# Patient Record
Sex: MALE | Race: WHITE | HISPANIC OR LATINO | URBAN - METROPOLITAN AREA
[De-identification: names, ages, dates, MRNs, and addresses within clinical notes are randomized per-mention and may not be internally consistent; named-entity substitution may affect disease eponyms.]

---

## 2017-09-27 ENCOUNTER — EMERGENCY (EMERGENCY)
Facility: HOSPITAL | Age: 37
LOS: 1 days | End: 2017-09-27

## 2017-12-05 ENCOUNTER — EMERGENCY (EMERGENCY)
Facility: HOSPITAL | Age: 37
LOS: 1 days | End: 2017-12-05
Payer: SELF-PAY

## 2017-12-05 PROCEDURE — 99284 EMERGENCY DEPT VISIT MOD MDM: CPT | Mod: 25

## 2017-12-05 PROCEDURE — 99053 MED SERV 10PM-8AM 24 HR FAC: CPT

## 2017-12-05 PROCEDURE — 99284 EMERGENCY DEPT VISIT MOD MDM: CPT

## 2018-10-24 ENCOUNTER — INPATIENT (INPATIENT)
Facility: HOSPITAL | Age: 38
LOS: 75 days | Discharge: NURSING, REHAB OR LONG-TERM CARE | DRG: 3 | End: 2019-01-08
Attending: SURGERY | Admitting: SURGERY
Payer: MEDICAID

## 2018-10-24 VITALS — HEART RATE: 124 BPM | OXYGEN SATURATION: 100 %

## 2018-10-24 DIAGNOSIS — S06.5X9A TRAUMATIC SUBDURAL HEMORRHAGE WITH LOSS OF CONSCIOUSNESS OF UNSPECIFIED DURATION, INITIAL ENCOUNTER: ICD-10-CM

## 2018-10-24 DIAGNOSIS — Z78.9 OTHER SPECIFIED HEALTH STATUS: Chronic | ICD-10-CM

## 2018-10-24 LAB
ABO RH CONFIRMATION: SIGNIFICANT CHANGE UP
ALBUMIN SERPL ELPH-MCNC: 2.8 G/DL — LOW (ref 3.3–5.2)
ALP SERPL-CCNC: 472 U/L — HIGH (ref 40–120)
ALT FLD-CCNC: 21 U/L — SIGNIFICANT CHANGE UP
ANION GAP SERPL CALC-SCNC: 12 MMOL/L — SIGNIFICANT CHANGE UP (ref 5–17)
ANION GAP SERPL CALC-SCNC: 12 MMOL/L — SIGNIFICANT CHANGE UP (ref 5–17)
ANION GAP SERPL CALC-SCNC: 16 MMOL/L — SIGNIFICANT CHANGE UP (ref 5–17)
ANISOCYTOSIS BLD QL: SLIGHT — SIGNIFICANT CHANGE UP
APTT BLD: 30.5 SEC — SIGNIFICANT CHANGE UP (ref 27.5–37.4)
APTT BLD: 32 SEC — SIGNIFICANT CHANGE UP (ref 27.5–37.4)
APTT BLD: 32.3 SEC — SIGNIFICANT CHANGE UP (ref 27.5–37.4)
AST SERPL-CCNC: 165 U/L — HIGH
BASOPHILS # BLD AUTO: 0 K/UL — SIGNIFICANT CHANGE UP (ref 0–0.2)
BILIRUB SERPL-MCNC: 2.6 MG/DL — HIGH (ref 0.4–2)
BLD GP AB SCN SERPL QL: SIGNIFICANT CHANGE UP
BUN SERPL-MCNC: 6 MG/DL — LOW (ref 8–20)
CALCIUM SERPL-MCNC: 6.9 MG/DL — LOW (ref 8.6–10.2)
CALCIUM SERPL-MCNC: 6.9 MG/DL — LOW (ref 8.6–10.2)
CALCIUM SERPL-MCNC: 7.7 MG/DL — LOW (ref 8.6–10.2)
CHLORIDE SERPL-SCNC: 100 MMOL/L — SIGNIFICANT CHANGE UP (ref 98–107)
CHLORIDE SERPL-SCNC: 88 MMOL/L — LOW (ref 98–107)
CHLORIDE SERPL-SCNC: 98 MMOL/L — SIGNIFICANT CHANGE UP (ref 98–107)
CO2 SERPL-SCNC: 21 MMOL/L — LOW (ref 22–29)
CO2 SERPL-SCNC: 23 MMOL/L — SIGNIFICANT CHANGE UP (ref 22–29)
CO2 SERPL-SCNC: 26 MMOL/L — SIGNIFICANT CHANGE UP (ref 22–29)
CREAT SERPL-MCNC: 0.4 MG/DL — LOW (ref 0.5–1.3)
CREAT SERPL-MCNC: 0.45 MG/DL — LOW (ref 0.5–1.3)
CREAT SERPL-MCNC: 0.46 MG/DL — LOW (ref 0.5–1.3)
EOSINOPHIL # BLD AUTO: 0 K/UL — SIGNIFICANT CHANGE UP (ref 0–0.5)
ETHANOL SERPL-MCNC: <10 MG/DL — SIGNIFICANT CHANGE UP
GAS PNL BLDA: SIGNIFICANT CHANGE UP
GAS PNL BLDA: SIGNIFICANT CHANGE UP
GLUCOSE SERPL-MCNC: 112 MG/DL — SIGNIFICANT CHANGE UP (ref 70–115)
GLUCOSE SERPL-MCNC: 116 MG/DL — HIGH (ref 70–115)
GLUCOSE SERPL-MCNC: 127 MG/DL — HIGH (ref 70–115)
HCT VFR BLD CALC: 22.6 % — LOW (ref 42–52)
HCT VFR BLD CALC: 25.1 % — LOW (ref 42–52)
HCT VFR BLD CALC: 25.6 % — LOW (ref 42–52)
HGB BLD-MCNC: 7.4 G/DL — LOW (ref 14–18)
HGB BLD-MCNC: 7.9 G/DL — LOW (ref 14–18)
HGB BLD-MCNC: 8.6 G/DL — LOW (ref 14–18)
HYPOCHROMIA BLD QL: SLIGHT — SIGNIFICANT CHANGE UP
INR BLD: 1.44 RATIO — HIGH (ref 0.88–1.16)
INR BLD: 1.47 RATIO — HIGH (ref 0.88–1.16)
INR BLD: 1.49 RATIO — HIGH (ref 0.88–1.16)
LIDOCAIN IGE QN: 22 U/L — SIGNIFICANT CHANGE UP (ref 22–51)
LYMPHOCYTES # BLD AUTO: 0.9 K/UL — LOW (ref 1–4.8)
LYMPHOCYTES # BLD AUTO: 1 % — LOW (ref 20–55)
LYMPHOCYTES # BLD AUTO: 3 % — LOW (ref 20–55)
LYMPHOCYTES # BLD AUTO: 5 % — LOW (ref 20–55)
MACROCYTES BLD QL: SLIGHT — SIGNIFICANT CHANGE UP
MAGNESIUM SERPL-MCNC: 1.1 MG/DL — LOW (ref 1.6–2.6)
MAGNESIUM SERPL-MCNC: 2 MG/DL — SIGNIFICANT CHANGE UP (ref 1.6–2.6)
MCHC RBC-ENTMCNC: 25.6 PG — LOW (ref 27–31)
MCHC RBC-ENTMCNC: 27.5 PG — SIGNIFICANT CHANGE UP (ref 27–31)
MCHC RBC-ENTMCNC: 27.9 PG — SIGNIFICANT CHANGE UP (ref 27–31)
MCHC RBC-ENTMCNC: 31.5 G/DL — LOW (ref 32–36)
MCHC RBC-ENTMCNC: 32.7 G/DL — SIGNIFICANT CHANGE UP (ref 32–36)
MCHC RBC-ENTMCNC: 33.6 G/DL — SIGNIFICANT CHANGE UP (ref 32–36)
MCV RBC AUTO: 81.5 FL — SIGNIFICANT CHANGE UP (ref 80–94)
MCV RBC AUTO: 83.1 FL — SIGNIFICANT CHANGE UP (ref 80–94)
MCV RBC AUTO: 84 FL — SIGNIFICANT CHANGE UP (ref 80–94)
MICROCYTES BLD QL: SLIGHT — SIGNIFICANT CHANGE UP
MONOCYTES # BLD AUTO: 1.8 K/UL — HIGH (ref 0–0.8)
MONOCYTES NFR BLD AUTO: 11 % — HIGH (ref 3–10)
MONOCYTES NFR BLD AUTO: 5 % — SIGNIFICANT CHANGE UP (ref 3–10)
MONOCYTES NFR BLD AUTO: 5 % — SIGNIFICANT CHANGE UP (ref 3–10)
NEUTROPHILS # BLD AUTO: 15.1 K/UL — HIGH (ref 1.8–8)
NEUTROPHILS NFR BLD AUTO: 83 % — HIGH (ref 37–73)
NEUTROPHILS NFR BLD AUTO: 84 % — HIGH (ref 37–73)
NEUTROPHILS NFR BLD AUTO: 89 % — HIGH (ref 37–73)
NEUTS BAND # BLD: 3 % — SIGNIFICANT CHANGE UP (ref 0–8)
OSMOLALITY SERPL: 300 MOSM/KG — SIGNIFICANT CHANGE UP (ref 280–300)
OVALOCYTES BLD QL SMEAR: SLIGHT — SIGNIFICANT CHANGE UP
PHOSPHATE SERPL-MCNC: 2.4 MG/DL — SIGNIFICANT CHANGE UP (ref 2.4–4.7)
PHOSPHATE SERPL-MCNC: 3.2 MG/DL — SIGNIFICANT CHANGE UP (ref 2.4–4.7)
PLAT MORPH BLD: NORMAL — SIGNIFICANT CHANGE UP
PLATELET # BLD AUTO: 117 K/UL — LOW (ref 150–400)
PLATELET # BLD AUTO: 156 K/UL — SIGNIFICANT CHANGE UP (ref 150–400)
PLATELET # BLD AUTO: 98 K/UL — LOW (ref 150–400)
POIKILOCYTOSIS BLD QL AUTO: SLIGHT — SIGNIFICANT CHANGE UP
POLYCHROMASIA BLD QL SMEAR: SLIGHT — SIGNIFICANT CHANGE UP
POTASSIUM SERPL-MCNC: 2.8 MMOL/L — CRITICAL LOW (ref 3.5–5.3)
POTASSIUM SERPL-MCNC: 3.2 MMOL/L — LOW (ref 3.5–5.3)
POTASSIUM SERPL-MCNC: 3.8 MMOL/L — SIGNIFICANT CHANGE UP (ref 3.5–5.3)
POTASSIUM SERPL-SCNC: 2.8 MMOL/L — CRITICAL LOW (ref 3.5–5.3)
POTASSIUM SERPL-SCNC: 3.2 MMOL/L — LOW (ref 3.5–5.3)
POTASSIUM SERPL-SCNC: 3.8 MMOL/L — SIGNIFICANT CHANGE UP (ref 3.5–5.3)
PROT SERPL-MCNC: 8.1 G/DL — SIGNIFICANT CHANGE UP (ref 6.6–8.7)
PROTHROM AB SERPL-ACNC: 16 SEC — HIGH (ref 9.8–12.7)
PROTHROM AB SERPL-ACNC: 16.3 SEC — HIGH (ref 9.8–12.7)
PROTHROM AB SERPL-ACNC: 16.5 SEC — HIGH (ref 9.8–12.7)
RBC # BLD: 2.69 M/UL — LOW (ref 4.6–6.2)
RBC # BLD: 3.08 M/UL — LOW (ref 4.6–6.2)
RBC # BLD: 3.08 M/UL — LOW (ref 4.6–6.2)
RBC # FLD: 16.3 % — HIGH (ref 11–15.6)
RBC # FLD: 16.6 % — HIGH (ref 11–15.6)
RBC # FLD: 16.7 % — HIGH (ref 11–15.6)
RBC BLD AUTO: ABNORMAL
SODIUM SERPL-SCNC: 130 MMOL/L — LOW (ref 135–145)
SODIUM SERPL-SCNC: 131 MMOL/L — LOW (ref 135–145)
SODIUM SERPL-SCNC: 135 MMOL/L — SIGNIFICANT CHANGE UP (ref 135–145)
TARGETS BLD QL SMEAR: SLIGHT — SIGNIFICANT CHANGE UP
WBC # BLD: 15.3 K/UL — HIGH (ref 4.8–10.8)
WBC # BLD: 15.7 K/UL — HIGH (ref 4.8–10.8)
WBC # BLD: 17.9 K/UL — HIGH (ref 4.8–10.8)
WBC # FLD AUTO: 15.3 K/UL — HIGH (ref 4.8–10.8)
WBC # FLD AUTO: 15.7 K/UL — HIGH (ref 4.8–10.8)
WBC # FLD AUTO: 17.9 K/UL — HIGH (ref 4.8–10.8)

## 2018-10-24 PROCEDURE — 61322 CRNEC/CRNOT DCMPRV W/O LOBEC: CPT | Mod: AS

## 2018-10-24 PROCEDURE — 72125 CT NECK SPINE W/O DYE: CPT | Mod: 26

## 2018-10-24 PROCEDURE — 99053 MED SERV 10PM-8AM 24 HR FAC: CPT

## 2018-10-24 PROCEDURE — 70450 CT HEAD/BRAIN W/O DYE: CPT | Mod: 26,59

## 2018-10-24 PROCEDURE — 36556 INSERT NON-TUNNEL CV CATH: CPT

## 2018-10-24 PROCEDURE — 70496 CT ANGIOGRAPHY HEAD: CPT | Mod: 26

## 2018-10-24 PROCEDURE — 99291 CRITICAL CARE FIRST HOUR: CPT

## 2018-10-24 PROCEDURE — 70498 CT ANGIOGRAPHY NECK: CPT | Mod: 26

## 2018-10-24 PROCEDURE — 61312 CRNEC/CRNOT STTL XDRL/SDRL: CPT

## 2018-10-24 PROCEDURE — 74176 CT ABD & PELVIS W/O CONTRAST: CPT | Mod: 26

## 2018-10-24 PROCEDURE — 99223 1ST HOSP IP/OBS HIGH 75: CPT

## 2018-10-24 PROCEDURE — 99285 EMERGENCY DEPT VISIT HI MDM: CPT | Mod: 25

## 2018-10-24 PROCEDURE — 95819 EEG AWAKE AND ASLEEP: CPT | Mod: 26

## 2018-10-24 PROCEDURE — 71045 X-RAY EXAM CHEST 1 VIEW: CPT | Mod: 26,76

## 2018-10-24 RX ORDER — FENTANYL CITRATE 50 UG/ML
100 INJECTION INTRAVENOUS ONCE
Qty: 0 | Refills: 0 | Status: DISCONTINUED | OUTPATIENT
Start: 2018-10-24 | End: 2018-10-24

## 2018-10-24 RX ORDER — SODIUM CHLORIDE 9 MG/ML
1000 INJECTION INTRAMUSCULAR; INTRAVENOUS; SUBCUTANEOUS ONCE
Qty: 0 | Refills: 0 | Status: COMPLETED | OUTPATIENT
Start: 2018-10-24 | End: 2018-10-24

## 2018-10-24 RX ORDER — FENTANYL CITRATE 50 UG/ML
0.5 INJECTION INTRAVENOUS
Qty: 2500 | Refills: 0 | Status: DISCONTINUED | OUTPATIENT
Start: 2018-10-24 | End: 2018-10-24

## 2018-10-24 RX ORDER — FENTANYL CITRATE 50 UG/ML
0.5 INJECTION INTRAVENOUS
Qty: 2500 | Refills: 0 | Status: DISCONTINUED | OUTPATIENT
Start: 2018-10-24 | End: 2018-10-28

## 2018-10-24 RX ORDER — POTASSIUM CHLORIDE 20 MEQ
20 PACKET (EA) ORAL
Qty: 0 | Refills: 0 | Status: COMPLETED | OUTPATIENT
Start: 2018-10-24 | End: 2018-10-24

## 2018-10-24 RX ORDER — LEVETIRACETAM 250 MG/1
750 TABLET, FILM COATED ORAL ONCE
Qty: 0 | Refills: 0 | Status: DISCONTINUED | OUTPATIENT
Start: 2018-10-24 | End: 2018-10-24

## 2018-10-24 RX ORDER — PROPOFOL 10 MG/ML
10 INJECTION, EMULSION INTRAVENOUS
Qty: 1000 | Refills: 0 | Status: DISCONTINUED | OUTPATIENT
Start: 2018-10-24 | End: 2018-10-28

## 2018-10-24 RX ORDER — CALCIUM GLUCONATE 100 MG/ML
2 VIAL (ML) INTRAVENOUS ONCE
Qty: 0 | Refills: 0 | Status: COMPLETED | OUTPATIENT
Start: 2018-10-24 | End: 2018-10-24

## 2018-10-24 RX ORDER — FENTANYL CITRATE 50 UG/ML
3.09 INJECTION INTRAVENOUS
Qty: 2500 | Refills: 0 | Status: DISCONTINUED | OUTPATIENT
Start: 2018-10-24 | End: 2018-10-24

## 2018-10-24 RX ORDER — FOLIC ACID 0.8 MG
1 TABLET ORAL DAILY
Qty: 0 | Refills: 0 | Status: DISCONTINUED | OUTPATIENT
Start: 2018-10-24 | End: 2018-10-28

## 2018-10-24 RX ORDER — SODIUM CHLORIDE 9 MG/ML
1000 INJECTION INTRAMUSCULAR; INTRAVENOUS; SUBCUTANEOUS
Qty: 0 | Refills: 0 | Status: DISCONTINUED | OUTPATIENT
Start: 2018-10-24 | End: 2018-10-26

## 2018-10-24 RX ORDER — MAGNESIUM SULFATE 500 MG/ML
4 VIAL (ML) INJECTION ONCE
Qty: 0 | Refills: 0 | Status: COMPLETED | OUTPATIENT
Start: 2018-10-24 | End: 2018-10-24

## 2018-10-24 RX ORDER — CEFAZOLIN SODIUM 1 G
2000 VIAL (EA) INJECTION ONCE
Qty: 0 | Refills: 0 | Status: DISCONTINUED | OUTPATIENT
Start: 2018-10-24 | End: 2018-10-24

## 2018-10-24 RX ORDER — NOREPINEPHRINE BITARTRATE/D5W 8 MG/250ML
0.2 PLASTIC BAG, INJECTION (ML) INTRAVENOUS
Qty: 16 | Refills: 0 | Status: DISCONTINUED | OUTPATIENT
Start: 2018-10-24 | End: 2018-10-25

## 2018-10-24 RX ORDER — FOSPHENYTOIN 50 MG/ML
1200 INJECTION INTRAMUSCULAR; INTRAVENOUS ONCE
Qty: 0 | Refills: 0 | Status: COMPLETED | OUTPATIENT
Start: 2018-10-24 | End: 2018-10-24

## 2018-10-24 RX ORDER — LEVETIRACETAM 250 MG/1
1000 TABLET, FILM COATED ORAL EVERY 12 HOURS
Qty: 0 | Refills: 0 | Status: DISCONTINUED | OUTPATIENT
Start: 2018-10-24 | End: 2018-10-28

## 2018-10-24 RX ORDER — LEVETIRACETAM 250 MG/1
1000 TABLET, FILM COATED ORAL EVERY 12 HOURS
Qty: 0 | Refills: 0 | Status: DISCONTINUED | OUTPATIENT
Start: 2018-10-24 | End: 2018-10-24

## 2018-10-24 RX ORDER — VASOPRESSIN 20 [USP'U]/ML
0.04 INJECTION INTRAVENOUS
Qty: 100 | Refills: 0 | Status: DISCONTINUED | OUTPATIENT
Start: 2018-10-24 | End: 2018-10-25

## 2018-10-24 RX ORDER — LEVETIRACETAM 250 MG/1
500 TABLET, FILM COATED ORAL EVERY 12 HOURS
Qty: 0 | Refills: 0 | Status: DISCONTINUED | OUTPATIENT
Start: 2018-10-24 | End: 2018-10-24

## 2018-10-24 RX ORDER — ACETAMINOPHEN 500 MG
1000 TABLET ORAL ONCE
Qty: 0 | Refills: 0 | Status: COMPLETED | OUTPATIENT
Start: 2018-10-24 | End: 2018-10-24

## 2018-10-24 RX ORDER — THIAMINE MONONITRATE (VIT B1) 100 MG
500 TABLET ORAL DAILY
Qty: 0 | Refills: 0 | Status: DISCONTINUED | OUTPATIENT
Start: 2018-10-24 | End: 2018-10-28

## 2018-10-24 RX ORDER — FOSPHENYTOIN 50 MG/ML
175 INJECTION INTRAMUSCULAR; INTRAVENOUS EVERY 12 HOURS
Qty: 0 | Refills: 0 | Status: DISCONTINUED | OUTPATIENT
Start: 2018-10-25 | End: 2018-10-29

## 2018-10-24 RX ORDER — PANTOPRAZOLE SODIUM 20 MG/1
40 TABLET, DELAYED RELEASE ORAL DAILY
Qty: 0 | Refills: 0 | Status: DISCONTINUED | OUTPATIENT
Start: 2018-10-24 | End: 2018-10-28

## 2018-10-24 RX ADMIN — FENTANYL CITRATE 100 MICROGRAM(S): 50 INJECTION INTRAVENOUS at 12:45

## 2018-10-24 RX ADMIN — Medication 50 MILLIEQUIVALENT(S): at 16:03

## 2018-10-24 RX ADMIN — SODIUM CHLORIDE 75 MILLILITER(S): 9 INJECTION INTRAMUSCULAR; INTRAVENOUS; SUBCUTANEOUS at 12:15

## 2018-10-24 RX ADMIN — FENTANYL CITRATE 100 MICROGRAM(S): 50 INJECTION INTRAVENOUS at 12:33

## 2018-10-24 RX ADMIN — Medication 400 MILLIGRAM(S): at 14:35

## 2018-10-24 RX ADMIN — SODIUM CHLORIDE 4000 MILLILITER(S): 9 INJECTION INTRAMUSCULAR; INTRAVENOUS; SUBCUTANEOUS at 12:41

## 2018-10-24 RX ADMIN — SODIUM CHLORIDE 1000 MILLILITER(S): 9 INJECTION INTRAMUSCULAR; INTRAVENOUS; SUBCUTANEOUS at 16:03

## 2018-10-24 RX ADMIN — LEVETIRACETAM 420 MILLIGRAM(S): 250 TABLET, FILM COATED ORAL at 12:48

## 2018-10-24 RX ADMIN — Medication 100 GRAM(S): at 14:34

## 2018-10-24 RX ADMIN — PANTOPRAZOLE SODIUM 40 MILLIGRAM(S): 20 TABLET, DELAYED RELEASE ORAL at 13:37

## 2018-10-24 RX ADMIN — FOSPHENYTOIN 98.67 MILLIGRAM(S) PE: 50 INJECTION INTRAMUSCULAR; INTRAVENOUS at 20:40

## 2018-10-24 RX ADMIN — LEVETIRACETAM 400 MILLIGRAM(S): 250 TABLET, FILM COATED ORAL at 14:45

## 2018-10-24 RX ADMIN — SODIUM CHLORIDE 6000 MILLILITER(S): 9 INJECTION INTRAMUSCULAR; INTRAVENOUS; SUBCUTANEOUS at 22:47

## 2018-10-24 RX ADMIN — FENTANYL CITRATE 0.81 MICROGRAM(S)/KG/HR: 50 INJECTION INTRAVENOUS at 12:20

## 2018-10-24 RX ADMIN — Medication 50 MILLIEQUIVALENT(S): at 17:20

## 2018-10-24 RX ADMIN — Medication 105 MILLIGRAM(S): at 12:48

## 2018-10-24 RX ADMIN — VASOPRESSIN 1.8 UNIT(S)/MIN: 20 INJECTION INTRAVENOUS at 12:16

## 2018-10-24 RX ADMIN — Medication 4 MILLIGRAM(S): at 14:36

## 2018-10-24 RX ADMIN — Medication 1 MILLIGRAM(S): at 13:24

## 2018-10-24 RX ADMIN — Medication 1000 MILLIGRAM(S): at 15:19

## 2018-10-24 RX ADMIN — Medication 200 GRAM(S): at 13:23

## 2018-10-24 RX ADMIN — Medication 50 MILLIEQUIVALENT(S): at 15:18

## 2018-10-24 RX ADMIN — Medication 4 MILLIGRAM(S): at 14:25

## 2018-10-24 NOTE — H&P ADULT - NSHPPHYSICALEXAM_GEN_ALL_CORE
PHYSICAL EXAM:   · CONSTITUTIONAL: Intubated, unresponsive to noxious stimuli  · ENMT: intubated orally, no lisa/rhinorrhea  · EYES: pupils dilated to 5 mm b/l and non-reactive  · CARDIAC: Tachycardic rate, regular rhythm.  Heart sounds S1, S2.  No murmur  · RESPIRATORY: Breath sounds clear with few scattered rhonchi  · GASTROINTESTINAL: Abdomen soft, non-tender, no guarding.  · MUSCULOSKELETAL: C-collar in place  · NEUROLOGICAL: + decerebrate posturing, unresponsive to noxious stimuli  · SKIN: Skin normal color for race, warm, dry and intact. No evidence of rash.  · HEME LYMPH: No adenopathy

## 2018-10-24 NOTE — PROVIDER CONTACT NOTE (EICU) - ACTION/TREATMENT ORDERED:
[facility] Referral # [id number]  [facility] Staff: Diana Neal

## 2018-10-24 NOTE — PROGRESS NOTE ADULT - SUBJECTIVE AND OBJECTIVE BOX
HPI:  HPI:  31 y/o M BIBEMS by air transport as a transfer from [facility] and with c-collar in place. Trauma A activation, patient was brought for further evaluation by trauma and neurosurgery team after being found unconscious and unresponsive on the sidewalk. Initial CT showed bilateral subdural hemorrhage and SAH. [place] ct's, no other injury reported. Transport vital signs were /88, , Sat 100%, GCS 3T. Patient presents intubated due to reported posturing and agonal respirations, EMS reports seizure activity enroute, sedated w 2 mg ativan and propofol for transport.     A: Protected, patient conversating  B: CTAB. Symmetrical chest rise  C: 2+ central (femoral) & peripheral pulses (Radial, DP)  D: GCS 15, MAEO, interacting. No yolanda disability noted  E: No gross deformities on primary exposure    Vitals:  Temp: 98.7  HR: 147  BP:138/76  SpO2: 100% (Mechanical Ventilation)  AC Rate 14  PEEP 8 FIO2 50%    CXR: Negative for evidence of hemo/pneumothorax  Rt Subclavian TLC was placed. (24 Oct 2018 04:21)        INTERVAL EVENTS:  POD#0 s/p evacuation of bilateral subdural hematomas and placement of left frontal EVD. Remains intubated and sent to ICU. Episodes of hypotension requiring levophed.     Vital Signs Last 24 Hrs  T(C): 39.1 (24 Oct 2018 12:12), Max: 39.1 (24 Oct 2018 12:12)  T(F): 102.4 (24 Oct 2018 12:12), Max: 102.4 (24 Oct 2018 12:12)  HR: 127 (24 Oct 2018 13:30) (124 - 130)  RR: 23 (24 Oct 2018 13:30) (22 - 37)  SpO2: 100% (24 Oct 2018 13:30) (94% - 100%)  PHYSICAL EXAM:  Patient is intubated with bilateral SD drains to gravity and b/l subgaleal drains to thumb suction and EVD at level of the tragus at zero. ICPs 2-3mmHg, zeroed at the bedside with EVD set to zero. No CSF drainage noted. B/l flaps are soft  R pupil 4mm, L pupil 3mm, reactive, rhythmic jerking of the tongue, minimal withdrawal with the LUE    LABS:             8.6    17.9  )-----------( 156                 25.6       135  |  100  |  6.0<L>  ----------------------------<  116<H>  3.2<L>   |  23.0  |  0.46<L>    Ca    6.9<L>       Phos  3.2       Mg     1.1        TPro  8.1  /  Alb  2.8<L>  /  TBili  2.6<H>  /  DBili  x   /  AST  165<H>  /  ALT  21  /  AlkPhos  472<H>     PT/INR - ( 24 Oct 2018 12:01 )   PT: 16.3 sec;   INR: 1.47 ratio    PTT:30.5 sec      RADIOLOGY & ADDITIONAL TESTS:  CT Head No Cont (10.24.18 @ 05:07)  IMPRESSION:  CT BRAIN: Bilateral holohemispheric subdural hemorrhages measuring 1.1 cm   on the rightand 1.4 cm on the left. There are increased hyperdense blood   products within these hemorrhages compared to the earlier examination.   The left convexity subdural hemorrhage has increased in size since the   earlier examination and there is new 9 mmmidline shift to the right.  Diffuse subarachnoid hemorrhage again noted. Poor visualization of the   basal cisterns.  Possible loss of gray-white differentiation along bilateral occipital   lobes and the right frontal lobe. Follow-up recommended.    Nondisplaced fracture along the left mastoid tip medial to the mastoid   air cells. Left posterior scalp soft tissue swelling.

## 2018-10-24 NOTE — H&P ADULT - ASSESSMENT
33 y/o M transfer from [place] w/ bilateral subdural and SAH. Unknown mechanism, was found unconscious and unresponsive in the sidewalk.    Plan:  - f/u images  - f/u labs  - Possible OR w/ Nsx for craniectomy  - Admit to SICU  - Pain control  - NPO, IVF

## 2018-10-24 NOTE — ED PROVIDER NOTE - MEDICAL DECISION MAKING DETAILS
Pt seen and eval by Trauma team with Dr. Hoang and by Neurosurgery/Dr. Fraser.  Treatment plan and orders as per Trauma and Neurosurgery

## 2018-10-24 NOTE — PROGRESS NOTE ADULT - SUBJECTIVE AND OBJECTIVE BOX
NSGY Attg:    see full consult    Briefly, the patient is estimated to be 32 years old.  He was transferred from [facility].  Per report of the [facility] attending, the patient was found down in the street.  No witnessed trauma.  Patient was brought in by EMS with a best GCS of 4.  Mannitol was given for dilation of the pupil.  The patient was transferred to [facility].    Exam on arrival:  GCS 4T  E1 V1T M2  no eye opening  right pupil 5 mm NR  left pupil 2-3 mm NR  + corneal bilaterally  no gag  extensor posturing to noxious stimuli    CT head from [facility] reviewed -- SAH and bilateral acute SDH with mass effect  CT c-spine -- no acute fracture or traumatic misalignment per my review; per flight medic, c-spine cleared at [facility] although patient has arrived in a cervical collar  CT C/A/P -- no apparently TLS fracture or misalignment per my review  official reports of the above are not available for review at this time    There is no known family available and no known family contact.  Given the patient's age, exam, and imaging findings, the plan is for bilateral craniotomy/craniectomy for evacuation of subdural hematoma for hopeful emergent life-saving intervention/hopeful prevention of progression of deficit.  Per discussion with Dr. Pathak (vascular neurosurgeon on call), any possible underlying vascular pathology can be addressed subsequent to the currently planned intervention.    A/P:  - repeat CT/CTA head  - additional initial work-up and supportive care per ACS  - to OR for bilateral craniotomy/craniectomy and subdural hematoma evacuation; will proceed without consent for hopeful life-saving intervention given no known family or family contact at this time

## 2018-10-24 NOTE — PROCEDURE NOTE - PROCEDURE
<<-----Click on this checkbox to enter Procedure Central line placement  10/24/2018    Active  [id number]

## 2018-10-24 NOTE — PROGRESS NOTE ADULT - ASSESSMENT
Approx 31 y/o male found down and transferred from [place]. Presenting GCS4 = E1M2V1. HCT with b/l holohemispheric subdural hemorrhages, worsened on repeat HCT, diffuse SAHs also worsened on repeat HCT with poor visualization of the basal cisterns and loss of gray-white differentiation of b/l occipital lobes and R frontal lobe.   Patient was emergently taken to the OR for evacuation of bilateral subdural hematomas and placement of left frontal EVD.     PLAN:  - Continue q1h neurochecks  - Monitor b/l SD drains to gravity and SG to thumb suction  - Keppra 500mg BID  - EVD set to 0 - ICP goals <[id number]  - SCDs for DVT ppx - hold APT/ACT  - HOB30  - Head CT when hemodynamically stable  - Discussed with attending

## 2018-10-24 NOTE — ED PROVIDER NOTE - OBJECTIVE STATEMENT
Approx 32's yo M presents to ED transferred [place] [facility] for further evalution/treatment by Trauma and Neurosurgery.  According to transfer sheet pt was "found face down on sidewalk, unconscious, unresponsive".  Pt with reported decerebrate posturing and agonal respirations.  Pt was intubated and found to have b/l subdural and SAH on CT scan. Pt with reported seizure activity enroute

## 2018-10-24 NOTE — CONSULT NOTE ADULT - SUBJECTIVE AND OBJECTIVE BOX
Source: EMS transport and patients chart  HPI: Patient is a 32y old  Male who is a transfer from [facility], patient found down on roadside unresponsive. Patient with bilateral SDH & SAH as per [place] ct's, no other injury reported.  Patient presents intubated, sedated w 2 mg ativan and propofol for transport.       PAST MEDICAL & SURGICAL HISTORY: UNOBTAINABLE AT THIS TIME      SOCIAL HISTORY: UNOBTAINABLE AT THIS TIME    FAMILY HISTORY: UNOBTAINABLE AT THIS TIME      ROS: UNOBTAINABLE AT THIS TIME      MEDICATIONS  (HOME): UNKOWN  fentaNYL    Injectable 100 MICROGram(s) IV Push Once  levETIRAcetam  IVPB 1000 milliGRAM(s) IV Intermittent every 12 hours    Allergies: Allergy Status Unknown    Vital Signs PENDING      PHYSICAL EXAM:  GENERAL: Intubated, unkempt , well-developed  HEAD:  Atraumatic, Normocephalic  EYES: EOMI, PERRLA, conjunctiva edema left eye, and sclera clear  ENMT: Intubated  NECK: c collar on,   NERVOUS SYSTEM:  intubated, sedated, pupils  equal 3mm, non reactive, +corneal reflexes, extends BLUE  to pain.   GCS=4      EYE=1, MOTOR=2, VERBAL=1  CHEST/LUNG: Clear bilaterally  HEART: Regular rate   ABDOMEN: Soft,  Nondistended; Bowel sounds present  EXTREMITIES:  2+ Peripheral Pulses, No clubbing, cyanosis, or edema  LYMPH: No lymphadenopathy noted  SKIN: No rashes or lesions      RADIOLOGY & ADDITIONAL STUDIES:    CT HEAD W CTA PENDING

## 2018-10-24 NOTE — ED ADULT TRIAGE NOTE - CHIEF COMPLAINT QUOTE
Transfer from [place]. BL Subdural hematoma with subarachnoid hemorrhage. [place] of 4 [place] [facility] [facility], propofol infusing.

## 2018-10-24 NOTE — BRIEF OPERATIVE NOTE - PROCEDURE
<<-----Click on this checkbox to enter Procedure Craniectomy  10/24/2018  bilateral, with evacuation of bilateral subdural hematomas and placement of left frontal EVD  Active  [id number]

## 2018-10-24 NOTE — ED ADULT NURSE NOTE - CHIEF COMPLAINT QUOTE
Transfer from [place]. BL Subdural hematoma with subarachnoid hemorrhage. GCS of 4 at [facility]. received Mannitol at [facility], propofol infusing.

## 2018-10-24 NOTE — CONSULT NOTE ADULT - SUBJECTIVE AND OBJECTIVE BOX
[facility]                                     [facility] [place]                                 [facility], [facility] Vincent                                  [address]                                        [place], [place], [address]                                             [phone]    CC:  TBI, seizure  HPI: The patient is a young man, estimated to be in 30's who was found unresponsive on sidewalk.  Per chart initial CT head showed SAH and b/l SDH.  [place] then [place] [facility] [place] [facility] for emergent neurosurgical treatment.  He was posturing with agonal respirations on arrival to [facility].  He was taken to the OR emergently by Dr. Fraser for SDH evacuation and decompression.  He had b/l craniectomy and EVD placed.  He has been having non- rhythmic movements of his feet and rhythmic movements of his tongue, causing concern for seizure/status epilepticus.  Neurology eval has been called    PAST MEDICAL & SURGICAL HISTORY:  Medical history unknown  Surgical history unknown      MEDICATIONS  (STANDING):  fentaNYL   Infusion 0.5 MICROgram(s)/kG/Hr (4.05 mL/Hr) IV Continuous <Continuous>  folic acid Injectable 1 milliGRAM(s) IV Push daily  levETIRAcetam  IVPB 1000 milliGRAM(s) IV Intermittent every 12 hours  norepinephrine Infusion 0.2 MICROgram(s)/kG/Min (15.188 mL/Hr) IV Continuous <Continuous>  pantoprazole  Injectable 40 milliGRAM(s) IV Push daily  potassium chloride  20 mEq/100 mL IVPB 20 milliEquivalent(s) IV Intermittent every 2 hours  propofol Infusion 10 MICROgram(s)/kG/Min (4.86 mL/Hr) IV Continuous <Continuous>  sodium chloride 0.9%. 1000 milliLiter(s) (75 mL/Hr) IV Continuous <Continuous>  thiamine IVPB 500 milliGRAM(s) IV Intermittent daily  vasopressin Infusion 0.04 Unit(s)/Min (2.4 mL/Hr) IV Continuous <Continuous>    MEDICATIONS  (PRN):      Allergies    Allergy Status Unknown    Intolerances        SOCIAL HISTORY:  unknown  FAMILY HISTORY:  unknown    ROS: intubated unresponsive    Vital Signs Last 24 Hrs  T(C): 38.4 (24 Oct 2018 16:00), Max: 39.1 (24 Oct 2018 12:12)  T(F): 101.1 (24 Oct 2018 16:00), Max: 102.4 (24 Oct 2018 12:12)  HR: 112 (24 Oct 2018 16:00) (112 - 130)  BP: --  BP(mean): --  RR: 28 (24 Oct 2018 16:00) (20 - 37)  SpO2: 99% (24 Oct 2018 16:00) (94% - 100%)      General: intubated on vent    Detailed Neurologic Exam:    Mental status: The patient is unresponsive, on fentanyl    Cranial nerves: Pupils left 1.5 mm NR right 4 mm minimally reactive. There is no visual response to threat.  Extraocular motion is not assessed (in cervical collar).  There is no corneal reflex b/l . No gag reflex    Motor: There is  b/l posturing (decorticate) to pinch and sternal rub.  There is triple flexion to nail bed pressure in feet    Sensation: Posturing, no grimace to nailbed pressure    Reflexes: absent throughout and plantar responses are extensor.    Cerebellar:  Unable to test finger to nose testing.    Gait : deferred    LABS:                         8.6    17.9  )-----------( 156      ( 24 Oct 2018 12:01 )             25.6       10-24    135  |  100  |  6.0<L>  ----------------------------<  116<H>  3.2<L>   |  23.0  |  0.46<L>    Ca    6.9<L>      24 Oct 2018 12:01  Phos  3.2     10-24  Mg     1.1     10-24    TPro  8.1  /  Alb  2.8<L>  /  TBili  2.6<H>  /  DBili  x   /  AST  165<H>  /  ALT  21  /  AlkPhos  472<H>  10-24      PT/INR - ( 24 Oct 2018 12:01 )   PT: 16.3 sec;   INR: 1.47 ratio         PTT - ( 24 Oct 2018 12:01 )  PTT:30.5 sec    RADIOLOGY & ADDITIONAL STUDIES (independently reviewed unless otherwise noted):  CT head- b/l SDH (+) SAH diffusely, likely ischemia in b/l occipital lobes and right frontal lobes   CT-A head and neck- no stenoses of ICAs or vertebrals, no stenoses in COW, no aneurysm or AVM

## 2018-10-24 NOTE — H&P ADULT - HISTORY OF PRESENT ILLNESS
33 y/o M BIBEMS by air transport as a transfer [place] [facility] [place] and with c-collar in place. Trauma A activation, patient was brought for further evaluation by trauma and neurosurgery team after being found unconscious and unresponsive on the sidewalk. Initial CT showed bilateral subdural hemorrhage and SAH. [place] ct's, no other injury reported. Transport vital signs were /88, , Sat 100%, GCS 3T. Patient presents intubated due to reported posturing and agonal respirations, EMS reports seizure activity enroute, sedated w 2 mg ativan and propofol for transport.     A: Protected, patient conversating  B: CTAB. Symmetrical chest rise  C: 2+ central (femoral) & peripheral pulses (Radial, DP)  D: GCS 15, MAEO, interacting. No yolanda disability noted  E: No gross deformities on primary exposure    Vitals:  Temp: 98.7  HR: 147  BP:138/76  SpO2: 100% (Mechanical Ventilation)  AC Rate 14  PEEP 8 FIO2 50%    CXR: Negative for evidence of hemo/pneumothorax  Rt Subclavian TLC was placed.

## 2018-10-25 DIAGNOSIS — R53.2 FUNCTIONAL QUADRIPLEGIA: ICD-10-CM

## 2018-10-25 DIAGNOSIS — S06.5X9A TRAUMATIC SUBDURAL HEMORRHAGE WITH LOSS OF CONSCIOUSNESS OF UNSPECIFIED DURATION, INITIAL ENCOUNTER: ICD-10-CM

## 2018-10-25 DIAGNOSIS — R56.1 POST TRAUMATIC SEIZURES: ICD-10-CM

## 2018-10-25 DIAGNOSIS — S06.9X9A UNSPECIFIED INTRACRANIAL INJURY WITH LOSS OF CONSCIOUSNESS OF UNSPECIFIED DURATION, INITIAL ENCOUNTER: ICD-10-CM

## 2018-10-25 DIAGNOSIS — R40.20 UNSPECIFIED COMA: ICD-10-CM

## 2018-10-25 DIAGNOSIS — J96.90 RESPIRATORY FAILURE, UNSPECIFIED, UNSPECIFIED WHETHER WITH HYPOXIA OR HYPERCAPNIA: ICD-10-CM

## 2018-10-25 LAB
ALBUMIN SERPL ELPH-MCNC: 1.7 G/DL — LOW (ref 3.3–5.2)
ALBUMIN SERPL ELPH-MCNC: 2 G/DL — LOW (ref 3.3–5.2)
ALBUMIN SERPL ELPH-MCNC: 2.1 G/DL — LOW (ref 3.3–5.2)
ALP SERPL-CCNC: 176 U/L — HIGH (ref 40–120)
ALP SERPL-CCNC: 230 U/L — HIGH (ref 40–120)
ALP SERPL-CCNC: 243 U/L — HIGH (ref 40–120)
ALT FLD-CCNC: 13 U/L — SIGNIFICANT CHANGE UP
ALT FLD-CCNC: 16 U/L — SIGNIFICANT CHANGE UP
ALT FLD-CCNC: 17 U/L — SIGNIFICANT CHANGE UP
ANION GAP SERPL CALC-SCNC: 10 MMOL/L — SIGNIFICANT CHANGE UP (ref 5–17)
ANION GAP SERPL CALC-SCNC: 11 MMOL/L — SIGNIFICANT CHANGE UP (ref 5–17)
ANION GAP SERPL CALC-SCNC: 11 MMOL/L — SIGNIFICANT CHANGE UP (ref 5–17)
ANION GAP SERPL CALC-SCNC: 12 MMOL/L — SIGNIFICANT CHANGE UP (ref 5–17)
APTT BLD: 29.3 SEC — SIGNIFICANT CHANGE UP (ref 27.5–37.4)
APTT BLD: 29.3 SEC — SIGNIFICANT CHANGE UP (ref 27.5–37.4)
APTT BLD: 29.4 SEC — SIGNIFICANT CHANGE UP (ref 27.5–37.4)
AST SERPL-CCNC: 74 U/L — HIGH
AST SERPL-CCNC: 82 U/L — HIGH
AST SERPL-CCNC: 94 U/L — HIGH
B PERT IGG+IGM PNL SER: CLEAR — SIGNIFICANT CHANGE UP
BASE EXCESS BLDA CALC-SCNC: 0.5 MMOL/L — SIGNIFICANT CHANGE UP (ref -2–2)
BASE EXCESS BLDA CALC-SCNC: 0.8 MMOL/L — SIGNIFICANT CHANGE UP (ref -2–2)
BASE EXCESS BLDV CALC-SCNC: 0.5 MMOL/L — SIGNIFICANT CHANGE UP (ref -2–2)
BASOPHILS # BLD AUTO: 0 K/UL — SIGNIFICANT CHANGE UP (ref 0–0.2)
BASOPHILS # BLD AUTO: 0 K/UL — SIGNIFICANT CHANGE UP (ref 0–0.2)
BASOPHILS NFR BLD AUTO: 0.1 % — SIGNIFICANT CHANGE UP (ref 0–2)
BASOPHILS NFR BLD AUTO: 0.1 % — SIGNIFICANT CHANGE UP (ref 0–2)
BILIRUB SERPL-MCNC: 1.6 MG/DL — SIGNIFICANT CHANGE UP (ref 0.4–2)
BILIRUB SERPL-MCNC: 2 MG/DL — SIGNIFICANT CHANGE UP (ref 0.4–2)
BILIRUB SERPL-MCNC: 2.1 MG/DL — HIGH (ref 0.4–2)
BLOOD GAS COMMENTS ARTERIAL: SIGNIFICANT CHANGE UP
BLOOD GAS COMMENTS ARTERIAL: SIGNIFICANT CHANGE UP
BUN SERPL-MCNC: 5 MG/DL — LOW (ref 8–20)
BUN SERPL-MCNC: 6 MG/DL — LOW (ref 8–20)
BUN SERPL-MCNC: 6 MG/DL — LOW (ref 8–20)
BUN SERPL-MCNC: 7 MG/DL — LOW (ref 8–20)
CALCIUM SERPL-MCNC: 5.1 MG/DL — CRITICAL LOW (ref 8.6–10.2)
CALCIUM SERPL-MCNC: 6.5 MG/DL — CRITICAL LOW (ref 8.6–10.2)
CALCIUM SERPL-MCNC: 6.6 MG/DL — LOW (ref 8.6–10.2)
CALCIUM SERPL-MCNC: 6.8 MG/DL — LOW (ref 8.6–10.2)
CHLORIDE SERPL-SCNC: 101 MMOL/L — SIGNIFICANT CHANGE UP (ref 98–107)
CHLORIDE SERPL-SCNC: 105 MMOL/L — SIGNIFICANT CHANGE UP (ref 98–107)
CHLORIDE SERPL-SCNC: 109 MMOL/L — HIGH (ref 98–107)
CHLORIDE SERPL-SCNC: 110 MMOL/L — HIGH (ref 98–107)
CO2 SERPL-SCNC: 17 MMOL/L — LOW (ref 22–29)
CO2 SERPL-SCNC: 21 MMOL/L — LOW (ref 22–29)
CO2 SERPL-SCNC: 22 MMOL/L — SIGNIFICANT CHANGE UP (ref 22–29)
CO2 SERPL-SCNC: 22 MMOL/L — SIGNIFICANT CHANGE UP (ref 22–29)
COLOR FLD: YELLOW
CREAT SERPL-MCNC: 0.24 MG/DL — LOW (ref 0.5–1.3)
CREAT SERPL-MCNC: 0.38 MG/DL — LOW (ref 0.5–1.3)
CREAT SERPL-MCNC: 0.38 MG/DL — LOW (ref 0.5–1.3)
CREAT SERPL-MCNC: 0.41 MG/DL — LOW (ref 0.5–1.3)
EOSINOPHIL # BLD AUTO: 0 K/UL — SIGNIFICANT CHANGE UP (ref 0–0.5)
EOSINOPHIL # BLD AUTO: 0 K/UL — SIGNIFICANT CHANGE UP (ref 0–0.5)
EOSINOPHIL NFR BLD AUTO: 0.1 % — SIGNIFICANT CHANGE UP (ref 0–5)
EOSINOPHIL NFR BLD AUTO: 0.1 % — SIGNIFICANT CHANGE UP (ref 0–5)
FLUID INTAKE SUBSTANCE CLASS: SIGNIFICANT CHANGE UP
FLUID SEGMENTED GRANULOCYTES: 16 % — SIGNIFICANT CHANGE UP
GAS PNL BLDA: SIGNIFICANT CHANGE UP
GAS PNL BLDA: SIGNIFICANT CHANGE UP
GAS PNL BLDV: SIGNIFICANT CHANGE UP
GLUCOSE SERPL-MCNC: 118 MG/DL — HIGH (ref 70–115)
GLUCOSE SERPL-MCNC: 126 MG/DL — HIGH (ref 70–115)
GLUCOSE SERPL-MCNC: 147 MG/DL — HIGH (ref 70–115)
GLUCOSE SERPL-MCNC: 99 MG/DL — SIGNIFICANT CHANGE UP (ref 70–115)
GRAM STN FLD: SIGNIFICANT CHANGE UP
HCO3 BLDA-SCNC: 25 MMOL/L — SIGNIFICANT CHANGE UP (ref 20–26)
HCO3 BLDA-SCNC: 25 MMOL/L — SIGNIFICANT CHANGE UP (ref 20–26)
HCO3 BLDV-SCNC: 25 MMOL/L — SIGNIFICANT CHANGE UP (ref 21–29)
HCT VFR BLD CALC: 21 % — CRITICAL LOW (ref 42–52)
HCT VFR BLD CALC: 21 % — CRITICAL LOW (ref 42–52)
HCT VFR BLD CALC: 24.9 % — LOW (ref 42–52)
HCT VFR BLD CALC: 25.1 % — LOW (ref 42–52)
HGB BLD-MCNC: 6.8 G/DL — CRITICAL LOW (ref 14–18)
HGB BLD-MCNC: 6.9 G/DL — CRITICAL LOW (ref 14–18)
HGB BLD-MCNC: 8.2 G/DL — LOW (ref 14–18)
HGB BLD-MCNC: 8.3 G/DL — LOW (ref 14–18)
HOROWITZ INDEX BLDA+IHG-RTO: 40 — SIGNIFICANT CHANGE UP
HOROWITZ INDEX BLDA+IHG-RTO: 40 — SIGNIFICANT CHANGE UP
INR BLD: 1.39 RATIO — HIGH (ref 0.88–1.16)
INR BLD: 1.4 RATIO — HIGH (ref 0.88–1.16)
INR BLD: 1.4 RATIO — HIGH (ref 0.88–1.16)
LYMPHOCYTES # BLD AUTO: 0.5 K/UL — LOW (ref 1–4.8)
LYMPHOCYTES # BLD AUTO: 0.5 K/UL — LOW (ref 1–4.8)
LYMPHOCYTES # BLD AUTO: 5.4 % — LOW (ref 20–55)
LYMPHOCYTES # BLD AUTO: 5.7 % — LOW (ref 20–55)
LYMPHOCYTES # FLD: 26 % — SIGNIFICANT CHANGE UP
MAGNESIUM SERPL-MCNC: 1.3 MG/DL — LOW (ref 1.6–2.6)
MAGNESIUM SERPL-MCNC: 1.8 MG/DL — SIGNIFICANT CHANGE UP (ref 1.8–2.6)
MAGNESIUM SERPL-MCNC: 1.9 MG/DL — SIGNIFICANT CHANGE UP (ref 1.6–2.6)
MAGNESIUM SERPL-MCNC: 2.2 MG/DL — SIGNIFICANT CHANGE UP (ref 1.8–2.6)
MCHC RBC-ENTMCNC: 27.5 PG — SIGNIFICANT CHANGE UP (ref 27–31)
MCHC RBC-ENTMCNC: 27.8 PG — SIGNIFICANT CHANGE UP (ref 27–31)
MCHC RBC-ENTMCNC: 28 PG — SIGNIFICANT CHANGE UP (ref 27–31)
MCHC RBC-ENTMCNC: 28.1 PG — SIGNIFICANT CHANGE UP (ref 27–31)
MCHC RBC-ENTMCNC: 32.4 G/DL — SIGNIFICANT CHANGE UP (ref 32–36)
MCHC RBC-ENTMCNC: 32.9 G/DL — SIGNIFICANT CHANGE UP (ref 32–36)
MCHC RBC-ENTMCNC: 32.9 G/DL — SIGNIFICANT CHANGE UP (ref 32–36)
MCHC RBC-ENTMCNC: 33.1 G/DL — SIGNIFICANT CHANGE UP (ref 32–36)
MCV RBC AUTO: 84.7 FL — SIGNIFICANT CHANGE UP (ref 80–94)
MCV RBC AUTO: 84.8 FL — SIGNIFICANT CHANGE UP (ref 80–94)
MCV RBC AUTO: 85 FL — SIGNIFICANT CHANGE UP (ref 80–94)
MCV RBC AUTO: 85.3 FL — SIGNIFICANT CHANGE UP (ref 80–94)
MESOTHL CELL # FLD: 7 % — SIGNIFICANT CHANGE UP
MONOCYTES # BLD AUTO: 1.3 K/UL — HIGH (ref 0–0.8)
MONOCYTES # BLD AUTO: 1.5 K/UL — HIGH (ref 0–0.8)
MONOCYTES NFR BLD AUTO: 14 % — HIGH (ref 3–10)
MONOCYTES NFR BLD AUTO: 16.6 % — HIGH (ref 3–10)
MONOS+MACROS # FLD: 51 % — SIGNIFICANT CHANGE UP
NEUTROPHILS # BLD AUTO: 7.2 K/UL — SIGNIFICANT CHANGE UP (ref 1.8–8)
NEUTROPHILS # BLD AUTO: 7.4 K/UL — SIGNIFICANT CHANGE UP (ref 1.8–8)
NEUTROPHILS NFR BLD AUTO: 77.5 % — HIGH (ref 37–73)
NEUTROPHILS NFR BLD AUTO: 79.9 % — HIGH (ref 37–73)
OSMOLALITY SERPL: 283 MOSM/KG — SIGNIFICANT CHANGE UP (ref 280–300)
OSMOLALITY SERPL: 287 MOSM/KG — SIGNIFICANT CHANGE UP (ref 280–300)
OSMOLALITY SERPL: 293 MOSM/KG — SIGNIFICANT CHANGE UP (ref 280–300)
OSMOLALITY SERPL: 297 MOSM/KG — SIGNIFICANT CHANGE UP (ref 280–300)
OSMOLALITY SERPL: 299 MOSM/KG — SIGNIFICANT CHANGE UP (ref 280–300)
PCO2 BLDA: 34 MMHG — LOW (ref 35–45)
PCO2 BLDA: 35 MMHG — SIGNIFICANT CHANGE UP (ref 35–45)
PCO2 BLDV: 38 MMHG — SIGNIFICANT CHANGE UP (ref 35–50)
PH BLDA: 7.45 — SIGNIFICANT CHANGE UP (ref 7.35–7.45)
PH BLDA: 7.47 — HIGH (ref 7.35–7.45)
PH BLDV: 7.42 — SIGNIFICANT CHANGE UP (ref 7.32–7.43)
PHOSPHATE SERPL-MCNC: 1.4 MG/DL — LOW (ref 2.4–4.7)
PHOSPHATE SERPL-MCNC: 1.9 MG/DL — LOW (ref 2.4–4.7)
PHOSPHATE SERPL-MCNC: 1.9 MG/DL — LOW (ref 2.4–4.7)
PHOSPHATE SERPL-MCNC: 2.5 MG/DL — SIGNIFICANT CHANGE UP (ref 2.4–4.7)
PLATELET # BLD AUTO: 124 K/UL — LOW (ref 150–400)
PLATELET # BLD AUTO: 91 K/UL — LOW (ref 150–400)
PLATELET # BLD AUTO: 94 K/UL — LOW (ref 150–400)
PLATELET # BLD AUTO: 96 K/UL — LOW (ref 150–400)
PO2 BLDA: 100 MMHG — SIGNIFICANT CHANGE UP (ref 83–108)
PO2 BLDA: 106 MMHG — SIGNIFICANT CHANGE UP (ref 83–108)
PO2 BLDV: 48 MMHG — HIGH (ref 25–45)
POTASSIUM SERPL-MCNC: 2.7 MMOL/L — CRITICAL LOW (ref 3.5–5.3)
POTASSIUM SERPL-MCNC: 3.4 MMOL/L — LOW (ref 3.5–5.3)
POTASSIUM SERPL-MCNC: 3.6 MMOL/L — SIGNIFICANT CHANGE UP (ref 3.5–5.3)
POTASSIUM SERPL-MCNC: 3.9 MMOL/L — SIGNIFICANT CHANGE UP (ref 3.5–5.3)
POTASSIUM SERPL-SCNC: 2.7 MMOL/L — CRITICAL LOW (ref 3.5–5.3)
POTASSIUM SERPL-SCNC: 3.4 MMOL/L — LOW (ref 3.5–5.3)
POTASSIUM SERPL-SCNC: 3.6 MMOL/L — SIGNIFICANT CHANGE UP (ref 3.5–5.3)
POTASSIUM SERPL-SCNC: 3.9 MMOL/L — SIGNIFICANT CHANGE UP (ref 3.5–5.3)
PROT SERPL-MCNC: 4.4 G/DL — LOW (ref 6.6–8.7)
PROT SERPL-MCNC: 5.7 G/DL — LOW (ref 6.6–8.7)
PROT SERPL-MCNC: 5.9 G/DL — LOW (ref 6.6–8.7)
PROTHROM AB SERPL-ACNC: 15.4 SEC — HIGH (ref 9.8–12.7)
PROTHROM AB SERPL-ACNC: 15.5 SEC — HIGH (ref 9.8–12.7)
PROTHROM AB SERPL-ACNC: 15.5 SEC — HIGH (ref 9.8–12.7)
RBC # BLD: 2.47 M/UL — LOW (ref 4.6–6.2)
RBC # BLD: 2.48 M/UL — LOW (ref 4.6–6.2)
RBC # BLD: 2.92 M/UL — LOW (ref 4.6–6.2)
RBC # BLD: 2.96 M/UL — LOW (ref 4.6–6.2)
RBC # FLD: 16.3 % — HIGH (ref 11–15.6)
RBC # FLD: 16.5 % — HIGH (ref 11–15.6)
RBC # FLD: 16.8 % — HIGH (ref 11–15.6)
RBC # FLD: 17 % — HIGH (ref 11–15.6)
RCV VOL RI: 540 /UL — HIGH (ref 0–5)
SAO2 % BLDA: 99 % — SIGNIFICANT CHANGE UP (ref 95–99)
SAO2 % BLDA: 99 % — SIGNIFICANT CHANGE UP (ref 95–99)
SAO2 % BLDV: 86 % — SIGNIFICANT CHANGE UP
SODIUM SERPL-SCNC: 135 MMOL/L — SIGNIFICANT CHANGE UP (ref 135–145)
SODIUM SERPL-SCNC: 137 MMOL/L — SIGNIFICANT CHANGE UP (ref 135–145)
SODIUM SERPL-SCNC: 138 MMOL/L — SIGNIFICANT CHANGE UP (ref 135–145)
SODIUM SERPL-SCNC: 141 MMOL/L — SIGNIFICANT CHANGE UP (ref 135–145)
SPECIMEN SOURCE: SIGNIFICANT CHANGE UP
TOTAL NUCLEATED CELL COUNT, BODY FLUID: 102 /UL — HIGH (ref 0–5)
TUBE TYPE: SIGNIFICANT CHANGE UP
WBC # BLD: 7.8 K/UL — SIGNIFICANT CHANGE UP (ref 4.8–10.8)
WBC # BLD: 9.1 K/UL — SIGNIFICANT CHANGE UP (ref 4.8–10.8)
WBC # BLD: 9.2 K/UL — SIGNIFICANT CHANGE UP (ref 4.8–10.8)
WBC # BLD: 9.3 K/UL — SIGNIFICANT CHANGE UP (ref 4.8–10.8)
WBC # FLD AUTO: 7.8 K/UL — SIGNIFICANT CHANGE UP (ref 4.8–10.8)
WBC # FLD AUTO: 9.1 K/UL — SIGNIFICANT CHANGE UP (ref 4.8–10.8)
WBC # FLD AUTO: 9.2 K/UL — SIGNIFICANT CHANGE UP (ref 4.8–10.8)
WBC # FLD AUTO: 9.3 K/UL — SIGNIFICANT CHANGE UP (ref 4.8–10.8)

## 2018-10-25 PROCEDURE — 99232 SBSQ HOSP IP/OBS MODERATE 35: CPT

## 2018-10-25 PROCEDURE — 49084 PERITONEAL LAVAGE: CPT

## 2018-10-25 PROCEDURE — 95819 EEG AWAKE AND ASLEEP: CPT | Mod: 26

## 2018-10-25 RX ORDER — CHLORHEXIDINE GLUCONATE 213 G/1000ML
1 SOLUTION TOPICAL DAILY
Qty: 0 | Refills: 0 | Status: DISCONTINUED | OUTPATIENT
Start: 2018-10-25 | End: 2018-11-16

## 2018-10-25 RX ORDER — SODIUM CHLORIDE 5 G/100ML
150 INJECTION, SOLUTION INTRAVENOUS
Qty: 0 | Refills: 0 | Status: DISCONTINUED | OUTPATIENT
Start: 2018-10-25 | End: 2018-10-26

## 2018-10-25 RX ORDER — MAGNESIUM SULFATE 500 MG/ML
2 VIAL (ML) INJECTION ONCE
Qty: 0 | Refills: 0 | Status: COMPLETED | OUTPATIENT
Start: 2018-10-25 | End: 2018-10-25

## 2018-10-25 RX ORDER — CALCIUM GLUCONATE 100 MG/ML
2 VIAL (ML) INTRAVENOUS ONCE
Qty: 0 | Refills: 0 | Status: COMPLETED | OUTPATIENT
Start: 2018-10-25 | End: 2018-10-25

## 2018-10-25 RX ORDER — POTASSIUM PHOSPHATE, MONOBASIC POTASSIUM PHOSPHATE, DIBASIC 236; 224 MG/ML; MG/ML
30 INJECTION, SOLUTION INTRAVENOUS ONCE
Qty: 0 | Refills: 0 | Status: COMPLETED | OUTPATIENT
Start: 2018-10-25 | End: 2018-10-25

## 2018-10-25 RX ORDER — SODIUM CHLORIDE 5 G/100ML
500 INJECTION, SOLUTION INTRAVENOUS
Qty: 0 | Refills: 0 | Status: DISCONTINUED | OUTPATIENT
Start: 2018-10-25 | End: 2018-10-27

## 2018-10-25 RX ORDER — POTASSIUM CHLORIDE 20 MEQ
20 PACKET (EA) ORAL
Qty: 0 | Refills: 0 | Status: COMPLETED | OUTPATIENT
Start: 2018-10-25 | End: 2018-10-25

## 2018-10-25 RX ADMIN — SODIUM CHLORIDE 100 MILLILITER(S): 9 INJECTION INTRAMUSCULAR; INTRAVENOUS; SUBCUTANEOUS at 10:33

## 2018-10-25 RX ADMIN — SODIUM CHLORIDE 75 MILLILITER(S): 9 INJECTION INTRAMUSCULAR; INTRAVENOUS; SUBCUTANEOUS at 06:13

## 2018-10-25 RX ADMIN — Medication 50 GRAM(S): at 20:28

## 2018-10-25 RX ADMIN — FOSPHENYTOIN 107 MILLIGRAM(S) PE: 50 INJECTION INTRAMUSCULAR; INTRAVENOUS at 06:13

## 2018-10-25 RX ADMIN — POTASSIUM PHOSPHATE, MONOBASIC POTASSIUM PHOSPHATE, DIBASIC 83.33 MILLIMOLE(S): 236; 224 INJECTION, SOLUTION INTRAVENOUS at 21:34

## 2018-10-25 RX ADMIN — LEVETIRACETAM 400 MILLIGRAM(S): 250 TABLET, FILM COATED ORAL at 06:13

## 2018-10-25 RX ADMIN — PANTOPRAZOLE SODIUM 40 MILLIGRAM(S): 20 TABLET, DELAYED RELEASE ORAL at 10:33

## 2018-10-25 RX ADMIN — FOSPHENYTOIN 107 MILLIGRAM(S) PE: 50 INJECTION INTRAMUSCULAR; INTRAVENOUS at 18:13

## 2018-10-25 RX ADMIN — Medication 105 MILLIGRAM(S): at 10:33

## 2018-10-25 RX ADMIN — LEVETIRACETAM 400 MILLIGRAM(S): 250 TABLET, FILM COATED ORAL at 17:44

## 2018-10-25 RX ADMIN — Medication 50 MILLIEQUIVALENT(S): at 07:04

## 2018-10-25 RX ADMIN — SODIUM CHLORIDE 30 MILLILITER(S): 5 INJECTION, SOLUTION INTRAVENOUS at 12:12

## 2018-10-25 RX ADMIN — Medication 50 MILLIEQUIVALENT(S): at 09:19

## 2018-10-25 RX ADMIN — Medication 200 GRAM(S): at 20:47

## 2018-10-25 RX ADMIN — PROPOFOL 4.86 MICROGRAM(S)/KG/MIN: 10 INJECTION, EMULSION INTRAVENOUS at 03:10

## 2018-10-25 RX ADMIN — Medication 50 GRAM(S): at 06:50

## 2018-10-25 RX ADMIN — Medication 50 MILLIEQUIVALENT(S): at 08:00

## 2018-10-25 RX ADMIN — POTASSIUM PHOSPHATE, MONOBASIC POTASSIUM PHOSPHATE, DIBASIC 83.33 MILLIMOLE(S): 236; 224 INJECTION, SOLUTION INTRAVENOUS at 07:15

## 2018-10-25 RX ADMIN — CHLORHEXIDINE GLUCONATE 1 APPLICATION(S): 213 SOLUTION TOPICAL at 10:23

## 2018-10-25 RX ADMIN — Medication 1 MILLIGRAM(S): at 17:49

## 2018-10-25 NOTE — PROGRESS NOTE ADULT - ASSESSMENT
The patient is a 32y Male who is followed by neurology because of TBI/possible seizure    TBI  s/p decompressive b/l hemicraniectomy and EVD  significant amount of intracranial hemorrhage  No significant improvement in neuro exam  EVD management per Neurosurgery    possible seizure  to try EEG, but expect artifacts due to surgery  rhythmic tongue movements persist, random foot movement has stopped  unable to tolerate propofol because of hypotension  continue fosphenytoin, but not clearly seizures    poor prognosis for meaningful recovery  d/w Marissa Thibodeaux and Evelio    will follow with you    Martell Mendoza MD PhD   [id number]

## 2018-10-25 NOTE — PROCEDURE NOTE - ADDITIONAL PROCEDURE DETAILS
Diagnostic peritoneal lavage performed.  Pt with free fluid and signs of potential bowel injury on CT.  Hypotensive this AM.  Procedure to determine need for ex lap.  The lower midline abdomen was prepped with chlorhexidine and draped.  The are 1cm infraumbilical was anesthetized with 1% lidocaine.  A 1cm stab incision was made.  A catheter over needle was advanced trough the incision until loss of resistance was felt and a small amount of fluid was obtained.  The catheter was then advanced off the needle and the needle removed.  A guidewire was passed through the catheter into the peritoneum without resistance.  The catheter was removed.  A larger plastic catheter was then placed into the peritoneum over the guidewire and the guidewire was removed whole and intact.  1L of warm NSS was infused through the catheter.  The peritoneum was then agitated with external abdominal pressure.  1L of peritoneal fluid was then drained.  The fluid was serous but appeared moderately cloudy.

## 2018-10-25 NOTE — PROGRESS NOTE ADULT - ATTENDING COMMENTS
NSGY Attg:    see above    patient seen and examined    GCS 4T  E1 V1T M2  no eye opening  R pupil 5 mm NR  L pupil 3 mm NR  assessment of corneal limited secondary to periorbital edema  extends to noxious  flaps full, soft bilaterally    agree with plan as above  can open EVD if sustained ICPs  CT head when stable

## 2018-10-25 NOTE — PROGRESS NOTE ADULT - SUBJECTIVE AND OBJECTIVE BOX
HISTORY  32y Male    24 HOUR EVENTS:    SUBJECTIVE/ROS:  [ ] A ten-point review of systems was otherwise negative except as noted.  [ ] Due to altered mental status/intubation, subjective information were not able to be obtained from the patient. History was obtained, to the extent possible, from review of the chart and collateral sources of information.      NEURO  RASS:     GCS:     CAM ICU:  Exam:   Meds: fentaNYL   Infusion 0.5 MICROgram(s)/kG/Hr IV Continuous <Continuous>  fosphenytoin IVPB 175 milliGRAM(s) PE IV Intermittent every 12 hours  levETIRAcetam  IVPB 1000 milliGRAM(s) IV Intermittent every 12 hours  propofol Infusion 10 MICROgram(s)/kG/Min IV Continuous <Continuous>    [x] Adequacy of sedation and pain control has been assessed and adjusted      RESPIRATORY  RR: 27 (10-25-18 @ 07:00) (20 - 37)  SpO2: 98% (10-25-18 @ 07:00) (94% - 100%)  Wt(kg): --  Exam: unlabored, clear to auscultation bilaterally  Mechanical Ventilation: Mode: AC/ CMV (Assist Control/ Continuous Mandatory Ventilation), RR (machine): 14, RR (patient): 24, TV (machine): 400, FiO2: 40, PEEP: 8, MAP: 9, PIP: 18  ABG - ( 25 Oct 2018 04:13 )  pH: 7.47  /  pCO2: 34    /  pO2: 100   / HCO3: 25    / Base Excess: 0.8   /  SaO2: 99      Lactate: x                [ ] Extubation Readiness Assessed  Meds:       CARDIOVASCULAR  HR: 115 (10-25-18 @ 07:00) (103 - 130)  BP: --  BP(mean): --  ABP: 129/51 (10-25-18 @ 07:00) (93/52 - 143/74)  ABP(mean): 73 (10-25-18 @ 07:00) (66 - 99)  Wt(kg): --  CVP(cm H2O): --  VBG - ( 24 Oct 2018 04:45 )  pH: x     /  pCO2: x     /  pO2: x     / HCO3: x     / Base Excess: x     /  SaO2: x      Lactate: 2.5                Exam:  Cardiac Rhythm:  Perfusion     [ ]Adequate   [ ]Inadequate  Mentation   [ ]Normal       [ ]Reduced  Extremities  [ ]Warm         [ ]Cool  Volume Status [ ]Hypervolemic [ ]Euvolemic [ ]Hypovolemic  Meds:       GI/NUTRITION  Exam:  Diet:  Meds: pantoprazole  Injectable 40 milliGRAM(s) IV Push daily      GENITOURINARY  I&O's Detail    10-24 @ 07:01  -  10-25 @ 07:00  --------------------------------------------------------  IN:    fentaNYL  Infusion: 15.2 mL    fentaNYL  Infusion: 65.6 mL    norepinephrine Infusion: 152.2 mL    propofol Infusion: 126.8 mL    Sodium Chloride 0.9% IV Bolus: 3000 mL    sodium chloride 0.9%.: 1500 mL    Solution: 300 mL    Solution: 400 mL    Solution: 150 mL    Solution: 100 mL    Solution: 100 mL    Solution: 100 mL    Solution: 100 mL    vasopressin Infusion: 8.4 mL    vasopressin Infusion: 25.5 mL  Total IN: 6143.7 mL    OUT:    Drain: 200 mL    Drain: 5 mL    Indwelling Catheter - Urethral: 1770 mL    Nasoenteral Tube: 450 mL  Total OUT: 2425 mL    Total NET: 3718.7 mL          10-25    138  |  110<H>  |  5.0<L>  ----------------------------<  99  2.7<LL>   |  17.0<L>  |  0.24<L>    Ca    5.1<LL>      25 Oct 2018 05:20  Phos  1.4     10-25  Mg     1.3     10-25    TPro  4.4<L>  /  Alb  1.7<L>  /  TBili  1.6  /  DBili  x   /  AST  74<H>  /  ALT  13  /  AlkPhos  176<H>  10-25    [ ] Apodaca catheter, indication: N/A  Meds: folic acid Injectable 1 milliGRAM(s) IV Push daily  potassium chloride  20 mEq/100 mL IVPB 20 milliEquivalent(s) IV Intermittent every 2 hours  sodium chloride 0.9%. 1000 milliLiter(s) IV Continuous <Continuous>  thiamine IVPB 500 milliGRAM(s) IV Intermittent daily        HEMATOLOGIC  Meds:   [x] VTE Prophylaxis                        6.9    9.2   )-----------( 124      ( 25 Oct 2018 05:20 )             21.0     PT/INR - ( 24 Oct 2018 18:57 )   PT: 16.0 sec;   INR: 1.44 ratio         PTT - ( 24 Oct 2018 18:57 )  PTT:32.0 sec  Transfusion     [ ] PRBC   [ ] Platelets   [ ] FFP   [ ] Cryoprecipitate      INFECTIOUS DISEASES  T(C): 38.1 (10-25-18 @ 04:00), Max: 39.1 (10-24-18 @ 12:12)  Wt(kg): --  WBC Count: 9.2 K/uL (10-25 @ 05:20)  WBC Count: 15.3 K/uL (10-24 @ 18:57)  WBC Count: 17.9 K/uL (10-24 @ 12:01)    Recent Cultures:    Meds:       ENDOCRINE  Capillary Blood Glucose    Meds:       ACCESS DEVICES:  [ ] Peripheral IV  [ ] Central Venous Line	[ ] R	[ ] L	[ ] IJ	[ ] Fem	[ ] SC	Placed:   [ ] Arterial Line		[ ] R	[ ] L	[ ] Fem	[ ] Rad	[ ] Ax	Placed:   [ ] PICC:					[ ] Mediport  [ ] Urinary Catheter, Date Placed:   [ ] Necessity of urinary, arterial, and venous catheters discussed    OTHER MEDICATIONS:  chlorhexidine 2% Cloths 1 Application(s) Topical daily      CODE STATUS:     IMAGING: HISTORY  32y Male    24 HOUR EVENTS:  Patient continues with poor mental status. GCS4T. He was transitioned off vasopressors overnight, but turned back on again early morning. He received PRBC x1 for anemia and hypotension. A DLP was performed and negative. Likely Ascites. Cervical collar cleared by ICU team this AM as CT scan with no identified traumatic injury and risk of maintaining C Collar outweighs benefit; C Collar could be a hindrance to brain perfusion at this time. Drains flushed overnight by Neurosurgery for elevated ICPs (improved).     SUBJECTIVE/ROS:  [ ] A ten-point review of systems was otherwise negative except as noted.  [x ] Due to altered mental status/intubation, subjective information were not able to be obtained from the patient. History was obtained, to the extent possible, from review of the chart and collateral sources of information.      NEURO  RASS:  -4   GCS: 4T    CAM ICU: NA  Exam: Decerebrate posturing. R Pupil 4mm nonreactive, L Pupil 2mm nonreactive. no gag, + Cough. overbreathing ventilator.   Meds: fentaNYL   Infusion 0.5 MICROgram(s)/kG/Hr IV Continuous <Continuous>  fosphenytoin IVPB 175 milliGRAM(s) PE IV Intermittent every 12 hours  levETIRAcetam  IVPB 1000 milliGRAM(s) IV Intermittent every 12 hours  propofol Infusion 10 MICROgram(s)/kG/Min IV Continuous <Continuous>    [x] Adequacy of sedation and pain control has been assessed and adjusted      RESPIRATORY  RR: 27 (10-25-18 @ 07:00) (20 - 37)  SpO2: 98% (10-25-18 @ 07:00) (94% - 100%)  Wt(kg): --  Exam: unlabored, clear to auscultation bilaterally  Mechanical Ventilation: Mode: AC/ CMV (Assist Control/ Continuous Mandatory Ventilation), RR (machine): 14, RR (patient): 24, TV (machine): 400, FiO2: 40, PEEP: 8, MAP: 9, PIP: 18  ABG - ( 25 Oct 2018 04:13 )  pH: 7.47  /  pCO2: 34    /  pO2: 100   / HCO3: 25    / Base Excess: 0.8   /  SaO2: 99      Lactate: x        [NA ] Extubation Readiness Assessed  Meds:     CARDIOVASCULAR  HR: 115 (10-25-18 @ 07:00) (103 - 130)  BP: --  BP(mean): --  ABP: 129/51 (10-25-18 @ 07:00) (93/52 - 143/74)  ABP(mean): 73 (10-25-18 @ 07:00) (66 - 99)  Wt(kg): --  CVP(cm H2O): --  VBG - ( 24 Oct 2018 04:45 )  pH: x     /  pCO2: x     /  pO2: x     / HCO3: x     / Base Excess: x     /  SaO2: x      Lactate: 2.5      Exam:  Cardiac Rhythm: S1 S2  Perfusion     [ x]Adequate   [ ]Inadequate  Mentation   [ ]Normal       [x ]Reduced  Extremities  [x ]Warm         [ ]Cool  Volume Status [ ]Hypervolemic [x ]Euvolemic [ ]Hypovolemic  Meds:       GI/NUTRITION  Exam: Soft ND  Diet: NPO  Meds: pantoprazole  Injectable 40 milliGRAM(s) IV Push daily      GENITOURINARY  I&O's Detail    10-24 @ 07:01  -  10-25 @ 07:00  --------------------------------------------------------  IN:    fentaNYL  Infusion: 15.2 mL    fentaNYL  Infusion: 65.6 mL    norepinephrine Infusion: 152.2 mL    propofol Infusion: 126.8 mL    Sodium Chloride 0.9% IV Bolus: 3000 mL    sodium chloride 0.9%.: 1500 mL    Solution: 300 mL    Solution: 400 mL    Solution: 150 mL    Solution: 100 mL    Solution: 100 mL    Solution: 100 mL    Solution: 100 mL    vasopressin Infusion: 8.4 mL    vasopressin Infusion: 25.5 mL  Total IN: 6143.7 mL    OUT:    Drain: 200 mL    Drain: 5 mL    Indwelling Catheter - Urethral: 1770 mL    Nasoenteral Tube: 450 mL  Total OUT: 2425 mL    Total NET: 3718.7 mL          10-25    138  |  110<H>  |  5.0<L>  ----------------------------<  99  2.7<LL>   |  17.0<L>  |  0.24<L>    Ca    5.1<LL>      25 Oct 2018 05:20  Phos  1.4     10-25  Mg     1.3     10-25    TPro  4.4<L>  /  Alb  1.7<L>  /  TBili  1.6  /  DBili  x   /  AST  74<H>  /  ALT  13  /  AlkPhos  176<H>  10-25    [ x] Apodaca catheter, indication: critically ill  Meds: folic acid Injectable 1 milliGRAM(s) IV Push daily  potassium chloride  20 mEq/100 mL IVPB 20 milliEquivalent(s) IV Intermittent every 2 hours  sodium chloride 0.9%. 1000 milliLiter(s) IV Continuous <Continuous>  thiamine IVPB 500 milliGRAM(s) IV Intermittent daily        HEMATOLOGIC  Meds:   [x] VTE Prophylaxis                        6.9    9.2   )-----------( 124      ( 25 Oct 2018 05:20 )             21.0     PT/INR - ( 24 Oct 2018 18:57 )   PT: 16.0 sec;   INR: 1.44 ratio         PTT - ( 24 Oct 2018 18:57 )  PTT:32.0 sec  Transfusion     [ ] PRBC   [ ] Platelets   [ ] FFP   [ ] Cryoprecipitate      INFECTIOUS DISEASES  T(C): 38.1 (10-25-18 @ 04:00), Max: 39.1 (10-24-18 @ 12:12)  Wt(kg): --  WBC Count: 9.2 K/uL (10-25 @ 05:20)  WBC Count: 15.3 K/uL (10-24 @ 18:57)  WBC Count: 17.9 K/uL (10-24 @ 12:01)    Recent Cultures:    Meds:       ENDOCRINE  Capillary Blood Glucose    Meds:       ACCESS DEVICES:  [ ] Peripheral IV  [x ] Central Venous Line	[ ] R	[ ] L	[ ] IJ	[ ] Fem	[ ] SC	Placed:   [ ] Arterial Line		[ ] R	[ ] L	[ ] Fem	[ ] Rad	[ ] Ax	Placed:   [ ] PICC:					[ ] Mediport  [ x] Urinary Catheter, Date Placed:   [ x] Necessity of urinary, arterial, and venous catheters discussed    OTHER MEDICATIONS:  chlorhexidine 2% Cloths 1 Application(s) Topical daily      CODE STATUS: Full    IMAGING:

## 2018-10-25 NOTE — PROGRESS NOTE ADULT - SUBJECTIVE AND OBJECTIVE BOX
[facility]                                     Neurology at [place]                                 Kelly Toth, & Vincent                                  [address]                                        [place], [place], [address]                                             [phone]    CC:  TBI, seizure  HPI: The patient is a young man, estimated to be in 30's who was found unresponsive on sidewalk.  Per chart initial CT head showed SAH and b/l SDH.  He was then transferred from [facility] to [facility] for emergent neurosurgical treatment.  He was posturing with agonal respirations on arrival to [facility].  He was taken to the OR emergently by Dr. Fraser for SDH evacuation and decompression.  He had b/l craniectomy and EVD placed.  He has been having non- rhythmic movements of his feet and rhythmic movements of his tongue, causing concern for seizure/status epilepticus.  Neurology eval has been called    Interval history: no neurological change    ROS neurology: sedated, unresponsive    MEDICATIONS  (STANDING):  chlorhexidine 2% Cloths 1 Application(s) Topical daily  fentaNYL   Infusion 0.5 MICROgram(s)/kG/Hr (4.05 mL/Hr) IV Continuous <Continuous>  folic acid Injectable 1 milliGRAM(s) IV Push daily  fosphenytoin IVPB 175 milliGRAM(s) PE IV Intermittent every 12 hours  levETIRAcetam  IVPB 1000 milliGRAM(s) IV Intermittent every 12 hours  pantoprazole  Injectable 40 milliGRAM(s) IV Push daily  propofol Infusion 10 MICROgram(s)/kG/Min (4.86 mL/Hr) IV Continuous <Continuous>  sodium chloride 0.9%. 1000 milliLiter(s) (100 mL/Hr) IV Continuous <Continuous>  sodium chloride 3%. 500 milliLiter(s) (30 mL/Hr) IV Continuous <Continuous>  thiamine IVPB 500 milliGRAM(s) IV Intermittent daily    MEDICATIONS  (PRN):      Vital Signs Last 24 Hrs  T(C): 38 (25 Oct 2018 10:00), Max: 38.9 (24 Oct 2018 15:00)  T(F): 100.4 (25 Oct 2018 10:00), Max: 102 (24 Oct 2018 15:00)  HR: 105 (25 Oct 2018 14:00) (103 - 128)  BP: --  BP(mean): --  RR: 25 (25 Oct 2018 14:00) (20 - 37)  SpO2: 98% (25 Oct 2018 14:00) (96% - 100%)      General: intubated on vent    Detailed Neurologic Exam:    Mental status: The patient is unresponsive, on propofol    Cranial nerves: Pupils left 1.5 mm NR right 4 mm not reactive. There is no visual response to threat.  Extraocular motion is not assessed.  There is no corneal reflex b/l . No gag reflex    Motor: There is  b/l posturing (decorticate) to pinch and sternal rub.  There is triple flexion to nail bed pressure in feet    Sensation: Posturing, no grimace to nailbed pressure    Reflexes: absent throughout and plantar responses are extensor.    Cerebellar:  Unable to test finger to nose testing.    Gait : deferred    LABS:                           8.3    9.1   )-----------( 91       ( 25 Oct 2018 14:03 )             25.1     10-25    137  |  105  |  6.0<L>  ----------------------------<  147<H>  3.9   |  22.0  |  0.41<L>    Ca    6.6<L>      25 Oct 2018 14:03  Phos  2.5     10-25  Mg     1.8     10-25    TPro  5.9<L>  /  Alb  2.1<L>  /  TBili  2.1<H>  /  DBili  x   /  AST  94<H>  /  ALT  17  /  AlkPhos  230<H>  10-25    LIVER FUNCTIONS - ( 25 Oct 2018 08:30 )  Alb: 2.1 g/dL / Pro: 5.9 g/dL / ALK PHOS: 230 U/L / ALT: 17 U/L / AST: 94 U/L / GGT: x           PT/INR - ( 25 Oct 2018 14:03 )   PT: 15.5 sec;   INR: 1.40 ratio         PTT - ( 25 Oct 2018 14:03 )  PTT:29.3 sec    RADIOLOGY & ADDITIONAL STUDIES (independently reviewed unless otherwise noted):  Previous neurological studies:  CT head- b/l SDH (+) SAH diffusely, likely ischemia in b/l occipital lobes and right frontal lobes   CT-A head and neck- no stenoses of ICAs or vertebrals, no stenoses in COW, no aneurysm or AVM

## 2018-10-25 NOTE — PROGRESS NOTE ADULT - PROBLEM SELECTOR PLAN 1
Plan:   Neuro: Continue with tiered therapy. Monitor ICPs, goal < 20. CPP 60-90. Push osmoles to 310. Bolus 3% saline and follow up with 3% saline at 30cc/hr. C Collar removed. Follow up neurosurgery  CV: Maintain MAP> 65. Levophed as needed  Pulm: Full vent control Maintain CO2 40, prevent hypoxemia. PEEP increased to 10  GI: Start enteral feeds  : STrict I&Os. avoid nephrotoxic agents  ID: No abx  HEme; Transfuse 1UPRBC and recheck  Endo: Target   Lines: Maintain all invasive lines  Skin: Intact  Dispo; Critically ill. [place]. Will attempt to identify patient.     CC Time excluding time spent on procedures 40 minutes

## 2018-10-25 NOTE — PROCEDURE NOTE - PROCEDURE
<<-----Click on this checkbox to enter Procedure Diagnostic peritoneal lavage  10/25/2018    Active  BFUMANTI

## 2018-10-25 NOTE — PROGRESS NOTE ADULT - SUBJECTIVE AND OBJECTIVE BOX
INTERVAL HPI/OVERNIGHT EVENTS:  32y Male unknown PMH found unresponsive on sidewalk found w/ b/l SDH and SAH now s/p b/l hemicraniectomy and EVD placement POD#1.     ICPs: 2-21 mmHg overnight, with 1 episode of 22 mmHg where our service opened EVD to drainage, let off 5 cc which lowered ICP. RN also called this AM with sustained ICP of 25 mmHg, however when arrived to bedside EVD was not set to tragus- when adjusted, ICP down to 20 mmHg.    KATTY drains- 1 drain with 100 cc/24 hours, the other drain with 5 cc/24 hours    Vital Signs Last 24 Hrs  T(C): 38 (25 Oct 2018 10:00), Max: 38.9 (24 Oct 2018 15:00)  T(F): 100.4 (25 Oct 2018 10:00), Max: 102 (24 Oct 2018 15:00)  HR: 112 (25 Oct 2018 12:35) (103 - 128)  BP: --  BP(mean): --  RR: 31 (25 Oct 2018 12:00) (20 - 37)  SpO2: 98% (25 Oct 2018 12:35) (97% - 100%)    PHYSICAL EXAM:  GCS: E-1 V-1T M-2= 4T  GENERAL: NAD  HEAD: S/p b/l hemicraniectomys. Flaps full  EYES: Severe chemosis b/l  MENTAL STATUS: Intubated, sedated. Does not open eyes. Not following commands  CRANIAL NERVES: R pupil 4 mm unreactive, L pupil 2 mm unreactive; corneals difficult to assess d/t chemosis. Grossly no facial asymmetry; Gag intact  MOTOR: Extensor posturing b/l uppers and triple flexing b/l lowers to noxious. Noted to have nonspecific left foot spontaneous movement  REFLEXES: Clonus b/l     LABS:                        6.8    9.3   )-----------( 96       ( 25 Oct 2018 08:30 )             21.0     10-25    135  |  101  |  7.0<L>  ----------------------------<  126<H>  3.4<L>   |  22.0  |  0.38<L>    Ca    6.8<L>      25 Oct 2018 08:30  Phos  1.9     10-25  Mg     2.2     10-25    TPro  5.9<L>  /  Alb  2.1<L>  /  TBili  2.1<H>  /  DBili  x   /  AST  94<H>  /  ALT  17  /  AlkPhos  230<H>  10-25    PT/INR - ( 25 Oct 2018 14:03 )   PT: 15.5 sec;   INR: 1.40 ratio    PTT - ( 25 Oct 2018 14:03 )  PTT:29.3 sec    10-24 @ 07:01  -  10-25 @ 07:00  --------------------------------------------------------  IN: 6143.7 mL / OUT: 2425 mL / NET: 3718.7 mL    10-25 @ 07:01  -  10-25 @ 14:22  --------------------------------------------------------  IN: 1086 mL / OUT: 1295 mL / NET: -209 mL    RADIOLOGY & ADDITIONAL TESTS:  CT Angio Head/Neck w/ IV Cont (10.24.18 @ 05:07)  IMPRESSION:  CTA NECK:  No significant stenosis of the cervical carotid or vertebral   arteries.    CTA HEAD:  No significant stenosis or vessel cutoff along the major   intracranial arterial vasculature. No aneurysm is seen.    CT Head/C-spine No Cont (10.24.18 @ 05:07)  IMPRESSION:  CT BRAIN: Bilateral holohemispheric subdural hemorrhages measuring 1.1 cm   on the rightand 1.4 cm on the left. There are increased hyperdense blood   products within these hemorrhages compared to the earlier examination.   The left convexity subdural hemorrhage has increased in size since the   earlier examination and there is new 9 mmmidline shift to the right.  Diffuse subarachnoid hemorrhage again noted. Poor visualization of the   basal cisterns.  Possible loss of gray-white differentiation along bilateral occipital   lobes and the right frontal lobe. Follow-up recommended.  Nondisplaced fracture along the left mastoid tip medial to the mastoid   air cells. Left posterior scalp soft tissue swelling.    CERVICAL SPINE CT: No cervical spine fracture.

## 2018-10-25 NOTE — PROGRESS NOTE ADULT - ATTENDING COMMENTS
Hypotensive, responded to blood transfusion.  Wean off pressors as able.  DPL performed to r/o hemoperitoneum and bowel perforation.  Off sedation ICPs mildly elevated.  Exam with extensor posturing at best.  Will start hypertonic therapy with goal of serum osm 310.  Normothermia, euglycemia, normal pH, CO2 and o2.  Continue supportive care.

## 2018-10-25 NOTE — PROGRESS NOTE ADULT - ASSESSMENT
32y Male unknown PMH found unresponsive on sidewalk found w/ b/l SDH and SAH now s/p b/l hemicraniectomy and EVD placement POD#1.   - ICPs: 2-21 mmHg overnight. ICP 25 mmHg this AM but drain adjusted to set at height of tragus and ICP decreased to 20 mmHg  - KATTY drains- 1 drain with 100 cc/24 hours, the other drain with 5 cc/24 hours  - GCS remains E-1 V-1T M-2= 4T    PLAN:  - D/w Dr. Fraser  - Continue EVD clamped to transduce ICPs  - Keppra 500 mg Q12 hour  - Q1 neuro checks, HOB 30 degrees  - CT head when hemodynamically stable  - SCDs for DVT ppx- hold APT/ACT  - Supportive care/further medical management per primary team 32y Male unknown PMH found unresponsive on sidewalk found w/ b/l SDH and SAH now s/p b/l hemicraniectomy and EVD placement POD#1.   - ICPs: 2-21 mmHg overnight. ICP 25 mmHg this AM but drain adjusted to set at height of tragus and ICP decreased to 20 mmHg  - KATTY drains- 1 drain with 100 cc/24 hours, the other drain with 5 cc/24 hours  - GCS remains E-1 V-1T M-2= 4T    PLAN:  - D/w Dr. Fraser  - Continue EVD clamped to transduce ICPs  - Keppra 500 mg Q12 hour  - Q1 neuro checks, HOB 30 degrees  - CT head when hemodynamically stable  - 3% NaCl started today- Na at 135 at 08:30 this AM  - SCDs for DVT ppx- hold APT/ACT  - Supportive care/further medical management per primary team

## 2018-10-26 LAB
ANION GAP SERPL CALC-SCNC: 10 MMOL/L — SIGNIFICANT CHANGE UP (ref 5–17)
ANION GAP SERPL CALC-SCNC: 11 MMOL/L — SIGNIFICANT CHANGE UP (ref 5–17)
ANION GAP SERPL CALC-SCNC: 12 MMOL/L — SIGNIFICANT CHANGE UP (ref 5–17)
ANION GAP SERPL CALC-SCNC: 8 MMOL/L — SIGNIFICANT CHANGE UP (ref 5–17)
APTT BLD: 29.5 SEC — SIGNIFICANT CHANGE UP (ref 27.5–37.4)
BASE EXCESS BLDA CALC-SCNC: 1.7 MMOL/L — SIGNIFICANT CHANGE UP (ref -2–2)
BASOPHILS # BLD AUTO: 0 K/UL — SIGNIFICANT CHANGE UP (ref 0–0.2)
BASOPHILS NFR BLD AUTO: 0.2 % — SIGNIFICANT CHANGE UP (ref 0–2)
BLOOD GAS COMMENTS ARTERIAL: SIGNIFICANT CHANGE UP
BUN SERPL-MCNC: 4 MG/DL — LOW (ref 8–20)
BUN SERPL-MCNC: 5 MG/DL — LOW (ref 8–20)
CALCIUM SERPL-MCNC: 6.6 MG/DL — LOW (ref 8.6–10.2)
CALCIUM SERPL-MCNC: 6.8 MG/DL — LOW (ref 8.6–10.2)
CALCIUM SERPL-MCNC: 6.8 MG/DL — LOW (ref 8.6–10.2)
CALCIUM SERPL-MCNC: 6.9 MG/DL — LOW (ref 8.6–10.2)
CHLORIDE SERPL-SCNC: 108 MMOL/L — HIGH (ref 98–107)
CHLORIDE SERPL-SCNC: 110 MMOL/L — HIGH (ref 98–107)
CHLORIDE SERPL-SCNC: 111 MMOL/L — HIGH (ref 98–107)
CHLORIDE SERPL-SCNC: 117 MMOL/L — HIGH (ref 98–107)
CO2 SERPL-SCNC: 21 MMOL/L — LOW (ref 22–29)
CO2 SERPL-SCNC: 21 MMOL/L — LOW (ref 22–29)
CO2 SERPL-SCNC: 22 MMOL/L — SIGNIFICANT CHANGE UP (ref 22–29)
CO2 SERPL-SCNC: 22 MMOL/L — SIGNIFICANT CHANGE UP (ref 22–29)
CREAT SERPL-MCNC: 0.36 MG/DL — LOW (ref 0.5–1.3)
CREAT SERPL-MCNC: 0.36 MG/DL — LOW (ref 0.5–1.3)
CREAT SERPL-MCNC: 0.37 MG/DL — LOW (ref 0.5–1.3)
CREAT SERPL-MCNC: 0.4 MG/DL — LOW (ref 0.5–1.3)
EOSINOPHIL # BLD AUTO: 0.1 K/UL — SIGNIFICANT CHANGE UP (ref 0–0.5)
EOSINOPHIL NFR BLD AUTO: 1 % — SIGNIFICANT CHANGE UP (ref 0–6)
EOSINOPHIL NFR BLD AUTO: 1.3 % — SIGNIFICANT CHANGE UP (ref 0–5)
GAS PNL BLDA: SIGNIFICANT CHANGE UP
GAS PNL BLDA: SIGNIFICANT CHANGE UP
GLUCOSE SERPL-MCNC: 105 MG/DL — SIGNIFICANT CHANGE UP (ref 70–115)
GLUCOSE SERPL-MCNC: 113 MG/DL — SIGNIFICANT CHANGE UP (ref 70–115)
GLUCOSE SERPL-MCNC: 144 MG/DL — HIGH (ref 70–115)
GLUCOSE SERPL-MCNC: 98 MG/DL — SIGNIFICANT CHANGE UP (ref 70–115)
HCO3 BLDA-SCNC: 26 MMOL/L — SIGNIFICANT CHANGE UP (ref 20–26)
HCT VFR BLD CALC: 25.6 % — LOW (ref 42–52)
HCT VFR BLD CALC: 26 % — LOW (ref 42–52)
HGB BLD-MCNC: 8.1 G/DL — LOW (ref 14–18)
HGB BLD-MCNC: 8.4 G/DL — LOW (ref 14–18)
HOROWITZ INDEX BLDA+IHG-RTO: 0.4 — SIGNIFICANT CHANGE UP
INR BLD: 1.34 RATIO — HIGH (ref 0.88–1.16)
LYMPHOCYTES # BLD AUTO: 0.6 K/UL — LOW (ref 1–4.8)
LYMPHOCYTES # BLD AUTO: 13 % — LOW (ref 20–55)
LYMPHOCYTES # BLD AUTO: 9.8 % — LOW (ref 20–55)
MAGNESIUM SERPL-MCNC: 1.9 MG/DL — SIGNIFICANT CHANGE UP (ref 1.6–2.6)
MAGNESIUM SERPL-MCNC: 2 MG/DL — SIGNIFICANT CHANGE UP (ref 1.6–2.6)
MCHC RBC-ENTMCNC: 26.6 PG — LOW (ref 27–31)
MCHC RBC-ENTMCNC: 28.3 PG — SIGNIFICANT CHANGE UP (ref 27–31)
MCHC RBC-ENTMCNC: 31.2 G/DL — LOW (ref 32–36)
MCHC RBC-ENTMCNC: 32.8 G/DL — SIGNIFICANT CHANGE UP (ref 32–36)
MCV RBC AUTO: 85.5 FL — SIGNIFICANT CHANGE UP (ref 80–94)
MCV RBC AUTO: 86.2 FL — SIGNIFICANT CHANGE UP (ref 80–94)
MONOCYTES # BLD AUTO: 1.3 K/UL — HIGH (ref 0–0.8)
MONOCYTES NFR BLD AUTO: 18 % — HIGH (ref 3–10)
MONOCYTES NFR BLD AUTO: 20.5 % — HIGH (ref 3–10)
NEUTROPHILS # BLD AUTO: 4.3 K/UL — SIGNIFICANT CHANGE UP (ref 1.8–8)
NEUTROPHILS NFR BLD AUTO: 54 % — SIGNIFICANT CHANGE UP (ref 37–73)
NEUTROPHILS NFR BLD AUTO: 67.7 % — SIGNIFICANT CHANGE UP (ref 37–73)
OSMOLALITY SERPL: 297 MOSM/KG — SIGNIFICANT CHANGE UP (ref 280–300)
OSMOLALITY SERPL: 300 MOSM/KG — SIGNIFICANT CHANGE UP (ref 280–300)
OSMOLALITY SERPL: 304 MOSM/KG — HIGH (ref 280–300)
OSMOLALITY SERPL: 306 MOSM/KG — HIGH (ref 280–300)
PCO2 BLDA: 34 MMHG — LOW (ref 35–45)
PH BLDA: 7.48 — HIGH (ref 7.35–7.45)
PHOSPHATE SERPL-MCNC: 2.2 MG/DL — LOW (ref 2.4–4.7)
PHOSPHATE SERPL-MCNC: 2.5 MG/DL — SIGNIFICANT CHANGE UP (ref 2.4–4.7)
PLATELET # BLD AUTO: 102 K/UL — LOW (ref 150–400)
PLATELET # BLD AUTO: 110 K/UL — LOW (ref 150–400)
PO2 BLDA: 96 MMHG — SIGNIFICANT CHANGE UP (ref 83–108)
POTASSIUM SERPL-MCNC: 3.4 MMOL/L — LOW (ref 3.5–5.3)
POTASSIUM SERPL-MCNC: 3.7 MMOL/L — SIGNIFICANT CHANGE UP (ref 3.5–5.3)
POTASSIUM SERPL-MCNC: 3.7 MMOL/L — SIGNIFICANT CHANGE UP (ref 3.5–5.3)
POTASSIUM SERPL-MCNC: 3.9 MMOL/L — SIGNIFICANT CHANGE UP (ref 3.5–5.3)
POTASSIUM SERPL-SCNC: 3.4 MMOL/L — LOW (ref 3.5–5.3)
POTASSIUM SERPL-SCNC: 3.7 MMOL/L — SIGNIFICANT CHANGE UP (ref 3.5–5.3)
POTASSIUM SERPL-SCNC: 3.7 MMOL/L — SIGNIFICANT CHANGE UP (ref 3.5–5.3)
POTASSIUM SERPL-SCNC: 3.9 MMOL/L — SIGNIFICANT CHANGE UP (ref 3.5–5.3)
PROTHROM AB SERPL-ACNC: 14.8 SEC — HIGH (ref 9.8–12.7)
RBC # BLD: 2.97 M/UL — LOW (ref 4.6–6.2)
RBC # BLD: 3.04 M/UL — LOW (ref 4.6–6.2)
RBC # FLD: 17.1 % — HIGH (ref 11–15.6)
RBC # FLD: 17.2 % — HIGH (ref 11–15.6)
SAO2 % BLDA: 99 % — SIGNIFICANT CHANGE UP (ref 95–99)
SODIUM SERPL-SCNC: 140 MMOL/L — SIGNIFICANT CHANGE UP (ref 135–145)
SODIUM SERPL-SCNC: 142 MMOL/L — SIGNIFICANT CHANGE UP (ref 135–145)
SODIUM SERPL-SCNC: 145 MMOL/L — SIGNIFICANT CHANGE UP (ref 135–145)
SODIUM SERPL-SCNC: 146 MMOL/L — HIGH (ref 135–145)
WBC # BLD: 6.3 K/UL — SIGNIFICANT CHANGE UP (ref 4.8–10.8)
WBC # BLD: 7.6 K/UL — SIGNIFICANT CHANGE UP (ref 4.8–10.8)
WBC # FLD AUTO: 6.3 K/UL — SIGNIFICANT CHANGE UP (ref 4.8–10.8)
WBC # FLD AUTO: 7.6 K/UL — SIGNIFICANT CHANGE UP (ref 4.8–10.8)

## 2018-10-26 PROCEDURE — 99232 SBSQ HOSP IP/OBS MODERATE 35: CPT

## 2018-10-26 PROCEDURE — 93010 ELECTROCARDIOGRAM REPORT: CPT

## 2018-10-26 PROCEDURE — 70450 CT HEAD/BRAIN W/O DYE: CPT | Mod: 26

## 2018-10-26 PROCEDURE — 43246 EGD PLACE GASTROSTOMY TUBE: CPT

## 2018-10-26 PROCEDURE — 71045 X-RAY EXAM CHEST 1 VIEW: CPT | Mod: 26

## 2018-10-26 PROCEDURE — 31600 PLANNED TRACHEOSTOMY: CPT

## 2018-10-26 RX ORDER — ACETAMINOPHEN 500 MG
1000 TABLET ORAL ONCE
Qty: 0 | Refills: 0 | Status: COMPLETED | OUTPATIENT
Start: 2018-10-26 | End: 2018-10-26

## 2018-10-26 RX ORDER — POTASSIUM CHLORIDE 20 MEQ
20 PACKET (EA) ORAL ONCE
Qty: 0 | Refills: 0 | Status: COMPLETED | OUTPATIENT
Start: 2018-10-26 | End: 2018-10-26

## 2018-10-26 RX ORDER — SODIUM CHLORIDE 9 MG/ML
1000 INJECTION INTRAMUSCULAR; INTRAVENOUS; SUBCUTANEOUS ONCE
Qty: 0 | Refills: 0 | Status: COMPLETED | OUTPATIENT
Start: 2018-10-26 | End: 2018-10-26

## 2018-10-26 RX ORDER — MIDAZOLAM HYDROCHLORIDE 1 MG/ML
4 INJECTION, SOLUTION INTRAMUSCULAR; INTRAVENOUS ONCE
Qty: 0 | Refills: 0 | Status: DISCONTINUED | OUTPATIENT
Start: 2018-10-26 | End: 2018-10-26

## 2018-10-26 RX ORDER — VECURONIUM BROMIDE 20 MG/1
10 INJECTION, POWDER, FOR SOLUTION INTRAVENOUS ONCE
Qty: 0 | Refills: 0 | Status: COMPLETED | OUTPATIENT
Start: 2018-10-26 | End: 2018-10-26

## 2018-10-26 RX ORDER — POTASSIUM PHOSPHATE, MONOBASIC POTASSIUM PHOSPHATE, DIBASIC 236; 224 MG/ML; MG/ML
30 INJECTION, SOLUTION INTRAVENOUS ONCE
Qty: 0 | Refills: 0 | Status: COMPLETED | OUTPATIENT
Start: 2018-10-26 | End: 2018-10-26

## 2018-10-26 RX ORDER — POTASSIUM CHLORIDE 20 MEQ
40 PACKET (EA) ORAL ONCE
Qty: 0 | Refills: 0 | Status: COMPLETED | OUTPATIENT
Start: 2018-10-26 | End: 2018-10-26

## 2018-10-26 RX ORDER — CEFAZOLIN SODIUM 1 G
2000 VIAL (EA) INJECTION ONCE
Qty: 0 | Refills: 0 | Status: COMPLETED | OUTPATIENT
Start: 2018-10-26 | End: 2018-10-26

## 2018-10-26 RX ORDER — SODIUM CHLORIDE 5 G/100ML
150 INJECTION, SOLUTION INTRAVENOUS
Qty: 0 | Refills: 0 | Status: DISCONTINUED | OUTPATIENT
Start: 2018-10-26 | End: 2018-10-27

## 2018-10-26 RX ADMIN — Medication 400 MILLIGRAM(S): at 01:52

## 2018-10-26 RX ADMIN — VECURONIUM BROMIDE 10 MILLIGRAM(S): 20 INJECTION, POWDER, FOR SOLUTION INTRAVENOUS at 14:27

## 2018-10-26 RX ADMIN — Medication 400 MILLIGRAM(S): at 13:35

## 2018-10-26 RX ADMIN — Medication 1 MILLIGRAM(S): at 11:10

## 2018-10-26 RX ADMIN — FOSPHENYTOIN 107 MILLIGRAM(S) PE: 50 INJECTION INTRAMUSCULAR; INTRAVENOUS at 05:11

## 2018-10-26 RX ADMIN — SODIUM CHLORIDE 30 MILLILITER(S): 5 INJECTION, SOLUTION INTRAVENOUS at 08:00

## 2018-10-26 RX ADMIN — FOSPHENYTOIN 107 MILLIGRAM(S) PE: 50 INJECTION INTRAMUSCULAR; INTRAVENOUS at 17:45

## 2018-10-26 RX ADMIN — SODIUM CHLORIDE 100 MILLILITER(S): 9 INJECTION INTRAMUSCULAR; INTRAVENOUS; SUBCUTANEOUS at 06:42

## 2018-10-26 RX ADMIN — SODIUM CHLORIDE 2000 MILLILITER(S): 9 INJECTION INTRAMUSCULAR; INTRAVENOUS; SUBCUTANEOUS at 21:30

## 2018-10-26 RX ADMIN — Medication 100 MILLIEQUIVALENT(S): at 01:57

## 2018-10-26 RX ADMIN — POTASSIUM PHOSPHATE, MONOBASIC POTASSIUM PHOSPHATE, DIBASIC 83.33 MILLIMOLE(S): 236; 224 INJECTION, SOLUTION INTRAVENOUS at 07:51

## 2018-10-26 RX ADMIN — LEVETIRACETAM 400 MILLIGRAM(S): 250 TABLET, FILM COATED ORAL at 05:19

## 2018-10-26 RX ADMIN — PANTOPRAZOLE SODIUM 40 MILLIGRAM(S): 20 TABLET, DELAYED RELEASE ORAL at 11:10

## 2018-10-26 RX ADMIN — Medication 105 MILLIGRAM(S): at 11:10

## 2018-10-26 RX ADMIN — Medication 1000 MILLIGRAM(S): at 01:52

## 2018-10-26 RX ADMIN — Medication 40 MILLIEQUIVALENT(S): at 17:45

## 2018-10-26 RX ADMIN — PROPOFOL 4.86 MICROGRAM(S)/KG/MIN: 10 INJECTION, EMULSION INTRAVENOUS at 00:27

## 2018-10-26 RX ADMIN — Medication 400 MILLIGRAM(S): at 20:27

## 2018-10-26 RX ADMIN — Medication 1000 MILLIGRAM(S): at 21:00

## 2018-10-26 RX ADMIN — LEVETIRACETAM 400 MILLIGRAM(S): 250 TABLET, FILM COATED ORAL at 17:45

## 2018-10-26 RX ADMIN — Medication 1000 MILLIGRAM(S): at 13:35

## 2018-10-26 RX ADMIN — MIDAZOLAM HYDROCHLORIDE 4 MILLIGRAM(S): 1 INJECTION, SOLUTION INTRAMUSCULAR; INTRAVENOUS at 15:00

## 2018-10-26 RX ADMIN — SODIUM CHLORIDE 150 MILLILITER(S): 5 INJECTION, SOLUTION INTRAVENOUS at 17:46

## 2018-10-26 RX ADMIN — CHLORHEXIDINE GLUCONATE 1 APPLICATION(S): 213 SOLUTION TOPICAL at 11:10

## 2018-10-26 RX ADMIN — Medication 100 MILLIGRAM(S): at 14:30

## 2018-10-26 NOTE — PROGRESS NOTE ADULT - ASSESSMENT
The patient is a 32y Male who is followed by neurology because of TBI/possible seizure    TBI  s/p decompressive b/l hemicraniectomy and EVD  significant amount of intracranial hemorrhage  No significant improvement in neuro exam  EVD management per Neurosurgery  poor prognosis    possible seizure   EEG, no seizure, breech rhythm over craniectomies, slowing suggesting diffuse cerebral dysfunction.  rhythmic tongue movements and random foot movement has stopped  unable to tolerate propofol because of hypotension  continue fosphenytoin, keppra,     poor prognosis for meaningful recovery      will follow with you    Martell Mendoza MD PhD   [id number]

## 2018-10-26 NOTE — DIETITIAN INITIAL EVALUATION ADULT. - PERTINENT LABORATORY DATA
10-26 Na142 mmol/L Glu 98 mg/dL K+ 3.7 mmol/L Cr  0.37 mg/dL<L> BUN 4.0 mg/dL<L> Phos 2.5 mg/dL Alb n/a   PAB n/a

## 2018-10-26 NOTE — PROGRESS NOTE ADULT - ASSESSMENT
31 yo Male Subdural hematoma s/p bilateral crani with EVD    Neuro: Continue with tiered therapy. Monitor ICPs, goal < 20. CPP 60-90. Push osmoles to 310.  3% saline at 30cc/hr + NS @ 100cc/hr.  C Collar removed. Follow up neurosurgery    CV: Maintain MAP> 65. Levophed as needed    Pulm: Full vent control Maintain CO2 40, prevent hypoxemia. PEEP increased to 10    GI: Start enteral feeds    : Strict I&Os. avoid nephrotoxic agents    ID: No abx    Heme; Serial H/H stable.  SCD    Endo: Target     Lines: Maintain all invasive lines    Skin: Intact    Dispo; Critically ill. Live [place] following. Will attempt to identify patient.

## 2018-10-26 NOTE — PROGRESS NOTE ADULT - SUBJECTIVE AND OBJECTIVE BOX
INTERVAL HPI/OVERNIGHT EVENTS:  32y Male unknown PMH found unresponsive on sidewalk found w/ b/l SDH and SAH now s/p b/l hemicraniectomy and EVD placement POD#2.   ICPs: 12-20mmHg overnight, no drainage required. Flap is full but stable.  KATTY drains- 1 drain with 50cc/24 hours, the other drain with 5 cc/24 hours. Awaiting HCT.    Vital Signs Last 24 Hrs  T(C): 37.2 (26 Oct 2018 08:00), Max: 38.2 (26 Oct 2018 01:00)  T(F): 99 (26 Oct 2018 08:00), Max: 100.8 (26 Oct 2018 01:00)  HR: 104 (26 Oct 2018 11:00) (96 - 118)  BP: 102/62 (26 Oct 2018 08:00) (102/62 - 102/62)  BP(mean): 77 (26 Oct 2018 08:00) (77 - 77)  RR: 20 (26 Oct 2018 11:00) (19 - 40)  SpO2: 99% (26 Oct 2018 11:00) (96% - 100%)  PHYSICAL EXAM:  GCS: E-1 V-1T M-2= 4T  GENERAL: NAD  HEAD: S/p b/l hemicraniectomys. Flaps stable  EYES: Severe chemosis b/l  MENTAL STATUS: Intubated, sedated. Does not open eyes. Not following commands  CRANIAL NERVES: R pupil 4 mm unreactive, L pupil 2 mm unreactive; neg corneals w+chemosis. Grossly no facial asymmetry; Gag intact, breathing over the vent  MOTOR: decerebrate rigidity b/l uppers and triple flexing b/l lowers. No spontaneous movement noted  REFLEXES: Clonus b/l     LABS:             8.4    6.3   )-----------( 102                 25.6     142  |  110<H>  |  4.0<L>  ----------------------------<  98  3.7   |  21.0<L>  |  0.37<L>    Ca    6.8<L>       Phos  2.5     Mg     1.9          RADIOLOGY & ADDITIONAL TESTS:  CT Angio Head/Neck w/ IV Cont (10.24.18 @ 05:07)  IMPRESSION:  CTA NECK:  No significant stenosis of the cervical carotid or vertebral   arteries.  CTA HEAD:  No significant stenosis or vessel cutoff along the major   intracranial arterial vasculature. No aneurysm is seen.    CT Head/C-spine No Cont (10.24.18 @ 05:07)  IMPRESSION:  CT BRAIN: Bilateral holohemispheric subdural hemorrhages measuring 1.1 cm   on the rightand 1.4 cm on the left. There are increased hyperdense blood   products within these hemorrhages compared to the earlier examination.   The left convexity subdural hemorrhage has increased in size since the   earlier examination and there is new 9 mmmidline shift to the right.  Diffuse subarachnoid hemorrhage again noted. Poor visualization of the   basal cisterns.  Possible loss of gray-white differentiation along bilateral occipital   lobes and the right frontal lobe. Follow-up recommended.  Nondisplaced fracture along the left mastoid tip medial to the mastoid   air cells. Left posterior scalp soft tissue swelling.    CERVICAL SPINE CT: No cervical spine fracture.

## 2018-10-26 NOTE — PROGRESS NOTE ADULT - SUBJECTIVE AND OBJECTIVE BOX
INTERVAL HPI/OVERNIGHT EVENTS:    Given 1 unit PRBC for anemia.  DPL performed for concern of intra-abdomimal injury  DPL negative.  Serial H/H s/p transfusion have remained stable.  3%  cc bolus x 2 give to drive up serum osmolality,  Currently 306.  Pt remains with decerebrate posturing only.  R pupil 4 mm unreactive and L pupil 2 mm and unreactive.    MEDICATIONS  (STANDING):  chlorhexidine 2% Cloths 1 Application(s) Topical daily  fentaNYL   Infusion 0.5 MICROgram(s)/kG/Hr (4.05 mL/Hr) IV Continuous <Continuous>  folic acid Injectable 1 milliGRAM(s) IV Push daily  fosphenytoin IVPB 175 milliGRAM(s) PE IV Intermittent every 12 hours  levETIRAcetam  IVPB 1000 milliGRAM(s) IV Intermittent every 12 hours  pantoprazole  Injectable 40 milliGRAM(s) IV Push daily  potassium phosphate IVPB 30 milliMole(s) IV Intermittent once  propofol Infusion 10 MICROgram(s)/kG/Min (4.86 mL/Hr) IV Continuous <Continuous>  sodium chloride 0.9%. 1000 milliLiter(s) (100 mL/Hr) IV Continuous <Continuous>  sodium chloride 3%. 500 milliLiter(s) (30 mL/Hr) IV Continuous <Continuous>  sodium chloride 3%. 150 milliLiter(s) (150 mL/Hr) IV Continuous <Continuous>  sodium chloride 3%. 150 milliLiter(s) (150 mL/Hr) IV Continuous <Continuous>  thiamine IVPB 500 milliGRAM(s) IV Intermittent daily    MEDICATIONS  (PRN):      Drug Dosing Weight  Height (cm): 152.4 (24 Oct 2018 14:00)  Weight (kg): 81 (24 Oct 2018 05:24)  BMI (kg/m2): 34.9 (24 Oct 2018 14:00)  BSA (m2): 1.78 (24 Oct 2018 14:00)      PAST MEDICAL & SURGICAL HISTORY:  Medical history unknown  Surgical history unknown      ICU Vital Signs Last 24 Hrs  T(C): 37.2 (26 Oct 2018 06:00), Max: 38.2 (25 Oct 2018 08:00)  T(F): 99 (26 Oct 2018 06:00), Max: 100.8 (25 Oct 2018 08:00)  HR: 97 (26 Oct 2018 06:00) (97 - 120)  BP: --  BP(mean): --  ABP: 102/58 (26 Oct 2018 06:00) (93/55 - 143/74)  ABP(mean): 72 (26 Oct 2018 06:00) (68 - 97)  RR: 19 (26 Oct 2018 06:00) (19 - 37)  SpO2: 99% (26 Oct 2018 06:00) (96% - 100%)      ABG - ( 26 Oct 2018 03:26 )  pH, Arterial: 7.49  pH, Blood: x     /  pCO2: 32    /  pO2: 189   / HCO3: 26    / Base Excess: 1.4   /  SaO2: 100                 I&O's Detail    24 Oct 2018 07:01  -  25 Oct 2018 07:00  --------------------------------------------------------  IN:    fentaNYL  Infusion: 15.2 mL    fentaNYL  Infusion: 65.6 mL    norepinephrine Infusion: 152.2 mL    propofol Infusion: 126.8 mL    Sodium Chloride 0.9% IV Bolus: 3000 mL    sodium chloride 0.9%.: 1500 mL    Solution: 300 mL    Solution: 400 mL    Solution: 150 mL    Solution: 100 mL    Solution: 100 mL    Solution: 100 mL    Solution: 100 mL    vasopressin Infusion: 8.4 mL    vasopressin Infusion: 25.5 mL  Total IN: 6143.7 mL    OUT:    Drain: 200 mL    Drain: 5 mL    Indwelling Catheter - Urethral: 1770 mL    Nasoenteral Tube: 450 mL  Total OUT: 2425 mL    Total NET: 3718.7 mL      25 Oct 2018 07:01  -  26 Oct 2018 06:56  --------------------------------------------------------  IN:    propofol Infusion: 257.6 mL    sodium chloride 0.9%.: 2250 mL    sodium chloride 3%.: 810 mL    sodium chloride 3%.: 180 mL    Solution: 100 mL    Solution: 150 mL    Solution: 300 mL    Solution: 999.6 mL    Solution: 150 mL    Solution: 200 mL    Solution: 100 mL  Total IN: 5497.2 mL    OUT:    Drain: 50 mL    Drain: 5 mL    Indwelling Catheter - Urethral: 3670 mL    Nasoenteral Tube: 650 mL  Total OUT: 4375 mL    Total NET: 1122.2 mL          Mode: AC/ CMV (Assist Control/ Continuous Mandatory Ventilation)  RR (machine): 14  TV (machine): 400  FiO2: 40  PEEP: 10  PS:   MAP: 9  PIP: 20      Physical Exam:    Neurological: RASS:  -4.  GCS: 4T.  Decerebrate posturing. R Pupil 4mm nonreactive, L Pupil 2mm nonreactive. no gag, + Cough. overbreathing ventilator.     Neck: No bruits; no thyromegaly or nodules,  No JVD    Back: Normal spine flexure, No CVA tenderness, No deformity or limitation of movement    Respiratory: Breath Sounds equal & clear to auscultation, no accessory muscle use    Cardiovascular: Regular rate & rhythm, normal S1, S2; no murmurs, gallops or rubs    Gastrointestinal: Soft, non-tender, normal bowel sounds    Extremities: No peripheral edema, No cyanosis, clubbing     Vascular: Equal and normal pulses: 2+ peripheral pulses throughout        LABS:  CBC Full  -  ( 26 Oct 2018 06:13 )  WBC Count : 6.3 K/uL  Hemoglobin : 8.4 g/dL  Hematocrit : 25.6 %  Platelet Count - Automated : 102 K/uL  Mean Cell Volume : 86.2 fl  Mean Cell Hemoglobin : 28.3 pg  Mean Cell Hemoglobin Concentration : 32.8 g/dL  Auto Neutrophil # : 4.3 K/uL  Auto Lymphocyte # : 0.6 K/uL  Auto Monocyte # : 1.3 K/uL  Auto Eosinophil # : 0.1 K/uL  Auto Basophil # : 0.0 K/uL  Auto Neutrophil % : 67.7 %  Auto Lymphocyte % : 9.8 %  Auto Monocyte % : 20.5 %  Auto Eosinophil % : 1.3 %  Auto Basophil % : 0.2 %    10-26    142  |  110<H>  |  4.0<L>  ----------------------------<  98  3.7   |  21.0<L>  |  0.37<L>    Ca    6.8<L>      26 Oct 2018 06:13  Phos  2.5     10-26  Mg     1.9     10-26    TPro  5.7<L>  /  Alb  2.0<L>  /  TBili  2.0  /  DBili  x   /  AST  82<H>  /  ALT  16  /  AlkPhos  243<H>  10-25    PT/INR - ( 26 Oct 2018 06:13 )   PT: 14.8 sec;   INR: 1.34 ratio         PTT - ( 26 Oct 2018 06:13 )  PTT:29.5 sec

## 2018-10-26 NOTE — PROGRESS NOTE ADULT - ASSESSMENT
32y Male unknown PMH found unresponsive on sidewalk found w/ b/l SDH and SAH now s/p b/l hemicraniectomy and EVD placement POD#1.   - ICPs <20mmHg overnight  - KATTY drains- 1 drain with 50 cc/24 hours, the other drain with 5 cc/24 hours  - GCS remains E-1 V-1T M-2= 4T, so remains stable    PLAN:  - D/w Dr. Fraser  - Discussed with ACS - HCT today to evaluate prior to pulling drains  - Continue EVD clamped to transduce ICPs  - Keppra 500 mg Q12 hour  - Q1 neuro checks, HOB 30 degrees  - 3% NaCl per SICU  - SCDs for DVT ppx- hold APT/ACT  - Supportive care/further medical management per primary team

## 2018-10-26 NOTE — CHART NOTE - NSCHARTNOTEFT_GEN_A_CORE
The patient has a severe TBI  Neurologically stable with GCS=4, ICPs<20, CPP>65 mmHg  EEG negative for seizure activity  Early tracheostomy has been shown to be beneficial in this patient population  The patient has no next of kin or HCP  Will proceed with Tracheostomy and PEG as the benefits outweigh the risks

## 2018-10-26 NOTE — DIETITIAN INITIAL EVALUATION ADULT. - ETIOLOGY
related to s/p craniectomy, B/L evacuation of SDH and placement of L frontal EVD, remains on vent support

## 2018-10-26 NOTE — PROGRESS NOTE ADULT - SUBJECTIVE AND OBJECTIVE BOX
32 year old Male, S/P Bilateral Craniotomy and Evacuation of Hematoma on 10/23/18. Patient is not on any pressors, being Sedated with Propofol infusion, well lined and with advanced Monitoring. Patient is currently on ventilator support. Vital signs are stable, being well managed.

## 2018-10-26 NOTE — PROGRESS NOTE ADULT - SUBJECTIVE AND OBJECTIVE BOX
[facility]                                     Neurology at [place]                                 Kelly Toth, & Vincent                                  [address]                                        [place], [place], [address]                                             [phone]    CC:  TBI, seizure  HPI: The patient is a young man, estimated to be in 30's who was found unresponsive on sidewalk.  Per chart initial CT head showed SAH and b/l SDH.  [place] [facility] [place] [facility] for emergent neurosurgical treatment.  He was posturing with agonal respirations on arrival to [facility].  He was taken to the OR emergently by Dr. Fraser for SDH evacuation and decompression.  He had b/l craniectomy and EVD placed.  He has been having non- rhythmic movements of his feet and rhythmic movements of his tongue, causing concern for seizure/status epilepticus.  Neurology eval has been called    Interval history: no neurological change    ROS neurology: sedated, unresponsive    MEDICATIONS  (STANDING):  ceFAZolin   IVPB 2000 milliGRAM(s) IV Intermittent once  chlorhexidine 2% Cloths 1 Application(s) Topical daily  fentaNYL   Infusion 0.5 MICROgram(s)/kG/Hr (4.05 mL/Hr) IV Continuous <Continuous>  folic acid Injectable 1 milliGRAM(s) IV Push daily  fosphenytoin IVPB 175 milliGRAM(s) PE IV Intermittent every 12 hours  levETIRAcetam  IVPB 1000 milliGRAM(s) IV Intermittent every 12 hours  pantoprazole  Injectable 40 milliGRAM(s) IV Push daily  propofol Infusion 10 MICROgram(s)/kG/Min (4.86 mL/Hr) IV Continuous <Continuous>  sodium chloride 3%. 500 milliLiter(s) (30 mL/Hr) IV Continuous <Continuous>  thiamine IVPB 500 milliGRAM(s) IV Intermittent daily  vecuronium Injectable 10 milliGRAM(s) IV Push once    MEDICATIONS  (PRN):      Vital Signs Last 24 Hrs  T(C): 38.3 (26 Oct 2018 13:35), Max: 38.3 (26 Oct 2018 13:35)  T(F): 100.9 (26 Oct 2018 13:35), Max: 100.9 (26 Oct 2018 13:35)  HR: 108 (26 Oct 2018 14:00) (96 - 113)  BP: 102/62 (26 Oct 2018 08:00) (102/62 - 102/62)  BP(mean): 77 (26 Oct 2018 08:00) (77 - 77)  RR: 26 (26 Oct 2018 14:00) (19 - 40)  SpO2: 100% (26 Oct 2018 14:00) (96% - 100%)    General: intubated on vent    Detailed Neurologic Exam:    Mental status: The patient is unresponsive, on propofol    Cranial nerves: Pupils left 1.5 mm NR right 4 mm not reactive. There is no visual response to threat.  Absent corneal reflexes.  Extraocular motion is not assessed. No gag reflex    Motor: There is  b/l posturing (decerebrate) to pinch and sternal rub.  There is triple flexion to nail bed pressure in feet    Sensation: Posturing, no grimace to nailbed pressure    Reflexes: absent throughout and plantar responses are extensor.    Cerebellar:  Unable to test finger to nose testing.    Gait : deferred    LABS:                           8.4    6.3   )-----------( 102      ( 26 Oct 2018 06:13 )             25.6     10-26    142  |  110<H>  |  4.0<L>  ----------------------------<  98  3.7   |  21.0<L>  |  0.37<L>    Ca    6.8<L>      26 Oct 2018 06:13  Phos  2.5     10-26  Mg     1.9     10-26    TPro  5.7<L>  /  Alb  2.0<L>  /  TBili  2.0  /  DBili  x   /  AST  82<H>  /  ALT  16  /  AlkPhos  243<H>  10-25    LIVER FUNCTIONS - ( 25 Oct 2018 19:16 )  Alb: 2.0 g/dL / Pro: 5.7 g/dL / ALK PHOS: 243 U/L / ALT: 16 U/L / AST: 82 U/L / GGT: x           PT/INR - ( 26 Oct 2018 06:13 )   PT: 14.8 sec;   INR: 1.34 ratio         PTT - ( 26 Oct 2018 06:13 )  PTT:29.5 sec    RADIOLOGY & ADDITIONAL STUDIES (independently reviewed unless otherwise noted):  Previous neurological studies:  CT head- b/l SDH (+) SAH diffusely, likely ischemia in b/l occipital lobes and right frontal lobes   CT-A head and neck- no stenoses of ICAs or vertebrals, no stenoses in COW, no aneurysm or AVM

## 2018-10-27 LAB
ANION GAP SERPL CALC-SCNC: 10 MMOL/L — SIGNIFICANT CHANGE UP (ref 5–17)
ANION GAP SERPL CALC-SCNC: 9 MMOL/L — SIGNIFICANT CHANGE UP (ref 5–17)
ANISOCYTOSIS BLD QL: SLIGHT — SIGNIFICANT CHANGE UP
BUN SERPL-MCNC: 6 MG/DL — LOW (ref 8–20)
BUN SERPL-MCNC: 7 MG/DL — LOW (ref 8–20)
CALCIUM SERPL-MCNC: 7 MG/DL — LOW (ref 8.6–10.2)
CALCIUM SERPL-MCNC: 7.2 MG/DL — LOW (ref 8.6–10.2)
CHLORIDE SERPL-SCNC: 112 MMOL/L — HIGH (ref 98–107)
CHLORIDE SERPL-SCNC: 115 MMOL/L — HIGH (ref 98–107)
CO2 SERPL-SCNC: 21 MMOL/L — LOW (ref 22–29)
CO2 SERPL-SCNC: 23 MMOL/L — SIGNIFICANT CHANGE UP (ref 22–29)
CREAT SERPL-MCNC: 0.37 MG/DL — LOW (ref 0.5–1.3)
CREAT SERPL-MCNC: 0.4 MG/DL — LOW (ref 0.5–1.3)
GAS PNL BLDA: SIGNIFICANT CHANGE UP
GAS PNL BLDA: SIGNIFICANT CHANGE UP
GLUCOSE SERPL-MCNC: 155 MG/DL — HIGH (ref 70–115)
GLUCOSE SERPL-MCNC: 181 MG/DL — HIGH (ref 70–115)
HCT VFR BLD CALC: 25.3 % — LOW (ref 42–52)
HGB BLD-MCNC: 8 G/DL — LOW (ref 14–18)
HYPOCHROMIA BLD QL: SIGNIFICANT CHANGE UP
LYMPHOCYTES # BLD AUTO: 4 % — LOW (ref 20–55)
MACROCYTES BLD QL: SLIGHT — SIGNIFICANT CHANGE UP
MAGNESIUM SERPL-MCNC: 2 MG/DL — SIGNIFICANT CHANGE UP (ref 1.6–2.6)
MCHC RBC-ENTMCNC: 27.9 PG — SIGNIFICANT CHANGE UP (ref 27–31)
MCHC RBC-ENTMCNC: 31.6 G/DL — LOW (ref 32–36)
MCV RBC AUTO: 88.2 FL — SIGNIFICANT CHANGE UP (ref 80–94)
MICROCYTES BLD QL: SLIGHT — SIGNIFICANT CHANGE UP
MONOCYTES NFR BLD AUTO: 11 % — HIGH (ref 3–10)
NEUTROPHILS NFR BLD AUTO: 75 % — HIGH (ref 37–73)
NEUTS BAND # BLD: 10 % — HIGH (ref 0–8)
OSMOLALITY SERPL: 301 MOSM/KG — HIGH (ref 280–300)
OSMOLALITY SERPL: 314 MOSM/KG — HIGH (ref 280–300)
PHENYTOIN FREE SERPL-MCNC: 7.7 UG/ML — LOW (ref 10–20)
PHOSPHATE SERPL-MCNC: 1.3 MG/DL — LOW (ref 2.4–4.7)
PLAT MORPH BLD: NORMAL — SIGNIFICANT CHANGE UP
PLATELET # BLD AUTO: 107 K/UL — LOW (ref 150–400)
POIKILOCYTOSIS BLD QL AUTO: SLIGHT — SIGNIFICANT CHANGE UP
POLYCHROMASIA BLD QL SMEAR: SLIGHT — SIGNIFICANT CHANGE UP
POTASSIUM SERPL-MCNC: 3.8 MMOL/L — SIGNIFICANT CHANGE UP (ref 3.5–5.3)
POTASSIUM SERPL-MCNC: 3.9 MMOL/L — SIGNIFICANT CHANGE UP (ref 3.5–5.3)
POTASSIUM SERPL-SCNC: 3.8 MMOL/L — SIGNIFICANT CHANGE UP (ref 3.5–5.3)
POTASSIUM SERPL-SCNC: 3.9 MMOL/L — SIGNIFICANT CHANGE UP (ref 3.5–5.3)
RBC # BLD: 2.87 M/UL — LOW (ref 4.6–6.2)
RBC # FLD: 18 % — HIGH (ref 11–15.6)
RBC BLD AUTO: ABNORMAL
SODIUM SERPL-SCNC: 145 MMOL/L — SIGNIFICANT CHANGE UP (ref 135–145)
SODIUM SERPL-SCNC: 145 MMOL/L — SIGNIFICANT CHANGE UP (ref 135–145)
WBC # BLD: 6.4 K/UL — SIGNIFICANT CHANGE UP (ref 4.8–10.8)
WBC # FLD AUTO: 6.4 K/UL — SIGNIFICANT CHANGE UP (ref 4.8–10.8)

## 2018-10-27 PROCEDURE — 99291 CRITICAL CARE FIRST HOUR: CPT

## 2018-10-27 PROCEDURE — 99232 SBSQ HOSP IP/OBS MODERATE 35: CPT

## 2018-10-27 RX ORDER — ACETAMINOPHEN 500 MG
1000 TABLET ORAL ONCE
Qty: 0 | Refills: 0 | Status: COMPLETED | OUTPATIENT
Start: 2018-10-27 | End: 2018-10-27

## 2018-10-27 RX ORDER — PROPRANOLOL HCL 160 MG
10 CAPSULE, EXTENDED RELEASE 24HR ORAL EVERY 6 HOURS
Qty: 0 | Refills: 0 | Status: DISCONTINUED | OUTPATIENT
Start: 2018-10-27 | End: 2018-10-27

## 2018-10-27 RX ORDER — PROPRANOLOL HCL 160 MG
10 CAPSULE, EXTENDED RELEASE 24HR ORAL EVERY 6 HOURS
Qty: 0 | Refills: 0 | Status: DISCONTINUED | OUTPATIENT
Start: 2018-10-27 | End: 2018-11-01

## 2018-10-27 RX ORDER — MAGNESIUM SULFATE 500 MG/ML
2 VIAL (ML) INJECTION ONCE
Qty: 0 | Refills: 0 | Status: COMPLETED | OUTPATIENT
Start: 2018-10-27 | End: 2018-10-27

## 2018-10-27 RX ORDER — CALCIUM GLUCONATE 100 MG/ML
2 VIAL (ML) INTRAVENOUS ONCE
Qty: 0 | Refills: 0 | Status: COMPLETED | OUTPATIENT
Start: 2018-10-27 | End: 2018-10-27

## 2018-10-27 RX ORDER — FENTANYL CITRATE 50 UG/ML
100 INJECTION INTRAVENOUS ONCE
Qty: 0 | Refills: 0 | Status: DISCONTINUED | OUTPATIENT
Start: 2018-10-27 | End: 2018-10-27

## 2018-10-27 RX ORDER — HYDROMORPHONE HYDROCHLORIDE 2 MG/ML
1 INJECTION INTRAMUSCULAR; INTRAVENOUS; SUBCUTANEOUS ONCE
Qty: 0 | Refills: 0 | Status: DISCONTINUED | OUTPATIENT
Start: 2018-10-27 | End: 2018-10-27

## 2018-10-27 RX ADMIN — SODIUM CHLORIDE 40 MILLILITER(S): 5 INJECTION, SOLUTION INTRAVENOUS at 06:14

## 2018-10-27 RX ADMIN — LEVETIRACETAM 400 MILLIGRAM(S): 250 TABLET, FILM COATED ORAL at 17:33

## 2018-10-27 RX ADMIN — Medication 400 MILLIGRAM(S): at 21:47

## 2018-10-27 RX ADMIN — Medication 200 GRAM(S): at 01:58

## 2018-10-27 RX ADMIN — Medication 50 GRAM(S): at 00:55

## 2018-10-27 RX ADMIN — Medication 400 MILLIGRAM(S): at 02:48

## 2018-10-27 RX ADMIN — Medication 10 MILLIGRAM(S): at 14:13

## 2018-10-27 RX ADMIN — CHLORHEXIDINE GLUCONATE 1 APPLICATION(S): 213 SOLUTION TOPICAL at 10:16

## 2018-10-27 RX ADMIN — FOSPHENYTOIN 107 MILLIGRAM(S) PE: 50 INJECTION INTRAMUSCULAR; INTRAVENOUS at 08:02

## 2018-10-27 RX ADMIN — FENTANYL CITRATE 100 MICROGRAM(S): 50 INJECTION INTRAVENOUS at 16:00

## 2018-10-27 RX ADMIN — Medication 1000 MILLIGRAM(S): at 03:20

## 2018-10-27 RX ADMIN — Medication 1 MILLIGRAM(S): at 19:45

## 2018-10-27 RX ADMIN — PROPOFOL 4.86 MICROGRAM(S)/KG/MIN: 10 INJECTION, EMULSION INTRAVENOUS at 07:01

## 2018-10-27 RX ADMIN — LEVETIRACETAM 400 MILLIGRAM(S): 250 TABLET, FILM COATED ORAL at 06:43

## 2018-10-27 RX ADMIN — Medication 85 MILLIMOLE(S): at 08:02

## 2018-10-27 RX ADMIN — Medication 105 MILLIGRAM(S): at 10:21

## 2018-10-27 RX ADMIN — FENTANYL CITRATE 100 MICROGRAM(S): 50 INJECTION INTRAVENOUS at 15:30

## 2018-10-27 RX ADMIN — FENTANYL CITRATE 4.05 MICROGRAM(S)/KG/HR: 50 INJECTION INTRAVENOUS at 08:01

## 2018-10-27 RX ADMIN — Medication 400 MILLIGRAM(S): at 11:36

## 2018-10-27 RX ADMIN — Medication 10 MILLIGRAM(S): at 17:33

## 2018-10-27 RX ADMIN — Medication 1000 MILLIGRAM(S): at 21:47

## 2018-10-27 RX ADMIN — FOSPHENYTOIN 107 MILLIGRAM(S) PE: 50 INJECTION INTRAMUSCULAR; INTRAVENOUS at 17:33

## 2018-10-27 RX ADMIN — Medication 1000 MILLIGRAM(S): at 11:37

## 2018-10-27 RX ADMIN — PANTOPRAZOLE SODIUM 40 MILLIGRAM(S): 20 TABLET, DELAYED RELEASE ORAL at 10:22

## 2018-10-27 RX ADMIN — HYDROMORPHONE HYDROCHLORIDE 1 MILLIGRAM(S): 2 INJECTION INTRAMUSCULAR; INTRAVENOUS; SUBCUTANEOUS at 02:46

## 2018-10-27 RX ADMIN — HYDROMORPHONE HYDROCHLORIDE 1 MILLIGRAM(S): 2 INJECTION INTRAMUSCULAR; INTRAVENOUS; SUBCUTANEOUS at 03:11

## 2018-10-27 NOTE — BRIEF OPERATIVE NOTE - PROCEDURE
<<-----Click on this checkbox to enter Procedure PEG (percutaneous endoscopic gastrostomy)  10/27/2018    Active  [email or web address]  Tracheostomy  10/27/2018    Active  [email or web address]

## 2018-10-27 NOTE — PROGRESS NOTE ADULT - SUBJECTIVE AND OBJECTIVE BOX
INTERVAL HPI/OVERNIGHT EVENTS:  32y Male unknown PMH found unresponsive on sidewalk found w/ b/l SDH and SAH now s/p b/l hemicraniectomy and EVD placement POD#3.   ICPs 6-18. HCT reviewed.     Vital Signs Last 24 Hrs  Vital Signs Last 24 Hrs  T(C): 38.1 (27 Oct 2018 05:00), Max: 38.8 (27 Oct 2018 03:00)  T(F): 100.6 (27 Oct 2018 05:00), Max: 101.8 (27 Oct 2018 03:00)  HR: 109 (27 Oct 2018 06:00) (98 - 120)  BP: 102/62 (26 Oct 2018 08:00) (102/62 - 102/62)  BP(mean): 77 (26 Oct 2018 08:00) (77 - 77)  RR: 26 (27 Oct 2018 06:00) (14 - 40)  SpO2: 100% (27 Oct 2018 06:00) (98% - 100%)  PHYSICAL EXAM:  GCS: E-1 V-1T M-2= 4T  GENERAL: NAD  HEAD: S/p b/l hemicraniectomys. Flaps stable, drains removed in standard fasion and stapled. Dressing clean and dry.  EYES: Severe chemosis b/l  MENTAL STATUS: Intubated, sedated. Does not open eyes. Not following commands  CRANIAL NERVES: R pupil 5 mm unreactive, L pupil 3mm unreactive; neg corneals w+chemosis. Grossly no facial asymmetry  MOTOR: decerebrate rigidity b/l uppers and triple flexing b/l lowers. No spontaneous movement noted  REFLEXES: Clonus b/l   RESPIRATORY: trach+    LABS:             8.4    6.3   )-----------( 102                 25.6     142  |  110<H>  |  4.0<L>  ----------------------------<  98  3.7   |  21.0<L>  |  0.37<L>    Ca    6.8<L>       Phos  2.5     Mg     1.9          RADIOLOGY & ADDITIONAL TESTS:  CT Head No Cont (10.26.18 @ 16:44)  IMPRESSION:  Since the CT of 10/24/18: Interval bilateral frontoparietal craniectomies   for decompression and evacuation of bilateral subdural hematomas, with   decrease in size of bilateral holohemispheric subdural hematomas. Near   complete resolution of rightward midline shift.  Extensive acute-subacute scattered bilateral cerebral ischemic changes,   detailed above. Mild bilateral uncal herniations.  More conspicuous small right frontal and posterior right occipitotemporal   parenchymal hematomas.  Mild improvement of small-moderate amount of scattered subarachnoid   hemorrhage.  Right frontal approach ventriculostomy with tip in the body of the left   lateral ventricle. New small amount of intraventricular hemorrhage. No   hydrocephalus.    CT Angio Head/Neck w/ IV Cont (10.24.18 @ 05:07)  IMPRESSION:  CTA NECK:  No significant stenosis of the cervical carotid or vertebral   arteries.  CTA HEAD:  No significant stenosis or vessel cutoff along the major   intracranial arterial vasculature. No aneurysm is seen.    CT Head/C-spine No Cont (10.24.18 @ 05:07)  IMPRESSION:  CT BRAIN: Bilateral holohemispheric subdural hemorrhages measuring 1.1 cm   on the rightand 1.4 cm on the left. There are increased hyperdense blood   products within these hemorrhages compared to the earlier examination.   The left convexity subdural hemorrhage has increased in size since the   earlier examination and there is new 9 mmmidline shift to the right.  Diffuse subarachnoid hemorrhage again noted. Poor visualization of the   basal cisterns.  Possible loss of gray-white differentiation along bilateral occipital   lobes and the right frontal lobe. Follow-up recommended.  Nondisplaced fracture along the left mastoid tip medial to the mastoid   air cells. Left posterior scalp soft tissue swelling.    CERVICAL SPINE CT: No cervical spine fracture.

## 2018-10-27 NOTE — PROGRESS NOTE ADULT - SUBJECTIVE AND OBJECTIVE BOX
[facility]                                     Neurology at [place]                                 Kelly Toth, & Vincent                                  [address]                                        [place], [place], [address]                                             [phone]    CC:  TBI, seizure  HPI: The patient is a young man, estimated to be in 30's who was found unresponsive on sidewalk.  Per chart initial CT head showed SAH and b/l SDH.  He was then transferred from [facility] to [facility] for emergent neurosurgical treatment.  He was posturing with agonal respirations on arrival to [facility].  He was taken to the OR emergently by Dr. Fraser for SDH evacuation and decompression.  He had b/l craniectomy and EVD placed.  He has been having non- rhythmic movements of his feet and rhythmic movements of his tongue, causing concern for seizure/status epilepticus.  Neurology eval has been called    Interval history: no neurological change, s/p PEG and trach 10/26    ROS neurology: sedated, unresponsive    MEDICATIONS  (STANDING):  chlorhexidine 2% Cloths 1 Application(s) Topical daily  fentaNYL   Infusion 0.5 MICROgram(s)/kG/Hr (4.05 mL/Hr) IV Continuous <Continuous>  folic acid Injectable 1 milliGRAM(s) IV Push daily  fosphenytoin IVPB 175 milliGRAM(s) PE IV Intermittent every 12 hours  levETIRAcetam  IVPB 1000 milliGRAM(s) IV Intermittent every 12 hours  pantoprazole  Injectable 40 milliGRAM(s) IV Push daily  propofol Infusion 10 MICROgram(s)/kG/Min (4.86 mL/Hr) IV Continuous <Continuous>  propranolol 10 milliGRAM(s) Oral every 6 hours  thiamine IVPB 500 milliGRAM(s) IV Intermittent daily    MEDICATIONS  (PRN):      Vital Signs Last 24 Hrs  T(C): 38.4 (27 Oct 2018 11:00), Max: 38.8 (27 Oct 2018 03:00)  T(F): 101.1 (27 Oct 2018 11:00), Max: 101.8 (27 Oct 2018 03:00)  HR: 101 (27 Oct 2018 12:21) (99 - 120)  BP: --  BP(mean): --  RR: 19 (27 Oct 2018 11:00) (14 - 26)  SpO2: 99% (27 Oct 2018 12:21) (98% - 100%)    General: intubated on vent    Detailed Neurologic Exam:    Mental status: The patient is unresponsive, on propofol    Cranial nerves: Pupils left 2 mm NR right 4 mm not reactive. There is no visual response to threat.  Absent corneal reflexes.  Extraocular motion is not assessed. No gag reflex    Motor: There is  b/l posturing (decerebrate) to pinch and sternal rub.  There is triple flexion to nail bed pressure in feet    Sensation: Posturing, no grimace to nailbed pressure    Reflexes: absent throughout and plantar responses are extensor.    Cerebellar:  Unable to test finger to nose testing.    Gait : deferred    LABS:                           8.0    6.4   )-----------( 107      ( 27 Oct 2018 04:44 )             25.3     10-27    145  |  112<H>  |  7.0<L>  ----------------------------<  181<H>  3.9   |  23.0  |  0.37<L>    Ca    7.0<L>      27 Oct 2018 09:33  Phos  1.3     10-27  Mg     2.0     10-27    TPro  5.7<L>  /  Alb  2.0<L>  /  TBili  2.0  /  DBili  x   /  AST  82<H>  /  ALT  16  /  AlkPhos  243<H>  10-25    LIVER FUNCTIONS - ( 25 Oct 2018 19:16 )  Alb: 2.0 g/dL / Pro: 5.7 g/dL / ALK PHOS: 243 U/L / ALT: 16 U/L / AST: 82 U/L / GGT: x           PT/INR - ( 26 Oct 2018 06:13 )   PT: 14.8 sec;   INR: 1.34 ratio         PTT - ( 26 Oct 2018 06:13 )  PTT:29.5 sec             RADIOLOGY & ADDITIONAL STUDIES (independently reviewed unless otherwise noted):  CT head 10/26- multipole areas of ischemia/infarction b/l.  improvement in b/l SDH, right mass effect and SAH.  rivht FP and occ hematoatomas.  Post surgical changes  Previous neurological studies:  CT head- b/l SDH (+) SAH diffusely, likely ischemia in b/l occipital lobes and right frontal lobes   CT-A head and neck- no stenoses of ICAs or vertebrals, no stenoses in COW, no aneurysm or AVM

## 2018-10-27 NOTE — PROGRESS NOTE ADULT - ASSESSMENT
32yMale presenting with: Severe traumatic brain injury.  SDH, SAH.    Neurological: Monitor ICP.  Maintain CPP > 60.    Neck: Trach care    Pulmonary: Remains on Full vent while on Propofol.      Cardiovascular: Maintain Normotension.  Avoid hypotension.    Gastrointestinal: increase TF    Genitourinary: Monitor U/O    Heme: SCD only      Dispo: Continue critical care as above.            CRITICAL CARE TIME SPENT: 40 minutes

## 2018-10-27 NOTE — PROGRESS NOTE ADULT - ASSESSMENT
32y Male unknown PMH found unresponsive on sidewalk found w/ b/l SDH and SAH now s/p b/l hemicraniectomy and EVD placement POD#3.   - ICPs <20mmHg overnight  - GCS remains E-1 V-1T M-2= 4T, stable  - 10/27: b/l JPs removed   - Postop HCT w/ decrease in size of bilateral holohemispheric subdural hematomas, near-complete resolution of R-MLS, extensive scattered bilateral cerebral ischemic changes, mild bilateral uncal herniations, right frontal and posterior right occipitotemporal parenchymal hematomas, improvement of small-moderate amount of scattered SAH.   - Overall prognosis is guarded.       PLAN:  - Will discuss with Dr. Marion  - Continue EVD clamped to transduce ICPs  - Keppra 500 mg Q12 hour  - HOB30, continue frequent neuro checks  - Drains removed - OK for Lovenox for DVT ppx  - Supportive care/further medical management per primary team

## 2018-10-27 NOTE — BRIEF OPERATIVE NOTE - BRIEF OP NOTE DRAINS
8 sarah beth moore, 22F PEG
bilateral epidural/subgaleal KATTY to thumbprint suction, left frontal EVD

## 2018-10-27 NOTE — PROGRESS NOTE ADULT - SUBJECTIVE AND OBJECTIVE BOX
INTERVAL HPI/OVERNIGHT EVENTS/SUBJECTIVE: No neuro change.  EEG negative.  Bedside trach and PEG completed.  Started on trickle feeds.  Osm ~ 300.  3% slightly increased.  Overnight had mild hypotension to SBP 92.  ICP <20.  CPP > 60.  Unable to give any hx due to AMS.    ICU Vital Signs Last 24 Hrs  T(C): 38.1 (27 Oct 2018 05:00), Max: 38.8 (27 Oct 2018 03:00)  T(F): 100.6 (27 Oct 2018 05:00), Max: 101.8 (27 Oct 2018 03:00)  HR: 109 (27 Oct 2018 06:00) (98 - 120)  BP: 102/62 (26 Oct 2018 08:00) (102/62 - 102/62)  BP(mean): 77 (26 Oct 2018 08:00) (77 - 77)  ABP: 153/83 (27 Oct 2018 06:00) (94/54 - 153/92)  ABP(mean): 103 (27 Oct 2018 06:00) (69 - 114)  RR: 26 (27 Oct 2018 06:00) (14 - 40)  SpO2: 100% (27 Oct 2018 06:00) (98% - 100%)      I&O's Detail    26 Oct 2018 07:01  -  27 Oct 2018 07:00  --------------------------------------------------------  IN:    0.9% NaCl: 1000 mL    Enteral Tube Flush: 240 mL    fentaNYL  Infusion: 100.5 mL    Pivot: 700 mL    propofol Infusion: 213.7 mL    sodium chloride 3%.: 810 mL    sodium chloride 3%.: 150 mL    Solution: 150 mL    Solution: 100 mL    Solution: 50 mL    Solution: 500 mL    Solution: 100 mL    Solution: 50 mL    Solution: 100 mL    Solution: 200 mL  Total IN: 4464.2 mL    OUT:    Drain: 4 mL    Indwelling Catheter - Urethral: 1590 mL    Nasoenteral Tube: 400 mL  Total OUT: 1994 mL    Total NET: 2470.2 mL          Mode: AC/ CMV (Assist Control/ Continuous Mandatory Ventilation)  RR (machine): 18  TV (machine): 400  FiO2: 40  PEEP: 10  MAP: 9  PIP: 18    ABG - ( 27 Oct 2018 04:43 )  pH, Arterial: 7.44  pH, Blood: x     /  pCO2: 36    /  pO2: 138   / HCO3: 25    / Base Excess: 0.2   /  SaO2: 100                 MEDICATIONS  (STANDING):  chlorhexidine 2% Cloths 1 Application(s) Topical daily  fentaNYL   Infusion 0.5 MICROgram(s)/kG/Hr (4.05 mL/Hr) IV Continuous <Continuous>  folic acid Injectable 1 milliGRAM(s) IV Push daily  fosphenytoin IVPB 175 milliGRAM(s) PE IV Intermittent every 12 hours  levETIRAcetam  IVPB 1000 milliGRAM(s) IV Intermittent every 12 hours  pantoprazole  Injectable 40 milliGRAM(s) IV Push daily  propofol Infusion 10 MICROgram(s)/kG/Min (4.86 mL/Hr) IV Continuous <Continuous>  sodium chloride 3%. 500 milliLiter(s) (40 mL/Hr) IV Continuous <Continuous>  sodium chloride 3%. 150 milliLiter(s) (150 mL/Hr) IV Continuous <Continuous>  sodium phosphate IVPB 30 milliMole(s) IV Intermittent once  thiamine IVPB 500 milliGRAM(s) IV Intermittent daily    MEDICATIONS  (PRN):      NUTRITION/IVF: Pivot @50,/ 3% @40    PHYSICAL EXAM:     Gen: NAD, Well appearing, No cyanosis, Pallor.  On Ventilator    Eyes: PERRL ~ 3mm, EOMI,     Neurological: GCS 1/1T/2, No focal defficit.     ENMT: Clear canals,    Neck: Supple. NT AT, FROM no pain.  No JVD. No meningeal signs    Pulmonary: NAD, CTA, = BL .  Minimal secretions.    Cardiovascular: RRR, S1, S2, No murmurs noted.    Gastrointestinal: ND, Soft, NT.    Extremities: NT, AT, Mild-Moderate 2+ pitting edema.  No erythema or palpable cord noted.  FROM, = 2+ pulses throughout.          LABS:  CBC Full  -  ( 27 Oct 2018 04:44 )  WBC Count : 6.4 K/uL  Hemoglobin : 8.0 g/dL  Hematocrit : 25.3 %  Platelet Count - Automated : 107 K/uL  Mean Cell Volume : 88.2 fl  Mean Cell Hemoglobin : 27.9 pg  Mean Cell Hemoglobin Concentration : 31.6 g/dL  Auto Neutrophil # : x  Auto Lymphocyte # : x  Auto Monocyte # : x  Auto Eosinophil # : x  Auto Basophil # : x  Auto Neutrophil % : 75.0 %  Auto Lymphocyte % : 4.0 %  Auto Monocyte % : 11.0 %  Auto Eosinophil % : x  Auto Basophil % : x    10-27    145  |  115<H>  |  6.0<L>  ----------------------------<  155<H>  3.8   |  21.0<L>  |  0.40<L>    Ca    7.2<L>      27 Oct 2018 04:44  Phos  1.3     10-27  Mg     2.0     10-27    TPro  5.7<L>  /  Alb  2.0<L>  /  TBili  2.0  /  DBili  x   /  AST  82<H>  /  ALT  16  /  AlkPhos  243<H>  10-25    PT/INR - ( 26 Oct 2018 06:13 )   PT: 14.8 sec;   INR: 1.34 ratio         PTT - ( 26 Oct 2018 06:13 )  PTT:29.5 sec    RECENT CULTURES:  10-25 .Body Fluid Peritoneal Fluid XXXX   Few WBC's  No organisms seen   No growth at 1 day.  Culture in progress        LIVER FUNCTIONS - ( 25 Oct 2018 19:16 )  Alb: 2.0 g/dL / Pro: 5.7 g/dL / ALK PHOS: 243 U/L / ALT: 16 U/L / AST: 82 U/L / GGT: x               CAPILLARY BLOOD GLUCOSE      RADIOLOGY & ADDITIONAL STUDIES:    ASSESSMENT/PLAN:  32yMale presenting with: Severe traumatic brain injury.  SDH, SAH.    Neurological: Monitor ICP.  Maintain CPP > 60.    Neck: Trach care    Pulmonary: Remains on Full vent while on Propofol.      Cardiovascular: Maintain Normotension.  Avoid hypotension.    Gastrointestinal: increase TF    Genitourinary: Monitor U/O    Heme: SCD only      Dispo: Continue critical care as above.            CRITICAL CARE TIME SPENT: 40 minutes

## 2018-10-27 NOTE — PROGRESS NOTE ADULT - ASSESSMENT
The patient is a 32y Male who is followed by neurology because of TBI/possible seizure    TBI  s/p decompressive b/l hemicraniectomy and EVD  significant amount of intracranial hemorrhage, improving post decompression  multiple areas of parenchymal CVA/ischemia seen  No significant improvement in neuro exam  EVD management per Neurosurgery  poor prognosis    possible seizure   EEG, no seizure, breech rhythm over craniectomies, slowing suggesting diffuse cerebral dysfunction.  rhythmic tongue movements and random foot movement has stopped  unable to tolerate propofol because of hypotension  continue fosphenytoin, keppra,     poor prognosis for meaningful recovery      will follow with you    Martell Mendoza MD PhD   [id number]

## 2018-10-28 LAB
ANION GAP SERPL CALC-SCNC: 11 MMOL/L — SIGNIFICANT CHANGE UP (ref 5–17)
ANISOCYTOSIS BLD QL: SLIGHT — SIGNIFICANT CHANGE UP
APPEARANCE UR: CLEAR — SIGNIFICANT CHANGE UP
BACTERIA # UR AUTO: ABNORMAL
BASOPHILS # BLD AUTO: 0 K/UL — SIGNIFICANT CHANGE UP (ref 0–0.2)
BASOPHILS NFR BLD AUTO: 1 % — SIGNIFICANT CHANGE UP (ref 0–2)
BILIRUB UR-MCNC: NEGATIVE — SIGNIFICANT CHANGE UP
BUN SERPL-MCNC: 8 MG/DL — SIGNIFICANT CHANGE UP (ref 8–20)
CALCIUM SERPL-MCNC: 7.2 MG/DL — LOW (ref 8.6–10.2)
CHLORIDE SERPL-SCNC: 105 MMOL/L — SIGNIFICANT CHANGE UP (ref 98–107)
CO2 SERPL-SCNC: 25 MMOL/L — SIGNIFICANT CHANGE UP (ref 22–29)
COLOR SPEC: YELLOW — SIGNIFICANT CHANGE UP
CREAT SERPL-MCNC: 0.26 MG/DL — LOW (ref 0.5–1.3)
DIFF PNL FLD: ABNORMAL
EOSINOPHIL # BLD AUTO: 0.1 K/UL — SIGNIFICANT CHANGE UP (ref 0–0.5)
EOSINOPHIL NFR BLD AUTO: 0 % — SIGNIFICANT CHANGE UP (ref 0–6)
GAS PNL BLDA: SIGNIFICANT CHANGE UP
GLUCOSE SERPL-MCNC: 163 MG/DL — HIGH (ref 70–115)
GLUCOSE UR QL: NEGATIVE MG/DL — SIGNIFICANT CHANGE UP
GRAM STN FLD: SIGNIFICANT CHANGE UP
HCT VFR BLD CALC: 28.2 % — LOW (ref 42–52)
HGB BLD-MCNC: 9.1 G/DL — LOW (ref 14–18)
HYPOCHROMIA BLD QL: SLIGHT — SIGNIFICANT CHANGE UP
KETONES UR-MCNC: NEGATIVE — SIGNIFICANT CHANGE UP
LEUKOCYTE ESTERASE UR-ACNC: NEGATIVE — SIGNIFICANT CHANGE UP
LYMPHOCYTES # BLD AUTO: 0.8 K/UL — LOW (ref 1–4.8)
LYMPHOCYTES # BLD AUTO: 5 % — LOW (ref 20–55)
MAGNESIUM SERPL-MCNC: 1.5 MG/DL — LOW (ref 1.6–2.6)
MCHC RBC-ENTMCNC: 28.3 PG — SIGNIFICANT CHANGE UP (ref 27–31)
MCHC RBC-ENTMCNC: 32.3 G/DL — SIGNIFICANT CHANGE UP (ref 32–36)
MCV RBC AUTO: 87.6 FL — SIGNIFICANT CHANGE UP (ref 80–94)
METAMYELOCYTES # FLD: 1 % — HIGH (ref 0–0)
MONOCYTES # BLD AUTO: 1.8 K/UL — HIGH (ref 0–0.8)
MONOCYTES NFR BLD AUTO: 16 % — HIGH (ref 3–10)
MYELOCYTES NFR BLD: 1 % — HIGH (ref 0–0)
NEUTROPHILS # BLD AUTO: 7.4 K/UL — SIGNIFICANT CHANGE UP (ref 1.8–8)
NEUTROPHILS NFR BLD AUTO: 74 % — HIGH (ref 37–73)
NEUTS BAND # BLD: 2 % — SIGNIFICANT CHANGE UP (ref 0–8)
NITRITE UR-MCNC: NEGATIVE — SIGNIFICANT CHANGE UP
NRBC # BLD: 1 /100 — HIGH (ref 0–0)
OSMOLALITY SERPL: 304 MOSM/KG — HIGH (ref 280–300)
OVALOCYTES BLD QL SMEAR: SLIGHT — SIGNIFICANT CHANGE UP
PH UR: 7 — SIGNIFICANT CHANGE UP (ref 5–8)
PHOSPHATE SERPL-MCNC: 1.4 MG/DL — LOW (ref 2.4–4.7)
PLAT MORPH BLD: NORMAL — SIGNIFICANT CHANGE UP
PLATELET # BLD AUTO: 133 K/UL — LOW (ref 150–400)
POIKILOCYTOSIS BLD QL AUTO: SLIGHT — SIGNIFICANT CHANGE UP
POTASSIUM SERPL-MCNC: 3.8 MMOL/L — SIGNIFICANT CHANGE UP (ref 3.5–5.3)
POTASSIUM SERPL-SCNC: 3.8 MMOL/L — SIGNIFICANT CHANGE UP (ref 3.5–5.3)
PROT UR-MCNC: 30 MG/DL
RBC # BLD: 3.22 M/UL — LOW (ref 4.6–6.2)
RBC # FLD: 18 % — HIGH (ref 11–15.6)
RBC BLD AUTO: ABNORMAL
RBC CASTS # UR COMP ASSIST: ABNORMAL /HPF (ref 0–4)
SCHISTOCYTES BLD QL AUTO: SLIGHT — SIGNIFICANT CHANGE UP
SODIUM SERPL-SCNC: 141 MMOL/L — SIGNIFICANT CHANGE UP (ref 135–145)
SP GR SPEC: 1 — LOW (ref 1.01–1.02)
SPECIMEN SOURCE: SIGNIFICANT CHANGE UP
TARGETS BLD QL SMEAR: SLIGHT — SIGNIFICANT CHANGE UP
UROBILINOGEN FLD QL: 8 MG/DL
WBC # BLD: 10.1 K/UL — SIGNIFICANT CHANGE UP (ref 4.8–10.8)
WBC # FLD AUTO: 10.1 K/UL — SIGNIFICANT CHANGE UP (ref 4.8–10.8)
WBC UR QL: SIGNIFICANT CHANGE UP

## 2018-10-28 PROCEDURE — 71045 X-RAY EXAM CHEST 1 VIEW: CPT | Mod: 26

## 2018-10-28 PROCEDURE — 99024 POSTOP FOLLOW-UP VISIT: CPT

## 2018-10-28 PROCEDURE — 99232 SBSQ HOSP IP/OBS MODERATE 35: CPT

## 2018-10-28 RX ORDER — PIPERACILLIN AND TAZOBACTAM 4; .5 G/20ML; G/20ML
4.5 INJECTION, POWDER, LYOPHILIZED, FOR SOLUTION INTRAVENOUS EVERY 8 HOURS
Qty: 0 | Refills: 0 | Status: DISCONTINUED | OUTPATIENT
Start: 2018-10-28 | End: 2018-11-01

## 2018-10-28 RX ORDER — FENTANYL CITRATE 50 UG/ML
100 INJECTION INTRAVENOUS ONCE
Qty: 0 | Refills: 0 | Status: DISCONTINUED | OUTPATIENT
Start: 2018-10-28 | End: 2018-10-29

## 2018-10-28 RX ORDER — VANCOMYCIN HCL 1 G
VIAL (EA) INTRAVENOUS
Qty: 0 | Refills: 0 | Status: DISCONTINUED | OUTPATIENT
Start: 2018-10-28 | End: 2018-10-30

## 2018-10-28 RX ORDER — ACETAMINOPHEN 500 MG
650 TABLET ORAL EVERY 6 HOURS
Qty: 0 | Refills: 0 | Status: DISCONTINUED | OUTPATIENT
Start: 2018-10-28 | End: 2018-12-31

## 2018-10-28 RX ORDER — VANCOMYCIN HCL 1 G
1600 VIAL (EA) INTRAVENOUS ONCE
Qty: 0 | Refills: 0 | Status: DISCONTINUED | OUTPATIENT
Start: 2018-10-28 | End: 2018-10-28

## 2018-10-28 RX ORDER — FOLIC ACID 0.8 MG
1 TABLET ORAL DAILY
Qty: 0 | Refills: 0 | Status: DISCONTINUED | OUTPATIENT
Start: 2018-10-28 | End: 2018-12-31

## 2018-10-28 RX ORDER — FENTANYL CITRATE 50 UG/ML
100 INJECTION INTRAVENOUS ONCE
Qty: 0 | Refills: 0 | Status: DISCONTINUED | OUTPATIENT
Start: 2018-10-28 | End: 2018-10-28

## 2018-10-28 RX ORDER — CHLORHEXIDINE GLUCONATE 213 G/1000ML
15 SOLUTION TOPICAL
Qty: 0 | Refills: 0 | Status: DISCONTINUED | OUTPATIENT
Start: 2018-10-28 | End: 2019-01-02

## 2018-10-28 RX ORDER — HYDROMORPHONE HYDROCHLORIDE 2 MG/ML
1 INJECTION INTRAMUSCULAR; INTRAVENOUS; SUBCUTANEOUS ONCE
Qty: 0 | Refills: 0 | Status: DISCONTINUED | OUTPATIENT
Start: 2018-10-28 | End: 2018-10-30

## 2018-10-28 RX ORDER — HYDROMORPHONE HYDROCHLORIDE 2 MG/ML
1 INJECTION INTRAMUSCULAR; INTRAVENOUS; SUBCUTANEOUS
Qty: 0 | Refills: 0 | Status: DISCONTINUED | OUTPATIENT
Start: 2018-10-28 | End: 2018-10-30

## 2018-10-28 RX ORDER — VANCOMYCIN HCL 1 G
1500 VIAL (EA) INTRAVENOUS EVERY 12 HOURS
Qty: 0 | Refills: 0 | Status: DISCONTINUED | OUTPATIENT
Start: 2018-10-28 | End: 2018-10-28

## 2018-10-28 RX ORDER — VECURONIUM BROMIDE 20 MG/1
10 INJECTION, POWDER, FOR SOLUTION INTRAVENOUS ONCE
Qty: 0 | Refills: 0 | Status: DISCONTINUED | OUTPATIENT
Start: 2018-10-28 | End: 2018-10-29

## 2018-10-28 RX ORDER — HYDROMORPHONE HYDROCHLORIDE 2 MG/ML
1 INJECTION INTRAMUSCULAR; INTRAVENOUS; SUBCUTANEOUS ONCE
Qty: 0 | Refills: 0 | Status: DISCONTINUED | OUTPATIENT
Start: 2018-10-28 | End: 2018-10-28

## 2018-10-28 RX ORDER — MIDAZOLAM HYDROCHLORIDE 1 MG/ML
2 INJECTION, SOLUTION INTRAMUSCULAR; INTRAVENOUS ONCE
Qty: 0 | Refills: 0 | Status: DISCONTINUED | OUTPATIENT
Start: 2018-10-28 | End: 2018-10-28

## 2018-10-28 RX ORDER — LEVETIRACETAM 250 MG/1
1000 TABLET, FILM COATED ORAL
Qty: 0 | Refills: 0 | Status: DISCONTINUED | OUTPATIENT
Start: 2018-10-28 | End: 2019-01-02

## 2018-10-28 RX ORDER — POLYETHYLENE GLYCOL 3350 17 G/17G
17 POWDER, FOR SOLUTION ORAL DAILY
Qty: 0 | Refills: 0 | Status: DISCONTINUED | OUTPATIENT
Start: 2018-10-28 | End: 2018-11-01

## 2018-10-28 RX ORDER — PANTOPRAZOLE SODIUM 20 MG/1
40 TABLET, DELAYED RELEASE ORAL DAILY
Qty: 0 | Refills: 0 | Status: DISCONTINUED | OUTPATIENT
Start: 2018-10-28 | End: 2018-10-29

## 2018-10-28 RX ORDER — PIPERACILLIN AND TAZOBACTAM 4; .5 G/20ML; G/20ML
4.5 INJECTION, POWDER, LYOPHILIZED, FOR SOLUTION INTRAVENOUS EVERY 8 HOURS
Qty: 0 | Refills: 0 | Status: DISCONTINUED | OUTPATIENT
Start: 2018-10-28 | End: 2018-10-28

## 2018-10-28 RX ORDER — VANCOMYCIN HCL 1 G
1600 VIAL (EA) INTRAVENOUS ONCE
Qty: 0 | Refills: 0 | Status: COMPLETED | OUTPATIENT
Start: 2018-10-28 | End: 2018-10-28

## 2018-10-28 RX ORDER — VANCOMYCIN HCL 1 G
VIAL (EA) INTRAVENOUS
Qty: 0 | Refills: 0 | Status: DISCONTINUED | OUTPATIENT
Start: 2018-10-28 | End: 2018-10-28

## 2018-10-28 RX ORDER — THIAMINE MONONITRATE (VIT B1) 100 MG
100 TABLET ORAL DAILY
Qty: 0 | Refills: 0 | Status: DISCONTINUED | OUTPATIENT
Start: 2018-10-28 | End: 2018-12-31

## 2018-10-28 RX ORDER — POTASSIUM PHOSPHATE, MONOBASIC POTASSIUM PHOSPHATE, DIBASIC 236; 224 MG/ML; MG/ML
30 INJECTION, SOLUTION INTRAVENOUS ONCE
Qty: 0 | Refills: 0 | Status: COMPLETED | OUTPATIENT
Start: 2018-10-28 | End: 2018-10-28

## 2018-10-28 RX ORDER — VANCOMYCIN HCL 1 G
1600 VIAL (EA) INTRAVENOUS EVERY 12 HOURS
Qty: 0 | Refills: 0 | Status: DISCONTINUED | OUTPATIENT
Start: 2018-10-29 | End: 2018-10-30

## 2018-10-28 RX ADMIN — CHLORHEXIDINE GLUCONATE 15 MILLILITER(S): 213 SOLUTION TOPICAL at 16:24

## 2018-10-28 RX ADMIN — FOSPHENYTOIN 107 MILLIGRAM(S) PE: 50 INJECTION INTRAMUSCULAR; INTRAVENOUS at 20:20

## 2018-10-28 RX ADMIN — FOSPHENYTOIN 107 MILLIGRAM(S) PE: 50 INJECTION INTRAMUSCULAR; INTRAVENOUS at 05:00

## 2018-10-28 RX ADMIN — FENTANYL CITRATE 100 MICROGRAM(S): 50 INJECTION INTRAVENOUS at 20:03

## 2018-10-28 RX ADMIN — MIDAZOLAM HYDROCHLORIDE 2 MILLIGRAM(S): 1 INJECTION, SOLUTION INTRAMUSCULAR; INTRAVENOUS at 20:03

## 2018-10-28 RX ADMIN — Medication 10 MILLIGRAM(S): at 17:44

## 2018-10-28 RX ADMIN — PANTOPRAZOLE SODIUM 40 MILLIGRAM(S): 20 TABLET, DELAYED RELEASE ORAL at 12:34

## 2018-10-28 RX ADMIN — HYDROMORPHONE HYDROCHLORIDE 1 MILLIGRAM(S): 2 INJECTION INTRAMUSCULAR; INTRAVENOUS; SUBCUTANEOUS at 23:12

## 2018-10-28 RX ADMIN — Medication 10 MILLIGRAM(S): at 05:01

## 2018-10-28 RX ADMIN — HYDROMORPHONE HYDROCHLORIDE 1 MILLIGRAM(S): 2 INJECTION INTRAMUSCULAR; INTRAVENOUS; SUBCUTANEOUS at 01:31

## 2018-10-28 RX ADMIN — HYDROMORPHONE HYDROCHLORIDE 1 MILLIGRAM(S): 2 INJECTION INTRAMUSCULAR; INTRAVENOUS; SUBCUTANEOUS at 15:27

## 2018-10-28 RX ADMIN — Medication 1 MILLIGRAM(S): at 17:52

## 2018-10-28 RX ADMIN — Medication 100 MILLIGRAM(S): at 21:14

## 2018-10-28 RX ADMIN — POTASSIUM PHOSPHATE, MONOBASIC POTASSIUM PHOSPHATE, DIBASIC 83.33 MILLIMOLE(S): 236; 224 INJECTION, SOLUTION INTRAVENOUS at 06:54

## 2018-10-28 RX ADMIN — LEVETIRACETAM 1000 MILLIGRAM(S): 250 TABLET, FILM COATED ORAL at 17:44

## 2018-10-28 RX ADMIN — HYDROMORPHONE HYDROCHLORIDE 1 MILLIGRAM(S): 2 INJECTION INTRAMUSCULAR; INTRAVENOUS; SUBCUTANEOUS at 22:32

## 2018-10-28 RX ADMIN — POLYETHYLENE GLYCOL 3350 17 GRAM(S): 17 POWDER, FOR SOLUTION ORAL at 21:14

## 2018-10-28 RX ADMIN — FENTANYL CITRATE 100 MICROGRAM(S): 50 INJECTION INTRAVENOUS at 20:00

## 2018-10-28 RX ADMIN — LEVETIRACETAM 400 MILLIGRAM(S): 250 TABLET, FILM COATED ORAL at 05:00

## 2018-10-28 RX ADMIN — Medication 333.33 MILLIGRAM(S): at 23:50

## 2018-10-28 RX ADMIN — HYDROMORPHONE HYDROCHLORIDE 1 MILLIGRAM(S): 2 INJECTION INTRAMUSCULAR; INTRAVENOUS; SUBCUTANEOUS at 01:48

## 2018-10-28 RX ADMIN — CHLORHEXIDINE GLUCONATE 1 APPLICATION(S): 213 SOLUTION TOPICAL at 12:28

## 2018-10-28 RX ADMIN — Medication 10 MILLIGRAM(S): at 12:34

## 2018-10-28 RX ADMIN — PIPERACILLIN AND TAZOBACTAM 25 GRAM(S): 4; .5 INJECTION, POWDER, LYOPHILIZED, FOR SOLUTION INTRAVENOUS at 23:49

## 2018-10-28 RX ADMIN — HYDROMORPHONE HYDROCHLORIDE 1 MILLIGRAM(S): 2 INJECTION INTRAMUSCULAR; INTRAVENOUS; SUBCUTANEOUS at 16:03

## 2018-10-28 NOTE — PROGRESS NOTE ADULT - SUBJECTIVE AND OBJECTIVE BOX
INTERVAL HPI/OVERNIGHT EVENTS:    Transitioned to pressure support and tolerating.  Sedation lifted and remains with decerebrate posturing, corneal, gag reflex.  Tolerating tube feeds.  Propranolol added.  3% DC'd.  ICP and CPP remains normal.  Osmol 304.  Tmax 101.8    MEDICATIONS  (STANDING):  chlorhexidine 2% Cloths 1 Application(s) Topical daily  folic acid Injectable 1 milliGRAM(s) IV Push daily  fosphenytoin IVPB 175 milliGRAM(s) PE IV Intermittent every 12 hours  levETIRAcetam  IVPB 1000 milliGRAM(s) IV Intermittent every 12 hours  pantoprazole  Injectable 40 milliGRAM(s) IV Push daily  propranolol 10 milliGRAM(s) Oral every 6 hours  thiamine IVPB 500 milliGRAM(s) IV Intermittent daily    MEDICATIONS  (PRN):      Drug Dosing Weight  Height (cm): 152.4 (24 Oct 2018 14:00)  Weight (kg): 81 (24 Oct 2018 05:24)  BMI (kg/m2): 34.9 (24 Oct 2018 14:00)  BSA (m2): 1.78 (24 Oct 2018 14:00)      PAST MEDICAL & SURGICAL HISTORY:  Medical history unknown  Surgical history unknown      ICU Vital Signs Last 24 Hrs  T(C): 38.3 (28 Oct 2018 06:00), Max: 38.8 (27 Oct 2018 19:00)  T(F): 100.9 (28 Oct 2018 06:00), Max: 101.8 (27 Oct 2018 19:00)  HR: 86 (28 Oct 2018 07:38) (82 - 110)  BP: --  BP(mean): --  ABP: 170/99 (28 Oct 2018 07:00) (105/64 - 170/99)  ABP(mean): 122 (28 Oct 2018 07:00) (78 - 124)  RR: 23 (28 Oct 2018 07:00) (12 - 31)  SpO2: 100% (28 Oct 2018 07:38) (99% - 100%)      ABG - ( 28 Oct 2018 04:35 )  pH, Arterial: 7.44  pH, Blood: x     /  pCO2: 42    /  pO2: 146   / HCO3: 28    / Base Excess: 4.0   /  SaO2: 100         Physical Exam:    Neurological: RASS:  -4.  GCS: 4T.  Decerebrate posturing. R Pupil 4mm nonreactive, L Pupil 2mm nonreactive. no gag, + Cough. overbreathing ventilator.     Neck: No bruits; no thyromegaly or nodules,  No JVD    Back: Normal spine flexure, No CVA tenderness, No deformity or limitation of movement    Respiratory: Breath Sounds equal & clear to auscultation, no accessory muscle use    Cardiovascular: Regular rate & rhythm, normal S1, S2; no murmurs, gallops or rubs    Gastrointestinal: Soft, non-tender, normal bowel sounds    Extremities: No peripheral edema, No cyanosis, clubbing     Vascular: Equal and normal pulses: 2+ peripheral pulses throughout          I&O's Detail    27 Oct 2018 07:01  -  28 Oct 2018 07:00  --------------------------------------------------------  IN:    Enteral Tube Flush: 400 mL    fentaNYL  Infusion: 112.2 mL    Pivot: 1200 mL    propofol Infusion: 70 mL    sodium chloride 3%: 80 mL    Solution: 100 mL    Solution: 100 mL    Solution: 200 mL    Solution: 200 mL    Solution: 583.1 mL  Total IN: 3045.3 mL    OUT:    Indwelling Catheter - Urethral: 2485 mL  Total OUT: 2485 mL    Total NET: 560.3 mL          Mode: CPAP with PS  FiO2: 40  PEEP: 8  PS: 5  MAP: 10          LABS:  CBC Full  -  ( 28 Oct 2018 04:37 )  WBC Count : 10.1 K/uL  Hemoglobin : 9.1 g/dL  Hematocrit : 28.2 %  Platelet Count - Automated : 133 K/uL  Mean Cell Volume : 87.6 fl  Mean Cell Hemoglobin : 28.3 pg  Mean Cell Hemoglobin Concentration : 32.3 g/dL  Auto Neutrophil # : 7.4 K/uL  Auto Lymphocyte # : 0.8 K/uL  Auto Monocyte # : 1.8 K/uL  Auto Eosinophil # : 0.1 K/uL  Auto Basophil # : 0.0 K/uL  Auto Neutrophil % : 74.0 %  Auto Lymphocyte % : 5.0 %  Auto Monocyte % : 16.0 %  Auto Eosinophil % : 0.0 %  Auto Basophil % : 1.0 %    10-28    141  |  105  |  8.0  ----------------------------<  163<H>  3.8   |  25.0  |  0.26<L>    Ca    7.2<L>      28 Oct 2018 04:37  Phos  1.4     10-28  Mg     1.5     10-28

## 2018-10-28 NOTE — PROGRESS NOTE ADULT - ASSESSMENT
31 yo Male Subdural hematoma s/p bilateral crani with EVD    Neuro: Continue with tiered therapy. Monitor ICPs, goal < 20. CPP 60-90.  C Collar removed. Neurosurgery plans to DC EVD today    CV: Maintain MAP> 65.     Pulm:  Continue vent management.  Currently tolerating PSV of 5/8.  Will attempt trach collar today    GI: Tolerating goal enteral feeds    : Strict I&Os. avoid nephrotoxic agents    ID: No abx.  Tmax 101.8.  Continue monitoring fever curve    Heme; Serial H/H stable.  SCD    Endo: Target     Lines: Maintain all invasive lines    Skin: Intact    Dispo; Continue SICU. Critically ill. Live on [place] following.

## 2018-10-28 NOTE — PROGRESS NOTE ADULT - SUBJECTIVE AND OBJECTIVE BOX
[facility]                                     Neurology at [place]                                 Kelly Toth, & Vincent                                  [address]                                        [place], [place], [address]                                             [phone]    CC:  TBI, seizure  HPI: The patient is a young man, estimated to be in 30's who was found unresponsive on sidewalk.  Per chart initial CT head showed SAH and b/l SDH.  He was then transferred from [facility] to [facility] for emergent neurosurgical treatment.  He was posturing with agonal respirations on arrival to [facility].  He was taken to the OR emergently by Dr. Fraser for SDH evacuation and decompression.  He had b/l craniectomy and EVD placed.  He has been having non- rhythmic movements of his feet and rhythmic movements of his tongue, causing concern for seizure/status epilepticus.  Neurology eval has been called    Interval history: no neurological change, s/p PEG and trach 10/26, no further abnormal movements    ROS neurology: sedated, unresponsive    MEDICATIONS  (STANDING):  chlorhexidine 0.12% Liquid 15 milliLiter(s) Swish and Spit two times a day  chlorhexidine 2% Cloths 1 Application(s) Topical daily  folic acid 1 milliGRAM(s) Oral daily  fosphenytoin IVPB 175 milliGRAM(s) PE IV Intermittent every 12 hours  levETIRAcetam  Solution 1000 milliGRAM(s) Oral two times a day  pantoprazole   Suspension 40 milliGRAM(s) Oral daily  propranolol 10 milliGRAM(s) Oral every 6 hours  thiamine 100 milliGRAM(s) Oral daily    MEDICATIONS  (PRN):  HYDROmorphone  Injectable 1 milliGRAM(s) IV Push every 3 hours PRN pain or agitation      Vital Signs Last 24 Hrs  T(C): 38.3 (28 Oct 2018 13:00), Max: 38.8 (27 Oct 2018 19:00)  T(F): 100.9 (28 Oct 2018 13:00), Max: 101.8 (27 Oct 2018 19:00)  HR: 83 (28 Oct 2018 14:00) (81 - 103)  BP: --  BP(mean): --  RR: 28 (28 Oct 2018 14:00) (12 - 31)  SpO2: 100% (28 Oct 2018 14:00) (99% - 100%)    General: intubated on vent    Detailed Neurologic Exam:    Mental status: The patient is unresponsive, on propofol    Cranial nerves: Pupils left 2.5  mm NR right 4 mm not reactive. There is no visual response to threat.  Absent corneal reflexes.  Extraocular motion is not assessed.    Motor: There is  b/l posturing (decerebrate) to pinch and sternal rub.  There is triple flexion to nail bed pressure in feet    Sensation: Posturing, no grimace to nailbed pressure    Reflexes: absent throughout and plantar responses are extensor.    Cerebellar:  Unable to test finger to nose testing.    Gait : deferred    LABS:                                        9.1    10.1  )-----------( 133      ( 28 Oct 2018 04:37 )             28.2     10-28    141  |  105  |  8.0  ----------------------------<  163<H>  3.8   |  25.0  |  0.26<L>    Ca    7.2<L>      28 Oct 2018 04:37  Phos  1.4     10-28  Mg     1.5     10-28          RADIOLOGY & ADDITIONAL STUDIES (independently reviewed unless otherwise noted):  CT head 10/26- multipole areas of ischemia/infarction b/l.  improvement in b/l SDH, right mass effect and SAH.  rivht FP and occ hematoatomas.  Post surgical changes  Previous neurological studies:  CT head- b/l SDH (+) SAH diffusely, likely ischemia in b/l occipital lobes and right frontal lobes   CT-A head and neck- no stenoses of ICAs or vertebrals, no stenoses in COW, no aneurysm or AVM

## 2018-10-28 NOTE — BRIEF OPERATIVE NOTE - OPERATION/FINDINGS
Thick secretions left hilum, BAL done culture sent
thick secretion in [place]
large left acute subdural hematoma with brain compression, moderate right acute subdural hematoma with brain compression

## 2018-10-28 NOTE — BRIEF OPERATIVE NOTE - PROCEDURE
<<-----Click on this checkbox to enter Procedure Flexible bronchoscopy in adult  10/28/2018    Active  [email or web address]

## 2018-10-28 NOTE — PROGRESS NOTE ADULT - SUBJECTIVE AND OBJECTIVE BOX
NEUROSURGERY PROGRESS NOTE:    pt seen and states is at his baseline, no improvement/decline reported.  Pt s/p  POD# 4     ROS: unable to obtain     MEDICATIONS  (STANDING):  chlorhexidine 0.12% Liquid 15 milliLiter(s) Swish and Spit two times a day  chlorhexidine 2% Cloths 1 Application(s) Topical daily  folic acid 1 milliGRAM(s) Oral daily  fosphenytoin IVPB 175 milliGRAM(s) PE IV Intermittent every 12 hours  levETIRAcetam  Solution 1000 milliGRAM(s) Oral two times a day  pantoprazole   Suspension 40 milliGRAM(s) Oral daily  propranolol 10 milliGRAM(s) Oral every 6 hours  thiamine 100 milliGRAM(s) Oral daily    MEDICATIONS  (PRN):  HYDROmorphone  Injectable 1 milliGRAM(s) IV Push every 3 hours PRN pain or agitation      Allergies    Allergy Status Unknown    Intolerances        Vital Signs Last 24 Hrs  T(C): 38.3 (28 Oct 2018 13:00), Max: 38.8 (27 Oct 2018 19:00)  T(F): 100.9 (28 Oct 2018 13:00), Max: 101.8 (27 Oct 2018 19:00)  HR: 83 (28 Oct 2018 14:00) (81 - 103)  BP: --  BP(mean): --  RR: 28 (28 Oct 2018 14:00) (12 - 31)  SpO2: 100% (28 Oct 2018 14:00) (99% - 100%)    PHYSICAL EXAM:    GCS: E-1 V-1T M-2= 4T  GENERAL: NAD  HEAD: S/p b/l hemicraniectomys. Flaps stable. Dressing clean and dry midline/ incisions C/D/I b/l   MENTAL STATUS: trached. Does not open eyes. Not following commands  CRANIAL NERVES: R pupil 5 mm unreactive, L pupil 3mm unreactive; neg corneals w+chemosis. Grossly no facial asymmetry  MOTOR: decerebrate rigidity b/l uppers and triple flexing b/l lowers. No spontaneous movement noted  REFLEXES: Clonus b/l       LABS:                        9.1    10.1  )-----------( 133      ( 28 Oct 2018 04:37 )             28.2     10-28    141  |  105  |  8.0  ----------------------------<  163<H>  3.8   |  25.0  |  0.26<L>    Ca    7.2<L>      28 Oct 2018 04:37  Phos  1.4     10-28  Mg     1.5     10-28        Urinalysis Basic - ( 28 Oct 2018 11:21 )    Color: Yellow / Appearance: Clear / S.005 / pH: x  Gluc: x / Ketone: Negative  / Bili: Negative / Urobili: 8 mg/dL   Blood: x / Protein: 30 mg/dL / Nitrite: Negative   Leuk Esterase: Negative / RBC: 6-10 /HPF / WBC 3-5   Sq Epi: x / Non Sq Epi: x / Bacteria: Occasional        RADIOLOGY & ADDITIONAL TESTS:  no new NSx images available for review

## 2018-10-28 NOTE — PROGRESS NOTE ADULT - ASSESSMENT
The patient is a 32y Male who is followed by neurology because of TBI/possible seizure    TBI  s/p decompressive b/l hemicraniectomy and EVD  significant amount of intracranial hemorrhage, improving post decompression  multiple areas of parenchymal CVA/ischemia seen  No significant improvement in neuro exam  EVD management per Neurosurgery  poor prognosis    possible seizure  EEG, no seizure, breech rhythm over craniectomies, slowing suggesting diffuse cerebral dysfunction.  rhythmic tongue movements and random foot movement has stopped  continue fosphenytoin, keppra,     poor prognosis for meaningful recovery      will follow with you    Martell Mendoza MD PhD   [id number]

## 2018-10-28 NOTE — BRIEF OPERATIVE NOTE - POST-OP DX
Oropharyngeal dysphagia  10/27/2018    Active  López Garcia  Subdural hematoma  10/24/2018  acute, bilateral  Active  Thomas Fraser
Oropharyngeal dysphagia  10/27/2018    Active  López Garcia  Subdural hematoma  10/24/2018  acute, bilateral  Active  Thomas Fraser
Subdural hematoma  10/24/2018  acute, bilateral  Active  Thomas Fraser

## 2018-10-28 NOTE — PROGRESS NOTE ADULT - ASSESSMENT
32y Male unknown PMH found unresponsive on sidewalk found w/ b/l SDH and SAH now s/p b/l hemicraniectomy and EVD placement POD#4.   - ICPs <20mmHg overnight  - GCS remains E-1 V-1T M-2= 4T, stable  - 10/27: b/l JPs removed   - Overall prognosis is guarded.       PLAN:  - Continue EVD clamped to transduce ICPs, 9-19  - Keppra 500 mg Q12 hour  - HOB @30, continue frequent neuro checks  - Drains removed - OK for Lovenox for DVT ppx  - Supportive care/further medical management per primary team

## 2018-10-29 LAB
ANION GAP SERPL CALC-SCNC: 12 MMOL/L — SIGNIFICANT CHANGE UP (ref 5–17)
ANISOCYTOSIS BLD QL: SLIGHT — SIGNIFICANT CHANGE UP
BASOPHILS # BLD AUTO: 0 K/UL — SIGNIFICANT CHANGE UP (ref 0–0.2)
BASOPHILS NFR BLD AUTO: 0 % — SIGNIFICANT CHANGE UP (ref 0–2)
BUN SERPL-MCNC: 14 MG/DL — SIGNIFICANT CHANGE UP (ref 8–20)
CALCIUM SERPL-MCNC: 6.7 MG/DL — LOW (ref 8.6–10.2)
CHLORIDE SERPL-SCNC: 98 MMOL/L — SIGNIFICANT CHANGE UP (ref 98–107)
CO2 SERPL-SCNC: 24 MMOL/L — SIGNIFICANT CHANGE UP (ref 22–29)
CREAT SERPL-MCNC: 0.36 MG/DL — LOW (ref 0.5–1.3)
EOSINOPHIL # BLD AUTO: 0.1 K/UL — SIGNIFICANT CHANGE UP (ref 0–0.5)
EOSINOPHIL NFR BLD AUTO: 1 % — SIGNIFICANT CHANGE UP (ref 0–6)
GAS PNL BLDA: SIGNIFICANT CHANGE UP
GLUCOSE SERPL-MCNC: 175 MG/DL — HIGH (ref 70–115)
HCT VFR BLD CALC: 26.2 % — LOW (ref 42–52)
HGB BLD-MCNC: 8.5 G/DL — LOW (ref 14–18)
HYPOCHROMIA BLD QL: SLIGHT — SIGNIFICANT CHANGE UP
LYMPHOCYTES # BLD AUTO: 1 K/UL — SIGNIFICANT CHANGE UP (ref 1–4.8)
LYMPHOCYTES # BLD AUTO: 6 % — LOW (ref 20–55)
MAGNESIUM SERPL-MCNC: 1.3 MG/DL — LOW (ref 1.6–2.6)
MCHC RBC-ENTMCNC: 27.9 PG — SIGNIFICANT CHANGE UP (ref 27–31)
MCHC RBC-ENTMCNC: 32.4 G/DL — SIGNIFICANT CHANGE UP (ref 32–36)
MCV RBC AUTO: 85.9 FL — SIGNIFICANT CHANGE UP (ref 80–94)
METAMYELOCYTES # FLD: 1 % — HIGH (ref 0–0)
MONOCYTES # BLD AUTO: 1.9 K/UL — HIGH (ref 0–0.8)
MONOCYTES NFR BLD AUTO: 11 % — HIGH (ref 3–10)
MYELOCYTES NFR BLD: 1 % — HIGH (ref 0–0)
NEUTROPHILS # BLD AUTO: 11.4 K/UL — HIGH (ref 1.8–8)
NEUTROPHILS NFR BLD AUTO: 69 % — SIGNIFICANT CHANGE UP (ref 37–73)
NEUTS BAND # BLD: 9 % — HIGH (ref 0–8)
OVALOCYTES BLD QL SMEAR: SLIGHT — SIGNIFICANT CHANGE UP
PHOSPHATE SERPL-MCNC: 1.7 MG/DL — LOW (ref 2.4–4.7)
PLAT MORPH BLD: NORMAL — SIGNIFICANT CHANGE UP
PLATELET # BLD AUTO: 157 K/UL — SIGNIFICANT CHANGE UP (ref 150–400)
POIKILOCYTOSIS BLD QL AUTO: SLIGHT — SIGNIFICANT CHANGE UP
POTASSIUM SERPL-MCNC: 3.9 MMOL/L — SIGNIFICANT CHANGE UP (ref 3.5–5.3)
POTASSIUM SERPL-SCNC: 3.9 MMOL/L — SIGNIFICANT CHANGE UP (ref 3.5–5.3)
RBC # BLD: 3.05 M/UL — LOW (ref 4.6–6.2)
RBC # FLD: 17.8 % — HIGH (ref 11–15.6)
RBC BLD AUTO: ABNORMAL
SCHISTOCYTES BLD QL AUTO: SLIGHT — SIGNIFICANT CHANGE UP
SODIUM SERPL-SCNC: 134 MMOL/L — LOW (ref 135–145)
TARGETS BLD QL SMEAR: SLIGHT — SIGNIFICANT CHANGE UP
VANCOMYCIN TROUGH SERPL-MCNC: 7.2 UG/ML — LOW (ref 10–20)
VARIANT LYMPHS # BLD: 2 % — SIGNIFICANT CHANGE UP (ref 0–6)
WBC # BLD: 15.6 K/UL — HIGH (ref 4.8–10.8)
WBC # FLD AUTO: 15.6 K/UL — HIGH (ref 4.8–10.8)

## 2018-10-29 PROCEDURE — 71045 X-RAY EXAM CHEST 1 VIEW: CPT | Mod: 26

## 2018-10-29 PROCEDURE — 99232 SBSQ HOSP IP/OBS MODERATE 35: CPT

## 2018-10-29 RX ORDER — ACETAMINOPHEN 500 MG
1000 TABLET ORAL ONCE
Qty: 0 | Refills: 0 | Status: COMPLETED | OUTPATIENT
Start: 2018-10-29 | End: 2018-10-29

## 2018-10-29 RX ORDER — MAGNESIUM SULFATE 500 MG/ML
2 VIAL (ML) INJECTION ONCE
Qty: 0 | Refills: 0 | Status: COMPLETED | OUTPATIENT
Start: 2018-10-29 | End: 2018-10-29

## 2018-10-29 RX ORDER — HYDROMORPHONE HYDROCHLORIDE 2 MG/ML
1 INJECTION INTRAMUSCULAR; INTRAVENOUS; SUBCUTANEOUS ONCE
Qty: 0 | Refills: 0 | Status: DISCONTINUED | OUTPATIENT
Start: 2018-10-29 | End: 2018-10-29

## 2018-10-29 RX ORDER — SODIUM CHLORIDE 9 MG/ML
1000 INJECTION, SOLUTION INTRAVENOUS ONCE
Qty: 0 | Refills: 0 | Status: COMPLETED | OUTPATIENT
Start: 2018-10-29 | End: 2018-10-29

## 2018-10-29 RX ORDER — SODIUM CHLORIDE 9 MG/ML
1000 INJECTION INTRAMUSCULAR; INTRAVENOUS; SUBCUTANEOUS
Qty: 0 | Refills: 0 | Status: DISCONTINUED | OUTPATIENT
Start: 2018-10-29 | End: 2018-10-29

## 2018-10-29 RX ORDER — ENOXAPARIN SODIUM 100 MG/ML
40 INJECTION SUBCUTANEOUS DAILY
Qty: 0 | Refills: 0 | Status: DISCONTINUED | OUTPATIENT
Start: 2018-10-29 | End: 2018-12-05

## 2018-10-29 RX ADMIN — CHLORHEXIDINE GLUCONATE 1 APPLICATION(S): 213 SOLUTION TOPICAL at 11:45

## 2018-10-29 RX ADMIN — Medication 650 MILLIGRAM(S): at 20:50

## 2018-10-29 RX ADMIN — Medication 50 GRAM(S): at 08:08

## 2018-10-29 RX ADMIN — POLYETHYLENE GLYCOL 3350 17 GRAM(S): 17 POWDER, FOR SOLUTION ORAL at 17:13

## 2018-10-29 RX ADMIN — PIPERACILLIN AND TAZOBACTAM 25 GRAM(S): 4; .5 INJECTION, POWDER, LYOPHILIZED, FOR SOLUTION INTRAVENOUS at 21:05

## 2018-10-29 RX ADMIN — ENOXAPARIN SODIUM 40 MILLIGRAM(S): 100 INJECTION SUBCUTANEOUS at 11:53

## 2018-10-29 RX ADMIN — FOSPHENYTOIN 107 MILLIGRAM(S) PE: 50 INJECTION INTRAMUSCULAR; INTRAVENOUS at 08:09

## 2018-10-29 RX ADMIN — Medication 100 MILLIGRAM(S): at 11:47

## 2018-10-29 RX ADMIN — PIPERACILLIN AND TAZOBACTAM 25 GRAM(S): 4; .5 INJECTION, POWDER, LYOPHILIZED, FOR SOLUTION INTRAVENOUS at 14:50

## 2018-10-29 RX ADMIN — HYDROMORPHONE HYDROCHLORIDE 1 MILLIGRAM(S): 2 INJECTION INTRAMUSCULAR; INTRAVENOUS; SUBCUTANEOUS at 01:24

## 2018-10-29 RX ADMIN — Medication 400 MILLIGRAM(S): at 12:30

## 2018-10-29 RX ADMIN — Medication 10 MILLIGRAM(S): at 11:47

## 2018-10-29 RX ADMIN — Medication 333.33 MILLIGRAM(S): at 23:07

## 2018-10-29 RX ADMIN — Medication 1 MILLIGRAM(S): at 11:47

## 2018-10-29 RX ADMIN — LEVETIRACETAM 1000 MILLIGRAM(S): 250 TABLET, FILM COATED ORAL at 17:12

## 2018-10-29 RX ADMIN — SODIUM CHLORIDE 2000 MILLILITER(S): 9 INJECTION, SOLUTION INTRAVENOUS at 05:29

## 2018-10-29 RX ADMIN — Medication 650 MILLIGRAM(S): at 00:11

## 2018-10-29 RX ADMIN — Medication 50 GRAM(S): at 06:18

## 2018-10-29 RX ADMIN — LEVETIRACETAM 1000 MILLIGRAM(S): 250 TABLET, FILM COATED ORAL at 05:08

## 2018-10-29 RX ADMIN — PIPERACILLIN AND TAZOBACTAM 25 GRAM(S): 4; .5 INJECTION, POWDER, LYOPHILIZED, FOR SOLUTION INTRAVENOUS at 05:08

## 2018-10-29 RX ADMIN — Medication 333.33 MILLIGRAM(S): at 11:46

## 2018-10-29 RX ADMIN — CHLORHEXIDINE GLUCONATE 15 MILLILITER(S): 213 SOLUTION TOPICAL at 17:12

## 2018-10-29 RX ADMIN — Medication 10 MILLIGRAM(S): at 23:07

## 2018-10-29 RX ADMIN — Medication 650 MILLIGRAM(S): at 20:51

## 2018-10-29 RX ADMIN — Medication 650 MILLIGRAM(S): at 00:00

## 2018-10-29 RX ADMIN — HYDROMORPHONE HYDROCHLORIDE 1 MILLIGRAM(S): 2 INJECTION INTRAMUSCULAR; INTRAVENOUS; SUBCUTANEOUS at 01:31

## 2018-10-29 RX ADMIN — Medication 10 MILLIGRAM(S): at 05:08

## 2018-10-29 RX ADMIN — Medication 10 MILLIGRAM(S): at 17:14

## 2018-10-29 RX ADMIN — Medication 85 MILLIMOLE(S): at 08:08

## 2018-10-29 RX ADMIN — Medication 175 MILLIGRAM(S): at 17:13

## 2018-10-29 RX ADMIN — Medication 1000 MILLIGRAM(S): at 13:30

## 2018-10-29 RX ADMIN — CHLORHEXIDINE GLUCONATE 15 MILLILITER(S): 213 SOLUTION TOPICAL at 05:08

## 2018-10-29 NOTE — PROGRESS NOTE ADULT - ASSESSMENT
31 yo Male Subdural hematoma Post op Day #5 Bilat hemicraniectomy with EVD for bilat subdural hematoma. Placement of EVD.  s/p Trach/peg    Patient seen by me on behalf of ACS/Trauma floor team; He remains having an extremely poor prognosis and chance of meaningful recovery. Remainder of care per SICU    Neuro: Neurosurgery plans to DC EVD today  CV: Maintain MAP> 65. Continue hemodynamic monitoring  Pulm:  Continue vent management.  Started on broad spec ABX Vanco/ Zosyn, will de-escalate when sputum cult result.  Currently tolerating PSV of 5/8.    GI: Tolerating goal enteral feeds  : Strict I&Os. avoid nephrotoxic agents  ID: Vanc/Zosyn. Continue monitoring fever curve, follow cultures and deescalate based upon the results  Heme; Serial H/H stable.  SCD  Endo: Target   Skin: Intact  Dispo; Continue SICU. Critically ill.

## 2018-10-29 NOTE — PROGRESS NOTE ADULT - SUBJECTIVE AND OBJECTIVE BOX
[facility]                                        Neurology at [place]                                 Kelly Toth, & Vincent                                  [address]                                        [place], [place], [address]                                             [phone]        CC: Traumatic Brain Injury and seizure.    HPI:   The patient is a young man, estimated to be in 30's who was found unresponsive on sidewalk.  Per chart initial CT head showed subarachnoid hemorrhage and bilateral subdural hematomas.  He was then transferred from [facility] to [facility] ER for emergent neurosurgical treatment.  He was posturing with agonal respirations on arrival to [facility].  He was taken to the OR emergently by Dr. Fraser for evacuation and decompression of subdural hematomas.  He had bilateral craniectomy and extraventricular drain placed.  He had been having non- rhythmic movements of his feet and rhythmic movements of his tongue, causing concern for seizure/status epilepticus.    Interim history:  Remains in SICU on mechanical ventilator.   No further abnormal movements.   No significant change.     ROS:   Unobtainable due to patient's condition.     MEDICATIONS  (STANDING):  chlorhexidine 0.12% Liquid 15 milliLiter(s) Swish and Spit two times a day  chlorhexidine 2% Cloths 1 Application(s) Topical daily  enoxaparin Injectable 40 milliGRAM(s) SubCutaneous daily  folic acid 1 milliGRAM(s) Oral daily  fosphenytoin IVPB 175 milliGRAM(s) PE IV Intermittent every 12 hours  HYDROmorphone  Injectable 1 milliGRAM(s) IV Push once  levETIRAcetam  Solution 1000 milliGRAM(s) Oral two times a day  piperacillin/tazobactam IVPB. 4.5 Gram(s) IV Intermittent every 8 hours  polyethylene glycol 3350 17 Gram(s) Oral daily  propranolol 10 milliGRAM(s) Oral every 6 hours  thiamine 100 milliGRAM(s) Oral daily  vancomycin  IVPB      vancomycin  IVPB 1600 milliGRAM(s) IV Intermittent every 12 hours      Vital Signs Last 24 Hrs  T(C): 36.9 (29 Oct 2018 08:00), Max: 39 (29 Oct 2018 00:07)  T(F): 98.4 (29 Oct 2018 08:00), Max: 102.2 (29 Oct 2018 00:07)  HR: 101 (29 Oct 2018 11:00) (80 - 115)  BP: 153/69 (29 Oct 2018 11:00) (98/54 - 161/84)  BP(mean): 70 (29 Oct 2018 01:00) (70 - 70)  RR: 27 (29 Oct 2018 11:00) (12 - 32)  SpO2: 100% (29 Oct 2018 11:00) (95% - 100%)    Detailed Neurologic Exam:    Mental status: Unresponsive to voice.     Cranial nerves: Pupils: left 2.5  mm NR right 4 mm not reactive. There is no visual response to threat.  Extraocular motion is not assessed. Absent corneal reflexes.      Motor/Sensory: There is  bilateral posturing (decerebrate) to pinch and sternal rub.  There is triple flexion to nail bed pressure in feet    Reflexes: absent throughout and plantar responses are extensor.    Cerebellar:  Unable to test finger to nose testing.    Labs:     10-29    134<L>  |  98  |  14.0  ----------------------------<  175<H>  3.9   |  24.0  |  0.36<L>    Ca    6.7<L>      29 Oct 2018 05:29  Phos  1.7     10-29  Mg     1.3     10-29                              8.5    15.6  )-----------( 157      ( 29 Oct 2018 05:29 )             26.2

## 2018-10-29 NOTE — PROGRESS NOTE ADULT - ASSESSMENT
31 yo Male Subdural hematoma s/p bilateral crani with EVD    Neuro: Monitor ICPs, goal < 20. CPP 60-90.  C Collar removed. Neurosurgery plans to DC EVD 10/29    CV: Maintain MAP> 65. Continue hemodynamic monitoring    Pulm:  Continue vent management.  Started on broad spec ABX Vanco/ Zosyn, will de-escalate when sputum cult result.  Currently tolerating PSV of 5/8.      GI: Tolerating goal enteral feeds    : Strict I&Os. avoid nephrotoxic agents    ID: Vanc/[email or web address]. Continue monitoring fever curve    Heme; Serial H/H stable.  SCD    Endo: Target     Lines: Maintain all invasive lines    Skin: Intact    Dispo; Continue SICU. Critically ill. Live [place] following. 33 yo Male Subdural hematoma s/p bilateral crani with EVD    Neuro: Monitor ICPs, goal < 20. CPP 60-90.  C Collar removed. Neurosurgery plans to DC EVD 10/29    CV: Maintain MAP> 65. Continue hemodynamic monitoring    Pulm:  Continue vent management.  Started on broad spec ABX Vanco/ Zosyn, will de-escalate when sputum cult result.  Currently tolerating PSV of 5/8.      GI: Tolerating goal enteral feeds    : Strict I&Os. avoid nephrotoxic agents    ID: [email or web address]/[email or web address]. Continue monitoring fever curve, follow cultures and deescalate based upon the results    Heme; Serial H/H stable.  SCD    Endo: Target     Lines: Maintain all invasive lines    Skin: Intact    Dispo; Continue SICU. Critically ill. Live on [place] following.

## 2018-10-29 NOTE — PROGRESS NOTE ADULT - ASSESSMENT
32ym s/p bilat craniectomy for bilat sdh  plan  1 Icp has been noted to be low will discontinue EVD today  2. Trach -vent settings as per ICU staff.  3. Zosyn and vanco for pulmonary tx.

## 2018-10-29 NOTE — PROGRESS NOTE ADULT - SUBJECTIVE AND OBJECTIVE BOX
INTERVAL HPI/OVERNIGHT EVENTS:    SUBJECTIVE:    Patient was bronched overnight due to fevers and thick secretions. Apodaca and central line were removed.    Neursurg is planning to discontinue the EVD today. No change in neurologic status    MEDICATIONS  (STANDING):  chlorhexidine 0.12% Liquid 15 milliLiter(s) Swish and Spit two times a day  chlorhexidine 2% Cloths 1 Application(s) Topical daily  enoxaparin Injectable 40 milliGRAM(s) SubCutaneous daily  folic acid 1 milliGRAM(s) Oral daily  HYDROmorphone  Injectable 1 milliGRAM(s) IV Push once  levETIRAcetam  Solution 1000 milliGRAM(s) Oral two times a day  phenytoin   Suspension 175 milliGRAM(s) Oral two times a day  piperacillin/tazobactam IVPB. 4.5 Gram(s) IV Intermittent every 8 hours  polyethylene glycol 3350 17 Gram(s) Oral daily  propranolol 10 milliGRAM(s) Oral every 6 hours  thiamine 100 milliGRAM(s) Oral daily  vancomycin  IVPB      vancomycin  IVPB 1600 milliGRAM(s) IV Intermittent every 12 hours    MEDICATIONS  (PRN):  acetaminophen    Suspension .. 650 milliGRAM(s) Oral every 6 hours PRN Temp greater or equal to 38.5C (101.3F)  HYDROmorphone  Injectable 1 milliGRAM(s) IV Push every 3 hours PRN pain or agitation      Vital Signs Last 24 Hrs  T(C): 37.8 (29 Oct 2018 16:31), Max: 39.4 (29 Oct 2018 12:00)  T(F): 100 (29 Oct 2018 16:31), Max: 102.9 (29 Oct 2018 12:00)  HR: 101 (29 Oct 2018 20:41) (80 - 112)  BP: 123/64 (29 Oct 2018 18:00) (98/54 - 161/84)  BP(mean): 70 (29 Oct 2018 01:00) (70 - 70)  RR: 27 (29 Oct 2018 19:00) (12 - 34)  SpO2: 99% (29 Oct 2018 20:41) (95% - 100%)    PE  Gen:  Pulm:  CV:  Abd:  :  Ext:  Vasc:  Neuro:      I&O's Detail    28 Oct 2018 07:01  -  29 Oct 2018 07:00  --------------------------------------------------------  IN:    Enteral Tube Flush: 590 mL    Pivot: 1200 mL    Solution: 150 mL    Solution: 50 mL    Solution: 416.5 mL    Solution: 500 mL    Solution: 2000 mL  Total IN: 4906.5 mL    OUT:    Incontinent per Condom Catheter: 500 mL    Indwelling Catheter - Urethral: 700 mL  Total OUT: 1200 mL    Total NET: 3706.5 mL      29 Oct 2018 07:  -  29 Oct 2018 20:48  --------------------------------------------------------  IN:    Pivot: 650 mL    Solution: 50 mL    Solution: 50 mL    Solution: 500 mL    Solution: 510 mL    Solution: 100 mL  Total IN: 1860 mL    OUT:    Incontinent per Condom Catheter: 1000 mL  Total OUT: 1000 mL    Total NET: 860 mL          LABS:                        8.5    15.6  )-----------( 157      ( 29 Oct 2018 05:29 )             26.2     10-    134<L>  |  98  |  14.0  ----------------------------<  175<H>  3.9   |  24.0  |  0.36<L>    Ca    6.7<L>      29 Oct 2018 05:29  Phos  1.7     10-29  Mg     1.3     10-29        Urinalysis Basic - ( 28 Oct 2018 11:21 )    Color: Yellow / Appearance: Clear / S.005 / pH: x  Gluc: x / Ketone: Negative  / Bili: Negative / Urobili: 8 mg/dL   Blood: x / Protein: 30 mg/dL / Nitrite: Negative   Leuk Esterase: Negative / RBC: 6-10 /HPF / WBC 3-5   Sq Epi: x / Non Sq Epi: x / Bacteria: Occasional        RADIOLOGY & ADDITIONAL STUDIES:

## 2018-10-29 NOTE — PROGRESS NOTE ADULT - SUBJECTIVE AND OBJECTIVE BOX
INTERVAL HPI/OVERNIGHT EVENTS:  Post op Day #5 Bilat hemicraniectomy for bilat subdural hematoma. Placement of EVD.  s/p Trach/peg    MEDICATIONS  (STANDING):  chlorhexidine 0.12% Liquid 15 milliLiter(s) Swish and Spit two times a day  chlorhexidine 2% Cloths 1 Application(s) Topical daily  enoxaparin Injectable 40 milliGRAM(s) SubCutaneous daily  folic acid 1 milliGRAM(s) Oral daily  fosphenytoin IVPB 175 milliGRAM(s) PE IV Intermittent every 12 hours  HYDROmorphone  Injectable 1 milliGRAM(s) IV Push once  levETIRAcetam  Solution 1000 milliGRAM(s) Oral two times a day  pantoprazole   Suspension 40 milliGRAM(s) Oral daily  piperacillin/tazobactam IVPB. 4.5 Gram(s) IV Intermittent every 8 hours  polyethylene glycol 3350 17 Gram(s) Oral daily  propranolol 10 milliGRAM(s) Oral every 6 hours  sodium chloride 0.9%. 1000 milliLiter(s) (100 mL/Hr) IV Continuous <Continuous>  thiamine 100 milliGRAM(s) Oral daily  vancomycin  IVPB      vancomycin  IVPB 1600 milliGRAM(s) IV Intermittent every 12 hours    MEDICATIONS  (PRN):  acetaminophen    Suspension .. 650 milliGRAM(s) Oral every 6 hours PRN Temp greater or equal to 38.5C (101.3F)  HYDROmorphone  Injectable 1 milliGRAM(s) IV Push every 3 hours PRN pain or agitation      Allergies  Allergy Status Unknown  Intolerances    Vital Signs Last 24 Hrs  T(C): 36.9 (29 Oct 2018 08:00), Max: 39 (29 Oct 2018 00:07)  T(F): 98.4 (29 Oct 2018 08:00), Max: 102.2 (29 Oct 2018 00:07)  HR: 98 (29 Oct 2018 08:01) (80 - 115)  BP: 145/66 (29 Oct 2018 08:01) (98/54 - 145/66)  BP(mean): 70 (29 Oct 2018 01:00) (70 - 70)  RR: 27 (29 Oct 2018 08:01) (12 - 32)  SpO2: 100% (29 Oct 2018 08:01) (95% - 100%)    PHYSICAL EXAM:  GCS-E1.V-TM2, -4  GENERAL: NAD, well-groomed, well-developed  HEAD: Dressing clean and dry. Flap bilat nonbulging. EVD-ICP 9-15  EYES: R 5mm nonreactive, l 3mm nonreative   ENMT: Most mucous membranes,   NECK: No JVD,  NERVOUS SYSTEM:  With noxious extension and rotation of the the left upper extrem, triple reflex lower bilat  CHEST/LUNG: fair air entry bilat, no rales, rhonchi, wheezing, or rubs  HEART: Regular rate and rhythm; No murmurs, rubs, or gallops  ABDOMEN: Soft, Nontender, Nondistended; Bowel sounds present  EXTREMITIES:  2+ Peripheral Pulses, No clubbing, cyanosis, or edema      LABS:                        8.5    15.6  )-----------( 157      ( 29 Oct 2018 05:29 )             26.2     10-29    134<L>  |  98  |  14.0  ----------------------------<  175<H>  3.9   |  24.0  |  0.36<L>    Ca    6.7<L>      29 Oct 2018 05:29  Phos  1.7     10-29  Mg     1.3     10-29        Urinalysis Basic - ( 28 Oct 2018 11:21 )    Color: Yellow / Appearance: Clear / S.005 / pH: x  Gluc: x / Ketone: Negative  / Bili: Negative / Urobili: 8 mg/dL   Blood: x / Protein: 30 mg/dL / Nitrite: Negative   Leuk Esterase: Negative / RBC: 6-10 /HPF / WBC 3-5   Sq Epi: x / Non Sq Epi: x / Bacteria: Occasional        RADIOLOGY & ADDITIONAL TESTS:  < from: CT Head No Cont (10.26.18 @ 16:44) >   EXAM:  CT BRAIN                          PROCEDURE DATE:  10/26/2018      < end of copied text >  < from: CT Head No Cont (10.26.18 @ 16:44) >  IMPRESSION:    Since the CT of 10/24/18: Interval bilateral frontoparietal craniectomies   for decompression and evacuation of bilateral subdural hematomas, with   decrease in size of bilateral holohemispheric subdural hematomas. Near   complete resolution of rightward midline shift.    Extensive acute-subacute scattered bilateral cerebral ischemic changes,   detailed above. Mild bilateral uncal herniations.    More conspicuous small right frontal and posterior right occipitotemporal   parenchymal hematomas.    Mild improvement of small-moderate amount of scattered subarachnoid   hemorrhage.    Right frontal approach ventriculostomy with tip in the body of the left   lateral ventricle. New small amount of intraventricular hemorrhage. No   hydrocephalus.      < end of copied text >

## 2018-10-29 NOTE — PROGRESS NOTE ADULT - SUBJECTIVE AND OBJECTIVE BOX
HISTORY  32y Male    24 HOUR EVENTS: Patient continues to spike fevers, borjas and central line removed. Urinalysis was negative. Chest xray shows new infiltrate, thick secretions and increased oxygen requirements.  Patient was bronched and sputum cultures were sent.  Patient had a rise in lactate of 2.2 and given 2L bolus.  VSS remained stable.  Patients fevers are more controlled throughout the night.      SUBJECTIVE/ROS:  [ ] A ten-point review of systems was otherwise negative except as noted.  [ x] Due to altered mental status/intubation, subjective information were not able to be obtained from the patient. History was obtained, to the extent possible, from review of the chart and collateral sources of information.      NEURO  RASS: -1    GCS:  4T   CAM ICU: neg  Exam: Decerebrate posturing. R Pupil 4mm nonreactive, L Pupil 2mm nonreactive. no gag, + Cough.   Meds: acetaminophen    Suspension .. 650 milliGRAM(s) Oral every 6 hours PRN Temp greater or equal to 38.5C (101.3F)  fosphenytoin IVPB 175 milliGRAM(s) PE IV Intermittent every 12 hours  HYDROmorphone  Injectable 1 milliGRAM(s) IV Push every 3 hours PRN pain or agitation  HYDROmorphone  Injectable 1 milliGRAM(s) IV Push once  levETIRAcetam  Solution 1000 milliGRAM(s) Oral two times a day    [x] Adequacy of sedation and pain control has been assessed and adjusted      RESPIRATORY  RR: 13 (10-29-18 @ 03:00) (12 - 32)  SpO2: 100% (10-29-18 @ 03:43) (95% - 100%)  Wt(kg): --  Exam: unlabored, breathing on PSV  Mechanical Ventilation: Mode: CPAP with PS, RR (patient): 13, FiO2: 40, PEEP: 8, PS: 12, MAP: 13  ABG - ( 29 Oct 2018 03:57 )  pH: 7.47  /  pCO2: 39    /  pO2: 118   / HCO3: 28    / Base Excess: 4.2   /  SaO2: 100     Lactate: x                [ ] Extubation Readiness Assessed  Meds:       CARDIOVASCULAR  HR: 89 (10-29-18 @ 03:43) (81 - 115)  BP: 98/54 (10-29-18 @ 01:00) (98/54 - 98/54)  BP(mean): 70 (10-29-18 @ 01:00) (70 - 70)  ABP: 123/67 (10-29-18 @ 03:00) (92/80 - 175/100)  ABP(mean): 81 (10-29-18 @ 03:00) (65 - 125)  Wt(kg): --  CVP(cm H2O): --      Exam: RRR  Cardiac Rhythm: reg  Perfusion     [x ]Adequate   [ ]Inadequate  Mentation   [ ]Normal       [x ]Reduced  Extremities  [x ]Warm         [ ]Cool  Volume Status [ ]Hypervolemic [ ]Euvolemic [ x]Hypovolemic  Meds: propranolol 10 milliGRAM(s) Oral every 6 hours        GI/NUTRITION  Exam: Softly distended, nttp no rebound or guarding   Diet: Pivot 50  Meds: pantoprazole   Suspension 40 milliGRAM(s) Oral daily  polyethylene glycol 3350 17 Gram(s) Oral daily      GENITOURINARY  I&O's Detail    10-27 @ 07:01  -  10-28 @ 07:00  --------------------------------------------------------  IN:    Enteral Tube Flush: 400 mL    fentaNYL  Infusion: 112.2 mL    Pivot: 1200 mL    propofol Infusion: 70 mL    sodium chloride 3%: 80 mL    Solution: 100 mL    Solution: 100 mL    Solution: 200 mL    Solution: 200 mL    Solution: 583.1 mL    Solution: 83.3 mL  Total IN: 3128.6 mL    OUT:    Indwelling Catheter - Urethral: 2485 mL  Total OUT: 2485 mL    Total NET: 643.6 mL      10-28 @ 07:01  -  10-29 @ 05:12  --------------------------------------------------------  IN:    Enteral Tube Flush: 490 mL    Pivot: 1050 mL    Solution: 416.5 mL    Solution: 500 mL    Solution: 1000 mL    Solution: 100 mL  Total IN: 3556.5 mL    OUT:    Indwelling Catheter - Urethral: 700 mL  Total OUT: 700 mL    Total NET: 2856.5 mL          10-28    141  |  105  |  8.0  ----------------------------<  163<H>  3.8   |  25.0  |  0.26<L>    Ca    7.2<L>      28 Oct 2018 04:37  Phos  1.4     10-28  Mg     1.5     10-28      [ ] Borjas catheter, indication: N/A  Meds: folic acid 1 milliGRAM(s) Oral daily  multiple electrolytes Injection Type 1 Bolus 1000 milliLiter(s) IV Bolus once  thiamine 100 milliGRAM(s) Oral daily        HEMATOLOGIC  Meds:   [x] VTE Prophylaxis                        9.1    10.1  )-----------( 133      ( 28 Oct 2018 04:37 )             28.2       Transfusion     [ ] PRBC   [ ] Platelets   [ ] FFP   [ ] Cryoprecipitate      INFECTIOUS DISEASES  T(C): 38.2 (10-29-18 @ 04:15), Max: 39 (10-29-18 @ 00:07)  Wt(kg): --    Recent Cultures:  Specimen Source: .Sputum Sputum, 10-28 @ 20:57; Results --; Gram Stain:   Moderate White blood cells  Few Gram Positive Cocci in Pairs and Chains  Few Gram Positive Rods  Few Gram Negative Rods; Organism: --  Specimen Source: .Body Fluid Peritoneal Fluid, 10-25 @ 09:53; Results   No growth at 3 days.  Culture in progress; Gram Stain:   Few WBC's  No organisms seen; Organism: --    Meds: piperacillin/tazobactam IVPB. 4.5 Gram(s) IV Intermittent every 8 hours  vancomycin  IVPB 1600 milliGRAM(s) IV Intermittent every 12 hours  vancomycin  IVPB            ENDOCRINE  Capillary Blood Glucose    Meds:       ACCESS DEVICES:  [x ] Peripheral IV  [ ] Central Venous Line	[ ] R	[ ] L	[ ] IJ	[ ] Fem	[ ] SC	Placed:   [ ] Arterial Line		[ ] R	[ ] L	[ ] Fem	[ ] Rad	[ ] Ax	Placed:   [ ] PICC:					[ ] Mediport  [ ] Urinary Catheter, Date Placed:   [ ] Necessity of urinary, arterial, and venous catheters discussed    OTHER MEDICATIONS:  chlorhexidine 0.12% Liquid 15 milliLiter(s) Swish and Spit two times a day  chlorhexidine 2% Cloths 1 Application(s) Topical daily      CODE STATUS:     IMAGING:

## 2018-10-29 NOTE — PROGRESS NOTE ADULT - ASSESSMENT
32(veena) y Male who is followed by neurology because of TBI/possible seizure    TBI  Now status post decompressive bilateral hemicraniectomy and extraventricular drain.  Significant amount of intracranial hemorrhage, improving post decompression.  Multiple areas of parenchymal CVA/ischemia seen  No significant improvement in neuro exam  Extraventricular drain management per Neurosurgery    Possible seizure  EEG: no seizure, breech rhythm over craniectomies, slowing suggesting diffuse cerebral dysfunction.  Rhythmic tongue movements and random foot movement has stopped.  Continue fosphenytoin, keppra,     Poor prognosis for meaningful recovery.

## 2018-10-30 LAB
-  AMPICILLIN/SULBACTAM: SIGNIFICANT CHANGE UP
-  CEFAZOLIN: SIGNIFICANT CHANGE UP
-  CLINDAMYCIN: SIGNIFICANT CHANGE UP
-  ERYTHROMYCIN: SIGNIFICANT CHANGE UP
-  GENTAMICIN: SIGNIFICANT CHANGE UP
-  OXACILLIN: SIGNIFICANT CHANGE UP
-  PENICILLIN: SIGNIFICANT CHANGE UP
-  RIFAMPIN: SIGNIFICANT CHANGE UP
-  TETRACYCLINE: SIGNIFICANT CHANGE UP
-  TRIMETHOPRIM/SULFAMETHOXAZOLE: SIGNIFICANT CHANGE UP
-  VANCOMYCIN: SIGNIFICANT CHANGE UP
ANION GAP SERPL CALC-SCNC: 11 MMOL/L — SIGNIFICANT CHANGE UP (ref 5–17)
ANION GAP SERPL CALC-SCNC: 13 MMOL/L — SIGNIFICANT CHANGE UP (ref 5–17)
BASOPHILS # BLD AUTO: 0 K/UL — SIGNIFICANT CHANGE UP (ref 0–0.2)
BASOPHILS NFR BLD AUTO: 0.2 % — SIGNIFICANT CHANGE UP (ref 0–2)
BUN SERPL-MCNC: 11 MG/DL — SIGNIFICANT CHANGE UP (ref 8–20)
BUN SERPL-MCNC: 9 MG/DL — SIGNIFICANT CHANGE UP (ref 8–20)
CALCIUM SERPL-MCNC: 7.1 MG/DL — LOW (ref 8.6–10.2)
CALCIUM SERPL-MCNC: 7.4 MG/DL — LOW (ref 8.6–10.2)
CHLORIDE SERPL-SCNC: 94 MMOL/L — LOW (ref 98–107)
CHLORIDE SERPL-SCNC: 95 MMOL/L — LOW (ref 98–107)
CHLORIDE UR-SCNC: 58 MMOL/L — SIGNIFICANT CHANGE UP
CO2 SERPL-SCNC: 20 MMOL/L — LOW (ref 22–29)
CO2 SERPL-SCNC: 26 MMOL/L — SIGNIFICANT CHANGE UP (ref 22–29)
CREAT ?TM UR-MCNC: 141 MG/DL — SIGNIFICANT CHANGE UP
CREAT SERPL-MCNC: 0.26 MG/DL — LOW (ref 0.5–1.3)
CREAT SERPL-MCNC: 0.37 MG/DL — LOW (ref 0.5–1.3)
CULTURE RESULTS: SIGNIFICANT CHANGE UP
CULTURE RESULTS: SIGNIFICANT CHANGE UP
EOSINOPHIL # BLD AUTO: 0.2 K/UL — SIGNIFICANT CHANGE UP (ref 0–0.5)
EOSINOPHIL NFR BLD AUTO: 1.4 % — SIGNIFICANT CHANGE UP (ref 0–6)
GAS PNL BLDA: SIGNIFICANT CHANGE UP
GAS PNL BLDA: SIGNIFICANT CHANGE UP
GLUCOSE SERPL-MCNC: 114 MG/DL — SIGNIFICANT CHANGE UP (ref 70–115)
GLUCOSE SERPL-MCNC: 127 MG/DL — HIGH (ref 70–115)
HCT VFR BLD CALC: 24.8 % — LOW (ref 42–52)
HGB BLD-MCNC: 7.9 G/DL — LOW (ref 14–18)
LYMPHOCYTES # BLD AUTO: 1 K/UL — SIGNIFICANT CHANGE UP (ref 1–4.8)
LYMPHOCYTES # BLD AUTO: 8.1 % — LOW (ref 20–55)
MAGNESIUM SERPL-MCNC: 1.7 MG/DL — SIGNIFICANT CHANGE UP (ref 1.6–2.6)
MCHC RBC-ENTMCNC: 27 PG — SIGNIFICANT CHANGE UP (ref 27–31)
MCHC RBC-ENTMCNC: 31.9 G/DL — LOW (ref 32–36)
MCV RBC AUTO: 84.6 FL — SIGNIFICANT CHANGE UP (ref 80–94)
METHOD TYPE: SIGNIFICANT CHANGE UP
MONOCYTES # BLD AUTO: 1.6 K/UL — HIGH (ref 0–0.8)
MONOCYTES NFR BLD AUTO: 12.6 % — HIGH (ref 3–10)
NEUTROPHILS # BLD AUTO: 9.6 K/UL — HIGH (ref 1.8–8)
NEUTROPHILS NFR BLD AUTO: 77.5 % — HIGH (ref 37–73)
ORGANISM # SPEC MICROSCOPIC CNT: SIGNIFICANT CHANGE UP
ORGANISM # SPEC MICROSCOPIC CNT: SIGNIFICANT CHANGE UP
OSMOLALITY UR: 843 MOSM/KG — SIGNIFICANT CHANGE UP (ref 300–1000)
PHENYTOIN FREE SERPL-MCNC: 2.1 MG/L — HIGH (ref 1–2)
PHENYTOIN FREE SERPL-MCNC: 7.6 UG/ML — LOW (ref 10–20)
PHOSPHATE SERPL-MCNC: 1.2 MG/DL — LOW (ref 2.4–4.7)
PLATELET # BLD AUTO: 164 K/UL — SIGNIFICANT CHANGE UP (ref 150–400)
POTASSIUM SERPL-MCNC: 3.3 MMOL/L — LOW (ref 3.5–5.3)
POTASSIUM SERPL-MCNC: 4 MMOL/L — SIGNIFICANT CHANGE UP (ref 3.5–5.3)
POTASSIUM SERPL-SCNC: 3.3 MMOL/L — LOW (ref 3.5–5.3)
POTASSIUM SERPL-SCNC: 4 MMOL/L — SIGNIFICANT CHANGE UP (ref 3.5–5.3)
RBC # BLD: 2.93 M/UL — LOW (ref 4.6–6.2)
RBC # FLD: 17.4 % — HIGH (ref 11–15.6)
SODIUM SERPL-SCNC: 127 MMOL/L — LOW (ref 135–145)
SODIUM SERPL-SCNC: 132 MMOL/L — LOW (ref 135–145)
SODIUM UR-SCNC: 144 MMOL/L — SIGNIFICANT CHANGE UP
SPECIMEN SOURCE: SIGNIFICANT CHANGE UP
SPECIMEN SOURCE: SIGNIFICANT CHANGE UP
WBC # BLD: 12.4 K/UL — HIGH (ref 4.8–10.8)
WBC # FLD AUTO: 12.4 K/UL — HIGH (ref 4.8–10.8)

## 2018-10-30 PROCEDURE — 99223 1ST HOSP IP/OBS HIGH 75: CPT | Mod: GC

## 2018-10-30 PROCEDURE — 99232 SBSQ HOSP IP/OBS MODERATE 35: CPT

## 2018-10-30 RX ORDER — POTASSIUM CHLORIDE 20 MEQ
20 PACKET (EA) ORAL ONCE
Qty: 0 | Refills: 0 | Status: DISCONTINUED | OUTPATIENT
Start: 2018-10-30 | End: 2018-10-30

## 2018-10-30 RX ORDER — POTASSIUM CHLORIDE 20 MEQ
20 PACKET (EA) ORAL ONCE
Qty: 0 | Refills: 0 | Status: COMPLETED | OUTPATIENT
Start: 2018-10-30 | End: 2018-10-30

## 2018-10-30 RX ORDER — VANCOMYCIN HCL 1 G
1250 VIAL (EA) INTRAVENOUS
Qty: 0 | Refills: 0 | Status: DISCONTINUED | OUTPATIENT
Start: 2018-10-30 | End: 2018-10-30

## 2018-10-30 RX ORDER — POTASSIUM PHOSPHATE, MONOBASIC POTASSIUM PHOSPHATE, DIBASIC 236; 224 MG/ML; MG/ML
30 INJECTION, SOLUTION INTRAVENOUS ONCE
Qty: 0 | Refills: 0 | Status: COMPLETED | OUTPATIENT
Start: 2018-10-30 | End: 2018-10-30

## 2018-10-30 RX ORDER — SODIUM CHLORIDE 9 MG/ML
1000 INJECTION INTRAMUSCULAR; INTRAVENOUS; SUBCUTANEOUS ONCE
Qty: 0 | Refills: 0 | Status: COMPLETED | OUTPATIENT
Start: 2018-10-30 | End: 2018-10-30

## 2018-10-30 RX ORDER — IBUPROFEN 200 MG
600 TABLET ORAL ONCE
Qty: 0 | Refills: 0 | Status: COMPLETED | OUTPATIENT
Start: 2018-10-30 | End: 2018-10-30

## 2018-10-30 RX ORDER — VANCOMYCIN HCL 1 G
1500 VIAL (EA) INTRAVENOUS EVERY 8 HOURS
Qty: 0 | Refills: 0 | Status: DISCONTINUED | OUTPATIENT
Start: 2018-10-30 | End: 2018-10-31

## 2018-10-30 RX ORDER — ACETAZOLAMIDE 250 MG/1
500 TABLET ORAL EVERY 12 HOURS
Qty: 0 | Refills: 0 | Status: COMPLETED | OUTPATIENT
Start: 2018-10-30 | End: 2018-10-31

## 2018-10-30 RX ORDER — ACETAZOLAMIDE 250 MG/1
500 TABLET ORAL EVERY 12 HOURS
Qty: 0 | Refills: 0 | Status: DISCONTINUED | OUTPATIENT
Start: 2018-10-30 | End: 2018-10-30

## 2018-10-30 RX ADMIN — Medication 175 MILLIGRAM(S): at 05:36

## 2018-10-30 RX ADMIN — POTASSIUM PHOSPHATE, MONOBASIC POTASSIUM PHOSPHATE, DIBASIC 83.33 MILLIMOLE(S): 236; 224 INJECTION, SOLUTION INTRAVENOUS at 06:26

## 2018-10-30 RX ADMIN — Medication 10 MILLIGRAM(S): at 17:35

## 2018-10-30 RX ADMIN — ACETAZOLAMIDE 210 MILLIGRAM(S): 250 TABLET ORAL at 13:52

## 2018-10-30 RX ADMIN — Medication 10 MILLIGRAM(S): at 11:12

## 2018-10-30 RX ADMIN — CHLORHEXIDINE GLUCONATE 15 MILLILITER(S): 213 SOLUTION TOPICAL at 05:37

## 2018-10-30 RX ADMIN — CHLORHEXIDINE GLUCONATE 15 MILLILITER(S): 213 SOLUTION TOPICAL at 16:33

## 2018-10-30 RX ADMIN — Medication 300 MILLIGRAM(S): at 17:35

## 2018-10-30 RX ADMIN — CHLORHEXIDINE GLUCONATE 1 APPLICATION(S): 213 SOLUTION TOPICAL at 11:00

## 2018-10-30 RX ADMIN — Medication 10 MILLIGRAM(S): at 05:37

## 2018-10-30 RX ADMIN — PIPERACILLIN AND TAZOBACTAM 25 GRAM(S): 4; .5 INJECTION, POWDER, LYOPHILIZED, FOR SOLUTION INTRAVENOUS at 14:14

## 2018-10-30 RX ADMIN — Medication 650 MILLIGRAM(S): at 04:11

## 2018-10-30 RX ADMIN — LEVETIRACETAM 1000 MILLIGRAM(S): 250 TABLET, FILM COATED ORAL at 17:35

## 2018-10-30 RX ADMIN — ENOXAPARIN SODIUM 40 MILLIGRAM(S): 100 INJECTION SUBCUTANEOUS at 11:11

## 2018-10-30 RX ADMIN — LEVETIRACETAM 1000 MILLIGRAM(S): 250 TABLET, FILM COATED ORAL at 05:36

## 2018-10-30 RX ADMIN — PIPERACILLIN AND TAZOBACTAM 25 GRAM(S): 4; .5 INJECTION, POWDER, LYOPHILIZED, FOR SOLUTION INTRAVENOUS at 05:37

## 2018-10-30 RX ADMIN — Medication 650 MILLIGRAM(S): at 12:08

## 2018-10-30 RX ADMIN — Medication 20 MILLIEQUIVALENT(S): at 05:40

## 2018-10-30 RX ADMIN — Medication 650 MILLIGRAM(S): at 13:51

## 2018-10-30 RX ADMIN — Medication 600 MILLIGRAM(S): at 21:56

## 2018-10-30 RX ADMIN — Medication 1 MILLIGRAM(S): at 11:12

## 2018-10-30 RX ADMIN — Medication 650 MILLIGRAM(S): at 05:38

## 2018-10-30 RX ADMIN — PIPERACILLIN AND TAZOBACTAM 25 GRAM(S): 4; .5 INJECTION, POWDER, LYOPHILIZED, FOR SOLUTION INTRAVENOUS at 22:12

## 2018-10-30 RX ADMIN — Medication 600 MILLIGRAM(S): at 23:09

## 2018-10-30 RX ADMIN — Medication 300 MILLIGRAM(S): at 11:13

## 2018-10-30 RX ADMIN — SODIUM CHLORIDE 6000 MILLILITER(S): 9 INJECTION INTRAMUSCULAR; INTRAVENOUS; SUBCUTANEOUS at 21:56

## 2018-10-30 RX ADMIN — Medication 100 MILLIGRAM(S): at 11:13

## 2018-10-30 NOTE — PROGRESS NOTE ADULT - SUBJECTIVE AND OBJECTIVE BOX
INTERVAL HPI/OVERNIGHT EVENTS:    BAL culture with gram positive rods and cocci and gram neg rods and currently awaiting speciation.  Remains on Vanco/Zosyn.  Pt still spiking temps with Tmax 103.1.  Neurologic exam unchanged.  Tolerating tube feeds.  Requiring IV fluid resuscitation      MEDICATIONS  (STANDING):  chlorhexidine 0.12% Liquid 15 milliLiter(s) Swish and Spit two times a day  chlorhexidine 2% Cloths 1 Application(s) Topical daily  enoxaparin Injectable 40 milliGRAM(s) SubCutaneous daily  folic acid 1 milliGRAM(s) Oral daily  HYDROmorphone  Injectable 1 milliGRAM(s) IV Push once  levETIRAcetam  Solution 1000 milliGRAM(s) Oral two times a day  phenytoin   Suspension 175 milliGRAM(s) Oral two times a day  piperacillin/tazobactam IVPB. 4.5 Gram(s) IV Intermittent every 8 hours  polyethylene glycol 3350 17 Gram(s) Oral daily  propranolol 10 milliGRAM(s) Oral every 6 hours  thiamine 100 milliGRAM(s) Oral daily  vancomycin  IVPB      vancomycin  IVPB 1600 milliGRAM(s) IV Intermittent every 12 hours    MEDICATIONS  (PRN):  acetaminophen    Suspension .. 650 milliGRAM(s) Oral every 6 hours PRN Temp greater or equal to 38.5C (101.3F)  HYDROmorphone  Injectable 1 milliGRAM(s) IV Push every 3 hours PRN pain or agitation      Drug Dosing Weight  Height (cm): 152.4 (24 Oct 2018 14:00)  Weight (kg): 81 (24 Oct 2018 05:24)  BMI (kg/m2): 34.9 (24 Oct 2018 14:00)  BSA (m2): 1.78 (24 Oct 2018 14:00)        PAST MEDICAL & SURGICAL HISTORY:  Medical history unknown  Surgical history unknown      ICU Vital Signs Last 24 Hrs  T(C): 39.5 (30 Oct 2018 04:00), Max: 39.5 (30 Oct 2018 04:00)  T(F): 103.1 (30 Oct 2018 04:00), Max: 103.1 (30 Oct 2018 04:00)  HR: 101 (30 Oct 2018 06:00) (89 - 105)  BP: 123/64 (29 Oct 2018 18:00) (123/64 - 161/84)  BP(mean): --  ABP: 128/74 (30 Oct 2018 06:00) (92/63 - 151/97)  ABP(mean): 91 (30 Oct 2018 06:00) (71 - 117)  RR: 29 (30 Oct 2018 06:00) (23 - 34)  SpO2: 96% (30 Oct 2018 06:00) (96% - 100%)      ABG - ( 30 Oct 2018 03:52 )  pH, Arterial: 7.56  pH, Blood: x     /  pCO2: 34    /  pO2: 127   / HCO3: 31    / Base Excess: 7.4   /  SaO2: 100                 I&O's Detail    29 Oct 2018 07:01  -  30 Oct 2018 07:00  --------------------------------------------------------  IN:    Enteral Tube Flush: 200 mL    Pivot: 1200 mL    Solution: 250 mL    Solution: 1000 mL    Solution: 83 mL    Solution: 50 mL    Solution: 50 mL    Solution: 510 mL  Total IN: 3343 mL    OUT:    Incontinent per Condom Catheter: 1000 mL  Total OUT: 1000 mL    Total NET: 2343 mL          Mode: CPAP with PS  FiO2: 40  PEEP: 8  PS: 12  MAP: 15    Physical Exam:    Neurological: RASS:  -4.  GCS: 4T.  Decerebrate posturing. R Pupil 4mm nonreactive, L Pupil 2mm nonreactive. no gag, + Cough. overbreathing ventilator.     Neck: No bruits; no thyromegaly or nodules,  No JVD    Back: Normal spine flexure, No CVA tenderness, No deformity or limitation of movement    Respiratory: Crackle to LLL base.  No accessory muscle use    Cardiovascular: Regular rate & rhythm, normal S1, S2; no murmurs, gallops or rubs    Gastrointestinal: Soft, non-tender, normal bowel sounds    Extremities: No peripheral edema, No cyanosis, clubbing     Vascular: Equal and normal pulses: 2+ peripheral pulses throughout    LABS:  CBC Full  -  ( 30 Oct 2018 04:34 )  WBC Count : 12.4 K/uL  Hemoglobin : 7.9 g/dL  Hematocrit : 24.8 %  Platelet Count - Automated : 164 K/uL  Mean Cell Volume : 84.6 fl  Mean Cell Hemoglobin : 27.0 pg  Mean Cell Hemoglobin Concentration : 31.9 g/dL  Auto Neutrophil # : 9.6 K/uL  Auto Lymphocyte # : 1.0 K/uL  Auto Monocyte # : 1.6 K/uL  Auto Eosinophil # : 0.2 K/uL  Auto Basophil # : 0.0 K/uL  Auto Neutrophil % : 77.5 %  Auto Lymphocyte % : 8.1 %  Auto Monocyte % : 12.6 %  Auto Eosinophil % : 1.4 %  Auto Basophil % : 0.2 %    10-30    132<L>  |  95<L>  |  11.0  ----------------------------<  127<H>  3.3<L>   |  26.0  |  0.37<L>    Ca    7.1<L>      30 Oct 2018 04:34  Phos  1.2     10-30  Mg     1.7     10-30        Urinalysis Basic - ( 28 Oct 2018 11:21 )    Color: Yellow / Appearance: Clear / S.005 / pH: x  Gluc: x / Ketone: Negative  / Bili: Negative / Urobili: 8 mg/dL   Blood: x / Protein: 30 mg/dL / Nitrite: Negative   Leuk Esterase: Negative / RBC: 6-10 /HPF / WBC 3-5   Sq Epi: x / Non Sq Epi: x / Bacteria: Occasional

## 2018-10-30 NOTE — PROGRESS NOTE ADULT - SUBJECTIVE AND OBJECTIVE BOX
[facility]                                        Neurology at [place]                                 Kelly Toth, & Vincent                                  [address]                                        [place], [place], [address]                                             [phone]        CC: Traumatic Brain Injury and seizure.    HPI:   The patient is a young man, estimated to be in 30's who was found unresponsive on sidewalk.  Per chart initial CT head showed subarachnoid hemorrhage and bilateral subdural hematomas.  He was then transferred from [facility] to [facility] ER for emergent neurosurgical treatment.  He was posturing with agonal respirations on arrival to [facility].  He was taken to the OR emergently by Dr. Fraser for evacuation and decompression of subdural hematomas.  He had bilateral craniectomy and extraventricular drain placed.  He had been having non- rhythmic movements of his feet and rhythmic movements of his tongue, causing concern for seizure/status epilepticus.    Interim history:  Remains in SICU on mechanical ventilator via tracheostomy.   No further abnormal movements.   No significant change.     ROS:   Unobtainable due to patient's condition.     MEDICATIONS  (STANDING):  acetazolamide  IVPB 500 milliGRAM(s) IV Intermittent every 12 hours  chlorhexidine 0.12% Liquid 15 milliLiter(s) Swish and Spit two times a day  chlorhexidine 2% Cloths 1 Application(s) Topical daily  enoxaparin Injectable 40 milliGRAM(s) SubCutaneous daily  folic acid 1 milliGRAM(s) Oral daily  levETIRAcetam  Solution 1000 milliGRAM(s) Oral two times a day  piperacillin/tazobactam IVPB. 4.5 Gram(s) IV Intermittent every 8 hours  polyethylene glycol 3350 17 Gram(s) Oral daily  propranolol 10 milliGRAM(s) Oral every 6 hours  thiamine 100 milliGRAM(s) Oral daily  vancomycin  IVPB 1500 milliGRAM(s) IV Intermittent every 8 hours      Vital Signs Last 24 Hrs  T(C): 38.4 (30 Oct 2018 07:53), Max: 39.5 (30 Oct 2018 04:00)  T(F): 101.2 (30 Oct 2018 07:53), Max: 103.1 (30 Oct 2018 04:00)  HR: 95 (30 Oct 2018 11:00) (89 - 105)  BP: 148/67 (30 Oct 2018 10:00) (116/60 - 148/67)  BP(mean): 97 (30 Oct 2018 10:00) (82 - 97)  RR: 30 (30 Oct 2018 11:00) (24 - 34)  SpO2: 100% (30 Oct 2018 11:00) (96% - 100%)    Detailed Neurologic Exam:    Mental status: Unresponsive to voice.     Cranial nerves: Pupils: left 2.5  mm NR right 4 mm not reactive. There is no visual response to threat.  Extraocular motion is not assessed. Absent corneal reflexes.      Motor/Sensory: There is  bilateral extensor posturing to pinch and sternal rub.  There is triple flexion to nail bed pressure in feet    Reflexes: absent throughout and plantar responses are extensor.    Cerebellar:  Unable to test finger to nose testing.    Labs:     10-30    132<L>  |  95<L>  |  11.0  ----------------------------<  127<H>  3.3<L>   |  26.0  |  0.37<L>    Ca    7.1<L>      30 Oct 2018 04:34  Phos  1.2     10-30  Mg     1.7     10-30                              7.9    12.4  )-----------( 164      ( 30 Oct 2018 04:34 )             24.8

## 2018-10-30 NOTE — PROGRESS NOTE ADULT - ASSESSMENT
32(veena) y Male who is followed by neurology because of TBI/possible seizure    TBI  Now status post decompressive bilateral hemicraniectomy and extraventricular drain.  Significant amount of intracranial hemorrhage, improving post decompression.  Multiple areas of parenchymal CVA/ischemia seen  No significant improvement in neuro exam  Extraventricular drain management per Neurosurgery    Possible seizure  EEG: no seizure, breech rhythm over craniectomies, slowing suggesting diffuse cerebral dysfunction.  Rhythmic tongue movements and random foot movement has stopped.  Continue Fosphenytoin, Keppra.    Neurologic status unchanged.  Poor prognosis for meaningful recovery.

## 2018-10-30 NOTE — PROGRESS NOTE ADULT - ASSESSMENT
31 yo Male Subdural hematoma Post op Day #6 Bilat hemicraniectomy with EVD for bilat subdural hematoma. Placement of EVD.  s/p Trach/peg    Patient seen by me on behalf of ACS/Trauma floor team; He remains having an extremely poor prognosis and chance of meaningful recovery. Remainder of care per SICU    Neuro: Continue with tiered therapy. EVD discontinued.  C Collar removed. Continue serial neurologic assessments.    CV: Maintain MAP> 65.   Pulm:  Awaiting speciation of cultures.  Continue vent management.  Pulmonary toilet  GI: Tolerating goal enteral feeds  : Strict I&Os. avoid nephrotoxic agents  ID:  Remains on broad spectrum Abx: Zosyn/Vanco.  Awaiting speciation of BAL.  Tmax 103.1.  Tylenol/cooling methods to maintain euthermia.  Continue monitoring fever curve, leukocytosis  Heme; Serial H/H stable.  SCD  Endo: Target   Lines:  Central line DC'd with rising temp.    Skin: Intact

## 2018-10-30 NOTE — PROGRESS NOTE ADULT - SUBJECTIVE AND OBJECTIVE BOX
INTERVAL HPI/OVERNIGHT EVENTS:    SUBJECTIVE:  Sputum cx: GPCs in pairs and chains, GPRs GNRs. Remains on Vanco/Zosyn. Currently febrile 103.1,  Neurologic exam unchanged.  Tolerating tube feeds.  Requiring IV fluid resuscitation      MEDICATIONS  (STANDING):  acetazolamide  IVPB 500 milliGRAM(s) IV Intermittent every 12 hours  chlorhexidine 0.12% Liquid 15 milliLiter(s) Swish and Spit two times a day  chlorhexidine 2% Cloths 1 Application(s) Topical daily  enoxaparin Injectable 40 milliGRAM(s) SubCutaneous daily  folic acid 1 milliGRAM(s) Oral daily  levETIRAcetam  Solution 1000 milliGRAM(s) Oral two times a day  piperacillin/tazobactam IVPB. 4.5 Gram(s) IV Intermittent every 8 hours  polyethylene glycol 3350 17 Gram(s) Oral daily  propranolol 10 milliGRAM(s) Oral every 6 hours  thiamine 100 milliGRAM(s) Oral daily  vancomycin  IVPB 1500 milliGRAM(s) IV Intermittent every 8 hours    MEDICATIONS  (PRN):  acetaminophen    Suspension .. 650 milliGRAM(s) Oral every 6 hours PRN Temp greater or equal to 38.5C (101.3F)      Vital Signs Last 24 Hrs  T(C): 39.5 (30 Oct 2018 20:00), Max: 39.5 (30 Oct 2018 04:00)  T(F): 103.1 (30 Oct 2018 20:00), Max: 103.1 (30 Oct 2018 04:00)  HR: 95 (30 Oct 2018 22:00) (89 - 101)  BP: 172/97 (30 Oct 2018 22:00) (116/60 - 172/97)  BP(mean): 125 (30 Oct 2018 22:00) (82 - 125)  RR: 38 (30 Oct 2018 22:00) (27 - 41)  SpO2: 100% (30 Oct 2018 22:00) (96% - 100%)    PE  Neurological: RASS:  -4.  GCS: 4T.  Decerebrate posturing. R Pupil 4mm nonreactive, L Pupil 2mm nonreactive. no gag, + Cough. overbreathing ventilator.   Neck: No bruits; no thyromegaly or nodules,  No JVD  Back: Normal spine flexure, No CVA tenderness, No deformity or limitation of movement  Respiratory: Crackle to LLL base.  No accessory muscle use  Cardiovascular: Regular rate & rhythm, normal S1, S2; no murmurs, gallops or rubs  Gastrointestinal: Soft, non-tender, normal bowel sounds  Extremities: No peripheral edema, No cyanosis, clubbing   Vascular: Equal and normal pulses: 2+ peripheral pulses throughout        I&O's Detail    29 Oct 2018 07:01  -  30 Oct 2018 07:00  --------------------------------------------------------  IN:    Enteral Tube Flush: 200 mL    Pivot: 1200 mL    Solution: 1000 mL    Solution: 83 mL    Solution: 50 mL    Solution: 50 mL    Solution: 510 mL    Solution: 250 mL  Total IN: 3343 mL    OUT:    Incontinent per Condom Catheter: 1000 mL  Total OUT: 1000 mL    Total NET: 2343 mL      30 Oct 2018 07:01  -  30 Oct 2018 22:34  --------------------------------------------------------  IN:    Pivot: 750 mL    Sodium Chloride 0.9% IV Bolus: 1000 mL    Solution: 200 mL    Solution: 100 mL    Solution: 415 mL    Solution: 1000 mL  Total IN: 3465 mL    OUT:    Incontinent per Condom Catheter: 1460 mL  Total OUT: 1460 mL    Total NET: 2005 mL          LABS:                        7.9    12.4  )-----------( 164      ( 30 Oct 2018 04:34 )             24.8     10-30    127<L>  |  94<L>  |  9.0  ----------------------------<  114  4.0   |  20.0<L>  |  0.26<L>    Ca    7.4<L>      30 Oct 2018 21:13  Phos  1.2     10-30  Mg     1.7     10-30            RADIOLOGY & ADDITIONAL STUDIES:

## 2018-10-30 NOTE — CONSULT NOTE ADULT - SUBJECTIVE AND OBJECTIVE BOX
32y Male was admitted on 10-24  In ED, GCS=     Patient is a 32y old  Male who presents with a chief complaint of unknown mechanism (30 Oct 2018 07:08)    HPI:  33 y/o M BIBEMS by air transport as a transfer from [place] intubated  due to pooe reported posturind and agonal respirations and with c-collar in place. Trauma A activation, patient was brought for further evaluation by trauma and neurosurgery team after being found unconscious and unresponsive on the sidewalk. Initial CT showed bilateral subdural hemorrhage and SAH. GCS 4. Mannitol was given for dilation of the pupil. EMS reports seizure activity enroute, sedated w 2 mg ativan and propofol for transport. IMaging showed ___. Patient was seen by neurosurgery taken for evacuation of b/l subdural hematomas and placement of left frontal EVD on 10/24 with post op hypotension requiring pressor support. Neurology was consulted for non-rhytmic movements of patients feet and rhytmic movements of the tongue concerning of seizures.    Imaging showed:    [place] [facility] [place]H and bilateral acute SDH with mass effect  CT c-spine -- no acute fracture or traumatic misalignment per my review; per flight medic, [place] [facility] [place] has arrived in a cervical collar  CT C/A/P -    REVIEW OF SYSTEMS  Constitutional - No fever, No weight loss, No fatigue  HEENT - No eye pain, No visual disturbances, No difficulty hearing, No tinnitus, No vertigo, No neck pain  Respiratory - No cough, No wheezing, No shortness of breath  Cardiovascular - No chest pain, No palpitations  Gastrointestinal - No abdominal pain, No nausea, No vomiting, No diarrhea, No constipation  Genitourinary - No dysuria, No frequency, No hematuria, No incontinence  Neurological - No headaches, No memory loss, No loss of strength, No numbness, No tremors  Skin - No itching, No rashes, No lesions   Endocrine - No temperature intolerance  Musculoskeletal - No joint pain, No joint swelling, No muscle pain  Psychiatric - No depression, No anxiety    VITALS  T(C): 38.4 (10-30-18 @ 07:53), Max: 39.5 (10-30-18 @ 04:00)  HR: 95 (10-30-18 @ 10:00) (89 - 105)  BP: 148/67 (10-30-18 @ 10:00) (116/60 - 153/69)  RR: 31 (10-30-18 @ 10:00) (24 - 34)  SpO2: 100% (10-30-18 @ 10:00) (96% - 100%)  Wt(kg): --    PAST MEDICAL & SURGICAL HISTORY  Medical history unknown  Surgical history unknown      SOCIAL HISTORY  Smoking - Denied  EtOH - Denied   Drugs - Denied    FUNCTIONAL HISTORY  Lives   Independent    CURRENT FUNCTIONAL STATUS  Bedmobility:  Transfers:  Gait:  ADLs:    FAMILY HISTORY       RECENT LABS/IMAGING  CBC Full  -  ( 30 Oct 2018 04:34 )  WBC Count : 12.4 K/uL  Hemoglobin : 7.9 g/dL  Hematocrit : 24.8 %  Platelet Count - Automated : 164 K/uL  Mean Cell Volume : 84.6 fl  Mean Cell Hemoglobin : 27.0 pg  Mean Cell Hemoglobin Concentration : 31.9 g/dL  Auto Neutrophil # : 9.6 K/uL  Auto Lymphocyte # : 1.0 K/uL  Auto Monocyte # : 1.6 K/uL  Auto Eosinophil # : 0.2 K/uL  Auto Basophil # : 0.0 K/uL  Auto Neutrophil % : 77.5 %  Auto Lymphocyte % : 8.1 %  Auto Monocyte % : 12.6 %  Auto Eosinophil % : 1.4 %  Auto Basophil % : 0.2 %    10-30    132<L>  |  95<L>  |  11.0  ----------------------------<  127<H>  3.3<L>   |  26.0  |  0.37<L>    Ca    7.1<L>      30 Oct 2018 04:34  Phos  1.2     10-30  Mg     1.7     10-30      Urinalysis Basic - ( 28 Oct 2018 11:21 )    Color: Yellow / Appearance: Clear / S.005 / pH: x  Gluc: x / Ketone: Negative  / Bili: Negative / Urobili: 8 mg/dL   Blood: x / Protein: 30 mg/dL / Nitrite: Negative   Leuk Esterase: Negative / RBC: 6-10 /HPF / WBC 3-5   Sq Epi: x / Non Sq Epi: x / Bacteria: Occasional        ALLERGIES  Allergy Status Unknown      MEDICATIONS   acetaminophen    Suspension .. 650 milliGRAM(s) Oral every 6 hours PRN  acetazolamide  IVPB 500 milliGRAM(s) IV Intermittent every 12 hours  chlorhexidine 0.12% Liquid 15 milliLiter(s) Swish and Spit two times a day  chlorhexidine 2% Cloths 1 Application(s) Topical daily  enoxaparin Injectable 40 milliGRAM(s) SubCutaneous daily  folic acid 1 milliGRAM(s) Oral daily  levETIRAcetam  Solution 1000 milliGRAM(s) Oral two times a day  piperacillin/tazobactam IVPB. 4.5 Gram(s) IV Intermittent every 8 hours  polyethylene glycol 3350 17 Gram(s) Oral daily  propranolol 10 milliGRAM(s) Oral every 6 hours  thiamine 100 milliGRAM(s) Oral daily  vancomycin  IVPB 1500 milliGRAM(s) IV Intermittent every 8 hours 32y Male was admitted on 10-24  In ED, GCS= 3    Patient is a 32y old  Male who presents with a chief complaint of unknown mechanism (30 Oct 2018 07:08)    HPI:  33 y/o M BIBEMS by air transport as a transfer from [place] intubated  due to pooe reported posturind and agonal respirations and with c-collar in place. Trauma A activation, patient was brought for further evaluation by trauma and neurosurgery team after being found unconscious and unresponsive on the sidewalk. Initial CT showed bilateral subdural hemorrhage and SAH. GCS 4. Mannitol was given for dilation of the pupil. EMS reports seizure activity enroute, sedated w 2 mg ativan and propofol for transport. CT head at [facility] showed worsening SDH with midline shift. Patient was seen by neurosurgery taken for evacuation of b/l subdural hematomas and placement of left frontal EVD on 10/24 with post op hypotension requiring pressor support. Neurology was consulted for non-rhytmic movements of patients feet and rhytmic movements of the tongue concerning of seizures. EEG was negative for seizure activity He was started on 3% NS to increase serum osmolality abd maintain ICP <20. Patient had trach and peg placed and Head KATTY drains removed on 10/27. EVD was removed on 10/29. Patient remains without neurological improvement. Decerebrate posturing, pupils non reactive and without gag reflex.  Hospital course was complicated by anemia for which patient received prbcs. Diagnostic peritoneal lavage to investigate for intra-abdominal injury and was negative. Patient spiked fevers prompting removal of borjas and central line with negative UA. CXR showed new infiltrate and with thick secretions and increased 02 requirements flex bronch was done with sputum cx sent. He was started on broad spectrum abx (vanco zosyn)    EEG: (10/25): diffuse a low voltage slowing consistent with a nonspecific diffuse cerebral disturbance. Fast theta activity over the frontal parietal regions is noted and is most consistent with a incidental breach rhythm in the setting of bilateral craniectomies. There are no seizure discharges or localizing features      Imaging showed:  CT Head ([facility], 10/24 5:07): Bilateral holohemispheric subdural hemorrhages measuring 1.1 cm on the right and 1.4 cm on the left. There are increased hyperdense blood products within these hemorrhages compared to the earlier examination. The left convexity subdural hemorrhage has increased in size since the   earlier examination and there is new 9 mm midline shift to the right. Diffuse subarachnoid hemorrhage again noted.    CT head (10/26 16:44): decrease in size of bilateral holohemispheric subdural hematomas s/p surgery. Near complete resolution of rightward midline shift. Extensive acute-subacute scattered bilateral cerebral ischemic changes. Mild bilateral uncal herniations. Mild improvement of small-moderate amount of scattered subarachnoid hemorrhage. No hydrocephalus    TODAYS SUBJECTIVE HISTORY:    REVIEW OF SYSTEMS  Constitutional - No fever, No weight loss, No fatigue  HEENT - No eye pain, No visual disturbances, No difficulty hearing, No tinnitus, No vertigo, No neck pain  Respiratory - No cough, No wheezing, No shortness of breath  Cardiovascular - No chest pain, No palpitations  Gastrointestinal - No abdominal pain, No nausea, No vomiting, No diarrhea, No constipation  Genitourinary - No dysuria, No frequency, No hematuria, No incontinence  Neurological - No headaches, No memory loss, No loss of strength, No numbness, No tremors  Skin - No itching, No rashes, No lesions   Endocrine - No temperature intolerance  Musculoskeletal - No joint pain, No joint swelling, No muscle pain  Psychiatric - No depression, No anxiety    VITALS  T(C): 38.4 (10-30-18 @ 07:53), Max: 39.5 (10-30-18 @ 04:00)  HR: 95 (10-30-18 @ 10:00) (89 - 105)  BP: 148/67 (10-30-18 @ 10:00) (116/60 - 153/69)  RR: 31 (10-30-18 @ 10:00) (24 - 34)  SpO2: 100% (10-30-18 @ 10:00) (96% - 100%)  Wt(kg): --    PAST MEDICAL & SURGICAL HISTORY  Medical history unknown  Surgical history unknown      SOCIAL HISTORY  Smoking - Denied  EtOH - Denied   Drugs - Denied    FUNCTIONAL HISTORY  Lives   Independent    CURRENT FUNCTIONAL STATUS  pending eval    FAMILY HISTORY       RECENT LABS/IMAGING  CBC Full  -  ( 30 Oct 2018 04:34 )  WBC Count : 12.4 K/uL  Hemoglobin : 7.9 g/dL  Hematocrit : 24.8 %  Platelet Count - Automated : 164 K/uL  Mean Cell Volume : 84.6 fl  Mean Cell Hemoglobin : 27.0 pg  Mean Cell Hemoglobin Concentration : 31.9 g/dL  Auto Neutrophil # : 9.6 K/uL  Auto Lymphocyte # : 1.0 K/uL  Auto Monocyte # : 1.6 K/uL  Auto Eosinophil # : 0.2 K/uL  Auto Basophil # : 0.0 K/uL  Auto Neutrophil % : 77.5 %  Auto Lymphocyte % : 8.1 %  Auto Monocyte % : 12.6 %  Auto Eosinophil % : 1.4 %  Auto Basophil % : 0.2 %    10-30    132<L>  |  95<L>  |  11.0  ----------------------------<  127<H>  3.3<L>   |  26.0  |  0.37<L>    Ca    7.1<L>      30 Oct 2018 04:34  Phos  1.2     10-30  Mg     1.7     10-30      Urinalysis Basic - ( 28 Oct 2018 11:21 )    Color: Yellow / Appearance: Clear / S.005 / pH: x  Gluc: x / Ketone: Negative  / Bili: Negative / Urobili: 8 mg/dL   Blood: x / Protein: 30 mg/dL / Nitrite: Negative   Leuk Esterase: Negative / RBC: 6-10 /HPF / WBC 3-5   Sq Epi: x / Non Sq Epi: x / Bacteria: Occasional        ALLERGIES  Allergy Status Unknown      MEDICATIONS   acetaminophen    Suspension .. 650 milliGRAM(s) Oral every 6 hours PRN  acetazolamide  IVPB 500 milliGRAM(s) IV Intermittent every 12 hours  chlorhexidine 0.12% Liquid 15 milliLiter(s) Swish and Spit two times a day  chlorhexidine 2% Cloths 1 Application(s) Topical daily  enoxaparin Injectable 40 milliGRAM(s) SubCutaneous daily  folic acid 1 milliGRAM(s) Oral daily  levETIRAcetam  Solution 1000 milliGRAM(s) Oral two times a day  piperacillin/tazobactam IVPB. 4.5 Gram(s) IV Intermittent every 8 hours  polyethylene glycol 3350 17 Gram(s) Oral daily  propranolol 10 milliGRAM(s) Oral every 6 hours  thiamine 100 milliGRAM(s) Oral daily  vancomycin  IVPB 1500 milliGRAM(s) IV Intermittent every 8 hours      32y Male unknown PMH found unresponsive on sidewalk found w/ b/l SDH and SAH now s/p b/l hemicraniectomy and EVD placement 32y Male was admitted on 10-24  In ED, GCS= 3    Patient is a 32y old  Male who presents with a chief complaint of unknown mechanism (30 Oct 2018 07:08)    HPI:  33 y/o M BIBEMS by air transport as a transfer from [place] intubated  due to pooe reported posturind and agonal respirations and with c-collar in place. Trauma A activation, patient was brought for further evaluation by trauma and neurosurgery team after being found unconscious and unresponsive on the sidewalk. Initial CT showed bilateral subdural hemorrhage and SAH. GCS 4. Mannitol was given for dilation of the pupil. EMS reports seizure activity enroute, sedated w 2 mg ativan and propofol for transport. CT head at [facility] showed worsening SDH with midline shift. Patient was seen by neurosurgery taken for evacuation of b/l subdural hematomas and placement of left frontal EVD on 10/24 with post op hypotension requiring pressor support. Neurology was consulted for non-rhytmic movements of patients feet and rhytmic movements of the tongue concerning of seizures. EEG was negative for seizure activity He was started on 3% NS to increase serum osmolality abd maintain ICP <20. Patient had trach and peg placed and Head KATTY drains removed on 10/27. EVD was removed on 10/29. Patient remains without neurological improvement. Decerebrate posturing, pupils non reactive and without gag reflex.  Hospital course was complicated by anemia for which patient received prbcs. Diagnostic peritoneal lavage to investigate for intra-abdominal injury and was negative. Patient spiked fevers prompting removal of borjas and central line with negative UA. CXR showed new infiltrate and with thick secretions and increased 02 requirements flex bronch was done with sputum cx sent. He was started on broad spectrum abx (vanco zosyn)    EEG: (10/25): diffuse a low voltage slowing consistent with a nonspecific diffuse cerebral disturbance. Fast theta activity over the frontal parietal regions is noted and is most consistent with a incidental breach rhythm in the setting of bilateral craniectomies. There are no seizure discharges or localizing features      Imaging showed:  CT Head ([facility], 10/24 5:07): Bilateral holohemispheric subdural hemorrhages measuring 1.1 cm on the right and 1.4 cm on the left. There are increased hyperdense blood products within these hemorrhages compared to the earlier examination. The left convexity subdural hemorrhage has increased in size since the   earlier examination and there is new 9 mm midline shift to the right. Diffuse subarachnoid hemorrhage again noted.    CT head (10/26 16:44): decrease in size of bilateral holohemispheric subdural hematomas s/p surgery. Near complete resolution of rightward midline shift. Extensive acute-subacute scattered bilateral cerebral ischemic changes. Mild bilateral uncal herniations. Mild improvement of small-moderate amount of scattered subarachnoid hemorrhage. No hydrocephalus    TODAYS SUBJECTIVE HISTORY:  Patient seen and examined in the AM. No acute events overnight as per nurse.    REVIEW OF SYSTEMS  unable to obtain    VITALS  T(C): 38.4 (10-30-18 @ 07:53), Max: 39.5 (10-30-18 @ 04:00)  HR: 95 (10-30-18 @ 10:00) (89 - 105)  BP: 148/67 (10-30-18 @ 10:00) (116/60 - 153/69)  RR: 31 (10-30-18 @ 10:00) (24 - 34)  SpO2: 100% (10-30-18 @ 10:00) (96% - 100%)  Wt(kg): --    PAST MEDICAL & SURGICAL HISTORY  Medical history unknown  Surgical history unknown      SOCIAL HISTORY  unable to obtain    FUNCTIONAL HISTORY  unable to obtain    CURRENT FUNCTIONAL STATUS  pending eval    FAMILY HISTORY       RECENT LABS/IMAGING  CBC Full  -  ( 30 Oct 2018 04:34 )  WBC Count : 12.4 K/uL  Hemoglobin : 7.9 g/dL  Hematocrit : 24.8 %  Platelet Count - Automated : 164 K/uL  Mean Cell Volume : 84.6 fl  Mean Cell Hemoglobin : 27.0 pg  Mean Cell Hemoglobin Concentration : 31.9 g/dL  Auto Neutrophil # : 9.6 K/uL  Auto Lymphocyte # : 1.0 K/uL  Auto Monocyte # : 1.6 K/uL  Auto Eosinophil # : 0.2 K/uL  Auto Basophil # : 0.0 K/uL  Auto Neutrophil % : 77.5 %  Auto Lymphocyte % : 8.1 %  Auto Monocyte % : 12.6 %  Auto Eosinophil % : 1.4 %  Auto Basophil % : 0.2 %    10-30    132<L>  |  95<L>  |  11.0  ----------------------------<  127<H>  3.3<L>   |  26.0  |  0.37<L>    Ca    7.1<L>      30 Oct 2018 04:34  Phos  1.2     10-30  Mg     1.7     10-30      Urinalysis Basic - ( 28 Oct 2018 11:21 )    Color: Yellow / Appearance: Clear / S.005 / pH: x  Gluc: x / Ketone: Negative  / Bili: Negative / Urobili: 8 mg/dL   Blood: x / Protein: 30 mg/dL / Nitrite: Negative   Leuk Esterase: Negative / RBC: 6-10 /HPF / WBC 3-5   Sq Epi: x / Non Sq Epi: x / Bacteria: Occasional        ALLERGIES  Allergy Status Unknown      MEDICATIONS   acetaminophen    Suspension .. 650 milliGRAM(s) Oral every 6 hours PRN  acetazolamide  IVPB 500 milliGRAM(s) IV Intermittent every 12 hours  chlorhexidine 0.12% Liquid 15 milliLiter(s) Swish and Spit two times a day  chlorhexidine 2% Cloths 1 Application(s) Topical daily  enoxaparin Injectable 40 milliGRAM(s) SubCutaneous daily  folic acid 1 milliGRAM(s) Oral daily  levETIRAcetam  Solution 1000 milliGRAM(s) Oral two times a day  piperacillin/tazobactam IVPB. 4.5 Gram(s) IV Intermittent every 8 hours  polyethylene glycol 3350 17 Gram(s) Oral daily  propranolol 10 milliGRAM(s) Oral every 6 hours  thiamine 100 milliGRAM(s) Oral daily  vancomycin  IVPB 1500 milliGRAM(s) IV Intermittent every 8 hours    ----------------------------------------------------------------------------------------  PHYSICAL EXAM not on sedation  HEENT - surgical incision closed with staples with dressing on top, pupils assymetric and not reactive to light  Chest - mech vent  Cardiovascular - S1S2   Abdomen - Soft, peg  Extrem: swelling in b/l lower and upper extrem  Neuro: Decerebrate posturing. Not withdrawing to painful stimuli or loud auditory stimulus, no visual response to threat     Reflexes - (+) babinski b/l, no rooting      ------------------------------------------------------------------------------------------------  ASSESSMENT/PLAN  32y Male unknown PMH found unresponsive on sidewalk found w/ b/l SDH and SAH now s/p b/l hemicraniectomy and EVD placement s/p removal  seizure ppx: keppra  diet: NPO on tube feeds  DVT PPX - SCDs, lovenox  Rehab - ongoing medical management, will continue to follow 32y Male was admitted on 10-24  In ED, GCS= 3    Patient is a 32y old  Male who presents with a chief complaint of unknown mechanism (30 Oct 2018 07:08)    HPI:  33 y/o M BIBEMS by air transport as a transfer from [place] intubated  due to pooe reported posturind and agonal respirations and with c-collar in place. Trauma A activation, patient was brought for further evaluation by trauma and neurosurgery team after being found unconscious and unresponsive on the sidewalk. Initial CT showed bilateral subdural hemorrhage and SAH. GCS 4. Mannitol was given for dilation of the pupil. EMS reports seizure activity enroute, sedated w 2 mg ativan and propofol for transport. CT head at [facility] showed worsening SDH with midline shift. Patient was seen by neurosurgery taken for evacuation of b/l subdural hematomas and placement of left frontal EVD on 10/24 with post op hypotension requiring pressor support. Neurology was consulted for non-rhytmic movements of patients feet and rhytmic movements of the tongue concerning of seizures. EEG was negative for seizure activity He was started on 3% NS to increase serum osmolality abd maintain ICP <20. Patient had trach and peg placed and Head KATTY drains removed on 10/27. EVD was removed on 10/29. Patient remains without neurological improvement. Decerebrate posturing, pupils non reactive and without gag reflex.  Hospital course was complicated by anemia for which patient received prbcs. Diagnostic peritoneal lavage to investigate for intra-abdominal injury and was negative. Patient spiked fevers prompting removal of borjas and central line with negative UA. CXR showed new infiltrate and with thick secretions and increased 02 requirements flex bronch was done with sputum cx sent. He was started on broad spectrum abx (vanco zosyn)    EEG: (10/25): diffuse a low voltage slowing consistent with a nonspecific diffuse cerebral disturbance. Fast theta activity over the frontal parietal regions is noted and is most consistent with a incidental breach rhythm in the setting of bilateral craniectomies. There are no seizure discharges or localizing features      Imaging showed:  CT Head ([facility], 10/24 5:07): Bilateral holohemispheric subdural hemorrhages measuring 1.1 cm on the right and 1.4 cm on the left. There are increased hyperdense blood products within these hemorrhages compared to the earlier examination. The left convexity subdural hemorrhage has increased in size since the   earlier examination and there is new 9 mm midline shift to the right. Diffuse subarachnoid hemorrhage again noted.    CT head (10/26 16:44): decrease in size of bilateral holohemispheric subdural hematomas s/p surgery. Near complete resolution of rightward midline shift. Extensive acute-subacute scattered bilateral cerebral ischemic changes. Mild bilateral uncal herniations. Mild improvement of small-moderate amount of scattered subarachnoid hemorrhage. No hydrocephalus    TODAYS SUBJECTIVE HISTORY:  Patient seen and examined in the AM. No acute events overnight as per nurse.    REVIEW OF SYSTEMS  unable to obtain    VITALS  T(C): 38.4 (10-30-18 @ 07:53), Max: 39.5 (10-30-18 @ 04:00)  HR: 95 (10-30-18 @ 10:00) (89 - 105)  BP: 148/67 (10-30-18 @ 10:00) (116/60 - 153/69)  RR: 31 (10-30-18 @ 10:00) (24 - 34)  SpO2: 100% (10-30-18 @ 10:00) (96% - 100%)  Wt(kg): --    PAST MEDICAL & SURGICAL HISTORY  Medical history unknown  Surgical history unknown      SOCIAL HISTORY  unable to obtain    FUNCTIONAL HISTORY  unable to obtain    CURRENT FUNCTIONAL STATUS  pending eval    FAMILY HISTORY       RECENT LABS/IMAGING  CBC Full  -  ( 30 Oct 2018 04:34 )  WBC Count : 12.4 K/uL  Hemoglobin : 7.9 g/dL  Hematocrit : 24.8 %  Platelet Count - Automated : 164 K/uL  Mean Cell Volume : 84.6 fl  Mean Cell Hemoglobin : 27.0 pg  Mean Cell Hemoglobin Concentration : 31.9 g/dL  Auto Neutrophil # : 9.6 K/uL  Auto Lymphocyte # : 1.0 K/uL  Auto Monocyte # : 1.6 K/uL  Auto Eosinophil # : 0.2 K/uL  Auto Basophil # : 0.0 K/uL  Auto Neutrophil % : 77.5 %  Auto Lymphocyte % : 8.1 %  Auto Monocyte % : 12.6 %  Auto Eosinophil % : 1.4 %  Auto Basophil % : 0.2 %    10-30    132<L>  |  95<L>  |  11.0  ----------------------------<  127<H>  3.3<L>   |  26.0  |  0.37<L>    Ca    7.1<L>      30 Oct 2018 04:34  Phos  1.2     10-30  Mg     1.7     10-30      Urinalysis Basic - ( 28 Oct 2018 11:21 )    Color: Yellow / Appearance: Clear / S.005 / pH: x  Gluc: x / Ketone: Negative  / Bili: Negative / Urobili: 8 mg/dL   Blood: x / Protein: 30 mg/dL / Nitrite: Negative   Leuk Esterase: Negative / RBC: 6-10 /HPF / WBC 3-5   Sq Epi: x / Non Sq Epi: x / Bacteria: Occasional        ALLERGIES  Allergy Status Unknown      MEDICATIONS   acetaminophen    Suspension .. 650 milliGRAM(s) Oral every 6 hours PRN  acetazolamide  IVPB 500 milliGRAM(s) IV Intermittent every 12 hours  chlorhexidine 0.12% Liquid 15 milliLiter(s) Swish and Spit two times a day  chlorhexidine 2% Cloths 1 Application(s) Topical daily  enoxaparin Injectable 40 milliGRAM(s) SubCutaneous daily  folic acid 1 milliGRAM(s) Oral daily  levETIRAcetam  Solution 1000 milliGRAM(s) Oral two times a day  piperacillin/tazobactam IVPB. 4.5 Gram(s) IV Intermittent every 8 hours  polyethylene glycol 3350 17 Gram(s) Oral daily  propranolol 10 milliGRAM(s) Oral every 6 hours  thiamine 100 milliGRAM(s) Oral daily  vancomycin  IVPB 1500 milliGRAM(s) IV Intermittent every 8 hours    ----------------------------------------------------------------------------------------  PHYSICAL EXAM not on sedation  HEENT - surgical incision closed with staples with dressing on top, pupils assymetric and not reactive to light  Chest - mech vent  Cardiovascular - S1S2   Abdomen - Soft, peg  Extrem: swelling in b/l lower and upper extrem  Neuro: Decerebrate posturing with all stimulus. Not withdrawing to painful stimuli or loud auditory stimulus, no visual response to threat     Reflexes - (+) babinski b/l, +Glabella, no rooting    Coma Recovery Scale - Revised  AUDITORY FUNCTION SCALE  0 - None  VISUAL FUNCTION SCALE  0 - None  MOTOR FUNCTION SCALE  1 - Abnormal Posturing  OROMOTOR/VERBAL FUNCTION SCALE  0 - None  COMMUNICATION SCALE  0 - None  AROUSAL SCALE  0 - Unarousable    TOTAL SCORE= 1 = Coma  ------------------------------------------------------------------------------------------------  ASSESSMENT/PLAN  32y Male unknown PMH found unresponsive on sidewalk found w/ b/l SDH and SAH now s/p b/l hemicraniectomy and EVD placement s/p removal  Seizure PPX - Keppra  DVT PPX - SCDs, Lovenox  Arousal - Recommend Melatonin 5mg HS and Bromocriptine 5mg Q6AM/12PM for sleep/wake cycle maintenance   Rehab - Patient is currently in a state of coma x 7days. Patient demonstrates poor signs of neurological recovery given history. Prognosis is in favor of patient due to traumatic injury and relative age of patient. However, given extent of the injury (Bilateral findings) and unknown time down, patient's prognosis is considerably more grim. Current literature suggests that if coma lasts >4 weeks a good recovery is unlikely. However, given extent of extensor posturing and persistence of primitive reflexes, it suggest an even poorer outcome and need for significant assistance long term. Would recommend palliative care involvement for advance care directives/family planning.      Consider COMA STIM from OT, PT and SLP for a few session to elicit a higher response.

## 2018-10-31 LAB
ANION GAP SERPL CALC-SCNC: 11 MMOL/L — SIGNIFICANT CHANGE UP (ref 5–17)
ANION GAP SERPL CALC-SCNC: 9 MMOL/L — SIGNIFICANT CHANGE UP (ref 5–17)
BASE EXCESS BLDA CALC-SCNC: -6.6 MMOL/L — LOW (ref -2–2)
BASOPHILS # BLD AUTO: 0 K/UL — SIGNIFICANT CHANGE UP (ref 0–0.2)
BASOPHILS NFR BLD AUTO: 0.1 % — SIGNIFICANT CHANGE UP (ref 0–2)
BLOOD GAS COMMENTS ARTERIAL: SIGNIFICANT CHANGE UP
BUN SERPL-MCNC: 12 MG/DL — SIGNIFICANT CHANGE UP (ref 8–20)
BUN SERPL-MCNC: 12 MG/DL — SIGNIFICANT CHANGE UP (ref 8–20)
CALCIUM SERPL-MCNC: 7.1 MG/DL — LOW (ref 8.6–10.2)
CALCIUM SERPL-MCNC: 7.4 MG/DL — LOW (ref 8.6–10.2)
CHLORIDE SERPL-SCNC: 96 MMOL/L — LOW (ref 98–107)
CHLORIDE SERPL-SCNC: 98 MMOL/L — SIGNIFICANT CHANGE UP (ref 98–107)
CHLORIDE UR-SCNC: 68 MMOL/L — SIGNIFICANT CHANGE UP
CO2 SERPL-SCNC: 18 MMOL/L — LOW (ref 22–29)
CO2 SERPL-SCNC: 19 MMOL/L — LOW (ref 22–29)
CREAT SERPL-MCNC: 0.2 MG/DL — LOW (ref 0.5–1.3)
CREAT SERPL-MCNC: 0.27 MG/DL — LOW (ref 0.5–1.3)
EOSINOPHIL # BLD AUTO: 0.2 K/UL — SIGNIFICANT CHANGE UP (ref 0–0.5)
EOSINOPHIL NFR BLD AUTO: 1.6 % — SIGNIFICANT CHANGE UP (ref 0–5)
GAS PNL BLDA: SIGNIFICANT CHANGE UP
GAS PNL BLDA: SIGNIFICANT CHANGE UP
GLUCOSE SERPL-MCNC: 159 MG/DL — HIGH (ref 70–115)
GLUCOSE SERPL-MCNC: 172 MG/DL — HIGH (ref 70–115)
HCO3 BLDA-SCNC: 19 MMOL/L — LOW (ref 20–26)
HCT VFR BLD CALC: 23.4 % — LOW (ref 42–52)
HGB BLD-MCNC: 7.6 G/DL — LOW (ref 14–18)
HOROWITZ INDEX BLDA+IHG-RTO: SIGNIFICANT CHANGE UP
LYMPHOCYTES # BLD AUTO: 0.8 K/UL — LOW (ref 1–4.8)
LYMPHOCYTES # BLD AUTO: 5.4 % — LOW (ref 20–55)
MAGNESIUM SERPL-MCNC: 1.5 MG/DL — LOW (ref 1.6–2.6)
MAGNESIUM SERPL-MCNC: 1.6 MG/DL — SIGNIFICANT CHANGE UP (ref 1.6–2.6)
MCHC RBC-ENTMCNC: 27.4 PG — SIGNIFICANT CHANGE UP (ref 27–31)
MCHC RBC-ENTMCNC: 32.5 G/DL — SIGNIFICANT CHANGE UP (ref 32–36)
MCV RBC AUTO: 84.5 FL — SIGNIFICANT CHANGE UP (ref 80–94)
MONOCYTES # BLD AUTO: 1.3 K/UL — HIGH (ref 0–0.8)
MONOCYTES NFR BLD AUTO: 9.1 % — SIGNIFICANT CHANGE UP (ref 3–10)
NEUTROPHILS # BLD AUTO: 12.3 K/UL — HIGH (ref 1.8–8)
NEUTROPHILS NFR BLD AUTO: 83.4 % — HIGH (ref 37–73)
OSMOLALITY SERPL: 276 MOSM/KG — LOW (ref 280–300)
OSMOLALITY UR: 527 MOSM/KG — SIGNIFICANT CHANGE UP (ref 300–1000)
PCO2 BLDA: 45 MMHG — SIGNIFICANT CHANGE UP (ref 35–45)
PH BLDA: 7.26 — LOW (ref 7.35–7.45)
PHOSPHATE SERPL-MCNC: 1.9 MG/DL — LOW (ref 2.4–4.7)
PHOSPHATE SERPL-MCNC: 2.7 MG/DL — SIGNIFICANT CHANGE UP (ref 2.4–4.7)
PLATELET # BLD AUTO: 183 K/UL — SIGNIFICANT CHANGE UP (ref 150–400)
PO2 BLDA: 113 MMHG — HIGH (ref 83–108)
POTASSIUM SERPL-MCNC: 3.6 MMOL/L — SIGNIFICANT CHANGE UP (ref 3.5–5.3)
POTASSIUM SERPL-MCNC: 4.2 MMOL/L — SIGNIFICANT CHANGE UP (ref 3.5–5.3)
POTASSIUM SERPL-SCNC: 3.6 MMOL/L — SIGNIFICANT CHANGE UP (ref 3.5–5.3)
POTASSIUM SERPL-SCNC: 4.2 MMOL/L — SIGNIFICANT CHANGE UP (ref 3.5–5.3)
POTASSIUM UR-SCNC: 34 MMOL/L — SIGNIFICANT CHANGE UP
RBC # BLD: 2.77 M/UL — LOW (ref 4.6–6.2)
RBC # FLD: 17.2 % — HIGH (ref 11–15.6)
SAO2 % BLDA: 98 % — SIGNIFICANT CHANGE UP (ref 95–99)
SODIUM SERPL-SCNC: 125 MMOL/L — LOW (ref 135–145)
SODIUM SERPL-SCNC: 126 MMOL/L — LOW (ref 135–145)
SODIUM UR-SCNC: 163 MMOL/L — SIGNIFICANT CHANGE UP
VANCOMYCIN TROUGH SERPL-MCNC: 15.5 UG/ML — SIGNIFICANT CHANGE UP (ref 10–20)
WBC # BLD: 14.7 K/UL — HIGH (ref 4.8–10.8)
WBC # FLD AUTO: 14.7 K/UL — HIGH (ref 4.8–10.8)

## 2018-10-31 PROCEDURE — 99232 SBSQ HOSP IP/OBS MODERATE 35: CPT

## 2018-10-31 PROCEDURE — 71045 X-RAY EXAM CHEST 1 VIEW: CPT | Mod: 26

## 2018-10-31 RX ORDER — LANOLIN ALCOHOL/MO/W.PET/CERES
5 CREAM (GRAM) TOPICAL AT BEDTIME
Qty: 0 | Refills: 0 | Status: DISCONTINUED | OUTPATIENT
Start: 2018-10-31 | End: 2018-12-07

## 2018-10-31 RX ORDER — MAGNESIUM SULFATE 500 MG/ML
4 VIAL (ML) INJECTION ONCE
Qty: 0 | Refills: 0 | Status: COMPLETED | OUTPATIENT
Start: 2018-10-31 | End: 2018-10-31

## 2018-10-31 RX ORDER — MAGNESIUM SULFATE 500 MG/ML
2 VIAL (ML) INJECTION ONCE
Qty: 0 | Refills: 0 | Status: COMPLETED | OUTPATIENT
Start: 2018-10-31 | End: 2018-10-31

## 2018-10-31 RX ORDER — POTASSIUM CHLORIDE 20 MEQ
10 PACKET (EA) ORAL
Qty: 0 | Refills: 0 | Status: COMPLETED | OUTPATIENT
Start: 2018-10-31 | End: 2018-10-31

## 2018-10-31 RX ORDER — HYDROMORPHONE HYDROCHLORIDE 2 MG/ML
2 INJECTION INTRAMUSCULAR; INTRAVENOUS; SUBCUTANEOUS ONCE
Qty: 0 | Refills: 0 | Status: DISCONTINUED | OUTPATIENT
Start: 2018-10-31 | End: 2018-10-31

## 2018-10-31 RX ORDER — BROMOCRIPTINE MESYLATE 5 MG/1
5 CAPSULE ORAL
Qty: 0 | Refills: 0 | Status: DISCONTINUED | OUTPATIENT
Start: 2018-10-31 | End: 2018-11-02

## 2018-10-31 RX ORDER — SODIUM CHLORIDE 9 MG/ML
1000 INJECTION INTRAMUSCULAR; INTRAVENOUS; SUBCUTANEOUS ONCE
Qty: 0 | Refills: 0 | Status: COMPLETED | OUTPATIENT
Start: 2018-10-31 | End: 2018-10-31

## 2018-10-31 RX ORDER — SODIUM CHLORIDE 9 MG/ML
1000 INJECTION INTRAMUSCULAR; INTRAVENOUS; SUBCUTANEOUS
Qty: 0 | Refills: 0 | Status: DISCONTINUED | OUTPATIENT
Start: 2018-10-31 | End: 2018-11-01

## 2018-10-31 RX ADMIN — CHLORHEXIDINE GLUCONATE 15 MILLILITER(S): 213 SOLUTION TOPICAL at 05:21

## 2018-10-31 RX ADMIN — SODIUM CHLORIDE 100 MILLILITER(S): 9 INJECTION INTRAMUSCULAR; INTRAVENOUS; SUBCUTANEOUS at 12:32

## 2018-10-31 RX ADMIN — Medication 100 MILLIEQUIVALENT(S): at 08:15

## 2018-10-31 RX ADMIN — Medication 100 MILLIEQUIVALENT(S): at 09:30

## 2018-10-31 RX ADMIN — LEVETIRACETAM 1000 MILLIGRAM(S): 250 TABLET, FILM COATED ORAL at 05:33

## 2018-10-31 RX ADMIN — Medication 50 GRAM(S): at 11:09

## 2018-10-31 RX ADMIN — Medication 300 MILLIGRAM(S): at 02:24

## 2018-10-31 RX ADMIN — LEVETIRACETAM 1000 MILLIGRAM(S): 250 TABLET, FILM COATED ORAL at 17:20

## 2018-10-31 RX ADMIN — Medication 10 MILLIGRAM(S): at 17:20

## 2018-10-31 RX ADMIN — PIPERACILLIN AND TAZOBACTAM 25 GRAM(S): 4; .5 INJECTION, POWDER, LYOPHILIZED, FOR SOLUTION INTRAVENOUS at 13:58

## 2018-10-31 RX ADMIN — POLYETHYLENE GLYCOL 3350 17 GRAM(S): 17 POWDER, FOR SOLUTION ORAL at 11:09

## 2018-10-31 RX ADMIN — CHLORHEXIDINE GLUCONATE 15 MILLILITER(S): 213 SOLUTION TOPICAL at 17:20

## 2018-10-31 RX ADMIN — Medication 10 MILLIGRAM(S): at 05:35

## 2018-10-31 RX ADMIN — PIPERACILLIN AND TAZOBACTAM 25 GRAM(S): 4; .5 INJECTION, POWDER, LYOPHILIZED, FOR SOLUTION INTRAVENOUS at 05:35

## 2018-10-31 RX ADMIN — SODIUM CHLORIDE 1000 MILLILITER(S): 9 INJECTION INTRAMUSCULAR; INTRAVENOUS; SUBCUTANEOUS at 12:32

## 2018-10-31 RX ADMIN — Medication 10 MILLIGRAM(S): at 11:09

## 2018-10-31 RX ADMIN — Medication 100 MILLIEQUIVALENT(S): at 07:14

## 2018-10-31 RX ADMIN — HYDROMORPHONE HYDROCHLORIDE 2 MILLIGRAM(S): 2 INJECTION INTRAMUSCULAR; INTRAVENOUS; SUBCUTANEOUS at 12:37

## 2018-10-31 RX ADMIN — ACETAZOLAMIDE 210 MILLIGRAM(S): 250 TABLET ORAL at 05:33

## 2018-10-31 RX ADMIN — HYDROMORPHONE HYDROCHLORIDE 2 MILLIGRAM(S): 2 INJECTION INTRAMUSCULAR; INTRAVENOUS; SUBCUTANEOUS at 12:52

## 2018-10-31 RX ADMIN — Medication 50 GRAM(S): at 05:58

## 2018-10-31 RX ADMIN — HYDROMORPHONE HYDROCHLORIDE 2 MILLIGRAM(S): 2 INJECTION INTRAMUSCULAR; INTRAVENOUS; SUBCUTANEOUS at 21:15

## 2018-10-31 RX ADMIN — Medication 1 MILLIGRAM(S): at 11:09

## 2018-10-31 RX ADMIN — PIPERACILLIN AND TAZOBACTAM 25 GRAM(S): 4; .5 INJECTION, POWDER, LYOPHILIZED, FOR SOLUTION INTRAVENOUS at 22:01

## 2018-10-31 RX ADMIN — Medication 85 MILLIMOLE(S): at 12:37

## 2018-10-31 RX ADMIN — Medication 100 GRAM(S): at 20:59

## 2018-10-31 RX ADMIN — CHLORHEXIDINE GLUCONATE 1 APPLICATION(S): 213 SOLUTION TOPICAL at 11:10

## 2018-10-31 RX ADMIN — Medication 10 MILLIGRAM(S): at 02:21

## 2018-10-31 RX ADMIN — Medication 5 MILLIGRAM(S): at 22:01

## 2018-10-31 RX ADMIN — HYDROMORPHONE HYDROCHLORIDE 2 MILLIGRAM(S): 2 INJECTION INTRAMUSCULAR; INTRAVENOUS; SUBCUTANEOUS at 20:27

## 2018-10-31 RX ADMIN — Medication 650 MILLIGRAM(S): at 12:52

## 2018-10-31 RX ADMIN — Medication 650 MILLIGRAM(S): at 12:42

## 2018-10-31 RX ADMIN — ENOXAPARIN SODIUM 40 MILLIGRAM(S): 100 INJECTION SUBCUTANEOUS at 11:10

## 2018-10-31 RX ADMIN — Medication 100 MILLIGRAM(S): at 11:09

## 2018-10-31 NOTE — PHYSICAL THERAPY INITIAL EVALUATION ADULT - PLANNED THERAPY INTERVENTIONS, PT EVAL
bed mobility training/balance training/transfer training/Coma stimulation/ROM/gait training/postural re-education/strengthening

## 2018-10-31 NOTE — PHYSICAL THERAPY INITIAL EVALUATION ADULT - PERTINENT HX OF CURRENT PROBLEM, REHAB EVAL
Pt is a 31 y/o male who was transferred from [place] with b/l SDH/SAH. Pt was found unconscious/unresponsive on a sidewalk.

## 2018-10-31 NOTE — PHYSICAL THERAPY INITIAL EVALUATION ADULT - GENERAL OBSERVATIONS, REHAB EVAL
Pt received in bed supine, + telemetry/SpO2 monitor, trach on MV, helmet, PEG, IV, a-line, borjas, b/l VCDs.

## 2018-10-31 NOTE — PROGRESS NOTE ADULT - SUBJECTIVE AND OBJECTIVE BOX
Patient sedated with Dilaudid due to tachypnea. Had Midline placed.  Had an EEG today, concern for exam for subclinical seizures.  On Keppra.    FUNCTIONAL PROGRESS  Total A    REVIEW OF SYSTEMS  Constitutional - +fever  Neurological - +loss of strength    VITALS  T(C): 38.2 (10-31-18 @ 20:00), Max: 38.3 (10-31-18 @ 12:00)  HR: 105 (10-31-18 @ 20:00) (68 - 105)  BP: 208/97 (10-31-18 @ 20:00) (125/93 - 208/97)  RR: 35 (10-31-18 @ 20:00) (27 - 80)  SpO2: 97% (10-31-18 @ 20:00) (96% - 100%)  Wt(kg): --    MEDICATIONS   acetaminophen    Suspension .. 650 milliGRAM(s) every 6 hours PRN  bromocriptine Capsule 5 milliGRAM(s) <User Schedule>  chlorhexidine 0.12% Liquid 15 milliLiter(s) two times a day  chlorhexidine 2% Cloths 1 Application(s) daily  enoxaparin Injectable 40 milliGRAM(s) daily  folic acid 1 milliGRAM(s) daily  levETIRAcetam  Solution 1000 milliGRAM(s) two times a day  melatonin 5 milliGRAM(s) at bedtime  piperacillin/tazobactam IVPB. 4.5 Gram(s) every 8 hours  polyethylene glycol 3350 17 Gram(s) daily  propranolol 10 milliGRAM(s) every 6 hours  sodium chloride 0.9%. 1000 milliLiter(s) <Continuous>  thiamine 100 milliGRAM(s) daily      RECENT LABS/IMAGING  CBC Full  -  ( 31 Oct 2018 05:03 )  WBC Count : 14.7 K/uL  Hemoglobin : 7.6 g/dL  Hematocrit : 23.4 %  Platelet Count - Automated : 183 K/uL  Mean Cell Volume : 84.5 fl  Mean Cell Hemoglobin : 27.4 pg  Mean Cell Hemoglobin Concentration : 32.5 g/dL  Auto Neutrophil # : 12.3 K/uL  Auto Lymphocyte # : 0.8 K/uL  Auto Monocyte # : 1.3 K/uL  Auto Eosinophil # : 0.2 K/uL  Auto Basophil # : 0.0 K/uL  Auto Neutrophil % : 83.4 %  Auto Lymphocyte % : 5.4 %  Auto Monocyte % : 9.1 %  Auto Eosinophil % : 1.6 %  Auto Basophil % : 0.1 %    10-31    126<L>  |  96<L>  |  12.0  ----------------------------<  172<H>  3.6   |  19.0<L>  |  0.27<L>    Ca    7.1<L>      31 Oct 2018 05:03  Phos  1.9     10-31  Mg     1.5     10-31      ----------------------------------------------------------------------------------------  PHYSICAL EXAM - Recent Dialudid  HEENT - Helmet on, Eyes not wavering, no response to threat, Bilateral pupils non reactive, Left pin-point  Chest - Mechanically ventilated  Cardiovascular - Sustained 89-93 despite auditory and painful stimuli  Extremities - Diffuse swelling    Decerebrate posturing noted, but limited response due to sedation  Reflexes - +Babinski     Coma Recovery Scale - Revised  AUDITORY FUNCTION SCALE  0 - None  VISUAL FUNCTION SCALE  0 - None  MOTOR FUNCTION SCALE  1 - Abnormal Posturing  OROMOTOR/VERBAL FUNCTION SCALE  0 - None  COMMUNICATION SCALE  0 - None  AROUSAL SCALE  0 - Unarousable    TOTAL SCORE= 1 = Coma  ------------------------------------------------------------------------------------------------  ASSESSMENT/PLAN  32y Male unknown PMH found unresponsive on sidewalk found w/ b/l SDH and SAH now s/p b/l hemicraniectomy and EVD placement s/p removal now in COMA x 8 DAYS  Seizure PPX - Keppra  Dysautonomia - Propranolol  Pain - Dilaudid, Tylenol  DVT PPX - SCDs, Lovenox  Sleep/Wake Cycles - Melatonin 5mg HS (10/31), Bromocriptine 5mg Q6AM/12PM (10/31)  Rehab - COMA STIM from OT, PT and SLP. Patient demonstrates poor signs of neurological recovery despite traumatic injury and relative age of patient. Given extent of the injury (Bilateral findings) and unknown time down, patient's prognosis is considerably more grim. However, given extent of extensor posturing and persistence of primitive reflexes, it suggest an even poorer outcome and need for significant assistance long term. Would recommend palliative care involvement for advance care directives/family planning.

## 2018-10-31 NOTE — PROGRESS NOTE ADULT - SUBJECTIVE AND OBJECTIVE BOX
[facility]                                        Ne[facility] [place]                                 [facility]                                  [address]                                        [place], [place], [address]                                             [phone]        CC: Traumatic Brain Injury and seizure.    HPI:   The patient is a young man, estimated to be in 30's who was found unresponsive on sidewalk.  Per chart initial CT head showed subarachnoid hemorrhage and bilateral subdural hematomas.  He was then transferred from [facility] to [facility] ER for emergent neurosurgical treatment.  He was posturing with agonal respirations on arrival to [facility].  He was taken to the OR emergently by Dr. Fraser for evacuation and decompression of subdural hematomas.  He had bilateral craniectomy and extraventricular drain placed.  He had been having non- rhythmic movements of his feet and rhythmic movements of his tongue, causing concern for seizure/status epilepticus.    Interim history:  Neuro unchanged, febrile ?central fever    ROS:   Unobtainable due to patient's condition.   MEDICATIONS  (STANDING):  chlorhexidine 0.12% Liquid 15 milliLiter(s) Swish and Spit two times a day  chlorhexidine 2% Cloths 1 Application(s) Topical daily  enoxaparin Injectable 40 milliGRAM(s) SubCutaneous daily  folic acid 1 milliGRAM(s) Oral daily  levETIRAcetam  Solution 1000 milliGRAM(s) Oral two times a day  magnesium sulfate  IVPB 2 Gram(s) IV Intermittent once  piperacillin/tazobactam IVPB. 4.5 Gram(s) IV Intermittent every 8 hours  polyethylene glycol 3350 17 Gram(s) Oral daily  potassium chloride  10 mEq/100 mL IVPB 10 milliEquivalent(s) IV Intermittent every 1 hour  propranolol 10 milliGRAM(s) Oral every 6 hours  sodium phosphate IVPB 30 milliMole(s) IV Intermittent once  thiamine 100 milliGRAM(s) Oral daily    MEDICATIONS  (PRN):  acetaminophen    Suspension .. 650 milliGRAM(s) Oral every 6 hours PRN Temp greater or equal to 38.5C (101.3F)      Vital Signs Last 24 Hrs  T(C): 36.6 (31 Oct 2018 08:00), Max: 39.5 (30 Oct 2018 20:00)  T(F): 97.9 (31 Oct 2018 08:00), Max: 103.1 (30 Oct 2018 20:00)  HR: 75 (31 Oct 2018 09:16) (68 - 99)  BP: 147/94 (31 Oct 2018 09:00) (124/61 - 172/97)  BP(mean): 112 (31 Oct 2018 09:00) (84 - 125)  RR: 36 (31 Oct 2018 09:00) (27 - 51)  SpO2: 100% (31 Oct 2018 09:16) (98% - 100%)    Detailed Neurologic Exam:    Mental status: Unresponsive to voice.     Cranial nerves: Pupils: left 2.0  mm NR right 4 mm not reactive. There is no visual response to threat.  Extraocular motion is not assessed. Absent corneal reflexes.      Motor/Sensory: There is  bilateral extensor posturing to pinch and sternal rub.  There is triple flexion to nail bed pressure in feet    Reflexes: absent throughout and plantar responses are extensor.    Cerebellar:  Unable to test finger to nose testing.    Labs:                           7.6    14.7  )-----------( 183      ( 31 Oct 2018 05:03 )             23.4     10-31    126<L>  |  96<L>  |  12.0  ----------------------------<  172<H>  3.6   |  19.0<L>  |  0.27<L>    Ca    7.1<L>      31 Oct 2018 05:03  Phos  1.9     10-31  Mg     1.5     10-31

## 2018-10-31 NOTE — PROGRESS NOTE ADULT - ASSESSMENT
This is an approximately 32 y Male who is followed by neurology because of TBI/possible seizure    TBI  Now status post decompressive bilateral hemicraniectomy and extraventricular drain.  Significant amount of intracranial hemorrhage, improving post decompression.  Multiple areas of parenchymal CVA/ischemia seen  No significant improvement in neuro exam  Extraventricular drain management per Neurosurgery    Possible seizure  EEG: no seizure, breech rhythm over craniectomies, slowing suggesting diffuse cerebral dysfunction.  Rhythmic tongue movements and random foot movement has stopped.  Continue Fosphenytoin, Keppra.    Neurologic status remains unchanged.  Poor prognosis for meaningful recovery.    will follow with you    Martell Mendoza MD PhD   [id number]

## 2018-10-31 NOTE — PHYSICAL THERAPY INITIAL EVALUATION ADULT - ORIENTATION, REHAB EVAL
Pt seen for Coma stim. K Coma recovery scale revised was completed. See adult plan of care for details.

## 2018-10-31 NOTE — PROGRESS NOTE ADULT - SUBJECTIVE AND OBJECTIVE BOX
INTERVAL HPI/OVERNIGHT EVENTS/SUBJECTIVE:  Pt was given diamox for an alkalosis yesterday. This morning he has a worsening hyponatremia. Dilantin DC'd yesterday since it is a possible cause of his fevers.     ICU Vital Signs Last 24 Hrs  T(C): 38.3 (31 Oct 2018 12:00), Max: 39.5 (30 Oct 2018 20:00)  T(F): 101 (31 Oct 2018 12:00), Max: 103.1 (30 Oct 2018 20:00)  HR: 90 (31 Oct 2018 13:00) (68 - 99)  BP: 128/63 (31 Oct 2018 13:00) (124/61 - 172/97)  BP(mean): 85 (31 Oct 2018 13:00) (84 - 125)  ABP: 116/59 (31 Oct 2018 13:00) (93/48 - 166/83)  ABP(mean): 78 (31 Oct 2018 13:00) (61 - 106)  RR: 58 (31 Oct 2018 13:00) (27 - 80)  SpO2: 97% (31 Oct 2018 13:00) (97% - 100%)      I&O's Detail    30 Oct 2018 07:01  -  31 Oct 2018 07:00  --------------------------------------------------------  IN:    Oral Fluid: 140 mL    Pivot: 1200 mL    Sodium Chloride 0.9% IV Bolus: 1000 mL    Solution: 415 mL    Solution: 1500 mL    Solution: 200 mL    Solution: 300 mL    Solution: 50 mL    Solution: 100 mL  Total IN: 4905 mL    OUT:    Incontinent per Condom Catheter: 2110 mL  Total OUT: 2110 mL    Total NET: 2795 mL      31 Oct 2018 07:01  -  31 Oct 2018 13:23  --------------------------------------------------------  IN:    Enteral Tube Flush: 20 mL    Pivot: 250 mL    sodium chloride 0.9%.: 100 mL    Solution: 50 mL    Solution: 83.3 mL    Solution: 200 mL  Total IN: 703.3 mL    OUT:    Incontinent per Condom Catheter: 1050 mL  Total OUT: 1050 mL    Total NET: -346.7 mL          ABG - ( 31 Oct 2018 04:02 )  pH, Arterial: 7.45  pH, Blood: x     /  pCO2: 31    /  pO2: 154   / HCO3: 22    / Base Excess: -2.4  /  SaO2: 100                 MEDICATIONS  (STANDING):  chlorhexidine 0.12% Liquid 15 milliLiter(s) Swish and Spit two times a day  chlorhexidine 2% Cloths 1 Application(s) Topical daily  enoxaparin Injectable 40 milliGRAM(s) SubCutaneous daily  folic acid 1 milliGRAM(s) Oral daily  HYDROmorphone  Injectable 2 milliGRAM(s) IV Push once  levETIRAcetam  Solution 1000 milliGRAM(s) Oral two times a day  piperacillin/tazobactam IVPB. 4.5 Gram(s) IV Intermittent every 8 hours  polyethylene glycol 3350 17 Gram(s) Oral daily  propranolol 10 milliGRAM(s) Oral every 6 hours  sodium chloride 0.9%. 1000 milliLiter(s) (100 mL/Hr) IV Continuous <Continuous>  thiamine 100 milliGRAM(s) Oral daily    MEDICATIONS  (PRN):  acetaminophen    Suspension .. 650 milliGRAM(s) Oral every 6 hours PRN Temp greater or equal to 38.5C (101.3F)          Physical Exam:    Gen: intubated    Eyes: downward nystagmus with pupillary exam. pupils equal and reactive b/l    Neurological: Not tracking, eyes closed, extension posturing to noxious stimuli.     Neck: Trach site without evidence of infection    Pulmonary: coarse left sided breath sounds, right side it CTA    Cardiovascular: S1S2    Gastrointestinal: Soft, non-tender, non-distended    : Apodaca in place draining yellow urine    Extremities: edematous throughout    Skin: Intact, scalp dressing in place    Musculoskeletal: no noted areas of deformity    LABS:  CBC Full  -  ( 31 Oct 2018 05:03 )  WBC Count : 14.7 K/uL  Hemoglobin : 7.6 g/dL  Hematocrit : 23.4 %  Platelet Count - Automated : 183 K/uL  Mean Cell Volume : 84.5 fl  Mean Cell Hemoglobin : 27.4 pg  Mean Cell Hemoglobin Concentration : 32.5 g/dL  Auto Neutrophil # : 12.3 K/uL  Auto Lymphocyte # : 0.8 K/uL  Auto Monocyte # : 1.3 K/uL  Auto Eosinophil # : 0.2 K/uL  Auto Basophil # : 0.0 K/uL  Auto Neutrophil % : 83.4 %  Auto Lymphocyte % : 5.4 %  Auto Monocyte % : 9.1 %  Auto Eosinophil % : 1.6 %  Auto Basophil % : 0.1 %    10-31    126<L>  |  96<L>  |  12.0  ----------------------------<  172<H>  3.6   |  19.0<L>  |  0.27<L>    Ca    7.1<L>      31 Oct 2018 05:03  Phos  1.9     10-31  Mg     1.5     10-31          RECENT CULTURES:  10-28 .Sputum Sputum Staphylococcus aureus   Moderate White blood cells  Few Gram Positive Cocci in Pairs and Chains  Few Gram Positive Rods  Few Gram Negative Rods   Moderate Staphylococcus aureus    10-28 .Blood Blood-Peripheral XXXX XXXX   No growth at 48 hours    10-28 .Blood Blood-Peripheral XXXX XXXX   No growth at 48 hours    10-25 .Body Fluid Peritoneal Fluid XXXX   Few WBC's  No organisms seen   No growth at 5 days.                  ASSESSMENT/PLAN:  32y Male    Neuro: Pain control, delirium precautions, sleep hygiene     CV:     Pulm:     GI/Nutrition:     /Renal: Apodaca for strict I&O    ID:     Endo:     Skin: Repositioning for DTI prevention while in bed    DVT Prophylaxis: SCDs    Lines/Tubes:     Dispo:     Critical care time spent: 45 minutes of critical care time spent providing medical care for patient's acute illness/conditions that impairs at least one vital organ system and/or poses a high risk of imminent or life threatening deterioration in the patient's condition. It includes time spent evaluating and treating the patient's acute illness as well as time spent reviewing labs, radiology, discussing goals of care with patient and/or patient's family, and discussing the case with a multidisciplinary team in an effort to prevent further life threatening deterioration or end organ damage. This time is independent of any procedures performed.

## 2018-11-01 LAB
ALBUMIN SERPL ELPH-MCNC: 1.9 G/DL — LOW (ref 3.3–5.2)
ALP SERPL-CCNC: 512 U/L — HIGH (ref 40–120)
ALT FLD-CCNC: 41 U/L — HIGH
ANION GAP SERPL CALC-SCNC: 10 MMOL/L — SIGNIFICANT CHANGE UP (ref 5–17)
ANION GAP SERPL CALC-SCNC: 10 MMOL/L — SIGNIFICANT CHANGE UP (ref 5–17)
ANISOCYTOSIS BLD QL: SLIGHT — SIGNIFICANT CHANGE UP
AST SERPL-CCNC: 157 U/L — HIGH
BASE EXCESS BLDA CALC-SCNC: -3.2 MMOL/L — LOW (ref -2–2)
BASOPHILS # BLD AUTO: 0 K/UL — SIGNIFICANT CHANGE UP (ref 0–0.2)
BASOPHILS NFR BLD AUTO: 1 % — SIGNIFICANT CHANGE UP (ref 0–2)
BASOPHILS NFR BLD AUTO: 1 % — SIGNIFICANT CHANGE UP (ref 0–2)
BILIRUB DIRECT SERPL-MCNC: 1.2 MG/DL — HIGH (ref 0–0.3)
BILIRUB INDIRECT FLD-MCNC: 0.6 MG/DL — SIGNIFICANT CHANGE UP (ref 0.2–1)
BILIRUB SERPL-MCNC: 1.8 MG/DL — SIGNIFICANT CHANGE UP (ref 0.4–2)
BLOOD GAS COMMENTS ARTERIAL: SIGNIFICANT CHANGE UP
BUN SERPL-MCNC: 13 MG/DL — SIGNIFICANT CHANGE UP (ref 8–20)
BUN SERPL-MCNC: 14 MG/DL — SIGNIFICANT CHANGE UP (ref 8–20)
CALCIUM SERPL-MCNC: 7.1 MG/DL — LOW (ref 8.6–10.2)
CALCIUM SERPL-MCNC: 7.3 MG/DL — LOW (ref 8.6–10.2)
CHLORIDE SERPL-SCNC: 96 MMOL/L — LOW (ref 98–107)
CHLORIDE SERPL-SCNC: 99 MMOL/L — SIGNIFICANT CHANGE UP (ref 98–107)
CHLORIDE UR-SCNC: 43 MMOL/L — SIGNIFICANT CHANGE UP
CHOLEST SERPL-MCNC: 104 MG/DL — LOW (ref 110–199)
CO2 SERPL-SCNC: 18 MMOL/L — LOW (ref 22–29)
CO2 SERPL-SCNC: 20 MMOL/L — LOW (ref 22–29)
CREAT SERPL-MCNC: 0.25 MG/DL — LOW (ref 0.5–1.3)
CREAT SERPL-MCNC: 0.32 MG/DL — LOW (ref 0.5–1.3)
EOSINOPHIL # BLD AUTO: 0.2 K/UL — SIGNIFICANT CHANGE UP (ref 0–0.5)
EOSINOPHIL NFR BLD AUTO: 1 % — SIGNIFICANT CHANGE UP (ref 0–6)
EOSINOPHIL NFR BLD AUTO: 2 % — SIGNIFICANT CHANGE UP (ref 0–6)
GAS PNL BLDA: SIGNIFICANT CHANGE UP
GLUCOSE SERPL-MCNC: 111 MG/DL — SIGNIFICANT CHANGE UP (ref 70–115)
GLUCOSE SERPL-MCNC: 118 MG/DL — HIGH (ref 70–115)
HCO3 BLDA-SCNC: 22 MMOL/L — SIGNIFICANT CHANGE UP (ref 20–26)
HCT VFR BLD CALC: 20 % — CRITICAL LOW (ref 42–52)
HCT VFR BLD CALC: 21.3 % — LOW (ref 42–52)
HDLC SERPL-MCNC: 16 MG/DL — LOW
HGB BLD-MCNC: 6.6 G/DL — CRITICAL LOW (ref 14–18)
HGB BLD-MCNC: 7.2 G/DL — LOW (ref 14–18)
HOROWITZ INDEX BLDA+IHG-RTO: SIGNIFICANT CHANGE UP
HYPOCHROMIA BLD QL: SIGNIFICANT CHANGE UP
LG PLATELETS BLD QL AUTO: SLIGHT — SIGNIFICANT CHANGE UP
LIPID PNL WITH DIRECT LDL SERPL: 61 MG/DL — SIGNIFICANT CHANGE UP
LYMPHOCYTES # BLD AUTO: 0.8 K/UL — LOW (ref 1–4.8)
LYMPHOCYTES # BLD AUTO: 1 % — LOW (ref 20–55)
LYMPHOCYTES # BLD AUTO: 4 % — LOW (ref 20–55)
MAGNESIUM SERPL-MCNC: 1.8 MG/DL — SIGNIFICANT CHANGE UP (ref 1.6–2.6)
MAGNESIUM SERPL-MCNC: 2.1 MG/DL — SIGNIFICANT CHANGE UP (ref 1.6–2.6)
MCHC RBC-ENTMCNC: 27.4 PG — SIGNIFICANT CHANGE UP (ref 27–31)
MCHC RBC-ENTMCNC: 27.9 PG — SIGNIFICANT CHANGE UP (ref 27–31)
MCHC RBC-ENTMCNC: 33 G/DL — SIGNIFICANT CHANGE UP (ref 32–36)
MCHC RBC-ENTMCNC: 33.8 G/DL — SIGNIFICANT CHANGE UP (ref 32–36)
MCV RBC AUTO: 82.6 FL — SIGNIFICANT CHANGE UP (ref 80–94)
MCV RBC AUTO: 83 FL — SIGNIFICANT CHANGE UP (ref 80–94)
MICROCYTES BLD QL: SLIGHT — SIGNIFICANT CHANGE UP
MONOCYTES # BLD AUTO: 1.6 K/UL — HIGH (ref 0–0.8)
MONOCYTES NFR BLD AUTO: 3 % — SIGNIFICANT CHANGE UP (ref 3–10)
MONOCYTES NFR BLD AUTO: 9 % — SIGNIFICANT CHANGE UP (ref 3–10)
NEUTROPHILS # BLD AUTO: 13.3 K/UL — HIGH (ref 1.8–8)
NEUTROPHILS NFR BLD AUTO: 74 % — HIGH (ref 37–73)
NEUTROPHILS NFR BLD AUTO: 89 % — HIGH (ref 37–73)
NEUTS BAND # BLD: 5 % — SIGNIFICANT CHANGE UP (ref 0–8)
OSMOLALITY SERPL: 297 MOSM/KG — SIGNIFICANT CHANGE UP (ref 280–300)
OSMOLALITY UR: 730 MOSM/KG — SIGNIFICANT CHANGE UP (ref 300–1000)
PCO2 BLDA: 26 MMHG — LOW (ref 35–45)
PH BLDA: 7.49 — HIGH (ref 7.35–7.45)
PHOSPHATE SERPL-MCNC: 1.7 MG/DL — LOW (ref 2.4–4.7)
PHOSPHATE SERPL-MCNC: 2 MG/DL — LOW (ref 2.4–4.7)
PLAT MORPH BLD: ABNORMAL
PLATELET # BLD AUTO: 207 K/UL — SIGNIFICANT CHANGE UP (ref 150–400)
PLATELET # BLD AUTO: 207 K/UL — SIGNIFICANT CHANGE UP (ref 150–400)
PO2 BLDA: 153 MMHG — HIGH (ref 83–108)
POLYCHROMASIA BLD QL SMEAR: SLIGHT — SIGNIFICANT CHANGE UP
POTASSIUM SERPL-MCNC: 3.8 MMOL/L — SIGNIFICANT CHANGE UP (ref 3.5–5.3)
POTASSIUM SERPL-MCNC: 3.9 MMOL/L — SIGNIFICANT CHANGE UP (ref 3.5–5.3)
POTASSIUM SERPL-SCNC: 3.8 MMOL/L — SIGNIFICANT CHANGE UP (ref 3.5–5.3)
POTASSIUM SERPL-SCNC: 3.9 MMOL/L — SIGNIFICANT CHANGE UP (ref 3.5–5.3)
PROT SERPL-MCNC: 6.4 G/DL — LOW (ref 6.6–8.7)
RBC # BLD: 2.41 M/UL — LOW (ref 4.6–6.2)
RBC # BLD: 2.58 M/UL — LOW (ref 4.6–6.2)
RBC # FLD: 16.9 % — HIGH (ref 11–15.6)
RBC # FLD: 17 % — HIGH (ref 11–15.6)
RBC BLD AUTO: ABNORMAL
SAO2 % BLDA: 100 % — HIGH (ref 95–99)
SODIUM SERPL-SCNC: 124 MMOL/L — LOW (ref 135–145)
SODIUM SERPL-SCNC: 129 MMOL/L — LOW (ref 135–145)
SODIUM UR-SCNC: <30 MMOL/L — SIGNIFICANT CHANGE UP
TOTAL CHOLESTEROL/HDL RATIO MEASUREMENT: 7 RATIO — SIGNIFICANT CHANGE UP (ref 3.4–9.6)
TRIGL SERPL-MCNC: 135 MG/DL — SIGNIFICANT CHANGE UP (ref 10–200)
TYPE + AB SCN PNL BLD: SIGNIFICANT CHANGE UP
WBC # BLD: 15.1 K/UL — HIGH (ref 4.8–10.8)
WBC # BLD: 16.1 K/UL — HIGH (ref 4.8–10.8)
WBC # FLD AUTO: 15.1 K/UL — HIGH (ref 4.8–10.8)
WBC # FLD AUTO: 16.1 K/UL — HIGH (ref 4.8–10.8)

## 2018-11-01 PROCEDURE — 99291 CRITICAL CARE FIRST HOUR: CPT

## 2018-11-01 PROCEDURE — 99232 SBSQ HOSP IP/OBS MODERATE 35: CPT

## 2018-11-01 PROCEDURE — 99233 SBSQ HOSP IP/OBS HIGH 50: CPT | Mod: GC

## 2018-11-01 RX ORDER — PROPOFOL 10 MG/ML
5 INJECTION, EMULSION INTRAVENOUS
Qty: 1000 | Refills: 0 | Status: DISCONTINUED | OUTPATIENT
Start: 2018-11-01 | End: 2018-11-05

## 2018-11-01 RX ORDER — CEFAZOLIN SODIUM 1 G
VIAL (EA) INJECTION
Qty: 0 | Refills: 0 | Status: DISCONTINUED | OUTPATIENT
Start: 2018-11-01 | End: 2018-11-06

## 2018-11-01 RX ORDER — POTASSIUM PHOSPHATE, MONOBASIC POTASSIUM PHOSPHATE, DIBASIC 236; 224 MG/ML; MG/ML
30 INJECTION, SOLUTION INTRAVENOUS ONCE
Qty: 0 | Refills: 0 | Status: COMPLETED | OUTPATIENT
Start: 2018-11-01 | End: 2018-11-01

## 2018-11-01 RX ORDER — HYDROMORPHONE HYDROCHLORIDE 2 MG/ML
1 INJECTION INTRAMUSCULAR; INTRAVENOUS; SUBCUTANEOUS ONCE
Qty: 0 | Refills: 0 | Status: DISCONTINUED | OUTPATIENT
Start: 2018-11-01 | End: 2018-11-01

## 2018-11-01 RX ORDER — OXYCODONE HYDROCHLORIDE 5 MG/1
5 TABLET ORAL EVERY 4 HOURS
Qty: 0 | Refills: 0 | Status: DISCONTINUED | OUTPATIENT
Start: 2018-11-01 | End: 2018-11-03

## 2018-11-01 RX ORDER — CEFAZOLIN SODIUM 1 G
2000 VIAL (EA) INJECTION EVERY 8 HOURS
Qty: 0 | Refills: 0 | Status: DISCONTINUED | OUTPATIENT
Start: 2018-11-01 | End: 2018-11-06

## 2018-11-01 RX ORDER — HYDROMORPHONE HYDROCHLORIDE 2 MG/ML
2 INJECTION INTRAMUSCULAR; INTRAVENOUS; SUBCUTANEOUS ONCE
Qty: 0 | Refills: 0 | Status: DISCONTINUED | OUTPATIENT
Start: 2018-11-01 | End: 2018-11-01

## 2018-11-01 RX ORDER — CEFAZOLIN SODIUM 1 G
2000 VIAL (EA) INJECTION ONCE
Qty: 0 | Refills: 0 | Status: COMPLETED | OUTPATIENT
Start: 2018-11-01 | End: 2018-11-01

## 2018-11-01 RX ORDER — PROPRANOLOL HCL 160 MG
20 CAPSULE, EXTENDED RELEASE 24HR ORAL EVERY 6 HOURS
Qty: 0 | Refills: 0 | Status: DISCONTINUED | OUTPATIENT
Start: 2018-11-01 | End: 2018-11-30

## 2018-11-01 RX ORDER — VECURONIUM BROMIDE 20 MG/1
10 INJECTION, POWDER, FOR SOLUTION INTRAVENOUS ONCE
Qty: 0 | Refills: 0 | Status: COMPLETED | OUTPATIENT
Start: 2018-11-01 | End: 2018-11-01

## 2018-11-01 RX ADMIN — Medication 100 MILLIGRAM(S): at 17:31

## 2018-11-01 RX ADMIN — PROPOFOL 2.43 MICROGRAM(S)/KG/MIN: 10 INJECTION, EMULSION INTRAVENOUS at 09:15

## 2018-11-01 RX ADMIN — Medication 650 MILLIGRAM(S): at 00:04

## 2018-11-01 RX ADMIN — Medication 650 MILLIGRAM(S): at 01:48

## 2018-11-01 RX ADMIN — Medication 2 MILLIGRAM(S): at 08:55

## 2018-11-01 RX ADMIN — OXYCODONE HYDROCHLORIDE 5 MILLIGRAM(S): 5 TABLET ORAL at 21:37

## 2018-11-01 RX ADMIN — OXYCODONE HYDROCHLORIDE 5 MILLIGRAM(S): 5 TABLET ORAL at 17:32

## 2018-11-01 RX ADMIN — CHLORHEXIDINE GLUCONATE 15 MILLILITER(S): 213 SOLUTION TOPICAL at 17:32

## 2018-11-01 RX ADMIN — ENOXAPARIN SODIUM 40 MILLIGRAM(S): 100 INJECTION SUBCUTANEOUS at 11:48

## 2018-11-01 RX ADMIN — BROMOCRIPTINE MESYLATE 5 MILLIGRAM(S): 5 CAPSULE ORAL at 00:19

## 2018-11-01 RX ADMIN — HYDROMORPHONE HYDROCHLORIDE 1 MILLIGRAM(S): 2 INJECTION INTRAMUSCULAR; INTRAVENOUS; SUBCUTANEOUS at 12:10

## 2018-11-01 RX ADMIN — BROMOCRIPTINE MESYLATE 5 MILLIGRAM(S): 5 CAPSULE ORAL at 05:18

## 2018-11-01 RX ADMIN — POTASSIUM PHOSPHATE, MONOBASIC POTASSIUM PHOSPHATE, DIBASIC 83.33 MILLIMOLE(S): 236; 224 INJECTION, SOLUTION INTRAVENOUS at 08:23

## 2018-11-01 RX ADMIN — LEVETIRACETAM 1000 MILLIGRAM(S): 250 TABLET, FILM COATED ORAL at 05:18

## 2018-11-01 RX ADMIN — BROMOCRIPTINE MESYLATE 5 MILLIGRAM(S): 5 CAPSULE ORAL at 23:21

## 2018-11-01 RX ADMIN — Medication 10 MILLIGRAM(S): at 00:19

## 2018-11-01 RX ADMIN — OXYCODONE HYDROCHLORIDE 5 MILLIGRAM(S): 5 TABLET ORAL at 18:30

## 2018-11-01 RX ADMIN — PROPOFOL 2.43 MICROGRAM(S)/KG/MIN: 10 INJECTION, EMULSION INTRAVENOUS at 18:29

## 2018-11-01 RX ADMIN — HYDROMORPHONE HYDROCHLORIDE 2 MILLIGRAM(S): 2 INJECTION INTRAMUSCULAR; INTRAVENOUS; SUBCUTANEOUS at 02:11

## 2018-11-01 RX ADMIN — CHLORHEXIDINE GLUCONATE 15 MILLILITER(S): 213 SOLUTION TOPICAL at 05:18

## 2018-11-01 RX ADMIN — Medication 650 MILLIGRAM(S): at 22:05

## 2018-11-01 RX ADMIN — HYDROMORPHONE HYDROCHLORIDE 1 MILLIGRAM(S): 2 INJECTION INTRAMUSCULAR; INTRAVENOUS; SUBCUTANEOUS at 09:07

## 2018-11-01 RX ADMIN — HYDROMORPHONE HYDROCHLORIDE 1 MILLIGRAM(S): 2 INJECTION INTRAMUSCULAR; INTRAVENOUS; SUBCUTANEOUS at 12:25

## 2018-11-01 RX ADMIN — Medication 20 MILLIGRAM(S): at 17:34

## 2018-11-01 RX ADMIN — HYDROMORPHONE HYDROCHLORIDE 1 MILLIGRAM(S): 2 INJECTION INTRAMUSCULAR; INTRAVENOUS; SUBCUTANEOUS at 09:20

## 2018-11-01 RX ADMIN — HYDROMORPHONE HYDROCHLORIDE 1 MILLIGRAM(S): 2 INJECTION INTRAMUSCULAR; INTRAVENOUS; SUBCUTANEOUS at 09:15

## 2018-11-01 RX ADMIN — Medication 650 MILLIGRAM(S): at 23:00

## 2018-11-01 RX ADMIN — Medication 100 MILLIGRAM(S): at 10:58

## 2018-11-01 RX ADMIN — PROPOFOL 2.43 MICROGRAM(S)/KG/MIN: 10 INJECTION, EMULSION INTRAVENOUS at 22:02

## 2018-11-01 RX ADMIN — Medication 5 MILLIGRAM(S): at 21:37

## 2018-11-01 RX ADMIN — LEVETIRACETAM 1000 MILLIGRAM(S): 250 TABLET, FILM COATED ORAL at 17:32

## 2018-11-01 RX ADMIN — PIPERACILLIN AND TAZOBACTAM 25 GRAM(S): 4; .5 INJECTION, POWDER, LYOPHILIZED, FOR SOLUTION INTRAVENOUS at 05:18

## 2018-11-01 RX ADMIN — CHLORHEXIDINE GLUCONATE 1 APPLICATION(S): 213 SOLUTION TOPICAL at 11:24

## 2018-11-01 RX ADMIN — Medication 100 MILLIGRAM(S): at 11:48

## 2018-11-01 RX ADMIN — VECURONIUM BROMIDE 10 MILLIGRAM(S): 20 INJECTION, POWDER, FOR SOLUTION INTRAVENOUS at 09:09

## 2018-11-01 RX ADMIN — HYDROMORPHONE HYDROCHLORIDE 1 MILLIGRAM(S): 2 INJECTION INTRAMUSCULAR; INTRAVENOUS; SUBCUTANEOUS at 08:55

## 2018-11-01 RX ADMIN — Medication 10 MILLIGRAM(S): at 05:18

## 2018-11-01 RX ADMIN — Medication 1 MILLIGRAM(S): at 11:48

## 2018-11-01 RX ADMIN — Medication 20 MILLIGRAM(S): at 11:48

## 2018-11-01 RX ADMIN — SODIUM CHLORIDE 100 MILLILITER(S): 9 INJECTION INTRAMUSCULAR; INTRAVENOUS; SUBCUTANEOUS at 08:22

## 2018-11-01 RX ADMIN — HYDROMORPHONE HYDROCHLORIDE 2 MILLIGRAM(S): 2 INJECTION INTRAMUSCULAR; INTRAVENOUS; SUBCUTANEOUS at 02:30

## 2018-11-01 NOTE — PROGRESS NOTE ADULT - ATTENDING COMMENTS
Agree with BIM Fellow, Dr. Vera.  Will attempt to improve arousal and reach VS with normalization of sleep/wake cycles and COMA STIM.  Currently under sedation due to tachypnea which interferes with exam.  Will continue to follow to monitor coma recovery.  Patient demonstrates poor signs of neurological recovery despite traumatic injury and relative age of patient. Consider palliative care consult for advance care directives/family planning. Given extent of the injury (bilateral findings) and unknown time down, patient's prognosis is considerably poor

## 2018-11-01 NOTE — PROGRESS NOTE ADULT - SUBJECTIVE AND OBJECTIVE BOX
SUBJECTIVE  Patient seen and examined. He was placed on sedation with propofol and received dilaudid secondary to increased work of breathing. Also started on Cefazolin for pneumonia.    TODAY'S REVIEW OF SYMPTOMS  Unable to obtain    VITALS  T(C): 37.9 (11-01-18 @ 12:00)  T(F): 100.2 (11-01-18 @ 12:00), Max: 100.8 (11-01-18 @ 00:01)  HR: 88 (11-01-18 @ 13:00) (84 - 105)  BP: 166/97 (11-01-18 @ 09:00) (113/57 - 208/97)  RR:  (19 - 43)  SpO2:  (92% - 100%)  Wt(kg): --    PHYSICAL EXAM  HEENT - Cranial incisions with staples, (+) Edema, Eyes not tracking, (-) Doll's eye, no response to threat, Bilateral pupils non reactive, Left pin-point  Chest - Mechanically ventilated  Cardiovascular - No tachycardia noted  Extremities - Diffuse swelling, No response to pain but recently placed on sedation  Reflexes - +Babinski and Meza's bilaterally, Diminished reflexes throughout   - (+) Apodaca    Coma Recovery Scale - Revised  AUDITORY FUNCTION SCALE  0 - None  VISUAL FUNCTION SCALE  0 - None  MOTOR FUNCTION SCALE  0 - Abnormal Posturing  OROMOTOR/VERBAL FUNCTION SCALE  0 - None  COMMUNICATION SCALE  0 - None  AROUSAL SCALE  0 - Unarousable    TOTAL SCORE= 0 = Coma    RECENT LABS/IMAGING                        6.6    16.1  )-----------( 207      ( 01 Nov 2018 03:41 )             20.0     11-01    124<L>  |  96<L>  |  14.0  ----------------------------<  111  3.9   |  18.0<L>  |  0.32<L>    Ca    7.1<L>      01 Nov 2018 03:41  Phos  1.7     11-01  Mg     2.1     11-01    MEDICATIONS   MEDICATIONS  (STANDING):  bromocriptine Capsule 5 milliGRAM(s) Oral <User Schedule>  ceFAZolin   IVPB      ceFAZolin   IVPB 2000 milliGRAM(s) IV Intermittent every 8 hours  chlorhexidine 0.12% Liquid 15 milliLiter(s) Swish and Spit two times a day  chlorhexidine 2% Cloths 1 Application(s) Topical daily  enoxaparin Injectable 40 milliGRAM(s) SubCutaneous daily  folic acid 1 milliGRAM(s) Oral daily  HYDROmorphone  Injectable 1 milliGRAM(s) IV Push once  levETIRAcetam  Solution 1000 milliGRAM(s) Oral two times a day  melatonin 5 milliGRAM(s) Oral at bedtime  propofol Infusion 5 MICROgram(s)/kG/Min (2.43 mL/Hr) IV Continuous <Continuous>  propranolol 20 milliGRAM(s) Oral every 6 hours  thiamine 100 milliGRAM(s) Oral daily    MEDICATIONS  (PRN):  acetaminophen    Suspension .. 650 milliGRAM(s) Oral every 6 hours PRN Temp greater or equal to 38.5C (101.3F)    ASSESSMENT/PLAN  32y Male unknown PMH found unresponsive on sidewalk found w/ b/l SDH and SAH now s/p b/l hemicraniectomy and EVD placement s/p removal now in COMA x 9 DAYS    PNA - Started on Cefazolin and required sedation for tachypnea  Seizure PPX - Keppra  Dysautonomia - Propranolol  Pain - Dilaudid, Tylenol  DVT PPX - SCDs, Lovenox  Sleep/Wake Cycles - Melatonin 5mg HS (10/31), Bromocriptine 5mg Q6AM/12PM (10/31)  Rehab - COMA STIM from OT, PT and SLP when patient is off sedation. Patient demonstrates poor signs of neurological recovery despite traumatic injury and relative age of patient. Recommend palliative care consult for advance care directives/family planning. Given extent of the injury (bilateral findings) and unknown time down, patient's prognosis is considerably grim. SUBJECTIVE  Patient seen and examined. He was placed on sedation with propofol and received dilaudid secondary to increased work of breathing. Also started on Cefazolin for pneumonia.    TODAY'S REVIEW OF SYMPTOMS  Unable to obtain    VITALS  T(C): 37.9 (11-01-18 @ 12:00)  T(F): 100.2 (11-01-18 @ 12:00), Max: 100.8 (11-01-18 @ 00:01)  HR: 88 (11-01-18 @ 13:00) (84 - 105)  BP: 166/97 (11-01-18 @ 09:00) (113/57 - 208/97)  RR:  (19 - 43)  SpO2:  (92% - 100%)  Wt(kg): --    PHYSICAL EXAM  HEENT - Cranial incisions with staples, (+) Edema, Eyes not tracking, (-) Doll's eye, no response to threat, Bilateral pupils non reactive, Left pin-point  Chest - Mechanically ventilated  Cardiovascular - No tachycardia noted  Extremities - Diffuse swelling, No response to pain but recently placed on sedation  Reflexes - +Babinski and Meza's bilaterally, Diminished reflexes throughout   - (+) Apodaca    Coma Recovery Scale - Revised  AUDITORY FUNCTION SCALE  0 - None  VISUAL FUNCTION SCALE  0 - None  MOTOR FUNCTION SCALE  0 - Abnormal Posturing  OROMOTOR/VERBAL FUNCTION SCALE  0 - None  COMMUNICATION SCALE  0 - None  AROUSAL SCALE  0 - Unarousable    TOTAL SCORE= 0 = Coma    RECENT LABS/IMAGING                        6.6    16.1  )-----------( 207      ( 01 Nov 2018 03:41 )             20.0     11-01    124<L>  |  96<L>  |  14.0  ----------------------------<  111  3.9   |  18.0<L>  |  0.32<L>    Ca    7.1<L>      01 Nov 2018 03:41  Phos  1.7     11-01  Mg     2.1     11-01    MEDICATIONS   MEDICATIONS  (STANDING):  bromocriptine Capsule 5 milliGRAM(s) Oral <User Schedule>  ceFAZolin   IVPB      ceFAZolin   IVPB 2000 milliGRAM(s) IV Intermittent every 8 hours  chlorhexidine 0.12% Liquid 15 milliLiter(s) Swish and Spit two times a day  chlorhexidine 2% Cloths 1 Application(s) Topical daily  enoxaparin Injectable 40 milliGRAM(s) SubCutaneous daily  folic acid 1 milliGRAM(s) Oral daily  HYDROmorphone  Injectable 1 milliGRAM(s) IV Push once  levETIRAcetam  Solution 1000 milliGRAM(s) Oral two times a day  melatonin 5 milliGRAM(s) Oral at bedtime  propofol Infusion 5 MICROgram(s)/kG/Min (2.43 mL/Hr) IV Continuous <Continuous>  propranolol 20 milliGRAM(s) Oral every 6 hours  thiamine 100 milliGRAM(s) Oral daily    MEDICATIONS  (PRN):  acetaminophen    Suspension .. 650 milliGRAM(s) Oral every 6 hours PRN Temp greater or equal to 38.5C (101.3F)    ASSESSMENT/PLAN  32y Male unknown PMH found unresponsive on sidewalk found w/ b/l SDH and SAH now s/p b/l hemicraniectomy and EVD placement s/p removal now in COMA x 9 DAYS    PNA - Started on Cefazolin and required sedation for tachypnea  Seizure PPX - Keppra  Dysautonomia - Propranolol  Pain - Dilaudid, Tylenol  DVT PPX - SCDs, Lovenox  Sleep/Wake Cycles - Melatonin 5mg HS (10/31), Bromocriptine 5mg Q6AM/12PM (10/31)  Rehab - COMA STIM from OT, PT and SLP when patient is off sedation. .

## 2018-11-01 NOTE — PROGRESS NOTE ADULT - SUBJECTIVE AND OBJECTIVE BOX
HISTORY  32y Male    24 HOUR EVENTS:    SUBJECTIVE/ROS:  [ ] A ten-point review of systems was otherwise negative except as noted.  [x] Due to altered mental status/intubation, subjective information were not able to be obtained from the patient. History was obtained, to the extent possible, from review of the chart and collateral sources of information.      NEURO  RASS:  -5   GCS:  7   4,1,2  CAM ICU: NA  Exam:   Meds: acetaminophen    Suspension .. 650 milliGRAM(s) Oral every 6 hours PRN Temp greater or equal to 38.5C (101.3F)  bromocriptine Capsule 5 milliGRAM(s) Oral <User Schedule>  HYDROmorphone  Injectable 1 milliGRAM(s) IV Push once  HYDROmorphone  Injectable 1 milliGRAM(s) IV Push once  levETIRAcetam  Solution 1000 milliGRAM(s) Oral two times a day  LORazepam   Injectable 2 milliGRAM(s) IntraMuscular once  melatonin 5 milliGRAM(s) Oral at bedtime  propofol Infusion 5 MICROgram(s)/kG/Min IV Continuous <Continuous>  vecuronium Injectable 10 milliGRAM(s) IV Push once    [x] Adequacy of sedation and pain control has been assessed and adjusted      RESPIRATORY  RR: 28 (11-01-18 @ 07:00) (19 - 80)  SpO2: 98% (11-01-18 @ 09:09) (96% - 100%)  Wt(kg): --  Exam: unlabored, clear to auscultation bilaterally  Mechanical Ventilation: Mode: AC/ CMV (Assist Control/ Continuous Mandatory Ventilation), RR (machine): 18, RR (patient): 18, TV (machine): 400, FiO2: 40, PEEP: 8, MAP: 15, PIP: 38  ABG - ( 01 Nov 2018 03:06 )  pH: 7.49  /  pCO2: 26    /  pO2: 153   / HCO3: 22    / Base Excess: -3.2  /  SaO2: 100     Lactate: x                [x] Extubation Readiness Assessed  Not appropriate  Meds:       CARDIOVASCULAR  HR: 100 (11-01-18 @ 09:09) (76 - 105)  BP: 116/60 (11-01-18 @ 07:00) (113/57 - 208/97)  BP(mean): 80 (11-01-18 @ 07:00) (77 - 139)  ABP: 114/64 (11-01-18 @ 07:00) (102/50 - 143/83)  ABP(mean): 79 (11-01-18 @ 07:00) (67 - 109)  Wt(kg): --  CVP(cm H2O): --      Exam:  Cardiac Rhythm: Sinus tach  Perfusion     [x ]Adequate   [ ]Inadequate  Mentation   [ ]Normal       [ x]Reduced  Extremities  [ x]Warm         [ ]Cool  Volume Status [ ]Hypervolemic [ ]Euvolemic [ ]Hypovolemic  Meds: propranolol 20 milliGRAM(s) Oral every 6 hours        GI/NUTRITION  Exam:  Distended, soft  Diet: Tube feeds + tolerance.   Meds:     GENITOURINARY  I&O's Detail    10-31 @ 07:01  -  11-01 @ 07:00  --------------------------------------------------------  IN:    Enteral Tube Flush: 240 mL    Pivot: 1600 mL    sodium chloride 0.9%.: 1800 mL    Solution: 250 mL    Solution: 200 mL    Solution: 1000 mL    Solution: 499.8 mL    Solution: 150 mL  Total IN: 5739.8 mL    OUT:    Incontinent per Condom Catheter: 2205 mL  Total OUT: 2205 mL    Total NET: 3534.8 mL          11-01    124<L>  |  96<L>  |  14.0  ----------------------------<  111  3.9   |  18.0<L>  |  0.32<L>    Ca    7.1<L>      01 Nov 2018 03:41  Phos  1.7     11-01  Mg     2.1     11-01      [ ] Apodaca catheter, indication: N/A - condom  Meds: folic acid 1 milliGRAM(s) Oral daily  sodium chloride 0.9%. 1000 milliLiter(s) IV Continuous <Continuous>  thiamine 100 milliGRAM(s) Oral daily        HEMATOLOGIC  Meds: enoxaparin Injectable 40 milliGRAM(s) SubCutaneous daily    [x] VTE Prophylaxis                        6.6    16.1  )-----------( 207      ( 01 Nov 2018 03:41 )             20.0       Transfusion     [x ] PRBC   [ ] Platelets   [ ] FFP   [ ] Cryoprecipitate      INFECTIOUS DISEASES  T(C): 37.8 (11-01-18 @ 08:00), Max: 38.3 (10-31-18 @ 12:00)  Wt(kg): --  WBC Count: 16.1 K/uL (11-01 @ 03:41)    Recent Cultures:  Specimen Source: .Sputum Sputum, 10-28 @ 20:57; Results   Moderate Staphylococcus aureus; Gram Stain:   Moderate White blood cells  Few Gram Positive Cocci in Pairs and Chains  Few Gram Positive Rods  Few Gram Negative Rods; Organism: Staphylococcus aureus  Specimen Source: .Blood Blood-Peripheral, 10-28 @ 17:40; Results   No growth at 48 hours; Gram Stain: --; Organism: --  Specimen Source: .Blood Blood-Peripheral, 10-28 @ 14:14; Results   No growth at 48 hours; Gram Stain: --; Organism: --  Specimen Source: .Body Fluid Peritoneal Fluid, 10-25 @ 09:53; Results   No growth at 5 days.; Gram Stain:   Few WBC's  No organisms seen; Organism: --    Meds: ceFAZolin   IVPB            ENDOCRINE  Capillary Blood Glucose    Meds:       ACCESS DEVICES:  [x ] Peripheral IV  [ ] Central Venous Line	[ ] R	[ ] L	[ ] IJ	[ ] Fem	[ ] SC	Placed:   [ ] Arterial Line		[x ] R	[ ] L	[ ] Fem	[x ] Rad	[ ] Ax	Placed:   [ ] PICC:					[ ] Mediport  [ ] Urinary Catheter, Date Placed:   [x ] Necessity of urinary, arterial, and venous catheters discussed    OTHER MEDICATIONS:  chlorhexidine 0.12% Liquid 15 milliLiter(s) Swish and Spit two times a day  chlorhexidine 2% Cloths 1 Application(s) Topical daily      CODE STATUS:  Full    IMAGING:

## 2018-11-01 NOTE — PROGRESS NOTE ADULT - ASSESSMENT
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      P: Increase sedation given respiratory alkalosis and vent dyssynchrony.  Paralysis and return to Ac/VC on vent to control pCO2 and pH.  Will increase propranolol as some of this is likely due to autonomic dysreflexia.  Volume status seems euvolemic on bedside ultrasound given minimal IVC variability. Will fluid restrict and monitor serum osms and sodium.  Will check urine lytes today.  Concern for augmented renal clearance and possible underdosing of abx.  Will check 24 hr creat clearance and empirically dose abx for high renal clearance.  Narrow to ancef given culture results.        45 minutes of critical care time spent providing medical care for patient's acute illness/conditions that impairs at least one vital organ system and/or poses a high risk of imminent or life threatening deterioration in the patient's condition. It includes time spent evaluating and treating the patient's acute illness as well as time spent reviewing labs, radiology, discussing goals of care with patient and/or patient's family, and discussing the case with a multidisciplinary team in an effort to prevent further life threatening deterioration or end organ damage. This time is independent of any procedures performed. 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      P: Increase sedation given respiratory alkalosis and vent dyssynchrony.  Paralysis and return to Ac/VC on vent to control pCO2 and pH.  Will increase propranolol as some of this is likely due to autonomic dysreflexia.  Volume status seems euvolemic on bedside ultrasound given minimal IVC variability. Will fluid restrict and monitor serum osms and sodium.  Will check urine lytes today.  Concern for augmented renal clearance and possible underdosing of abx.  Will check 24 hr creat clearance and empirically dose abx for high renal clearance.  Narrow to ancef given culture results.  Transfuse PRBCs for severe anemia.        45 minutes of critical care time spent providing medical care for patient's acute illness/conditions that impairs at least one vital organ system and/or poses a high risk of imminent or life threatening deterioration in the patient's condition. It includes time spent evaluating and treating the patient's acute illness as well as time spent reviewing labs, radiology, discussing goals of care with patient and/or patient's family, and discussing the case with a multidisciplinary team in an effort to prevent further life threatening deterioration or end organ damage. This time is independent of any procedures performed.

## 2018-11-01 NOTE — PROGRESS NOTE ADULT - ASSESSMENT
This is an approximately 32 y Male who is followed by neurology because of TBI/possible seizure    TBI  Now status post decompressive bilateral hemicraniectomy and extraventricular drain.  Significant amount of intracranial hemorrhage, improving post decompression.  Multiple areas of parenchymal CVA/ischemia seen  No significant improvement in neuro exam      Possible seizure  EEG: no seizure, breech rhythm over craniectomies, slowing suggesting diffuse cerebral dysfunction.  Rhythmic tongue movements and random foot movement has stopped.  Continue Fosphenytoin, Keppra.    Neurologic status remains unchanged.  Supportive care  Poor prognosis for meaningful recovery.    will follow with you    Martell Mendoza MD PhD   [id number]

## 2018-11-01 NOTE — PROGRESS NOTE ADULT - SUBJECTIVE AND OBJECTIVE BOX
[facility]                                        Neurology at [place]                                 Kelly Toth, & Vincent                                  [address]                                        [place], [place], [address]                                             [phone]        CC: Traumatic Brain Injury and seizure.    HPI:   The patient is a young man, estimated to be in 30's who was found unresponsive on sidewalk.  Per chart initial CT head showed subarachnoid hemorrhage and bilateral subdural hematomas.  He was then transferred from [facility] to [facility] ER for emergent neurosurgical treatment.  He was posturing with agonal respirations on arrival to [facility].  He was taken to the OR emergently by Dr. Fraser for evacuation and decompression of subdural hematomas.  He had bilateral craniectomy and extraventricular drain placed.  He had been having non- rhythmic movements of his feet and rhythmic movements of his tongue, causing concern for seizure/status epilepticus.    Interim history:  Neuro unchanged, febrile ?central fever    ROS:   Unobtainable due to patient's condition.     MEDICATIONS  (STANDING):  bromocriptine Capsule 5 milliGRAM(s) Oral <User Schedule>  ceFAZolin   IVPB      ceFAZolin   IVPB 2000 milliGRAM(s) IV Intermittent every 8 hours  chlorhexidine 0.12% Liquid 15 milliLiter(s) Swish and Spit two times a day  chlorhexidine 2% Cloths 1 Application(s) Topical daily  enoxaparin Injectable 40 milliGRAM(s) SubCutaneous daily  folic acid 1 milliGRAM(s) Oral daily  HYDROmorphone  Injectable 1 milliGRAM(s) IV Push once  levETIRAcetam  Solution 1000 milliGRAM(s) Oral two times a day  melatonin 5 milliGRAM(s) Oral at bedtime  propofol Infusion 5 MICROgram(s)/kG/Min (2.43 mL/Hr) IV Continuous <Continuous>  propranolol 20 milliGRAM(s) Oral every 6 hours  thiamine 100 milliGRAM(s) Oral daily    MEDICATIONS  (PRN):  acetaminophen    Suspension .. 650 milliGRAM(s) Oral every 6 hours PRN Temp greater or equal to 38.5C (101.3F)      Vital Signs Last 24 Hrs  T(C): 37.9 (01 Nov 2018 12:00), Max: 38.2 (31 Oct 2018 20:00)  T(F): 100.2 (01 Nov 2018 12:00), Max: 100.8 (01 Nov 2018 00:01)  HR: 88 (01 Nov 2018 13:00) (84 - 105)  BP: 166/97 (01 Nov 2018 09:00) (113/57 - 208/97)  BP(mean): 124 (01 Nov 2018 09:00) (77 - 139)  RR: 24 (01 Nov 2018 13:00) (19 - 43)  SpO2: 100% (01 Nov 2018 13:00) (92% - 100%)    Detailed Neurologic Exam:    Mental status: Unresponsive to voice.     Cranial nerves: Pupils: left 2.0  mm NR right 4 mm not reactive. There is no visual response to threat.  Extraocular motion is not assessed. Absent corneal reflexes.      Motor/Sensory: There is  bilateral decerebrate posturing to pinch and sternal rub.  There is triple flexion to nail bed pressure in feet    Reflexes: absent throughout and plantar responses are extensor.    Cerebellar:  Unable to test finger to nose testing.    Labs:                                      6.6    16.1  )-----------( 207      ( 01 Nov 2018 03:41 )             20.0     11-01    124<L>  |  96<L>  |  14.0  ----------------------------<  111  3.9   |  18.0<L>  |  0.32<L>    Ca    7.1<L>      01 Nov 2018 03:41  Phos  1.7     11-01  Mg     2.1     11-01

## 2018-11-01 NOTE — CHART NOTE - NSCHARTNOTEFT_GEN_A_CORE
Source: Patient [ ]  Family [ ]   other [x ]  EMR and staff     Enteral /Parenteral Nutrition: Diet, NPO with Tube Feed:   Tube Feeding Modality: Orogastric  Pivot 1.5 Ryan  Total Volume for 24 Hours (mL): 1800  Continuous  Starting Tube Feed Rate {mL per Hour}: 75  Until Goal Tube Feed Rate (mL per Hour): 75  Tube Feed Duration (in Hours): 24  Tube Feed Start Time: 12:30 (10-31-18 @ 12:25)    Current Weight:    11/1: 82 kg   10/27: 82kg     % Weight Change: N/A    Pertinent Medications: MEDICATIONS  (STANDING):  bromocriptine Capsule 5 milliGRAM(s) Oral <User Schedule>  ceFAZolin   IVPB      ceFAZolin   IVPB 2000 milliGRAM(s) IV Intermittent once  ceFAZolin   IVPB 2000 milliGRAM(s) IV Intermittent every 8 hours  chlorhexidine 0.12% Liquid 15 milliLiter(s) Swish and Spit two times a day  chlorhexidine 2% Cloths 1 Application(s) Topical daily  enoxaparin Injectable 40 milliGRAM(s) SubCutaneous daily  folic acid 1 milliGRAM(s) Oral daily  levETIRAcetam  Solution 1000 milliGRAM(s) Oral two times a day  melatonin 5 milliGRAM(s) Oral at bedtime  propofol Infusion 5 MICROgram(s)/kG/Min (2.43 mL/Hr) IV Continuous <Continuous>  propranolol 20 milliGRAM(s) Oral every 6 hours  sodium chloride 0.9%. 1000 milliLiter(s) (100 mL/Hr) IV Continuous <Continuous>  thiamine 100 milliGRAM(s) Oral daily    MEDICATIONS  (PRN):  acetaminophen    Suspension .. 650 milliGRAM(s) Oral every 6 hours PRN Temp greater or equal to 38.5C (101.3F)    Pertinent Labs: CBC Full  -  ( 01 Nov 2018 03:41 )  WBC Count : 16.1 K/uL  Hemoglobin : 6.6 g/dL  Hematocrit : 20.0 %  Platelet Count - Automated : 207 K/uL  Mean Cell Volume : 83.0 fl  Mean Cell Hemoglobin : 27.4 pg  Mean Cell Hemoglobin Concentration : 33.0 g/dL  Auto Neutrophil # : 13.3 K/uL  Auto Lymphocyte # : 0.8 K/uL  Auto Monocyte # : 1.6 K/uL  Auto Eosinophil # : 0.2 K/uL  Auto Basophil # : 0.0 K/uL  Auto Neutrophil % : 74.0 %  Auto Lymphocyte % : 4.0 %  Auto Monocyte % : 9.0 %  Auto Eosinophil % : 2.0 %  Auto Basophil % : 1.0 %        Skin: Surgical sites to head     Nutrition focused physical exam conducted - found signs of malnutrition [x ]absent [ ]present    Subcutaneous fat loss: [ ] Orbital fat pads region, [ ]Buccal fat region, [ ]Triceps region,  [ ]Ribs region    Muscle wasting: [ ]Temples region, [ ]Clavicle region, [ ]Shoulder region, [ ]Scapula region, [ ]Interosseous region,  [ ]thigh region, [ ]Calf region    Estimated Needs:   [x ] no change since previous assessment  [ ] recalculated:     Current Nutrition Diagnosis:  Pt remains at high nutrition risk secondary to increased nutrient needs related to increased physiologic demand for healing as evidenced by s/p TBI, with multicompartment ICH and brain compression from unknown mechanism, now with VAP, s/p trach and PEG. Pt now receiving enteral feeds of Pivot @ 75ml/hr ([id number]) 1500ml/day 2250kcal, 146gm protein, meeting estimated nutrition needs at this time. Recommendations below:     Recommendations:   1. Rx: MVI and Vit. C daily (500mg)   2. Check wt daily to monitor trends   3. Continue with enteral regime at this time.     Monitoring and Evaluation:   [ ] PO intake [x ] Tolerance to diet prescription [X] Weights  [X] Follow up per protocol [X] Labs:

## 2018-11-02 DIAGNOSIS — S06.9X3A: ICD-10-CM

## 2018-11-02 DIAGNOSIS — E87.1 HYPO-OSMOLALITY AND HYPONATREMIA: ICD-10-CM

## 2018-11-02 LAB
ANION GAP SERPL CALC-SCNC: 11 MMOL/L — SIGNIFICANT CHANGE UP (ref 5–17)
APPEARANCE UR: CLEAR — SIGNIFICANT CHANGE UP
APTT BLD: 34.4 SEC — SIGNIFICANT CHANGE UP (ref 27.5–36.3)
BASOPHILS # BLD AUTO: 0 K/UL — SIGNIFICANT CHANGE UP (ref 0–0.2)
BASOPHILS NFR BLD AUTO: 0.1 % — SIGNIFICANT CHANGE UP (ref 0–2)
BILIRUB UR-MCNC: ABNORMAL
BUN SERPL-MCNC: 13 MG/DL — SIGNIFICANT CHANGE UP (ref 8–20)
CALCIUM SERPL-MCNC: 7.3 MG/DL — LOW (ref 8.6–10.2)
CHLORIDE SERPL-SCNC: 98 MMOL/L — SIGNIFICANT CHANGE UP (ref 98–107)
CK SERPL-CCNC: 87 U/L — SIGNIFICANT CHANGE UP (ref 30–200)
CO2 SERPL-SCNC: 20 MMOL/L — LOW (ref 22–29)
COLLECT DURATION TIME UR: 24 HR — SIGNIFICANT CHANGE UP
COLOR SPEC: ABNORMAL
CREAT SERPL-MCNC: 0.21 MG/DL — LOW (ref 0.5–1.3)
CULTURE RESULTS: SIGNIFICANT CHANGE UP
CULTURE RESULTS: SIGNIFICANT CHANGE UP
DIFF PNL FLD: NEGATIVE — SIGNIFICANT CHANGE UP
EOSINOPHIL # BLD AUTO: 0.2 K/UL — SIGNIFICANT CHANGE UP (ref 0–0.5)
EOSINOPHIL NFR BLD AUTO: 1.6 % — SIGNIFICANT CHANGE UP (ref 0–5)
EPI CELLS # UR: SIGNIFICANT CHANGE UP
GAS PNL BLDA: SIGNIFICANT CHANGE UP
GGT SERPL-CCNC: 1954 U/L — HIGH (ref 8–61)
GLUCOSE SERPL-MCNC: 113 MG/DL — SIGNIFICANT CHANGE UP (ref 70–115)
GLUCOSE UR QL: NEGATIVE MG/DL — SIGNIFICANT CHANGE UP
HCT VFR BLD CALC: 22.1 % — LOW (ref 42–52)
HGB BLD-MCNC: 7.1 G/DL — LOW (ref 14–18)
INR BLD: 1.74 RATIO — HIGH (ref 0.88–1.16)
KETONES UR-MCNC: NEGATIVE — SIGNIFICANT CHANGE UP
LEUKOCYTE ESTERASE UR-ACNC: ABNORMAL
LYMPHOCYTES # BLD AUTO: 0.6 K/UL — LOW (ref 1–4.8)
LYMPHOCYTES # BLD AUTO: 4 % — LOW (ref 20–55)
MAGNESIUM SERPL-MCNC: 1.8 MG/DL — SIGNIFICANT CHANGE UP (ref 1.6–2.6)
MCHC RBC-ENTMCNC: 27 PG — SIGNIFICANT CHANGE UP (ref 27–31)
MCHC RBC-ENTMCNC: 32.1 G/DL — SIGNIFICANT CHANGE UP (ref 32–36)
MCV RBC AUTO: 84 FL — SIGNIFICANT CHANGE UP (ref 80–94)
MONOCYTES # BLD AUTO: 1.3 K/UL — HIGH (ref 0–0.8)
MONOCYTES NFR BLD AUTO: 9 % — SIGNIFICANT CHANGE UP (ref 3–10)
NEUTROPHILS # BLD AUTO: 12 K/UL — HIGH (ref 1.8–8)
NEUTROPHILS NFR BLD AUTO: 84.4 % — HIGH (ref 37–73)
NITRITE UR-MCNC: NEGATIVE — SIGNIFICANT CHANGE UP
OSMOLALITY SERPL: 287 MOSM/KG — SIGNIFICANT CHANGE UP (ref 280–300)
PH UR: 6 — SIGNIFICANT CHANGE UP (ref 5–8)
PHOSPHATE SERPL-MCNC: 1.7 MG/DL — LOW (ref 2.4–4.7)
PLATELET # BLD AUTO: 250 K/UL — SIGNIFICANT CHANGE UP (ref 150–400)
POTASSIUM SERPL-MCNC: 3.6 MMOL/L — SIGNIFICANT CHANGE UP (ref 3.5–5.3)
POTASSIUM SERPL-SCNC: 3.6 MMOL/L — SIGNIFICANT CHANGE UP (ref 3.5–5.3)
PROT UR-MCNC: 30 MG/DL
PROTHROM AB SERPL-ACNC: 20.3 SEC — HIGH (ref 10–12.9)
RBC # BLD: 2.63 M/UL — LOW (ref 4.6–6.2)
RBC # FLD: 17.6 % — HIGH (ref 11–15.6)
SODIUM SERPL-SCNC: 129 MMOL/L — LOW (ref 135–145)
SP GR SPEC: 1.01 — SIGNIFICANT CHANGE UP (ref 1.01–1.02)
SPECIMEN SOURCE: SIGNIFICANT CHANGE UP
SPECIMEN SOURCE: SIGNIFICANT CHANGE UP
TOTAL VOLUME - 24 HOUR: 1125 ML — SIGNIFICANT CHANGE UP
URINE CREATININE CALCULATION: SIGNIFICANT CHANGE UP G/24 HR (ref 1–2)
UROBILINOGEN FLD QL: 1 MG/DL
WBC # BLD: 14.2 K/UL — HIGH (ref 4.8–10.8)
WBC # FLD AUTO: 14.2 K/UL — HIGH (ref 4.8–10.8)
WBC UR QL: SIGNIFICANT CHANGE UP

## 2018-11-02 PROCEDURE — 76705 ECHO EXAM OF ABDOMEN: CPT | Mod: 26

## 2018-11-02 PROCEDURE — 99233 SBSQ HOSP IP/OBS HIGH 50: CPT | Mod: GC

## 2018-11-02 RX ORDER — HYDROMORPHONE HYDROCHLORIDE 2 MG/ML
1 INJECTION INTRAMUSCULAR; INTRAVENOUS; SUBCUTANEOUS ONCE
Qty: 0 | Refills: 0 | Status: DISCONTINUED | OUTPATIENT
Start: 2018-11-02 | End: 2018-11-02

## 2018-11-02 RX ORDER — POTASSIUM PHOSPHATE, MONOBASIC POTASSIUM PHOSPHATE, DIBASIC 236; 224 MG/ML; MG/ML
30 INJECTION, SOLUTION INTRAVENOUS ONCE
Qty: 0 | Refills: 0 | Status: COMPLETED | OUTPATIENT
Start: 2018-11-02 | End: 2018-11-02

## 2018-11-02 RX ORDER — MAGNESIUM SULFATE 500 MG/ML
2 VIAL (ML) INJECTION ONCE
Qty: 0 | Refills: 0 | Status: COMPLETED | OUTPATIENT
Start: 2018-11-02 | End: 2018-11-02

## 2018-11-02 RX ORDER — BROMOCRIPTINE MESYLATE 5 MG/1
5 CAPSULE ORAL DAILY
Qty: 0 | Refills: 0 | Status: DISCONTINUED | OUTPATIENT
Start: 2018-11-02 | End: 2018-11-06

## 2018-11-02 RX ADMIN — OXYCODONE HYDROCHLORIDE 5 MILLIGRAM(S): 5 TABLET ORAL at 05:24

## 2018-11-02 RX ADMIN — OXYCODONE HYDROCHLORIDE 5 MILLIGRAM(S): 5 TABLET ORAL at 19:16

## 2018-11-02 RX ADMIN — LEVETIRACETAM 1000 MILLIGRAM(S): 250 TABLET, FILM COATED ORAL at 05:23

## 2018-11-02 RX ADMIN — LEVETIRACETAM 1000 MILLIGRAM(S): 250 TABLET, FILM COATED ORAL at 18:13

## 2018-11-02 RX ADMIN — BROMOCRIPTINE MESYLATE 5 MILLIGRAM(S): 5 CAPSULE ORAL at 05:23

## 2018-11-02 RX ADMIN — HYDROMORPHONE HYDROCHLORIDE 1 MILLIGRAM(S): 2 INJECTION INTRAMUSCULAR; INTRAVENOUS; SUBCUTANEOUS at 04:35

## 2018-11-02 RX ADMIN — OXYCODONE HYDROCHLORIDE 5 MILLIGRAM(S): 5 TABLET ORAL at 01:52

## 2018-11-02 RX ADMIN — PROPOFOL 2.43 MICROGRAM(S)/KG/MIN: 10 INJECTION, EMULSION INTRAVENOUS at 20:15

## 2018-11-02 RX ADMIN — Medication 100 MILLIGRAM(S): at 18:12

## 2018-11-02 RX ADMIN — Medication 20 MILLIGRAM(S): at 05:23

## 2018-11-02 RX ADMIN — Medication 20 MILLIGRAM(S): at 12:37

## 2018-11-02 RX ADMIN — OXYCODONE HYDROCHLORIDE 5 MILLIGRAM(S): 5 TABLET ORAL at 10:26

## 2018-11-02 RX ADMIN — Medication 1 MILLIGRAM(S): at 12:37

## 2018-11-02 RX ADMIN — OXYCODONE HYDROCHLORIDE 5 MILLIGRAM(S): 5 TABLET ORAL at 15:00

## 2018-11-02 RX ADMIN — POTASSIUM PHOSPHATE, MONOBASIC POTASSIUM PHOSPHATE, DIBASIC 83.33 MILLIMOLE(S): 236; 224 INJECTION, SOLUTION INTRAVENOUS at 09:06

## 2018-11-02 RX ADMIN — OXYCODONE HYDROCHLORIDE 5 MILLIGRAM(S): 5 TABLET ORAL at 11:15

## 2018-11-02 RX ADMIN — Medication 100 MILLIGRAM(S): at 10:27

## 2018-11-02 RX ADMIN — Medication 20 MILLIGRAM(S): at 18:13

## 2018-11-02 RX ADMIN — Medication 100 MILLIGRAM(S): at 01:51

## 2018-11-02 RX ADMIN — OXYCODONE HYDROCHLORIDE 5 MILLIGRAM(S): 5 TABLET ORAL at 22:04

## 2018-11-02 RX ADMIN — OXYCODONE HYDROCHLORIDE 5 MILLIGRAM(S): 5 TABLET ORAL at 18:12

## 2018-11-02 RX ADMIN — CHLORHEXIDINE GLUCONATE 15 MILLILITER(S): 213 SOLUTION TOPICAL at 05:23

## 2018-11-02 RX ADMIN — CHLORHEXIDINE GLUCONATE 15 MILLILITER(S): 213 SOLUTION TOPICAL at 18:13

## 2018-11-02 RX ADMIN — CHLORHEXIDINE GLUCONATE 1 APPLICATION(S): 213 SOLUTION TOPICAL at 12:34

## 2018-11-02 RX ADMIN — OXYCODONE HYDROCHLORIDE 5 MILLIGRAM(S): 5 TABLET ORAL at 13:55

## 2018-11-02 RX ADMIN — Medication 50 GRAM(S): at 09:06

## 2018-11-02 RX ADMIN — OXYCODONE HYDROCHLORIDE 5 MILLIGRAM(S): 5 TABLET ORAL at 01:51

## 2018-11-02 RX ADMIN — OXYCODONE HYDROCHLORIDE 5 MILLIGRAM(S): 5 TABLET ORAL at 22:05

## 2018-11-02 RX ADMIN — PROPOFOL 2.43 MICROGRAM(S)/KG/MIN: 10 INJECTION, EMULSION INTRAVENOUS at 04:38

## 2018-11-02 RX ADMIN — Medication 5 MILLIGRAM(S): at 22:04

## 2018-11-02 RX ADMIN — OXYCODONE HYDROCHLORIDE 5 MILLIGRAM(S): 5 TABLET ORAL at 05:23

## 2018-11-02 RX ADMIN — ENOXAPARIN SODIUM 40 MILLIGRAM(S): 100 INJECTION SUBCUTANEOUS at 12:37

## 2018-11-02 RX ADMIN — Medication 100 MILLIGRAM(S): at 12:37

## 2018-11-02 NOTE — PROGRESS NOTE ADULT - SUBJECTIVE AND OBJECTIVE BOX
[facility]                                        Neurology at [place]                                 Kelly Toth, & Vincent                                  [address]                                        [place], [place], [address]                                             [phone]        CC: Traumatic Brain Injury and seizure.    HPI:   The patient is a young man, estimated to be in 30's who was found unresponsive on sidewalk.  Per chart initial CT head showed subarachnoid hemorrhage and bilateral subdural hematomas.  He was then transferred from [facility] to [facility] ER for emergent neurosurgical treatment.  He was posturing with agonal respirations on arrival to [facility].  He was taken to the OR emergently by Dr. Fraser for evacuation and decompression of subdural hematomas.  He had bilateral craniectomy and extraventricular drain placed.  He had been having non- rhythmic movements of his feet and rhythmic movements of his tongue, causing concern for seizure/status epilepticus.    Interim history:    Neurologic status unchanged.    ROS:   Unobtainable due to patient's condition.       MEDICATIONS  (STANDING):  bromocriptine Capsule 5 milliGRAM(s) Oral <User Schedule>  ceFAZolin   IVPB      ceFAZolin   IVPB 2000 milliGRAM(s) IV Intermittent every 8 hours  chlorhexidine 0.12% Liquid 15 milliLiter(s) Swish and Spit two times a day  chlorhexidine 2% Cloths 1 Application(s) Topical daily  enoxaparin Injectable 40 milliGRAM(s) SubCutaneous daily  folic acid 1 milliGRAM(s) Oral daily  levETIRAcetam  Solution 1000 milliGRAM(s) Oral two times a day  melatonin 5 milliGRAM(s) Oral at bedtime  oxyCODONE    Solution 5 milliGRAM(s) Oral every 4 hours  propofol Infusion 5 MICROgram(s)/kG/Min (2.43 mL/Hr) IV Continuous <Continuous>  propranolol 20 milliGRAM(s) Oral every 6 hours  thiamine 100 milliGRAM(s) Oral daily      Vital Signs Last 24 Hrs  T(C): 37.3 (02 Nov 2018 08:00), Max: 38.6 (01 Nov 2018 20:25)  T(F): 99.1 (02 Nov 2018 08:00), Max: 101.4 (01 Nov 2018 20:25)  HR: 75 (02 Nov 2018 11:00) (75 - 94)  BP: 120/79 (02 Nov 2018 08:00) (113/64 - 120/79)  BP(mean): 95 (02 Nov 2018 08:00) (81 - 95)  RR: 20 (02 Nov 2018 11:00) (20 - 44)  SpO2: 100% (02 Nov 2018 11:00) (98% - 100%)    Detailed Neurologic Exam:    Mental status: Unresponsive to voice.     Cranial nerves: Pupils: left 2.0  mm NR right 4 mm not reactive. There is no visual response to threat.  Extraocular motion is not assessed. Absent corneal reflexes.      Motor/Sensory:   There is  bilateral extensor posturing to pinch and sternal rub.  There is triple flexion to nail bed pressure in feet    Reflexes: absent throughout and plantar responses are extensor.    Cerebellar:  Unable to test finger to nose testing.      Labs:     11-02    129<L>  |  98  |  13.0  ----------------------------<  113  3.6   |  20.0<L>  |  0.21<L>    Ca    7.3<L>      02 Nov 2018 04:33  Phos  1.7     11-02  Mg     1.8     11-02    TPro  6.4<L>  /  Alb  1.9<L>  /  TBili  1.8  /  DBili  1.2<H>  /  AST  157<H>  /  ALT  41<H>  /  AlkPhos  512<H>  11-01                            7.1    14.2  )-----------( 250      ( 02 Nov 2018 04:33 )             22.1

## 2018-11-02 NOTE — PROGRESS NOTE ADULT - ATTENDING COMMENTS
Agree with Dr. Vera.  Patient with ongoing need for sedation. Coma stim may not be beneficial.  Will continue to monitor to assist with arousal.   Likely dysautonomia complicating current neurological condition.  Thank you for allowing us to participate in the care of your patient.

## 2018-11-02 NOTE — PROGRESS NOTE ADULT - SUBJECTIVE AND OBJECTIVE BOX
INTERVAL HPI/OVERNIGHT EVENTS/SUBJECTIVE: Sputum resulted as MSSA.  Transitioned to Ancef.  Labs do not quite suggest SIADH but had been fluid restricted for falling serum Na yesterday.  Pivot decreased caloric intake.  Propranolol increased.  Had to have propofol and analgesics restarted to improve resp alkalosis. Overnight ABG improved.  No acute issues last night.  Slight improvement of Na.  Continues to have fever.    ICU Vital Signs Last 24 Hrs  T(C): 37.2 (2018 13:00), Max: 38.6 (2018 20:25)  T(F): 99 (2018 13:00), Max: 101.4 (2018 20:25)  HR: 76 (2018 15:00) (75 - 87)  BP: 120/79 (2018 08:00) (113/64 - 120/79)  BP(mean): 95 (2018 08:00) (81 - 95)  ABP: 156/84 (2018 15:00) (86/71 - 157/88)  ABP(mean): 106 (2018 15:00) (70 - 111)  RR: 19 (2018 15:00) (19 - 44)  SpO2: 100% (2018 15:00) (98% - 100%)      I&O's Detail    2018 07:01  -  2018 07:00  --------------------------------------------------------  IN:    Enteral Tube Flush: 300 mL    Packed Red Blood Cells: 449 mL    Pivot: 1250 mL    propofol Infusion: 499.7 mL    sodium chloride 0.9%: 200 mL    Solution: 150 mL    Solution: 75 mL    Solution: 499.8 mL  Total IN: 3423.5 mL    OUT:    Incontinent per Condom Catheter: 550 mL  Total OUT: 550 mL    Total NET: 2873.5 mL      2018 07:01  -  2018 15:28  --------------------------------------------------------  IN:    Pivot: 450 mL    propofol Infusion: 183.7 mL    Solution: 50 mL    Solution: 583.1 mL    Solution: 50 mL  Total IN: 1316.8 mL    OUT:    Incontinent per Condom Catheter: 125 mL    Indwelling Catheter - Urethral: 440 mL  Total OUT: 565 mL    Total NET: 751.8 mL          Mode: CPAP with PS  FiO2: 40  PEEP: 8  PS: 10  MAP: 11    ABG - ( 2018 03:16 )  pH, Arterial: 7.45  pH, Blood: x     /  pCO2: 32    /  pO2: 147   / HCO3: 23    / Base Excess: -1.5  /  SaO2: 100                 MEDICATIONS  (STANDING):  bromocriptine Capsule 5 milliGRAM(s) Oral <User Schedule>  ceFAZolin   IVPB      ceFAZolin   IVPB 2000 milliGRAM(s) IV Intermittent every 8 hours  chlorhexidine 0.12% Liquid 15 milliLiter(s) Swish and Spit two times a day  chlorhexidine 2% Cloths 1 Application(s) Topical daily  enoxaparin Injectable 40 milliGRAM(s) SubCutaneous daily  folic acid 1 milliGRAM(s) Oral daily  levETIRAcetam  Solution 1000 milliGRAM(s) Oral two times a day  melatonin 5 milliGRAM(s) Oral at bedtime  oxyCODONE    Solution 5 milliGRAM(s) Oral every 4 hours  propofol Infusion 5 MICROgram(s)/kG/Min (2.43 mL/Hr) IV Continuous <Continuous>  propranolol 20 milliGRAM(s) Oral every 6 hours  thiamine 100 milliGRAM(s) Oral daily    MEDICATIONS  (PRN):  acetaminophen    Suspension .. 650 milliGRAM(s) Oral every 6 hours PRN Temp greater or equal to 38.5C (101.3F)      NUTRITION/IVF: Pivot @ 50    MISC: PEG    PHYSICAL EXAM:     Gen: NAD, Well appearing, No cyanosis, Pallor.  On Ventilator    Eyes: PERRL ~ 3mm, EOMI,     Neurological: GCS 1/1T/2.      ENT: Minor tongue abrasions noted.  No lacerations noted.     Neck: Supple. NT AT, FROM no pain.  No JVD. No meningeal signs.  Trach in place.    Pulmonary: NAD, CTA, = BL .  Minimal secretions.    Cardiovascular: RRR, S1, S2, No murmurs noted.    Gastrointestinal: ND, Soft, NT. PEG CDI    Extremities: NT, AT, Mild-Moderate 2+ pitting edema.  No erythema or palpable cord noted.  FROM, = 2+ pulses throughout.    LABS:  CBC Full  -  ( 2018 04:33 )  WBC Count : 14.2 K/uL  Hemoglobin : 7.1 g/dL  Hematocrit : 22.1 %  Platelet Count - Automated : 250 K/uL  Mean Cell Volume : 84.0 fl  Mean Cell Hemoglobin : 27.0 pg  Mean Cell Hemoglobin Concentration : 32.1 g/dL  Auto Neutrophil # : 12.0 K/uL  Auto Lymphocyte # : 0.6 K/uL  Auto Monocyte # : 1.3 K/uL  Auto Eosinophil # : 0.2 K/uL  Auto Basophil # : 0.0 K/uL  Auto Neutrophil % : 84.4 %  Auto Lymphocyte % : 4.0 %  Auto Monocyte % : 9.0 %  Auto Eosinophil % : 1.6 %  Auto Basophil % : 0.1 %    11    129<L>  |  98  |  13.0  ----------------------------<  113  3.6   |  20.0<L>  |  0.21<L>    Ca    7.3<L>      2018 04:33  Phos  1.7     11-  Mg     1.8     11-    TPro  6.4<L>  /  Alb  1.9<L>  /  TBili  1.8  /  DBili  1.2<H>  /  AST  157<H>  /  ALT  41<H>  /  AlkPhos  512<H>  11-    PT/INR - ( 2018 09:55 )   PT: 20.3 sec;   INR: 1.74 ratio         PTT - ( 2018 09:55 )  PTT:34.4 sec  Urinalysis Basic - ( 2018 10:36 )    Color: Susy / Appearance: Clear / S.015 / pH: x  Gluc: x / Ketone: Negative  / Bili: Small / Urobili: 1 mg/dL   Blood: x / Protein: 30 mg/dL / Nitrite: Negative   Leuk Esterase: Trace / RBC: x / WBC 3-5   Sq Epi: x / Non Sq Epi: Occasional / Bacteria: x      RECENT CULTURES:  10-28 .Sputum Sputum Staphylococcus aureus   Moderate White blood cells  Few Gram Positive Cocci in Pairs and Chains  Few Gram Positive Rods  Few Gram Negative Rods   Moderate Staphylococcus aureus    10-28 .Blood Blood-Peripheral XXXX XXXX   No growth at 48 hours    10-28 .Blood Blood-Peripheral XXXX XXXX   No growth at 5 days.        LIVER FUNCTIONS - ( 2018 09:55 )  Alb: x     / Pro: x     / ALK PHOS: x     / ALT: x     / AST: x     / GGT: 1954 U/L       CARDIAC MARKERS ( 2018 09:55 )  x     / x     / 87 U/L / x     / x          CAPILLARY BLOOD GLUCOSE      RADIOLOGY & ADDITIONAL STUDIES:    ASSESSMENT/PLAN:  32yMale presenting with: Severe TBI, Seizure? but unlikely. PNA? Hyponatremia.    Neurological: Continue neuro check checks.  Neuro recs continue Keppra.  Sedation and analgesics PRN    ENMT: Trach care.    Pulmonary: Can attempt PSV.  If lowers volumes or apnea then will switch back to AC while on Propofol.    Cardiovascular: Continue higher propofol    Gastrointestinal: Given his increase LFT.  Will check RUQ US for additional possible source. Continue TF at current rate.    Genitourinary: Pt penis has ulceration.  Will place Apodaca and remove condom cath    Heme:CW lovenox    ID: Continue Ancef.  FU Creatinine clearance.  if very high then will continue Ancef x 7 days from yesterday.  If low then would continue abx 7 days from start of Zosyn    Lines/ Tubes: Maintain plan as above.    Dispo: Continue critical care as above.      CRITICAL CARE TIME SPENT: 45 minutes.

## 2018-11-02 NOTE — PROGRESS NOTE ADULT - SUBJECTIVE AND OBJECTIVE BOX
SUBJECTIVE  Patient seen and examined. Primary team weaning off sedation as tolerated. Transitioned to oxycodone scheduled from [facility].    TODAY'S REVIEW OF SYMPTOMS  Unable to obtain    VITALS  T(C): 37.3 (18 @ 08:00)  T(F): 99.1 (18 @ 08:00), Max: 101.4 (18 @ 20:25)  HR: 76 (18 @ 12:11) (75 - 88)  BP: 120/79 (18 @ 08:00) (113/64 - 120/79)  RR:  (20 - 44)  SpO2:  (98% - 100%)  Wt(kg): --    RECENT LABS/IMAGING             7.1    14.2  )-----------( 250      ( 2018 04:33 )             22.1     129<L>  |  98  |  13.0  ----------------------------<  113  3.6   |  20.0<L>  |  0.21<L>    Ca    7.3<L>      2018 04:33  Phos  1.7     11  Mg     1.8         TPro  6.4<L>  /  Alb  1.9<L>  /  TBili  1.8  /  DBili  1.2<H>  /  AST  157<H>  /  ALT  41<H>  /  AlkPhos  512<H>  11    PT/INR - ( 2018 09:55 )   PT: 20.3 sec;   INR: 1.74 ratio    PTT - ( 2018 09:55 )  PTT:34.4 sec    Urinalysis Basic - ( 2018 10:36 )  Color: Susy / Appearance: Clear / S.015 / pH: x  Gluc: x / Ketone: Negative  / Bili: Small / Urobili: 1 mg/dL   Blood: x / Protein: 30 mg/dL / Nitrite: Negative   Leuk Esterase: Trace / RBC: x / WBC 3-5   Sq Epi: x / Non Sq Epi: Occasional / Bacteria: x    MEDICATIONS   MEDICATIONS  (STANDING):  bromocriptine Capsule 5 milliGRAM(s) Oral <User Schedule>  ceFAZolin   IVPB 2000 milliGRAM(s) IV Intermittent every 8 hours  chlorhexidine 0.12% Liquid 15 milliLiter(s) Swish and Spit two times a day  chlorhexidine 2% Cloths 1 Application(s) Topical daily  enoxaparin Injectable 40 milliGRAM(s) SubCutaneous daily  folic acid 1 milliGRAM(s) Oral daily  levETIRAcetam  Solution 1000 milliGRAM(s) Oral two times a day  melatonin 5 milliGRAM(s) Oral at bedtime  oxyCODONE    Solution 5 milliGRAM(s) Oral every 4 hours  propofol Infusion 5 MICROgram(s)/kG/Min (2.43 mL/Hr) IV Continuous <Continuous>  propranolol 20 milliGRAM(s) Oral every 6 hours  thiamine 100 milliGRAM(s) Oral daily    MEDICATIONS  (PRN):  acetaminophen    Suspension .. 650 milliGRAM(s) Oral every 6 hours PRN Temp greater or equal to 38.5C (101.3F)    PHYSICAL EXAM  HEENT - Cranial incisions with staples, (+) Edema, Eyes not tracking, (-) Doll's eye, no response to threat, Bilateral pupils non reactive, Left pin-point  Chest - Mechanically ventilated  Cardiovascular - No tachycardia noted  Extremities - Diffuse swelling, No response to pain but recently placed on sedation  Reflexes - +Babinski and Meza's bilaterally, Diminished reflexes throughout   - (+) Apodaca    Coma Recovery Scale - Revised  AUDITORY FUNCTION SCALE  0 - None  VISUAL FUNCTION SCALE  0 - None  MOTOR FUNCTION SCALE  1 - Abnormal Posturing  OROMOTOR/VERBAL FUNCTION SCALE  0 - None  COMMUNICATION SCALE  0 - None  AROUSAL SCALE  0 - Unarousable    TOTAL SCORE= 1 = Coma    ASSESSMENT/PLAN  32y Male unknown PMH found unresponsive on sidewalk found w/ b/l SDH and SAH now s/p b/l hemicraniectomy and EVD placement s/p removal now in COMA x 10 DAYS    Sedation - ICU team weaning propofol, Oxycodone Q4hrs to help with tachypnea  PNA - Cefazolin  Seizure PPX - Keppra (day 6 of 7)  Dysautonomia - Propranolol, Bromocriptine  Pain - Oxycodone Q4hrs, Tylenol  Oral care - Chlorhexidine  DVT PPX - SCDs, Lovenox  Sleep/Wake Cycles - Melatonin 5mg HS (10/31), Bromocriptine 5mg Q6AM/12PM (10/31)  Rehab - COMA STIM from OT, PT and SLP when patient is off sedation. Patient currently being weaned off sedation.

## 2018-11-02 NOTE — PROGRESS NOTE ADULT - ASSESSMENT
32 y Male who is followed by neurology because of TBI/possible seizure    TBI  Now status post decompressive bilateral hemicraniectomy and extraventricular drain.  Significant amount of intracranial hemorrhage, improving post decompression.  Multiple areas of parenchymal CVA/ischemia seen  No significant improvement in neuro exam      Possible seizure  EEG: no seizure, breech rhythm over craniectomies, slowing suggesting diffuse cerebral dysfunction.  Rhythmic tongue movements and random foot movement has stopped.  Continue Keppra.    Neurologic status remains unchanged.  Supportive care  Poor prognosis for meaningful recovery.

## 2018-11-02 NOTE — PROGRESS NOTE ADULT - ASSESSMENT
32yMale presenting with: Severe TBI, Seizure? but unlikely. PNA? Hyponatremia.    Neurological: Continue neuro check checks.  Neuro recs continue Keppra.  Sedation and analgesics PRN    ENMT: Trach care.    Pulmonary: Can attempt PSV.  If lowers volumes or apnea then will switch back to AC while on Propofol.    Cardiovascular: Continue higher propofol    Gastrointestinal: Given his increase LFT.  Will check RUQ US for additional possible source. Continue TF at current rate.    Genitourinary: Pt penis has ulceration.  Will place Apodaca and remove condom cath    Heme:CW lovenox    ID: Continue Ancef.  FU Creatinine clearance.  if very high then will continue Ancef x 7 days from yesterday.  If low then would continue abx 7 days from start of Zosyn    Lines/ Tubes: Maintain plan as above.    Dispo: Continue critical care as above.      CRITICAL CARE TIME SPENT: 45 minutes.

## 2018-11-02 NOTE — PROGRESS NOTE ADULT - ASSESSMENT
46 yo M with severe TBI with multicompartment ICH and brain compression from unknown mechanism, s/p bilateral hemicraniectomy with extraventricular drain, remains in coma    Continue management per ICU team  Continue to monitor for coma recovery, vent settings per ICU  Neurology on board, EEG reviewed. Continue Keppra. Poor prognosis for meaningful recovery  Rehab on board, will continue to improve arousal and reach VS with normalization of sleep/wake cycles and COMA STIM

## 2018-11-02 NOTE — PROGRESS NOTE ADULT - SUBJECTIVE AND OBJECTIVE BOX
SUBJECTIVE: No acute changes overnight. Neurologic status unchanged. Sedated, trach in place.       MEDICATIONS  (STANDING):  bromocriptine Capsule 5 milliGRAM(s) Oral <User Schedule>  ceFAZolin   IVPB      ceFAZolin   IVPB 2000 milliGRAM(s) IV Intermittent every 8 hours  chlorhexidine 0.12% Liquid 15 milliLiter(s) Swish and Spit two times a day  chlorhexidine 2% Cloths 1 Application(s) Topical daily  enoxaparin Injectable 40 milliGRAM(s) SubCutaneous daily  folic acid 1 milliGRAM(s) Oral daily  levETIRAcetam  Solution 1000 milliGRAM(s) Oral two times a day  melatonin 5 milliGRAM(s) Oral at bedtime  oxyCODONE    Solution 5 milliGRAM(s) Oral every 4 hours  propofol Infusion 5 MICROgram(s)/kG/Min (2.43 mL/Hr) IV Continuous <Continuous>  propranolol 20 milliGRAM(s) Oral every 6 hours  thiamine 100 milliGRAM(s) Oral daily    MEDICATIONS  (PRN):  acetaminophen    Suspension .. 650 milliGRAM(s) Oral every 6 hours PRN Temp greater or equal to 38.5C (101.3F)      Vital Signs Last 24 Hrs  T(C): 37.3 (2018 08:00), Max: 38.6 (2018 20:25)  T(F): 99.1 (2018 08:00), Max: 101.4 (2018 20:25)  HR: 76 (2018 12:11) (75 - 89)  BP: 120/79 (2018 08:00) (113/64 - 120/79)  BP(mean): 95 (2018 08:00) (81 - 95)  RR: 20 (2018 11:00) (20 - 44)  SpO2: 100% (2018 12:11) (98% - 100%)    PE  Gen: Sedated, on vent with trach in place  Pulm: Non labored on vent  CV: RRR  Abd: Soft, Mild distension, unable to determine if tender due to neurological status  Ext: Mild peripheral edema      I&O's Detail    2018 07:01  -  2018 07:00  --------------------------------------------------------  IN:    Enteral Tube Flush: 300 mL    Packed Red Blood Cells: 449 mL    Pivot: 1250 mL    propofol Infusion: 499.7 mL    sodium chloride 0.9%: 200 mL    Solution: 150 mL    Solution: 75 mL    Solution: 499.8 mL  Total IN: 3423.5 mL    OUT:    Incontinent per Condom Catheter: 550 mL  Total OUT: 550 mL    Total NET: 2873.5 mL      2018 07:01  -  2018 12:13  --------------------------------------------------------  IN:    Pivot: 200 mL    propofol Infusion: 84.2 mL    Solution: 50 mL    Solution: 249.9 mL    Solution: 50 mL  Total IN: 634.1 mL    OUT:    Incontinent per Condom Catheter: 125 mL    Indwelling Catheter - Urethral: 200 mL  Total OUT: 325 mL    Total NET: 309.1 mL          LABS:                        7.1    14.2  )-----------( 250      ( 2018 04:33 )             22.1         129<L>  |  98  |  13.0  ----------------------------<  113  3.6   |  20.0<L>  |  0.21<L>    Ca    7.3<L>      2018 04:33  Phos  1.7       Mg     1.8         TPro  6.4<L>  /  Alb  1.9<L>  /  TBili  1.8  /  DBili  1.2<H>  /  AST  157<H>  /  ALT  41<H>  /  AlkPhos  512<H>      PT/INR - ( 2018 09:55 )   PT: 20.3 sec;   INR: 1.74 ratio         PTT - ( 2018 09:55 )  PTT:34.4 sec  Urinalysis Basic - ( 2018 10:36 )    Color: Susy / Appearance: Clear / S.015 / pH: x  Gluc: x / Ketone: Negative  / Bili: Small / Urobili: 1 mg/dL   Blood: x / Protein: 30 mg/dL / Nitrite: Negative   Leuk Esterase: Trace / RBC: x / WBC 3-5   Sq Epi: x / Non Sq Epi: Occasional / Bacteria: x        RADIOLOGY & ADDITIONAL STUDIES:

## 2018-11-03 LAB
ALBUMIN SERPL ELPH-MCNC: 2 G/DL — LOW (ref 3.3–5.2)
ALP SERPL-CCNC: 625 U/L — HIGH (ref 40–120)
ALT FLD-CCNC: 40 U/L — SIGNIFICANT CHANGE UP
ANION GAP SERPL CALC-SCNC: 10 MMOL/L — SIGNIFICANT CHANGE UP (ref 5–17)
AST SERPL-CCNC: 160 U/L — HIGH
BASOPHILS # BLD AUTO: 0 K/UL — SIGNIFICANT CHANGE UP (ref 0–0.2)
BASOPHILS NFR BLD AUTO: 0.1 % — SIGNIFICANT CHANGE UP (ref 0–2)
BILIRUB DIRECT SERPL-MCNC: 1 MG/DL — HIGH (ref 0–0.3)
BILIRUB INDIRECT FLD-MCNC: 0.8 MG/DL — SIGNIFICANT CHANGE UP (ref 0.2–1)
BILIRUB SERPL-MCNC: 1.8 MG/DL — SIGNIFICANT CHANGE UP (ref 0.4–2)
BUN SERPL-MCNC: 10 MG/DL — SIGNIFICANT CHANGE UP (ref 8–20)
CALCIUM SERPL-MCNC: 7.8 MG/DL — LOW (ref 8.6–10.2)
CHLORIDE SERPL-SCNC: 97 MMOL/L — LOW (ref 98–107)
CO2 SERPL-SCNC: 23 MMOL/L — SIGNIFICANT CHANGE UP (ref 22–29)
CREAT SERPL-MCNC: <0.2 MG/DL — LOW (ref 0.5–1.3)
CULTURE RESULTS: NO GROWTH — SIGNIFICANT CHANGE UP
EOSINOPHIL # BLD AUTO: 0.2 K/UL — SIGNIFICANT CHANGE UP (ref 0–0.5)
EOSINOPHIL NFR BLD AUTO: 1.5 % — SIGNIFICANT CHANGE UP (ref 0–5)
GAS PNL BLDA: SIGNIFICANT CHANGE UP
GLUCOSE SERPL-MCNC: 110 MG/DL — SIGNIFICANT CHANGE UP (ref 70–115)
HCT VFR BLD CALC: 23.6 % — LOW (ref 42–52)
HGB BLD-MCNC: 7.7 G/DL — LOW (ref 14–18)
LYMPHOCYTES # BLD AUTO: 0.8 K/UL — LOW (ref 1–4.8)
LYMPHOCYTES # BLD AUTO: 5.2 % — LOW (ref 20–55)
MAGNESIUM SERPL-MCNC: 1.5 MG/DL — LOW (ref 1.6–2.6)
MCHC RBC-ENTMCNC: 27.6 PG — SIGNIFICANT CHANGE UP (ref 27–31)
MCHC RBC-ENTMCNC: 32.6 G/DL — SIGNIFICANT CHANGE UP (ref 32–36)
MCV RBC AUTO: 84.6 FL — SIGNIFICANT CHANGE UP (ref 80–94)
MONOCYTES # BLD AUTO: 1.2 K/UL — HIGH (ref 0–0.8)
MONOCYTES NFR BLD AUTO: 8.3 % — SIGNIFICANT CHANGE UP (ref 3–10)
NEUTROPHILS # BLD AUTO: 12.5 K/UL — HIGH (ref 1.8–8)
NEUTROPHILS NFR BLD AUTO: 83.3 % — HIGH (ref 37–73)
PHOSPHATE SERPL-MCNC: 2.2 MG/DL — LOW (ref 2.4–4.7)
PLATELET # BLD AUTO: 350 K/UL — SIGNIFICANT CHANGE UP (ref 150–400)
POTASSIUM SERPL-MCNC: 4.2 MMOL/L — SIGNIFICANT CHANGE UP (ref 3.5–5.3)
POTASSIUM SERPL-SCNC: 4.2 MMOL/L — SIGNIFICANT CHANGE UP (ref 3.5–5.3)
PROT SERPL-MCNC: 6.7 G/DL — SIGNIFICANT CHANGE UP (ref 6.6–8.7)
RBC # BLD: 2.79 M/UL — LOW (ref 4.6–6.2)
RBC # FLD: 17.9 % — HIGH (ref 11–15.6)
SODIUM SERPL-SCNC: 130 MMOL/L — LOW (ref 135–145)
SPECIMEN SOURCE: SIGNIFICANT CHANGE UP
WBC # BLD: 15 K/UL — HIGH (ref 4.8–10.8)
WBC # FLD AUTO: 15 K/UL — HIGH (ref 4.8–10.8)

## 2018-11-03 PROCEDURE — 99232 SBSQ HOSP IP/OBS MODERATE 35: CPT

## 2018-11-03 RX ORDER — OXYCODONE HYDROCHLORIDE 5 MG/1
10 TABLET ORAL EVERY 4 HOURS
Qty: 0 | Refills: 0 | Status: DISCONTINUED | OUTPATIENT
Start: 2018-11-03 | End: 2018-11-10

## 2018-11-03 RX ORDER — ALBUMIN HUMAN 25 %
50 VIAL (ML) INTRAVENOUS ONCE
Qty: 0 | Refills: 0 | Status: COMPLETED | OUTPATIENT
Start: 2018-11-03 | End: 2018-11-04

## 2018-11-03 RX ORDER — SODIUM,POTASSIUM PHOSPHATES 278-250MG
1 POWDER IN PACKET (EA) ORAL ONCE
Qty: 0 | Refills: 0 | Status: COMPLETED | OUTPATIENT
Start: 2018-11-03 | End: 2018-11-03

## 2018-11-03 RX ADMIN — Medication 20 MILLIGRAM(S): at 06:14

## 2018-11-03 RX ADMIN — CHLORHEXIDINE GLUCONATE 15 MILLILITER(S): 213 SOLUTION TOPICAL at 06:14

## 2018-11-03 RX ADMIN — OXYCODONE HYDROCHLORIDE 5 MILLIGRAM(S): 5 TABLET ORAL at 12:43

## 2018-11-03 RX ADMIN — OXYCODONE HYDROCHLORIDE 10 MILLIGRAM(S): 5 TABLET ORAL at 22:01

## 2018-11-03 RX ADMIN — OXYCODONE HYDROCHLORIDE 5 MILLIGRAM(S): 5 TABLET ORAL at 06:14

## 2018-11-03 RX ADMIN — OXYCODONE HYDROCHLORIDE 10 MILLIGRAM(S): 5 TABLET ORAL at 15:04

## 2018-11-03 RX ADMIN — OXYCODONE HYDROCHLORIDE 10 MILLIGRAM(S): 5 TABLET ORAL at 15:30

## 2018-11-03 RX ADMIN — Medication 100 MILLIGRAM(S): at 11:41

## 2018-11-03 RX ADMIN — CHLORHEXIDINE GLUCONATE 15 MILLILITER(S): 213 SOLUTION TOPICAL at 17:50

## 2018-11-03 RX ADMIN — OXYCODONE HYDROCHLORIDE 5 MILLIGRAM(S): 5 TABLET ORAL at 01:50

## 2018-11-03 RX ADMIN — Medication 1 MILLIGRAM(S): at 11:41

## 2018-11-03 RX ADMIN — CHLORHEXIDINE GLUCONATE 1 APPLICATION(S): 213 SOLUTION TOPICAL at 11:37

## 2018-11-03 RX ADMIN — Medication 5 MILLIGRAM(S): at 22:01

## 2018-11-03 RX ADMIN — OXYCODONE HYDROCHLORIDE 5 MILLIGRAM(S): 5 TABLET ORAL at 11:40

## 2018-11-03 RX ADMIN — PROPOFOL 2.43 MICROGRAM(S)/KG/MIN: 10 INJECTION, EMULSION INTRAVENOUS at 17:50

## 2018-11-03 RX ADMIN — OXYCODONE HYDROCHLORIDE 10 MILLIGRAM(S): 5 TABLET ORAL at 22:05

## 2018-11-03 RX ADMIN — ENOXAPARIN SODIUM 40 MILLIGRAM(S): 100 INJECTION SUBCUTANEOUS at 11:41

## 2018-11-03 RX ADMIN — OXYCODONE HYDROCHLORIDE 5 MILLIGRAM(S): 5 TABLET ORAL at 01:49

## 2018-11-03 RX ADMIN — PROPOFOL 2.43 MICROGRAM(S)/KG/MIN: 10 INJECTION, EMULSION INTRAVENOUS at 06:34

## 2018-11-03 RX ADMIN — LEVETIRACETAM 1000 MILLIGRAM(S): 250 TABLET, FILM COATED ORAL at 06:14

## 2018-11-03 RX ADMIN — Medication 100 MILLIGRAM(S): at 01:49

## 2018-11-03 RX ADMIN — Medication 1 PACKET(S): at 15:04

## 2018-11-03 RX ADMIN — Medication 100 MILLIGRAM(S): at 17:49

## 2018-11-03 RX ADMIN — Medication 20 MILLIGRAM(S): at 17:50

## 2018-11-03 RX ADMIN — Medication 20 MILLIGRAM(S): at 11:41

## 2018-11-03 RX ADMIN — OXYCODONE HYDROCHLORIDE 10 MILLIGRAM(S): 5 TABLET ORAL at 17:49

## 2018-11-03 RX ADMIN — PROPOFOL 2.43 MICROGRAM(S)/KG/MIN: 10 INJECTION, EMULSION INTRAVENOUS at 01:40

## 2018-11-03 RX ADMIN — Medication 20 MILLIGRAM(S): at 00:11

## 2018-11-03 RX ADMIN — OXYCODONE HYDROCHLORIDE 10 MILLIGRAM(S): 5 TABLET ORAL at 18:07

## 2018-11-03 RX ADMIN — LEVETIRACETAM 1000 MILLIGRAM(S): 250 TABLET, FILM COATED ORAL at 17:50

## 2018-11-03 RX ADMIN — PROPOFOL 2.43 MICROGRAM(S)/KG/MIN: 10 INJECTION, EMULSION INTRAVENOUS at 11:41

## 2018-11-03 RX ADMIN — BROMOCRIPTINE MESYLATE 5 MILLIGRAM(S): 5 CAPSULE ORAL at 11:41

## 2018-11-03 NOTE — PROGRESS NOTE ADULT - SUBJECTIVE AND OBJECTIVE BOX
[facility]                                        [facility] [place]                                 [facility], [facility]                                  [address]                                        [place], [place], [address]                                             [phone]        CC: Traumatic Brain Injury and seizure.    HPI:   The patient is a young man, estimated to be in 30's who was found unresponsive on sidewalk.  Per chart initial CT head showed subarachnoid hemorrhage and bilateral subdural hematomas.  He was then transferred from [facility] to [facility] ER for emergent neurosurgical treatment.  He was posturing with agonal respirations on arrival to [facility].  He was taken to the OR emergently by Dr. Fraser for evacuation and decompression of subdural hematomas.  He had bilateral craniectomy and extraventricular drain placed.  He had been having non- rhythmic movements of his feet and rhythmic movements of his tongue, causing concern for seizure/status epilepticus.    Interim history:    Neurologic status unchanged.    ROS:   Unobtainable due to patient's condition.     MEDICATIONS  (STANDING):  bromocriptine Tablet 5 milliGRAM(s) Oral daily  ceFAZolin   IVPB      ceFAZolin   IVPB 2000 milliGRAM(s) IV Intermittent every 8 hours  chlorhexidine 0.12% Liquid 15 milliLiter(s) Swish and Spit two times a day  chlorhexidine 2% Cloths 1 Application(s) Topical daily  enoxaparin Injectable 40 milliGRAM(s) SubCutaneous daily  folic acid 1 milliGRAM(s) Oral daily  levETIRAcetam  Solution 1000 milliGRAM(s) Oral two times a day  melatonin 5 milliGRAM(s) Oral at bedtime  oxyCODONE    Solution 5 milliGRAM(s) Oral every 4 hours  potassium phosphate / sodium phosphate powder 1 Packet(s) Oral once  propofol Infusion 5 MICROgram(s)/kG/Min (2.43 mL/Hr) IV Continuous <Continuous>  propranolol 20 milliGRAM(s) Oral every 6 hours  thiamine 100 milliGRAM(s) Oral daily      Vital Signs Last 24 Hrs  T(C): 37.8 (03 Nov 2018 12:00), Max: 37.9 (03 Nov 2018 00:00)  T(F): 100 (03 Nov 2018 12:00), Max: 100.2 (03 Nov 2018 00:00)  HR: 80 (03 Nov 2018 12:25) (75 - 81)  RR: 31 (03 Nov 2018 12:00) (19 - 31)  SpO2: 100% (03 Nov 2018 12:25) (97% - 100%)    Detailed Neurologic Exam:    Mental status: Unresponsive to voice.     Cranial nerves: Pupils: left 2.0  mm NR right 4 mm not reactive. There is no visual response to threat.  Extraocular motion is not assessed. Absent corneal reflexes.      Motor/Sensory:   There is  bilateral extensor posturing to pinch and sternal rub.  There is triple flexion to nail bed pressure in feet    Reflexes: absent throughout and plantar responses are extensor.    Cerebellar:  Unable to test finger to nose testing.    Labs:     11-03    130<L>  |  97<L>  |  10.0  ----------------------------<  110  4.2   |  23.0  |  <0.20<L>    Ca    7.8<L>      03 Nov 2018 05:28  Phos  2.2     11-03  Mg     1.5     11-03    TPro  6.7  /  Alb  2.0<L>  /  TBili  1.8  /  DBili  1.0<H>  /  AST  160<H>  /  ALT  40  /  AlkPhos  625<H>  11-03                            7.7    15.0  )-----------( 350      ( 03 Nov 2018 05:28 )             23.6

## 2018-11-03 NOTE — PROGRESS NOTE ADULT - SUBJECTIVE AND OBJECTIVE BOX
HISTORY  32y Male B/L SDH s/p evacuation, Severe TBI, respiratory failure, PNA    24 HOUR EVENTS: No new events, tolerating PSV through out the day and overnight, afebrile for 24 H    SUBJECTIVE/ROS:  [ ] A ten-point review of systems was otherwise negative except as noted.  [ x] Due to altered mental status/intubation, subjective information were not able to be obtained from the patient. History was obtained, to the extent possible, from review of the chart and collateral sources of information.      NEURO  RASS: 0     GCS:  1T2    CAM ICU:  Exam: anisocoria,  R pupil 4mm, L 2mm, decerabrate postures to noxious stimuli   Meds: acetaminophen    Suspension .. 650 milliGRAM(s) Oral every 6 hours PRN Temp greater or equal to 38.5C (101.3F)  bromocriptine Tablet 5 milliGRAM(s) Oral daily  levETIRAcetam  Solution 1000 milliGRAM(s) Oral two times a day  melatonin 5 milliGRAM(s) Oral at bedtime  oxyCODONE    Solution 5 milliGRAM(s) Oral every 4 hours  propofol Infusion 5 MICROgram(s)/kG/Min IV Continuous <Continuous>    [x] Adequacy of sedation and pain control has been assessed and adjusted      RESPIRATORY  RR: 24 (11-03-18 @ 05:00) (19 - 27)  SpO2: 100% (11-03-18 @ 05:00) (98% - 100%)  Wt(kg): --  Exam: unlabored, coarse BS b/l  Mechanical Ventilation: Mode: CPAP with PS, RR (patient): 19, FiO2: 40, PEEP: 8, PS: 10, MAP: 11  ABG - ( 03 Nov 2018 03:20 )  pH: 7.48  /  pCO2: 32    /  pO2: 153   / HCO3: 25    / Base Excess: 1.1   /  SaO2: 100     Lactate: x                [ ] Extubation Readiness Assessed  Meds:       CARDIOVASCULAR  HR: 77 (11-03-18 @ 05:00) (75 - 79)  BP: 120/79 (11-02-18 @ 08:00) (113/64 - 120/79)  BP(mean): 95 (11-02-18 @ 08:00) (81 - 95)  ABP: 136/71 (11-03-18 @ 05:00) (86/71 - 168/91)  ABP(mean): 91 (11-03-18 @ 05:00) (78 - 115)  Wt(kg): --  CVP(cm H2O): --      Exam: s1s2  Cardiac Rhythm: Sinus  Perfusion     [ x]Adequate   [ ]Inadequate  Mentation   [ ]Normal       [x ]Reduced  Extremities  [x ]Warm         [ ]Cool  Volume Status [ ]Hypervolemic [x ]Euvolemic [ ]Hypovolemic  Meds: propranolol 20 milliGRAM(s) Oral every 6 hours        GI/NUTRITION  Exam: Soft NT ND  Diet: pivot @ 50  Meds:     GENITOURINARY  I&O's Detail    11-01 @ 07:01  -  11-02 @ 07:00  --------------------------------------------------------  IN:    Enteral Tube Flush: 300 mL    Packed Red Blood Cells: 449 mL    Pivot: 1250 mL    propofol Infusion: 499.7 mL    sodium chloride 0.9%: 200 mL    Solution: 150 mL    Solution: 75 mL    Solution: 499.8 mL  Total IN: 3423.5 mL    OUT:    Incontinent per Condom Catheter: 550 mL  Total OUT: 550 mL    Total NET: 2873.5 mL      11-02 @ 07:01 - 11-03 @ 05:24  --------------------------------------------------------  IN:    Enteral Tube Flush: 170 mL    Pivot: 1150 mL    propofol Infusion: 462.3 mL    Solution: 50 mL    Solution: 499.8 mL    Solution: 200 mL  Total IN: 2532.1 mL    OUT:    Incontinent per Condom Catheter: 125 mL    Indwelling Catheter - Urethral: 1315 mL  Total OUT: 1440 mL    Total NET: 1092.1 mL          11-02    129<L>  |  98  |  13.0  ----------------------------<  113  3.6   |  20.0<L>  |  0.21<L>    Ca    7.3<L>      02 Nov 2018 04:33  Phos  1.7     11-02  Mg     1.8     11-02    TPro  6.4<L>  /  Alb  1.9<L>  /  TBili  1.8  /  DBili  1.2<H>  /  AST  157<H>  /  ALT  41<H>  /  AlkPhos  512<H>  11-01    [ ] Apodaca catheter, indication: N/A  Meds: folic acid 1 milliGRAM(s) Oral daily  thiamine 100 milliGRAM(s) Oral daily        HEMATOLOGIC  Meds: enoxaparin Injectable 40 milliGRAM(s) SubCutaneous daily    [x] VTE Prophylaxis                        7.1    14.2  )-----------( 250      ( 02 Nov 2018 04:33 )             22.1     PT/INR - ( 02 Nov 2018 09:55 )   PT: 20.3 sec;   INR: 1.74 ratio         PTT - ( 02 Nov 2018 09:55 )  PTT:34.4 sec  Transfusion     [ ] PRBC   [ ] Platelets   [ ] FFP   [ ] Cryoprecipitate      INFECTIOUS DISEASES  T(C): 37.8 (11-03-18 @ 04:00), Max: 37.9 (11-03-18 @ 00:00)  Wt(kg): --    Recent Cultures:  Specimen Source: .Sputum Sputum, 10-28 @ 20:57; Results   Moderate Staphylococcus aureus; Gram Stain:   Moderate White blood cells  Few Gram Positive Cocci in Pairs and Chains  Few Gram Positive Rods  Few Gram Negative Rods; Organism: Staphylococcus aureus  Specimen Source: .Blood Blood-Peripheral, 10-28 @ 17:40; Results   No growth at 5 days.; Gram Stain: --; Organism: --  Specimen Source: .Blood Blood-Peripheral, 10-28 @ 14:14; Results   No growth at 5 days.; Gram Stain: --; Organism: --    Meds: ceFAZolin   IVPB      ceFAZolin   IVPB 2000 milliGRAM(s) IV Intermittent every 8 hours        ENDOCRINE  Capillary Blood Glucose    Meds:       ACCESS DEVICES:  [ x] Peripheral IV  [ ] Central Venous Line	[ ] R	[ ] L	[ ] IJ	[ ] Fem	[ ] SC	Placed:   [x ] Arterial Line		[ ] R	[ ] L	[ ] Fem	[ ] Rad	[ ] Ax	Placed:   [ ] PICC:					[ ] Mediport  [ ] Urinary Catheter, Date Placed:   [ ] Necessity of urinary, arterial, and venous catheters discussed    OTHER MEDICATIONS:  chlorhexidine 0.12% Liquid 15 milliLiter(s) Swish and Spit two times a day  chlorhexidine 2% Cloths 1 Application(s) Topical daily      CODE STATUS:     IMAGING:

## 2018-11-03 NOTE — PROGRESS NOTE ADULT - ASSESSMENT
32y Male B/L SDH s/p evacuation, Severe TBI, respiratory failure, PNA  Neuro: avoid secondary injury, prognosis is poor  Pulm: trach collar trials today  Card: no new issues  GI: tolerating tube feeds  Renal: hyponatremia continues to improve with fluid restriction  ID: Ancef,   PPX: dvt ppx with Lovenox  dispo: will continue to require ICU level of care

## 2018-11-04 LAB
ALBUMIN SERPL ELPH-MCNC: 2 G/DL — LOW (ref 3.3–5.2)
ALP SERPL-CCNC: 616 U/L — HIGH (ref 40–120)
ALT FLD-CCNC: 37 U/L — SIGNIFICANT CHANGE UP
AMMONIA BLD-MCNC: 125 UMOL/L — HIGH (ref 11–55)
ANION GAP SERPL CALC-SCNC: 9 MMOL/L — SIGNIFICANT CHANGE UP (ref 5–17)
AST SERPL-CCNC: 154 U/L — HIGH
BASE EXCESS BLDA CALC-SCNC: 4.6 MMOL/L — HIGH (ref -2–2)
BASOPHILS # BLD AUTO: 0 K/UL — SIGNIFICANT CHANGE UP (ref 0–0.2)
BASOPHILS NFR BLD AUTO: 0.1 % — SIGNIFICANT CHANGE UP (ref 0–2)
BILIRUB DIRECT SERPL-MCNC: 1.1 MG/DL — HIGH (ref 0–0.3)
BILIRUB INDIRECT FLD-MCNC: 0.8 MG/DL — SIGNIFICANT CHANGE UP (ref 0.2–1)
BILIRUB SERPL-MCNC: 1.9 MG/DL — SIGNIFICANT CHANGE UP (ref 0.4–2)
BLOOD GAS COMMENTS ARTERIAL: SIGNIFICANT CHANGE UP
BUN SERPL-MCNC: 12 MG/DL — SIGNIFICANT CHANGE UP (ref 8–20)
CALCIUM SERPL-MCNC: 7.8 MG/DL — LOW (ref 8.6–10.2)
CHLORIDE SERPL-SCNC: 95 MMOL/L — LOW (ref 98–107)
CO2 SERPL-SCNC: 25 MMOL/L — SIGNIFICANT CHANGE UP (ref 22–29)
CREAT SERPL-MCNC: 0.21 MG/DL — LOW (ref 0.5–1.3)
EOSINOPHIL # BLD AUTO: 0.2 K/UL — SIGNIFICANT CHANGE UP (ref 0–0.5)
EOSINOPHIL NFR BLD AUTO: 1.4 % — SIGNIFICANT CHANGE UP (ref 0–5)
GAS PNL BLDA: SIGNIFICANT CHANGE UP
GAS PNL BLDA: SIGNIFICANT CHANGE UP
GLUCOSE SERPL-MCNC: 112 MG/DL — SIGNIFICANT CHANGE UP (ref 70–115)
HCO3 BLDA-SCNC: 29 MMOL/L — HIGH (ref 20–26)
HCT VFR BLD CALC: 22.1 % — LOW (ref 42–52)
HGB BLD-MCNC: 7.1 G/DL — LOW (ref 14–18)
HOROWITZ INDEX BLDA+IHG-RTO: SIGNIFICANT CHANGE UP
LYMPHOCYTES # BLD AUTO: 0.9 K/UL — LOW (ref 1–4.8)
LYMPHOCYTES # BLD AUTO: 5.3 % — LOW (ref 20–55)
MAGNESIUM SERPL-MCNC: 1.5 MG/DL — LOW (ref 1.6–2.6)
MCHC RBC-ENTMCNC: 27.1 PG — SIGNIFICANT CHANGE UP (ref 27–31)
MCHC RBC-ENTMCNC: 32.1 G/DL — SIGNIFICANT CHANGE UP (ref 32–36)
MCV RBC AUTO: 84.4 FL — SIGNIFICANT CHANGE UP (ref 80–94)
MONOCYTES # BLD AUTO: 1.2 K/UL — HIGH (ref 0–0.8)
MONOCYTES NFR BLD AUTO: 7.5 % — SIGNIFICANT CHANGE UP (ref 3–10)
NEUTROPHILS # BLD AUTO: 13.8 K/UL — HIGH (ref 1.8–8)
NEUTROPHILS NFR BLD AUTO: 84.6 % — HIGH (ref 37–73)
PCO2 BLDA: 38 MMHG — SIGNIFICANT CHANGE UP (ref 35–45)
PH BLDA: 7.48 — HIGH (ref 7.35–7.45)
PHOSPHATE SERPL-MCNC: 2.4 MG/DL — SIGNIFICANT CHANGE UP (ref 2.4–4.7)
PLATELET # BLD AUTO: 315 K/UL — SIGNIFICANT CHANGE UP (ref 150–400)
PO2 BLDA: 109 MMHG — HIGH (ref 83–108)
POTASSIUM SERPL-MCNC: 4.4 MMOL/L — SIGNIFICANT CHANGE UP (ref 3.5–5.3)
POTASSIUM SERPL-SCNC: 4.4 MMOL/L — SIGNIFICANT CHANGE UP (ref 3.5–5.3)
PREALB SERPL-MCNC: 6 MG/DL — LOW (ref 18–38)
PROT SERPL-MCNC: 6.6 G/DL — SIGNIFICANT CHANGE UP (ref 6.6–8.7)
RBC # BLD: 2.62 M/UL — LOW (ref 4.6–6.2)
RBC # FLD: 18 % — HIGH (ref 11–15.6)
SAO2 % BLDA: 100 % — HIGH (ref 95–99)
SODIUM SERPL-SCNC: 129 MMOL/L — LOW (ref 135–145)
WBC # BLD: 16.4 K/UL — HIGH (ref 4.8–10.8)
WBC # FLD AUTO: 16.4 K/UL — HIGH (ref 4.8–10.8)

## 2018-11-04 PROCEDURE — 76705 ECHO EXAM OF ABDOMEN: CPT | Mod: 26

## 2018-11-04 PROCEDURE — 99232 SBSQ HOSP IP/OBS MODERATE 35: CPT

## 2018-11-04 RX ADMIN — LEVETIRACETAM 1000 MILLIGRAM(S): 250 TABLET, FILM COATED ORAL at 18:34

## 2018-11-04 RX ADMIN — OXYCODONE HYDROCHLORIDE 10 MILLIGRAM(S): 5 TABLET ORAL at 01:51

## 2018-11-04 RX ADMIN — Medication 100 MILLIGRAM(S): at 09:15

## 2018-11-04 RX ADMIN — OXYCODONE HYDROCHLORIDE 10 MILLIGRAM(S): 5 TABLET ORAL at 19:04

## 2018-11-04 RX ADMIN — Medication 100 MILLIGRAM(S): at 13:33

## 2018-11-04 RX ADMIN — Medication 20 MILLIGRAM(S): at 18:34

## 2018-11-04 RX ADMIN — CHLORHEXIDINE GLUCONATE 15 MILLILITER(S): 213 SOLUTION TOPICAL at 18:34

## 2018-11-04 RX ADMIN — Medication 50 MILLILITER(S): at 00:36

## 2018-11-04 RX ADMIN — Medication 100 MILLIGRAM(S): at 01:47

## 2018-11-04 RX ADMIN — PROPOFOL 2.43 MICROGRAM(S)/KG/MIN: 10 INJECTION, EMULSION INTRAVENOUS at 01:46

## 2018-11-04 RX ADMIN — Medication 20 MILLIGRAM(S): at 00:37

## 2018-11-04 RX ADMIN — OXYCODONE HYDROCHLORIDE 10 MILLIGRAM(S): 5 TABLET ORAL at 09:15

## 2018-11-04 RX ADMIN — Medication 100 MILLIGRAM(S): at 18:34

## 2018-11-04 RX ADMIN — PROPOFOL 2.43 MICROGRAM(S)/KG/MIN: 10 INJECTION, EMULSION INTRAVENOUS at 18:34

## 2018-11-04 RX ADMIN — CHLORHEXIDINE GLUCONATE 15 MILLILITER(S): 213 SOLUTION TOPICAL at 05:42

## 2018-11-04 RX ADMIN — Medication 5 MILLIGRAM(S): at 22:01

## 2018-11-04 RX ADMIN — OXYCODONE HYDROCHLORIDE 10 MILLIGRAM(S): 5 TABLET ORAL at 06:00

## 2018-11-04 RX ADMIN — OXYCODONE HYDROCHLORIDE 10 MILLIGRAM(S): 5 TABLET ORAL at 09:39

## 2018-11-04 RX ADMIN — Medication 1 MILLIGRAM(S): at 13:33

## 2018-11-04 RX ADMIN — OXYCODONE HYDROCHLORIDE 10 MILLIGRAM(S): 5 TABLET ORAL at 18:39

## 2018-11-04 RX ADMIN — CHLORHEXIDINE GLUCONATE 1 APPLICATION(S): 213 SOLUTION TOPICAL at 13:39

## 2018-11-04 RX ADMIN — OXYCODONE HYDROCHLORIDE 10 MILLIGRAM(S): 5 TABLET ORAL at 01:49

## 2018-11-04 RX ADMIN — OXYCODONE HYDROCHLORIDE 10 MILLIGRAM(S): 5 TABLET ORAL at 13:39

## 2018-11-04 RX ADMIN — LEVETIRACETAM 1000 MILLIGRAM(S): 250 TABLET, FILM COATED ORAL at 05:42

## 2018-11-04 RX ADMIN — OXYCODONE HYDROCHLORIDE 10 MILLIGRAM(S): 5 TABLET ORAL at 05:42

## 2018-11-04 RX ADMIN — OXYCODONE HYDROCHLORIDE 10 MILLIGRAM(S): 5 TABLET ORAL at 22:00

## 2018-11-04 RX ADMIN — ENOXAPARIN SODIUM 40 MILLIGRAM(S): 100 INJECTION SUBCUTANEOUS at 13:32

## 2018-11-04 RX ADMIN — OXYCODONE HYDROCHLORIDE 10 MILLIGRAM(S): 5 TABLET ORAL at 22:01

## 2018-11-04 RX ADMIN — BROMOCRIPTINE MESYLATE 5 MILLIGRAM(S): 5 CAPSULE ORAL at 13:32

## 2018-11-04 RX ADMIN — OXYCODONE HYDROCHLORIDE 10 MILLIGRAM(S): 5 TABLET ORAL at 13:33

## 2018-11-04 RX ADMIN — Medication 20 MILLIGRAM(S): at 13:32

## 2018-11-04 RX ADMIN — Medication 20 MILLIGRAM(S): at 05:42

## 2018-11-04 NOTE — PROGRESS NOTE ADULT - ASSESSMENT
32 y Male who is followed by neurology because of TBI/possible seizure    TBI  Now status post decompressive bilateral hemicraniectomy and extraventricular drain.  Significant amount of intracranial hemorrhage, improving post decompression.  Multiple areas of parenchymal CVA/ischemia seen  No significant improvement in neuro exam    Possible seizure  EEG: no seizure, breech rhythm over craniectomies, slowing suggesting diffuse cerebral dysfunction.  Rhythmic tongue movements and random foot movement has stopped.  Continue Keppra.    Neurologic status remains unchanged.  Poor prognosis for meaningful recovery.

## 2018-11-04 NOTE — PROGRESS NOTE ADULT - SUBJECTIVE AND OBJECTIVE BOX
[facility]                                        [facility] at [place]                                 Kelly Toth, & Vincent                                  [address]                                        [place], [place], [address]                                             [phone]        CC: Traumatic Brain Injury and seizure.    HPI:   The patient is a young man, estimated to be in 30's who was found unresponsive on sidewalk.  Per chart initial CT head showed subarachnoid hemorrhage and bilateral subdural hematomas.  He was then transferred from [facility] to [facility] ER for emergent neurosurgical treatment.  He was posturing with agonal respirations on arrival to [facility].  He was taken to the OR emergently by Dr. Fraser for evacuation and decompression of subdural hematomas.  He had bilateral craniectomy and extraventricular drain placed.  He had been having non- rhythmic movements of his feet and rhythmic movements of his tongue, causing concern for seizure/status epilepticus.    Interim history:    Neurologic status unchanged.    ROS:   Unobtainable due to patient's condition.     MEDICATIONS  (STANDING):  bromocriptine Tablet 5 milliGRAM(s) Oral daily  ceFAZolin   IVPB      ceFAZolin   IVPB 2000 milliGRAM(s) IV Intermittent every 8 hours  chlorhexidine 0.12% Liquid 15 milliLiter(s) Swish and Spit two times a day  chlorhexidine 2% Cloths 1 Application(s) Topical daily  enoxaparin Injectable 40 milliGRAM(s) SubCutaneous daily  folic acid 1 milliGRAM(s) Oral daily  levETIRAcetam  Solution 1000 milliGRAM(s) Oral two times a day  melatonin 5 milliGRAM(s) Oral at bedtime  oxyCODONE    Solution 10 milliGRAM(s) Oral every 4 hours  propofol Infusion 5 MICROgram(s)/kG/Min (2.43 mL/Hr) IV Continuous <Continuous>  propranolol 20 milliGRAM(s) Oral every 6 hours  thiamine 100 milliGRAM(s) Oral daily      Vital Signs Last 24 Hrs  T(C): 37.9 (04 Nov 2018 08:00), Max: 38.4 (04 Nov 2018 00:00)  T(F): 100.2 (04 Nov 2018 08:00), Max: 101.1 (04 Nov 2018 00:00)  HR: 85 (04 Nov 2018 09:00) (78 - 90)  RR: 20 (04 Nov 2018 09:00) (18 - 31)  SpO2: 98% (04 Nov 2018 09:00) (95% - 100%)    Detailed Neurologic Exam:    Mental status: Unresponsive to voice.     Cranial nerves: Pupils: left 2.0  mm NR right 4 mm not reactive. There is no visual response to threat.  Extraocular motion is not assessed. Absent corneal reflexes.      Motor/Sensory:   There is  bilateral extensor posturing to pinch and sternal rub.  There is triple flexion to nail bed pressure in feet    Reflexes: absent throughout and plantar responses are extensor.    Cerebellar:  Unable to test finger to nose testing.    Labs:     11-04    129<L>  |  95<L>  |  12.0  ----------------------------<  112  4.4   |  25.0  |  0.21<L>    Ca    7.8<L>      04 Nov 2018 06:04  Phos  2.4     11-04  Mg     1.5     11-04    TPro  6.6  /  Alb  2.0<L>  /  TBili  1.9  /  DBili  1.1<H>  /  AST  154<H>  /  ALT  37  /  AlkPhos  616<H>  11-04                            7.1    16.4  )-----------( 315      ( 04 Nov 2018 06:04 )             22.1

## 2018-11-04 NOTE — PROGRESS NOTE ADULT - SUBJECTIVE AND OBJECTIVE BOX
HISTORY  32y Male found down severe TBI now with LLL PNA, concern for Cholecystitis    24 HOUR EVENTS: No acute events overnight.  Continues on PSV ventilation.  Propofol sedation weaned to minimum with oxycodone for pain.  24hr urine creatinine clearance 132.  +BM, voiding via borjas catheter.      SUBJECTIVE/ROS:  [ ] A ten-point review of systems was otherwise negative except as noted.  [ c] Due to altered mental status/intubation, subjective information were not able to be obtained from the patient. History was obtained, to the extent possible, from review of the chart and collateral sources of information.      NEURO  RASS:   -2  GCS:  4T    Exam: R pupil 4mm nonreactive, L pupil 2mm nonreactive  Meds: acetaminophen    Suspension .. 650 milliGRAM(s) Oral every 6 hours PRN Temp greater or equal to 38.5C (101.3F)  bromocriptine Tablet 5 milliGRAM(s) Oral daily  levETIRAcetam  Solution 1000 milliGRAM(s) Oral two times a day  melatonin 5 milliGRAM(s) Oral at bedtime  oxyCODONE    Solution 10 milliGRAM(s) Oral every 4 hours  propofol Infusion 5 MICROgram(s)/kG/Min IV Continuous <Continuous>    [x] Adequacy of sedation and pain control has been assessed and adjusted      RESPIRATORY  RR: 19 (11-04-18 @ 07:00) (19 - 31)  SpO2: 96% (11-04-18 @ 07:00) (95% - 100%)  Wt(kg): --  Exam: unlabored, clear to auscultation bilaterally  Mechanical Ventilation: Mode: CPAP with PS, RR (patient): 20, FiO2: 40, PEEP: 8, PS: 10, MAP: 11  ABG - ( 04 Nov 2018 04:14 )  pH: 7.48  /  pCO2: 38    /  pO2: 109   / HCO3: 29    / Base Excess: 4.6   /  SaO2: 100     Lactate: x             CARDIOVASCULAR  HR: 81 (11-04-18 @ 07:00) (78 - 90)  BP: --  BP(mean): --  ABP: 118/58 (11-04-18 @ 07:00) (112/60 - 162/93)  ABP(mean): 74 (11-04-18 @ 07:00) (74 - 116)        Exam:  Cardiac Rhythm:  Perfusion     [c]Adequate   [ ]Inadequate  Mentation   [ ]Normal       [c ]Reduced  Extremities  [c ]Warm         [ ]Cool  Volume Status [ ]Hypervolemic [c ]Euvolemic [ ]Hypovolemic  Meds: propranolol 20 milliGRAM(s) Oral every 6 hours        GI/NUTRITION  Exam: Abdomen soft, distended, no grimace to exam  Diet: Pivot @50cc/hr    GENITOURINARY  I&O's Detail    11-03 @ 08:01  -  11-04 @ 07:00  --------------------------------------------------------  IN:    Enteral Tube Flush: 770 mL    Pivot: 1200 mL    propofol Infusion: 270.5 mL    Solution: 100 mL    Solution: 50 mL  Total IN: 2390.5 mL    OUT:    Indwelling Catheter - Urethral: 1130 mL  Total OUT: 1130 mL    Total NET: 1260.5 mL          11-04    129<L>  |  95<L>  |  12.0  ----------------------------<  112  4.4   |  25.0  |  0.21<L>    Ca    7.8<L>      04 Nov 2018 06:04  Phos  2.4     11-04  Mg     1.5     11-04    TPro  6.6  /  Alb  2.0<L>  /  TBili  1.9  /  DBili  1.1<H>  /  AST  154<H>  /  ALT  37  /  AlkPhos  616<H>  11-04    [c ] Borjas catheter, indication: monitoring in critically ill patient  Meds: folic acid 1 milliGRAM(s) Oral daily  thiamine 100 milliGRAM(s) Oral daily        HEMATOLOGIC  Meds: enoxaparin Injectable 40 milliGRAM(s) SubCutaneous daily    [x] VTE Prophylaxis - Lovenox                        7.1    16.4  )-----------( 315      ( 04 Nov 2018 06:04 )             22.1     PT/INR - ( 02 Nov 2018 09:55 )   PT: 20.3 sec;   INR: 1.74 ratio         PTT - ( 02 Nov 2018 09:55 )  PTT:34.4 sec  Transfusion     [ ] PRBC   [ ] Platelets   [ ] FFP   [ ] Cryoprecipitate      INFECTIOUS DISEASES  T(C): 37.6 (11-04-18 @ 04:00), Max: 38.4 (11-04-18 @ 00:00)  Wt(kg): --  WBC Count: 16.4 K/uL (11-04 @ 06:04)    Recent Cultures:  Specimen Source: .Urine Catheterized, 11-02 @ 10:34; Results   No growth; Gram Stain: --; Organism: --  Specimen Source: .Sputum Sputum, 10-28 @ 20:57; Results   Moderate Staphylococcus aureus; Gram Stain:   Moderate White blood cells  Few Gram Positive Cocci in Pairs and Chains  Few Gram Positive Rods  Few Gram Negative Rods; Organism: Staphylococcus aureus  Specimen Source: .Blood Blood-Peripheral, 10-28 @ 17:40; Results   No growth at 5 days.; Gram Stain: --; Organism: --  Specimen Source: .Blood Blood-Peripheral, 10-28 @ 14:14; Results   No growth at 5 days.; Gram Stain: --; Organism: --    Meds: ceFAZolin   IVPB      ceFAZolin   IVPB 2000 milliGRAM(s) IV Intermittent every 8 hours        ENDOCRINE  Capillary Blood Glucose    Meds:       ACCESS DEVICES:  [ x] Peripheral IV  [ ] Central Venous Line	[ ] R	[ ] L	[ ] IJ	[ ] Fem	[ ] SC	Placed:   [ x] Arterial Line		[ x] R	[ ] L	[ ] Fem	[ x] Rad	[ ] Ax	Placed:   [ ] PICC:					[ ] Mediport  [ x] Urinary Catheter, Date Placed: 11/2  [x ] Necessity of urinary, arterial, and venous catheters discussed    OTHER MEDICATIONS:  chlorhexidine 0.12% Liquid 15 milliLiter(s) Swish and Spit two times a day  chlorhexidine 2% Cloths 1 Application(s) Topical daily      CODE STATUS:  Full Code    IMAGING:

## 2018-11-05 LAB
ANION GAP SERPL CALC-SCNC: 11 MMOL/L — SIGNIFICANT CHANGE UP (ref 5–17)
APTT BLD: 34.5 SEC — SIGNIFICANT CHANGE UP (ref 27.5–36.3)
B PERT IGG+IGM PNL SER: ABNORMAL
BASOPHILS # BLD AUTO: 0 K/UL — SIGNIFICANT CHANGE UP (ref 0–0.2)
BASOPHILS NFR BLD AUTO: 0.1 % — SIGNIFICANT CHANGE UP (ref 0–2)
BLD GP AB SCN SERPL QL: SIGNIFICANT CHANGE UP
BUN SERPL-MCNC: 14 MG/DL — SIGNIFICANT CHANGE UP (ref 8–20)
CALCIUM SERPL-MCNC: 7.9 MG/DL — LOW (ref 8.6–10.2)
CHLORIDE SERPL-SCNC: 92 MMOL/L — LOW (ref 98–107)
CO2 SERPL-SCNC: 25 MMOL/L — SIGNIFICANT CHANGE UP (ref 22–29)
COLLECT DURATION TIME UR: 24 HR — SIGNIFICANT CHANGE UP
COLOR FLD: YELLOW
CREAT SERPL-MCNC: 0.2 MG/DL — LOW (ref 0.5–1.3)
EOSINOPHIL # BLD AUTO: 0.3 K/UL — SIGNIFICANT CHANGE UP (ref 0–0.5)
EOSINOPHIL NFR BLD AUTO: 1 % — SIGNIFICANT CHANGE UP (ref 0–6)
FLUID INTAKE SUBSTANCE CLASS: SIGNIFICANT CHANGE UP
FLUID SEGMENTED GRANULOCYTES: 59 % — SIGNIFICANT CHANGE UP
GAS PNL BLDA: SIGNIFICANT CHANGE UP
GLUCOSE SERPL-MCNC: 80 MG/DL — SIGNIFICANT CHANGE UP (ref 70–115)
GRAM STN FLD: SIGNIFICANT CHANGE UP
HCT VFR BLD CALC: 26.7 % — LOW (ref 42–52)
HGB BLD-MCNC: 8.6 G/DL — LOW (ref 14–18)
INR BLD: 1.65 RATIO — HIGH (ref 0.88–1.16)
LYMPHOCYTES # BLD AUTO: 1.2 K/UL — SIGNIFICANT CHANGE UP (ref 1–4.8)
LYMPHOCYTES # BLD AUTO: 4.4 % — LOW (ref 20–55)
LYMPHOCYTES # FLD: 16 % — SIGNIFICANT CHANGE UP
MAGNESIUM SERPL-MCNC: 1.5 MG/DL — LOW (ref 1.6–2.6)
MCHC RBC-ENTMCNC: 27.2 PG — SIGNIFICANT CHANGE UP (ref 27–31)
MCHC RBC-ENTMCNC: 32.2 G/DL — SIGNIFICANT CHANGE UP (ref 32–36)
MCV RBC AUTO: 84.5 FL — SIGNIFICANT CHANGE UP (ref 80–94)
MESOTHL CELL # FLD: 3 % — SIGNIFICANT CHANGE UP
MONOCYTES # BLD AUTO: 1.6 K/UL — HIGH (ref 0–0.8)
MONOCYTES NFR BLD AUTO: 5.7 % — SIGNIFICANT CHANGE UP (ref 3–10)
MONOS+MACROS # FLD: 22 % — SIGNIFICANT CHANGE UP
NEUTROPHILS # BLD AUTO: 23.6 K/UL — HIGH (ref 1.8–8)
NEUTROPHILS NFR BLD AUTO: 87.2 % — HIGH (ref 37–73)
PHOSPHATE SERPL-MCNC: 3.3 MG/DL — SIGNIFICANT CHANGE UP (ref 2.4–4.7)
PLATELET # BLD AUTO: 392 K/UL — SIGNIFICANT CHANGE UP (ref 150–400)
POTASSIUM SERPL-MCNC: 4.9 MMOL/L — SIGNIFICANT CHANGE UP (ref 3.5–5.3)
POTASSIUM SERPL-SCNC: 4.9 MMOL/L — SIGNIFICANT CHANGE UP (ref 3.5–5.3)
PROTHROM AB SERPL-ACNC: 19.3 SEC — HIGH (ref 10–12.9)
RBC # BLD: 3.16 M/UL — LOW (ref 4.6–6.2)
RBC # FLD: 17.9 % — HIGH (ref 11–15.6)
RCV VOL RI: 500 /UL — HIGH (ref 0–5)
SODIUM SERPL-SCNC: 128 MMOL/L — LOW (ref 135–145)
SPECIMEN SOURCE: SIGNIFICANT CHANGE UP
TOTAL NUCLEATED CELL COUNT, BODY FLUID: 375 /UL — HIGH (ref 0–5)
TOTAL VOLUME - 24 HOUR: 1225 ML — SIGNIFICANT CHANGE UP
TUBE TYPE: SIGNIFICANT CHANGE UP
TYPE + AB SCN PNL BLD: SIGNIFICANT CHANGE UP
URINE CREATININE CALCULATION: 1.4 G/24 HR — SIGNIFICANT CHANGE UP (ref 1–2)
WBC # BLD: 27.4 K/UL — HIGH (ref 4.8–10.8)
WBC # FLD AUTO: 27.4 K/UL — HIGH (ref 4.8–10.8)

## 2018-11-05 PROCEDURE — 99232 SBSQ HOSP IP/OBS MODERATE 35: CPT

## 2018-11-05 PROCEDURE — 49082 ABD PARACENTESIS: CPT

## 2018-11-05 PROCEDURE — 99233 SBSQ HOSP IP/OBS HIGH 50: CPT | Mod: GC

## 2018-11-05 RX ORDER — ALBUMIN HUMAN 25 %
100 VIAL (ML) INTRAVENOUS EVERY 6 HOURS
Qty: 0 | Refills: 0 | Status: COMPLETED | OUTPATIENT
Start: 2018-11-05 | End: 2018-11-06

## 2018-11-05 RX ORDER — MAGNESIUM SULFATE 500 MG/ML
2 VIAL (ML) INJECTION ONCE
Qty: 0 | Refills: 0 | Status: COMPLETED | OUTPATIENT
Start: 2018-11-05 | End: 2018-11-05

## 2018-11-05 RX ORDER — LACTULOSE 10 G/15ML
20 SOLUTION ORAL EVERY 12 HOURS
Qty: 0 | Refills: 0 | Status: DISCONTINUED | OUTPATIENT
Start: 2018-11-05 | End: 2018-11-08

## 2018-11-05 RX ORDER — MAGNESIUM SULFATE 500 MG/ML
4 VIAL (ML) INJECTION ONCE
Qty: 0 | Refills: 0 | Status: DISCONTINUED | OUTPATIENT
Start: 2018-11-05 | End: 2018-11-05

## 2018-11-05 RX ADMIN — LEVETIRACETAM 1000 MILLIGRAM(S): 250 TABLET, FILM COATED ORAL at 18:06

## 2018-11-05 RX ADMIN — LEVETIRACETAM 1000 MILLIGRAM(S): 250 TABLET, FILM COATED ORAL at 05:48

## 2018-11-05 RX ADMIN — CHLORHEXIDINE GLUCONATE 1 APPLICATION(S): 213 SOLUTION TOPICAL at 11:49

## 2018-11-05 RX ADMIN — OXYCODONE HYDROCHLORIDE 10 MILLIGRAM(S): 5 TABLET ORAL at 22:07

## 2018-11-05 RX ADMIN — BROMOCRIPTINE MESYLATE 5 MILLIGRAM(S): 5 CAPSULE ORAL at 13:54

## 2018-11-05 RX ADMIN — Medication 20 MILLIGRAM(S): at 18:06

## 2018-11-05 RX ADMIN — Medication 20 MILLIGRAM(S): at 00:39

## 2018-11-05 RX ADMIN — OXYCODONE HYDROCHLORIDE 10 MILLIGRAM(S): 5 TABLET ORAL at 13:53

## 2018-11-05 RX ADMIN — OXYCODONE HYDROCHLORIDE 10 MILLIGRAM(S): 5 TABLET ORAL at 10:00

## 2018-11-05 RX ADMIN — OXYCODONE HYDROCHLORIDE 10 MILLIGRAM(S): 5 TABLET ORAL at 05:48

## 2018-11-05 RX ADMIN — OXYCODONE HYDROCHLORIDE 10 MILLIGRAM(S): 5 TABLET ORAL at 18:06

## 2018-11-05 RX ADMIN — Medication 20 MILLIGRAM(S): at 05:48

## 2018-11-05 RX ADMIN — OXYCODONE HYDROCHLORIDE 10 MILLIGRAM(S): 5 TABLET ORAL at 10:32

## 2018-11-05 RX ADMIN — OXYCODONE HYDROCHLORIDE 10 MILLIGRAM(S): 5 TABLET ORAL at 02:26

## 2018-11-05 RX ADMIN — CHLORHEXIDINE GLUCONATE 15 MILLILITER(S): 213 SOLUTION TOPICAL at 18:07

## 2018-11-05 RX ADMIN — ENOXAPARIN SODIUM 40 MILLIGRAM(S): 100 INJECTION SUBCUTANEOUS at 11:56

## 2018-11-05 RX ADMIN — Medication 50 MILLILITER(S): at 18:06

## 2018-11-05 RX ADMIN — OXYCODONE HYDROCHLORIDE 10 MILLIGRAM(S): 5 TABLET ORAL at 21:07

## 2018-11-05 RX ADMIN — Medication 5 MILLIGRAM(S): at 21:07

## 2018-11-05 RX ADMIN — LACTULOSE 20 GRAM(S): 10 SOLUTION ORAL at 18:06

## 2018-11-05 RX ADMIN — OXYCODONE HYDROCHLORIDE 10 MILLIGRAM(S): 5 TABLET ORAL at 02:30

## 2018-11-05 RX ADMIN — Medication 100 MILLIGRAM(S): at 13:53

## 2018-11-05 RX ADMIN — Medication 50 GRAM(S): at 07:48

## 2018-11-05 RX ADMIN — Medication 100 MILLIGRAM(S): at 02:26

## 2018-11-05 RX ADMIN — CHLORHEXIDINE GLUCONATE 15 MILLILITER(S): 213 SOLUTION TOPICAL at 05:48

## 2018-11-05 RX ADMIN — Medication 20 MILLIGRAM(S): at 13:53

## 2018-11-05 RX ADMIN — OXYCODONE HYDROCHLORIDE 10 MILLIGRAM(S): 5 TABLET ORAL at 14:00

## 2018-11-05 RX ADMIN — Medication 1 MILLIGRAM(S): at 13:53

## 2018-11-05 RX ADMIN — Medication 50 MILLILITER(S): at 11:56

## 2018-11-05 RX ADMIN — Medication 100 MILLIGRAM(S): at 10:01

## 2018-11-05 RX ADMIN — Medication 50 GRAM(S): at 09:58

## 2018-11-05 RX ADMIN — OXYCODONE HYDROCHLORIDE 10 MILLIGRAM(S): 5 TABLET ORAL at 19:00

## 2018-11-05 RX ADMIN — Medication 100 MILLIGRAM(S): at 18:06

## 2018-11-05 NOTE — PROCEDURE NOTE - PROCEDURE
<<-----Click on this checkbox to enter Procedure Paracentesis at bedside  11/06/2018    Active  BFUMANTI

## 2018-11-05 NOTE — PROGRESS NOTE ADULT - SUBJECTIVE AND OBJECTIVE BOX
[facility]                                        Neurology at [place]                                 Kelly Toth, & Vincent                                  [address]                                        [place], [place], [address]                                             [phone]        CC: Traumatic Brain Injury and seizure.    HPI:   The patient is a young man, estimated to be in 30's who was found unresponsive on [place]wal.  Per chart initial CT head showed subarachnoid hemorrhage and bilateral subdural hematomas.  He was then transferred from [facility] to [facility] ER for emergent neurosurgical treatment.  He was posturing with agonal respirations on arrival to [facility].  He was taken to the OR emergently by Dr. Fraser for evacuation and decompression of subdural hematomas.  He had bilateral craniectomy and extraventricular drain placed.  He had been having non- rhythmic movements of his feet and rhythmic movements of his tongue, causing concern for seizure/status epilepticus.    Interim history:    Neurologic status unchanged.    ROS:   Unobtainable due to patient's condition.     MEDICATIONS  (STANDING):  albumin human 25% IVPB 100 milliLiter(s) IV Intermittent every 6 hours  bromocriptine Tablet 5 milliGRAM(s) Oral daily  ceFAZolin   IVPB      ceFAZolin   IVPB 2000 milliGRAM(s) IV Intermittent every 8 hours  chlorhexidine 0.12% Liquid 15 milliLiter(s) Swish and Spit two times a day  chlorhexidine 2% Cloths 1 Application(s) Topical daily  enoxaparin Injectable 40 milliGRAM(s) SubCutaneous daily  folic acid 1 milliGRAM(s) Oral daily  lactulose Syrup 20 Gram(s) Oral every 12 hours  levETIRAcetam  Solution 1000 milliGRAM(s) Oral two times a day  melatonin 5 milliGRAM(s) Oral at bedtime  oxyCODONE    Solution 10 milliGRAM(s) Oral every 4 hours  propranolol 20 milliGRAM(s) Oral every 6 hours  rifaximin 550 milliGRAM(s) Oral two times a day  thiamine 100 milliGRAM(s) Oral daily      Vital Signs Last 24 Hrs  T(C): 37.6 (05 Nov 2018 08:00), Max: 38.4 (04 Nov 2018 16:00)  T(F): 99.7 (05 Nov 2018 08:00), Max: 101.1 (04 Nov 2018 16:00)  HR: 72 (05 Nov 2018 11:00) (72 - 86)  BP: 104/57 (05 Nov 2018 11:00) (98/56 - 114/62)  BP(mean): 75 (05 Nov 2018 11:00) (72 - 82)  RR: 24 (05 Nov 2018 11:00) (12 - 34)  SpO2: 97% (05 Nov 2018 11:00) (93% - 100%)    Detailed Neurologic Exam:  Remains on mechanical ventilator via tracheostomy.    Mental status: Unresponsive to voice.     Cranial nerves: Pupils: left 2.0  mm NR right 4 mm not reactive. There is no visual response to threat.  Extraocular motion is not assessed. Absent corneal reflexes.      Motor/Sensory:   There is  bilateral extensor posturing to pinch and sternal rub.  There is triple flexion to nail bed pressure in feet    Reflexes: absent throughout and plantar responses are extensor.    Cerebellar:  Unable to test finger to nose testing.    Labs:     11-05    128<L>  |  92<L>  |  14.0  ----------------------------<  80  4.9   |  25.0  |  0.20<L>    Ca    7.9<L>      05 Nov 2018 04:37  Phos  3.3     11-05  Mg     1.5     11-05    TPro  6.6  /  Alb  2.0<L>  /  TBili  1.9  /  DBili  1.1<H>  /  AST  154<H>  /  ALT  37  /  AlkPhos  616<H>  11-04                            8.6    27.4  )-----------( 392      ( 05 Nov 2018 04:37 )             26.7

## 2018-11-05 NOTE — PROGRESS NOTE ADULT - ASSESSMENT
32y Male with severe TBI, acute respiratory failure pneumonia, ? cholecystitis   Neuro: continue coma stim  Pulm: continue to wean to trach collar today  Card: no new issues  GI: restart tube feeds, FU official US results  Renal: hyponatremia stable, 24H creatinine clearance ongoing to eval for augmented renal clearance   ID: on ancef for MSSA pna  PPX: lovenox for DVT ppx  Dispo: will continue to require ICU level of care.

## 2018-11-05 NOTE — PROGRESS NOTE ADULT - ATTENDING COMMENTS
Rising WBC count.  Unclear reason.  GB normal appearing on repeat ultrasound.  Will perform paracentesis to exclude bacterial peritonitis.      Given elevated ammonia consider hepatic encephalopathy complicating tbi.  Will start lactulose, rifaximin, and colloid volume expansion.

## 2018-11-05 NOTE — PROGRESS NOTE ADULT - SUBJECTIVE AND OBJECTIVE BOX
INTERVAL HPI/OVERNIGHT EVENTS: Patient spiked fevers up to 101.1 yesterday afternoon. Noted this morning to have increasing WBC to 27.4. Ultrasound resulted with findings of gallbladder wall thickening. Patient today underwent paracentesis to rule out spontaneous bacterial peritonitis. Continued to exhibit decerabrate posturing.    STATUS POST:  b/l craniectomy, trach/PEG    POST OPERATIVE DAY #: 12, 10    MEDICATIONS  (STANDING):  albumin human 25% IVPB 100 milliLiter(s) IV Intermittent every 6 hours  bromocriptine Tablet 5 milliGRAM(s) Oral daily  ceFAZolin   IVPB      ceFAZolin   IVPB 2000 milliGRAM(s) IV Intermittent every 8 hours  chlorhexidine 0.12% Liquid 15 milliLiter(s) Swish and Spit two times a day  chlorhexidine 2% Cloths 1 Application(s) Topical daily  enoxaparin Injectable 40 milliGRAM(s) SubCutaneous daily  folic acid 1 milliGRAM(s) Oral daily  lactulose Syrup 20 Gram(s) Oral every 12 hours  levETIRAcetam  Solution 1000 milliGRAM(s) Oral two times a day  melatonin 5 milliGRAM(s) Oral at bedtime  oxyCODONE    Solution 10 milliGRAM(s) Oral every 4 hours  propranolol 20 milliGRAM(s) Oral every 6 hours  rifaximin 550 milliGRAM(s) Oral two times a day  thiamine 100 milliGRAM(s) Oral daily    MEDICATIONS  (PRN):  acetaminophen    Suspension .. 650 milliGRAM(s) Oral every 6 hours PRN Temp greater or equal to 38.5C (101.3F)      Vital Signs Last 24 Hrs  T(C): 37.1 (05 Nov 2018 16:00), Max: 38.3 (04 Nov 2018 20:00)  T(F): 98.8 (05 Nov 2018 16:00), Max: 100.9 (04 Nov 2018 20:00)  HR: 76 (05 Nov 2018 18:00) (70 - 81)  BP: 101/56 (05 Nov 2018 18:00) (96/53 - 153/98)  BP(mean): 71 (05 Nov 2018 18:00) (68 - 119)  RR: 18 (05 Nov 2018 18:00) (12 - 31)  SpO2: 98% (05 Nov 2018 18:00) (93% - 100%)    Constitutional: NAD  Neck: No JVD  Respiratory: Breath Sounds equal & clear to percussion & auscultation, no accessory muscle use, on PS  Cardiovascular: Regular rate & rhythm, normal S1, S2  Gastrointestinal: Soft, non-tender, non-distended, PEG tube in place  Extremities: No peripheral edema, No cyanosis, clubbing   Vascular: Equal and normal pulses: 2+ peripheral pulses throughout  Neurological: GCS:  2T2, decerabrate posturing to noxious stimuli  Skin: No rashes      I&O's Detail    04 Nov 2018 07:01  -  05 Nov 2018 07:00  --------------------------------------------------------  IN:    Enteral Tube Flush: 660 mL    Pivot: 100 mL    propofol Infusion: 64.8 mL    Solution: 100 mL  Total IN: 924.8 mL    OUT:    Indwelling Catheter - Urethral: 1280 mL  Total OUT: 1280 mL    Total NET: -355.2 mL      05 Nov 2018 07:01  -  05 Nov 2018 19:02  --------------------------------------------------------  IN:    Enteral Tube Flush: 340 mL    Solution: 50 mL    Solution: 100 mL    Solution: 100 mL  Total IN: 590 mL    OUT:    Indwelling Catheter - Urethral: 485 mL  Total OUT: 485 mL    Total NET: 105 mL          LABS:                        8.6    27.4  )-----------( 392      ( 05 Nov 2018 04:37 )             26.7     11-05    128<L>  |  92<L>  |  14.0  ----------------------------<  80  4.9   |  25.0  |  0.20<L>    Ca    7.9<L>      05 Nov 2018 04:37  Phos  3.3     11-05  Mg     1.5     11-05    TPro  6.6  /  Alb  2.0<L>  /  TBili  1.9  /  DBili  1.1<H>  /  AST  154<H>  /  ALT  37  /  AlkPhos  616<H>  11-04    PT/INR - ( 05 Nov 2018 10:54 )   PT: 19.3 sec;   INR: 1.65 ratio         PTT - ( 05 Nov 2018 10:54 )  PTT:34.5 sec

## 2018-11-05 NOTE — PROGRESS NOTE ADULT - ATTENDING COMMENTS
Agree with Dr. Vera  Patient transitioned to vegetative state on Day 13. Opens eyes to stimulation.   Continues to have generalized/non-focal responses with persistence of primitive reflexes.   Will continue to follow. Please modify bromocriptine to 5mg Q6am/12pm.   Continue COMA STIM.

## 2018-11-05 NOTE — PROGRESS NOTE ADULT - ASSESSMENT
33 y/o M HD #13 s/p found [place] and [place] [facility] b/l craniectomy, trach/PEG and paracentesis  - continue keppra for seizure prophylaxis, f/u neurology recommendations  - f/u culture from paracentesis, current gram stain with few WBC, no organisms  - trend fever curve  - monitor WBC  - wean to trach collar as tolerated  - continue TF as tolerated  - continue Ancef for now  - continue SICU care

## 2018-11-05 NOTE — PROGRESS NOTE ADULT - SUBJECTIVE AND OBJECTIVE BOX
SUBJECTIVE  Patient seen and examined with rehabilitation staff at bedside. Propofol weaned this weekend and stopped today at 8AM. Patient tolerated well since. Bite block removed on Saturday, asked ICU staff if possible to get mouth guard to protect tongue. Still with severely swollen tongue.     TODAY'S REVIEW OF SYMPTOMS  Unable to obtain    CURRENT FUNCTIONAL STATUS    VITALS  T(C): 37.6 (11-05-18 @ 08:00)  T(F): 99.7 (11-05-18 @ 08:00), Max: 101.1 (11-04-18 @ 16:00)  HR: 73 (11-05-18 @ 09:00) (73 - 86)  BP: 98/56 (11-05-18 @ 09:00) (98/56 - 114/62)  RR:  (12 - 34)  SpO2:  (93% - 100%)  Wt(kg): --    RECENT LABS/IMAGING             8.6    27.4  )-----------( 392      ( 05 Nov 2018 04:37 )             26.7     128<L>  |  92<L>  |  14.0  ----------------------------<  80  4.9   |  25.0  |  0.20<L>    Ca    7.9<L>      05 Nov 2018 04:37  Phos  3.3     11-05  Mg     1.5     11-05    TPro  6.6  /  Alb  2.0<L>  /  TBili  1.9  /  DBili  1.1<H>  /  AST  154<H>  /  ALT  37  /  AlkPhos  616<H>  11-04    MEDICATIONS   MEDICATIONS  (STANDING):  bromocriptine Tablet 5 milliGRAM(s) Oral daily  ceFAZolin   IVPB      ceFAZolin   IVPB 2000 milliGRAM(s) IV Intermittent every 8 hours  chlorhexidine 0.12% Liquid 15 milliLiter(s) Swish and Spit two times a day  chlorhexidine 2% Cloths 1 Application(s) Topical daily  enoxaparin Injectable 40 milliGRAM(s) SubCutaneous daily  folic acid 1 milliGRAM(s) Oral daily  levETIRAcetam  Solution 1000 milliGRAM(s) Oral two times a day  magnesium sulfate  IVPB 2 Gram(s) IV Intermittent once  melatonin 5 milliGRAM(s) Oral at bedtime  oxyCODONE    Solution 10 milliGRAM(s) Oral every 4 hours  propofol Infusion 5 MICROgram(s)/kG/Min (2.43 mL/Hr) IV Continuous <Continuous>  propranolol 20 milliGRAM(s) Oral every 6 hours  thiamine 100 milliGRAM(s) Oral daily    MEDICATIONS  (PRN):  acetaminophen    Suspension .. 650 milliGRAM(s) Oral every 6 hours PRN Temp greater or equal to 38.5C (101.3F)    PHYSICAL EXAM  HEENT - Cranial incisions with staples, (+) Edema, (-) Spontaneous eye opening, (+) Blink to threat R > L, (-) Auditory startle  Cardiovascular - No tachycardia noted, All extremities warm, Diffuse edema  Extremities - Diffuse swelling, Localized response to pain LLE > RLE, Normal tone appreciated in all extremities  Reflexes - +Babinski and Meza's bilaterally, Felipe's reflex 2+/4, No clonus appreciated in bilateral lower extremities    Coma Recovery Scale - Revised  AUDITORY FUNCTION SCALE  0 - None  VISUAL FUNCTION SCALE  1 - Visual startle  MOTOR FUNCTION SCALE  3 - Localized response to noxious stimuli  OROMOTOR/VERBAL FUNCTION SCALE  0 - None  COMMUNICATION SCALE  0 - None  AROUSAL SCALE  0 - None    TOTAL SCORE= 4 = Coma    ASSESSMENT/PLAN  32y Male unknown PMH found unresponsive on sidewalk found w/ b/l SDH and SAH now s/p b/l hemicraniectomy and EVD placement s/p removal now in COMA x 13 DAYS    Sedation - Propofol turned off this AM and noted to have more response as compared to late last week. Continued on Oxycodone 10mg Q4hrs to help with tachypnea  Encephalopathy - Started on Rifaximin and Lactulose  PNA - Cefazolin  Possible seizure activity - Keppra   Dysautonomia - Propranolol, Bromocriptine  Pain - Oxycodone Q4hrs, Tylenol  Oral care - Chlorhexidine, Asked staff to inquire about obtaining a mouth guard  DVT PPX - SCDs, Lovenox  Sleep/Wake Cycles - Melatonin 5mg HS (10/31), Bromocriptine 5mg Qdaily  Rehab - COMA STIM. Will continue to follow. SUBJECTIVE  Patient seen and examined with rehabilitation staff at bedside. Propofol weaned this weekend and stopped today at 8AM. Patient tolerated well since. More eye opening appreciated. Bite block removed on Saturday, asked ICU staff if possible to get mouth guard to protect tongue. Still with severely swollen tongue.     TODAY'S REVIEW OF SYMPTOMS  Unable to obtain    VITALS  T(C): 37.6 (11-05-18 @ 08:00)  T(F): 99.7 (11-05-18 @ 08:00), Max: 101.1 (11-04-18 @ 16:00)  HR: 73 (11-05-18 @ 09:00) (73 - 86)  BP: 98/56 (11-05-18 @ 09:00) (98/56 - 114/62)  RR:  (12 - 34)  SpO2:  (93% - 100%)  Wt(kg): --    RECENT LABS/IMAGING             8.6    27.4  )-----------( 392      ( 05 Nov 2018 04:37 )             26.7     128<L>  |  92<L>  |  14.0  ----------------------------<  80  4.9   |  25.0  |  0.20<L>    Ca    7.9<L>      05 Nov 2018 04:37  Phos  3.3     11-05  Mg     1.5     11-05    TPro  6.6  /  Alb  2.0<L>  /  TBili  1.9  /  DBili  1.1<H>  /  AST  154<H>  /  ALT  37  /  AlkPhos  616<H>  11-04    MEDICATIONS   MEDICATIONS  (STANDING):  bromocriptine Tablet 5 milliGRAM(s) Oral daily  ceFAZolin   IVPB      ceFAZolin   IVPB 2000 milliGRAM(s) IV Intermittent every 8 hours  chlorhexidine 0.12% Liquid 15 milliLiter(s) Swish and Spit two times a day  chlorhexidine 2% Cloths 1 Application(s) Topical daily  enoxaparin Injectable 40 milliGRAM(s) SubCutaneous daily  folic acid 1 milliGRAM(s) Oral daily  levETIRAcetam  Solution 1000 milliGRAM(s) Oral two times a day  magnesium sulfate  IVPB 2 Gram(s) IV Intermittent once  melatonin 5 milliGRAM(s) Oral at bedtime  oxyCODONE    Solution 10 milliGRAM(s) Oral every 4 hours  propofol Infusion 5 MICROgram(s)/kG/Min (2.43 mL/Hr) IV Continuous <Continuous>  propranolol 20 milliGRAM(s) Oral every 6 hours  thiamine 100 milliGRAM(s) Oral daily    MEDICATIONS  (PRN):  acetaminophen    Suspension .. 650 milliGRAM(s) Oral every 6 hours PRN Temp greater or equal to 38.5C (101.3F)    PHYSICAL EXAM  HEENT - Cranial incisions with staples, (+) Edema, (+) Spontaneous eye opening, (+) Blink to threat R > L, (-) Auditory startle  Cardiovascular - No tachycardia noted, All extremities warm, Diffuse edema  Extremities - Diffuse swelling, Posturing to pain response, Normal tone appreciated in all extremities  Reflexes - +Babinski and Meza's bilaterally, Felipe's reflex 2+/4, No clonus appreciated in bilateral lower extremities, (+)Glabellar reflex    Coma Recovery Scale - Revised  AUDITORY FUNCTION SCALE  0 - None  VISUAL FUNCTION SCALE  1 - Visual startle  MOTOR FUNCTION SCALE  1 - Abnormal posturing (generalized decerebrate posturing particularly in upper extremities)  OROMOTOR/VERBAL FUNCTION SCALE  0 - None  COMMUNICATION SCALE  0 - None  AROUSAL SCALE  0 - None    TOTAL SCORE= 2     ASSESSMENT/PLAN  32y Male unknown PMH found unresponsive on sidewalk found w/ b/l SDH and SAH now s/p b/l hemicraniectomy and EVD placement s/p removal. Now in vegetative state.    Patient with spontaneous eye opening, appears to be in vegetative state at this time. Was is coma for approximately 13 days. Prognosis is in favor of patient given his age, that his injury was traumatic in nature, and since coma lasted less than 14 days, patient may make some recovery. However given his bilateral findings and extensor posturing response, overall prognosis is grim. Will most likely require significant assistance in the long term.    Sedation - Propofol turned off this AM and noted to have more response as compared to late last week. Continued on Oxycodone 10mg Q4hrs to help with tachypnea  Encephalopathy - Started on Rifaximin and Lactulose  Sleep/Wake Cycles - Melatonin 5mg HS (10/31), Recommend increasing Bromocriptine to 5mg Q6AM and Q12PM.   PNA - Cefazolin  Possible seizure activity - Keppra   Dysautonomia - Propranolol, Bromocriptine  Pain - Oxycodone Q4hrs, Tylenol  Oral care - Chlorhexidine, Asked staff to inquire about obtaining a mouth guard  DVT PPX - SCDs, Lovenox  Rehab - COMA STIM. Will continue to follow. SUBJECTIVE  Patient seen and examined with rehabilitation staff at bedside. Propofol weaned this weekend and stopped today at 8AM. Patient tolerated well since. More eye opening appreciated. Bite block removed on Saturday, asked ICU staff if possible to get mouth guard to protect tongue. Still with severely swollen tongue.     TODAY'S REVIEW OF SYMPTOMS  Unable to obtain    VITALS  T(C): 37.6 (11-05-18 @ 08:00)  T(F): 99.7 (11-05-18 @ 08:00), Max: 101.1 (11-04-18 @ 16:00)  HR: 73 (11-05-18 @ 09:00) (73 - 86)  BP: 98/56 (11-05-18 @ 09:00) (98/56 - 114/62)  RR:  (12 - 34)  SpO2:  (93% - 100%)  Wt(kg): --    RECENT LABS/IMAGING             8.6    27.4  )-----------( 392      ( 05 Nov 2018 04:37 )             26.7     128<L>  |  92<L>  |  14.0  ----------------------------<  80  4.9   |  25.0  |  0.20<L>    Ca    7.9<L>      05 Nov 2018 04:37  Phos  3.3     11-05  Mg     1.5     11-05    TPro  6.6  /  Alb  2.0<L>  /  TBili  1.9  /  DBili  1.1<H>  /  AST  154<H>  /  ALT  37  /  AlkPhos  616<H>  11-04    MEDICATIONS   MEDICATIONS  (STANDING):  bromocriptine Tablet 5 milliGRAM(s) Oral daily  ceFAZolin   IVPB      ceFAZolin   IVPB 2000 milliGRAM(s) IV Intermittent every 8 hours  chlorhexidine 0.12% Liquid 15 milliLiter(s) Swish and Spit two times a day  chlorhexidine 2% Cloths 1 Application(s) Topical daily  enoxaparin Injectable 40 milliGRAM(s) SubCutaneous daily  folic acid 1 milliGRAM(s) Oral daily  levETIRAcetam  Solution 1000 milliGRAM(s) Oral two times a day  magnesium sulfate  IVPB 2 Gram(s) IV Intermittent once  melatonin 5 milliGRAM(s) Oral at bedtime  oxyCODONE    Solution 10 milliGRAM(s) Oral every 4 hours  propofol Infusion 5 MICROgram(s)/kG/Min (2.43 mL/Hr) IV Continuous <Continuous>  propranolol 20 milliGRAM(s) Oral every 6 hours  thiamine 100 milliGRAM(s) Oral daily    MEDICATIONS  (PRN):  acetaminophen    Suspension .. 650 milliGRAM(s) Oral every 6 hours PRN Temp greater or equal to 38.5C (101.3F)    PHYSICAL EXAM  HEENT - Cranial incisions with staples, (+) Edema, (+) Spontaneous eye opening, (+) Blink to threat R > L, (-) Auditory startle  Cardiovascular - No tachycardia noted, All extremities warm, Diffuse edema  Extremities - Diffuse swelling, Posturing to pain response, Normal tone appreciated in all extremities  Reflexes - +Babinski and Meza's bilaterally, Felipe's reflex 2+/4, No clonus appreciated in bilateral lower extremities, (+)Glabellar reflex    Coma Recovery Scale - Revised  AUDITORY FUNCTION SCALE  0 - None  VISUAL FUNCTION SCALE  1 - Visual startle  MOTOR FUNCTION SCALE  1 - Abnormal posturing (generalized decerebrate posturing particularly in upper extremities)  OROMOTOR/VERBAL FUNCTION SCALE  0 - None  COMMUNICATION SCALE  0 - None  AROUSAL SCALE  1 - Eye Opening with Stimulation  TOTAL SCORE= 3 = Vegetative State     ASSESSMENT/PLAN  32y Male unknown PMH found unresponsive on sidewalk found w/ b/l SDH and SAH now s/p b/l hemicraniectomy and EVD placement s/p removal. Coma x 12 days, Vegetative State at Day 13.    Patient with spontaneous eye opening, appears to be in vegetative state at this time. Was is coma for approximately 13 days. Prognosis is in favor of patient given his age, that his injury was traumatic in nature, and since coma lasted less than 14 days, patient may make some recovery. However given his bilateral findings and extensor posturing response, overall prognosis is grim. Will most likely require significant assistance in the long term.    Sedation - Propofol turned off this AM and noted to have more response as compared to late last week. Continued on Oxycodone 10mg Q4hrs to help with tachypnea  Encephalopathy - Started on Rifaximin and Lactulose  Sleep/Wake Cycles - Melatonin 5mg HS (10/31), Recommend increasing Bromocriptine to 5mg Q6AM and Q12PM.   PNA - Cefazolin  Possible seizure activity - Keppra   Dysautonomia - Propranolol, Bromocriptine  Pain - Oxycodone Q4hrs, Tylenol  Oral care - Chlorhexidine, Asked staff to inquire about obtaining a mouth guard  DVT PPX - SCDs, Lovenox  Rehab - COMA STIM by all services. Will continue to follow.

## 2018-11-05 NOTE — PROCEDURE NOTE - NSPROCDETAILS_GEN_ALL_CORE
guidewire recovered/lumen(s) aspirated and flushed/sterile technique, catheter placed/sterile dressing applied
location identified, sterile technique used, catheter introduced, fluid drained
location identified, sterile technique used, catheter introduced, fluid drained
location identified, draped/prepped, sterile technique used, needle inserted/introduced/Arterial Line exchange using seldinger technique

## 2018-11-05 NOTE — PROGRESS NOTE ADULT - SUBJECTIVE AND OBJECTIVE BOX
HISTORY  32y Male with severe TBI, acute respiratory failure pneumonia, ? cholecystitis     24 HOUR EVENTS: Tmax 101.4, wbc continue to increase 27K this am. no change ion neurological status, continues to tolerate PSV    SUBJECTIVE/ROS:  [ ] A ten-point review of systems was otherwise negative except as noted.  [ ] Due to altered mental status/intubation, subjective information were not able to be obtained from the patient. History was obtained, to the extent possible, from review of the chart and collateral sources of information.      NEURO  RASS:   0  GCS:  2T2   CAM ICU: N/A  Exam: opens eyes to noxious stimuli, decerabrate postures to noxious stimuli  Meds: acetaminophen    Suspension .. 650 milliGRAM(s) Oral every 6 hours PRN Temp greater or equal to 38.5C (101.3F)  bromocriptine Tablet 5 milliGRAM(s) Oral daily  levETIRAcetam  Solution 1000 milliGRAM(s) Oral two times a day  melatonin 5 milliGRAM(s) Oral at bedtime  oxyCODONE    Solution 10 milliGRAM(s) Oral every 4 hours  propofol Infusion 5 MICROgram(s)/kG/Min IV Continuous <Continuous>    [x] Adequacy of sedation and pain control has been assessed and adjusted      RESPIRATORY  RR: 12 (11-05-18 @ 07:00) (12 - 34)  SpO2: 99% (11-05-18 @ 07:00) (93% - 100%)  Wt(kg): --  Exam:coarse bilaterally  Mechanical Ventilation: Mode: CPAP with PS, RR (patient): 25, FiO2: 40, PEEP: 8, PS: 10, MAP: 11  ABG - ( 05 Nov 2018 04:26 )  pH: 7.51  /  pCO2: 37    /  pO2: 79    / HCO3: 30    / Base Excess: 6.1   /  SaO2: 97      Lactate: x                [ ] Extubation Readiness Assessed  Meds:       CARDIOVASCULAR  HR: 75 (11-05-18 @ 07:00) (75 - 86)  BP: 99/58 (11-05-18 @ 07:00) (99/58 - 114/62)  BP(mean): 72 (11-05-18 @ 07:00) (72 - 82)  ABP: 103/48 (11-05-18 @ 07:00) (103/48 - 170/87)  ABP(mean): 65 (11-05-18 @ 07:00) (65 - 127)  Wt(kg): --  CVP(cm H2O): --      Exam:  Cardiac Rhythm:  Perfusion     [x ]Adequate   [ ]Inadequate  Mentation   [ ]Normal       [x ]Reduced  Extremities  [x ]Warm         [ ]Cool  Volume Status [ ]Hypervolemic [ x]Euvolemic [ ]Hypovolemic  Meds: propranolol 20 milliGRAM(s) Oral every 6 hours        GI/NUTRITION  Exam: soft ND  Diet: NPO  Meds:     GENITOURINARY  I&O's Detail    11-04 @ 07:01  -  11-05 @ 07:00  --------------------------------------------------------  IN:    Enteral Tube Flush: 660 mL    Pivot: 100 mL    propofol Infusion: 64.8 mL    Solution: 100 mL  Total IN: 924.8 mL    OUT:    Indwelling Catheter - Urethral: 1280 mL  Total OUT: 1280 mL    Total NET: -355.2 mL          11-05    128<L>  |  92<L>  |  14.0  ----------------------------<  80  4.9   |  25.0  |  0.20<L>    Ca    7.9<L>      05 Nov 2018 04:37  Phos  3.3     11-05  Mg     1.5     11-05    TPro  6.6  /  Alb  2.0<L>  /  TBili  1.9  /  DBili  1.1<H>  /  AST  154<H>  /  ALT  37  /  AlkPhos  616<H>  11-04    [ ] Apodaca catheter, indication: N/A  Meds: folic acid 1 milliGRAM(s) Oral daily  magnesium sulfate  IVPB 4 Gram(s) IV Intermittent once  thiamine 100 milliGRAM(s) Oral daily        HEMATOLOGIC  Meds: enoxaparin Injectable 40 milliGRAM(s) SubCutaneous daily    [x] VTE Prophylaxis                        8.6    27.4  )-----------( 392      ( 05 Nov 2018 04:37 )             26.7       Transfusion     [ ] PRBC   [ ] Platelets   [ ] FFP   [ ] Cryoprecipitate      INFECTIOUS DISEASES  T(C): 37.4 (11-05-18 @ 04:00), Max: 38.4 (11-04-18 @ 16:00)  Wt(kg): --  WBC Count: 27.4 K/uL (11-05 @ 04:37)    Recent Cultures:  Specimen Source: .Urine Catheterized, 11-02 @ 10:34; Results   No growth; Gram Stain: --; Organism: --    Meds: ceFAZolin   IVPB      ceFAZolin   IVPB 2000 milliGRAM(s) IV Intermittent every 8 hours        ENDOCRINE  Capillary Blood Glucose    Meds:       ACCESS DEVICES:  [x ] Peripheral IV  [ ] Central Venous Line	[ ] R	[ ] L	[ ] IJ	[ ] Fem	[ ] SC	Placed:   [x ] Arterial Line		[ ] R	[ ] L	[ ] Fem	[ ] Rad	[ ] Ax	Placed:   [ ] PICC:					[ ] Mediport  [ ] Urinary Catheter, Date Placed:   [ ] Necessity of urinary, arterial, and venous catheters discussed    OTHER MEDICATIONS:  chlorhexidine 0.12% Liquid 15 milliLiter(s) Swish and Spit two times a day  chlorhexidine 2% Cloths 1 Application(s) Topical daily      CODE STATUS:     IMAGING:

## 2018-11-05 NOTE — PROCEDURE NOTE - NSINFORMCONSENT_GEN_A_CORE
This was an emergent procedure.
This was an emergent procedure.
Two Physician based on medical necessity/Benefits, risks, and possible complications of procedure explained to patient/caregiver who verbalized understanding and gave written consent.
Two physician based on medical necessity

## 2018-11-05 NOTE — CHART NOTE - NSCHARTNOTEFT_GEN_A_CORE
FFP ordered for coagulopathy, benefits of transfusion outweigh risks, will proceed with transfusion with implied consent

## 2018-11-06 DIAGNOSIS — K72.90 HEPATIC FAILURE, UNSPECIFIED WITHOUT COMA: ICD-10-CM

## 2018-11-06 DIAGNOSIS — K74.60 UNSPECIFIED CIRRHOSIS OF LIVER: ICD-10-CM

## 2018-11-06 LAB
ANION GAP SERPL CALC-SCNC: 10 MMOL/L — SIGNIFICANT CHANGE UP (ref 5–17)
ANISOCYTOSIS BLD QL: SLIGHT — SIGNIFICANT CHANGE UP
BASOPHILS # BLD AUTO: 0 K/UL — SIGNIFICANT CHANGE UP (ref 0–0.2)
BASOPHILS NFR BLD AUTO: 0.1 % — SIGNIFICANT CHANGE UP (ref 0–2)
BUN SERPL-MCNC: 13 MG/DL — SIGNIFICANT CHANGE UP (ref 8–20)
CALCIUM SERPL-MCNC: 8 MG/DL — LOW (ref 8.6–10.2)
CHLORIDE SERPL-SCNC: 92 MMOL/L — LOW (ref 98–107)
CO2 SERPL-SCNC: 27 MMOL/L — SIGNIFICANT CHANGE UP (ref 22–29)
CREAT SERPL-MCNC: 0.29 MG/DL — LOW (ref 0.5–1.3)
EOSINOPHIL # BLD AUTO: 0.2 K/UL — SIGNIFICANT CHANGE UP (ref 0–0.5)
EOSINOPHIL NFR BLD AUTO: 1.5 % — SIGNIFICANT CHANGE UP (ref 0–5)
GLUCOSE SERPL-MCNC: 92 MG/DL — SIGNIFICANT CHANGE UP (ref 70–115)
HCT VFR BLD CALC: 22.3 % — LOW (ref 42–52)
HGB BLD-MCNC: 7.3 G/DL — LOW (ref 14–18)
HYPOCHROMIA BLD QL: SIGNIFICANT CHANGE UP
LYMPHOCYTES # BLD AUTO: 0.9 K/UL — LOW (ref 1–4.8)
LYMPHOCYTES # BLD AUTO: 5.4 % — LOW (ref 20–55)
MAGNESIUM SERPL-MCNC: 1.7 MG/DL — SIGNIFICANT CHANGE UP (ref 1.6–2.6)
MCHC RBC-ENTMCNC: 27.9 PG — SIGNIFICANT CHANGE UP (ref 27–31)
MCHC RBC-ENTMCNC: 32.7 G/DL — SIGNIFICANT CHANGE UP (ref 32–36)
MCV RBC AUTO: 85.1 FL — SIGNIFICANT CHANGE UP (ref 80–94)
MONOCYTES # BLD AUTO: 0.8 K/UL — SIGNIFICANT CHANGE UP (ref 0–0.8)
MONOCYTES NFR BLD AUTO: 5 % — SIGNIFICANT CHANGE UP (ref 3–10)
NEUTROPHILS # BLD AUTO: 14 K/UL — HIGH (ref 1.8–8)
NEUTROPHILS NFR BLD AUTO: 87.4 % — HIGH (ref 37–73)
OVALOCYTES BLD QL SMEAR: SLIGHT — SIGNIFICANT CHANGE UP
PHOSPHATE SERPL-MCNC: 3.4 MG/DL — SIGNIFICANT CHANGE UP (ref 2.4–4.7)
PLAT MORPH BLD: NORMAL — SIGNIFICANT CHANGE UP
PLATELET # BLD AUTO: 322 K/UL — SIGNIFICANT CHANGE UP (ref 150–400)
POIKILOCYTOSIS BLD QL AUTO: SLIGHT — SIGNIFICANT CHANGE UP
POLYCHROMASIA BLD QL SMEAR: SLIGHT — SIGNIFICANT CHANGE UP
POTASSIUM SERPL-MCNC: 3.9 MMOL/L — SIGNIFICANT CHANGE UP (ref 3.5–5.3)
POTASSIUM SERPL-SCNC: 3.9 MMOL/L — SIGNIFICANT CHANGE UP (ref 3.5–5.3)
RBC # BLD: 2.62 M/UL — LOW (ref 4.6–6.2)
RBC # FLD: 17.8 % — HIGH (ref 11–15.6)
RBC BLD AUTO: ABNORMAL
SCHISTOCYTES BLD QL AUTO: SLIGHT — SIGNIFICANT CHANGE UP
SODIUM SERPL-SCNC: 129 MMOL/L — LOW (ref 135–145)
TARGETS BLD QL SMEAR: SLIGHT — SIGNIFICANT CHANGE UP
WBC # BLD: 16 K/UL — HIGH (ref 4.8–10.8)
WBC # FLD AUTO: 16 K/UL — HIGH (ref 4.8–10.8)

## 2018-11-06 PROCEDURE — 99233 SBSQ HOSP IP/OBS HIGH 50: CPT | Mod: GC

## 2018-11-06 RX ORDER — CEFEPIME 1 G/1
2000 INJECTION, POWDER, FOR SOLUTION INTRAMUSCULAR; INTRAVENOUS ONCE
Qty: 0 | Refills: 0 | Status: COMPLETED | OUTPATIENT
Start: 2018-11-06 | End: 2018-11-06

## 2018-11-06 RX ORDER — CEFEPIME 1 G/1
2000 INJECTION, POWDER, FOR SOLUTION INTRAMUSCULAR; INTRAVENOUS EVERY 8 HOURS
Qty: 0 | Refills: 0 | Status: DISCONTINUED | OUTPATIENT
Start: 2018-11-06 | End: 2018-11-07

## 2018-11-06 RX ORDER — SPIRONOLACTONE 25 MG/1
25 TABLET, FILM COATED ORAL ONCE
Qty: 0 | Refills: 0 | Status: COMPLETED | OUTPATIENT
Start: 2018-11-06 | End: 2018-11-06

## 2018-11-06 RX ORDER — CEFEPIME 1 G/1
INJECTION, POWDER, FOR SOLUTION INTRAMUSCULAR; INTRAVENOUS
Qty: 0 | Refills: 0 | Status: DISCONTINUED | OUTPATIENT
Start: 2018-11-06 | End: 2018-11-07

## 2018-11-06 RX ORDER — SPIRONOLACTONE 25 MG/1
25 TABLET, FILM COATED ORAL DAILY
Qty: 0 | Refills: 0 | Status: DISCONTINUED | OUTPATIENT
Start: 2018-11-06 | End: 2018-12-08

## 2018-11-06 RX ORDER — ALBUMIN HUMAN 25 %
100 VIAL (ML) INTRAVENOUS EVERY 6 HOURS
Qty: 0 | Refills: 0 | Status: COMPLETED | OUTPATIENT
Start: 2018-11-06 | End: 2018-11-07

## 2018-11-06 RX ORDER — PHYTONADIONE (VIT K1) 5 MG
5 TABLET ORAL DAILY
Qty: 0 | Refills: 0 | Status: DISCONTINUED | OUTPATIENT
Start: 2018-11-06 | End: 2018-12-31

## 2018-11-06 RX ORDER — BROMOCRIPTINE MESYLATE 5 MG/1
5 CAPSULE ORAL
Qty: 0 | Refills: 0 | Status: DISCONTINUED | OUTPATIENT
Start: 2018-11-06 | End: 2018-11-07

## 2018-11-06 RX ORDER — SPIRONOLACTONE 25 MG/1
25 TABLET, FILM COATED ORAL ONCE
Qty: 0 | Refills: 0 | Status: DISCONTINUED | OUTPATIENT
Start: 2018-11-06 | End: 2018-11-06

## 2018-11-06 RX ORDER — MAGNESIUM SULFATE 500 MG/ML
2 VIAL (ML) INJECTION ONCE
Qty: 0 | Refills: 0 | Status: COMPLETED | OUTPATIENT
Start: 2018-11-06 | End: 2018-11-06

## 2018-11-06 RX ADMIN — Medication 20 MILLIGRAM(S): at 18:22

## 2018-11-06 RX ADMIN — ENOXAPARIN SODIUM 40 MILLIGRAM(S): 100 INJECTION SUBCUTANEOUS at 11:56

## 2018-11-06 RX ADMIN — OXYCODONE HYDROCHLORIDE 10 MILLIGRAM(S): 5 TABLET ORAL at 10:00

## 2018-11-06 RX ADMIN — Medication 100 MILLIGRAM(S): at 01:38

## 2018-11-06 RX ADMIN — CEFEPIME 100 MILLIGRAM(S): 1 INJECTION, POWDER, FOR SOLUTION INTRAMUSCULAR; INTRAVENOUS at 11:56

## 2018-11-06 RX ADMIN — Medication 1 MILLIGRAM(S): at 11:56

## 2018-11-06 RX ADMIN — CHLORHEXIDINE GLUCONATE 1 APPLICATION(S): 213 SOLUTION TOPICAL at 11:55

## 2018-11-06 RX ADMIN — CEFEPIME 100 MILLIGRAM(S): 1 INJECTION, POWDER, FOR SOLUTION INTRAMUSCULAR; INTRAVENOUS at 21:56

## 2018-11-06 RX ADMIN — LACTULOSE 20 GRAM(S): 10 SOLUTION ORAL at 18:21

## 2018-11-06 RX ADMIN — OXYCODONE HYDROCHLORIDE 10 MILLIGRAM(S): 5 TABLET ORAL at 22:56

## 2018-11-06 RX ADMIN — Medication 20 MILLIGRAM(S): at 11:56

## 2018-11-06 RX ADMIN — Medication 50 MILLILITER(S): at 11:57

## 2018-11-06 RX ADMIN — Medication 5 MILLIGRAM(S): at 21:56

## 2018-11-06 RX ADMIN — OXYCODONE HYDROCHLORIDE 10 MILLIGRAM(S): 5 TABLET ORAL at 18:27

## 2018-11-06 RX ADMIN — Medication 50 MILLILITER(S): at 05:40

## 2018-11-06 RX ADMIN — OXYCODONE HYDROCHLORIDE 10 MILLIGRAM(S): 5 TABLET ORAL at 13:34

## 2018-11-06 RX ADMIN — LEVETIRACETAM 1000 MILLIGRAM(S): 250 TABLET, FILM COATED ORAL at 18:22

## 2018-11-06 RX ADMIN — Medication 100 MILLIGRAM(S): at 11:56

## 2018-11-06 RX ADMIN — Medication 20 MILLIGRAM(S): at 00:09

## 2018-11-06 RX ADMIN — Medication 50 GRAM(S): at 06:04

## 2018-11-06 RX ADMIN — OXYCODONE HYDROCHLORIDE 10 MILLIGRAM(S): 5 TABLET ORAL at 02:00

## 2018-11-06 RX ADMIN — LACTULOSE 20 GRAM(S): 10 SOLUTION ORAL at 05:41

## 2018-11-06 RX ADMIN — SPIRONOLACTONE 25 MILLIGRAM(S): 25 TABLET, FILM COATED ORAL at 18:22

## 2018-11-06 RX ADMIN — Medication 50 MILLILITER(S): at 18:17

## 2018-11-06 RX ADMIN — OXYCODONE HYDROCHLORIDE 10 MILLIGRAM(S): 5 TABLET ORAL at 05:44

## 2018-11-06 RX ADMIN — CHLORHEXIDINE GLUCONATE 15 MILLILITER(S): 213 SOLUTION TOPICAL at 18:22

## 2018-11-06 RX ADMIN — OXYCODONE HYDROCHLORIDE 10 MILLIGRAM(S): 5 TABLET ORAL at 18:21

## 2018-11-06 RX ADMIN — OXYCODONE HYDROCHLORIDE 10 MILLIGRAM(S): 5 TABLET ORAL at 01:38

## 2018-11-06 RX ADMIN — OXYCODONE HYDROCHLORIDE 10 MILLIGRAM(S): 5 TABLET ORAL at 21:56

## 2018-11-06 RX ADMIN — LEVETIRACETAM 1000 MILLIGRAM(S): 250 TABLET, FILM COATED ORAL at 05:41

## 2018-11-06 RX ADMIN — OXYCODONE HYDROCHLORIDE 10 MILLIGRAM(S): 5 TABLET ORAL at 09:53

## 2018-11-06 RX ADMIN — OXYCODONE HYDROCHLORIDE 10 MILLIGRAM(S): 5 TABLET ORAL at 13:37

## 2018-11-06 RX ADMIN — CHLORHEXIDINE GLUCONATE 15 MILLILITER(S): 213 SOLUTION TOPICAL at 05:41

## 2018-11-06 RX ADMIN — Medication 50 MILLILITER(S): at 00:09

## 2018-11-06 RX ADMIN — SPIRONOLACTONE 25 MILLIGRAM(S): 25 TABLET, FILM COATED ORAL at 11:56

## 2018-11-06 RX ADMIN — OXYCODONE HYDROCHLORIDE 10 MILLIGRAM(S): 5 TABLET ORAL at 06:00

## 2018-11-06 RX ADMIN — Medication 20 MILLIGRAM(S): at 05:41

## 2018-11-06 RX ADMIN — Medication 5 MILLIGRAM(S): at 11:57

## 2018-11-06 RX ADMIN — BROMOCRIPTINE MESYLATE 5 MILLIGRAM(S): 5 CAPSULE ORAL at 11:57

## 2018-11-06 NOTE — PROGRESS NOTE ADULT - SUBJECTIVE AND OBJECTIVE BOX
HISTORY  32y Male    24 HOUR EVENTS:  Trialed on trach collar but tired out and returned to CPAP. Afebrile. No change in clinical exam. Tolerating enteral feedings.     SUBJECTIVE/ROS:  [ ] A ten-point review of systems was otherwise negative except as noted.  [x ] Due to altered mental status/intubation, subjective information were not able to be obtained from the patient. History was obtained, to the extent possible, from review of the chart and collateral sources of information.      NEURO  RASS:     GCS: 4T    CAM ICU:  Exam: Pupils unequal and non reactive. Over breathing ventilator. Decerebrating  Meds: acetaminophen    Suspension .. 650 milliGRAM(s) Oral every 6 hours PRN Temp greater or equal to 38.5C (101.3F)  bromocriptine Tablet 5 milliGRAM(s) Oral daily  levETIRAcetam  Solution 1000 milliGRAM(s) Oral two times a day  melatonin 5 milliGRAM(s) Oral at bedtime  oxyCODONE    Solution 10 milliGRAM(s) Oral every 4 hours    [x] Adequacy of sedation and pain control has been assessed and adjusted      RESPIRATORY  RR: 16 (11-06-18 @ 00:00) (12 - 30)  SpO2: 98% (11-06-18 @ 00:00) (94% - 100%)  Wt(kg): --  Exam: unlabored, clear to auscultation bilaterally  Mechanical Ventilation: Mode: CPAP with PS, RR (patient): 20, FiO2: 40, PEEP: 5, PS: 5, MAP: 6.8  ABG - ( 05 Nov 2018 04:26 )  pH: 7.51  /  pCO2: 37    /  pO2: 79    / HCO3: 30    / Base Excess: 6.1   /  SaO2: 97      Lactate: x          [ ] Extubation Readiness Assessed  Meds:       CARDIOVASCULAR  HR: 75 (11-06-18 @ 00:00) (70 - 81)  BP: 101/55 (11-06-18 @ 00:00) (96/53 - 153/98)  BP(mean): 72 (11-06-18 @ 00:00) (68 - 119)  ABP: 117/61 (11-06-18 @ 00:00) (72/58 - 174/94)  ABP(mean): 75 (11-06-18 @ 00:00) (65 - 127)  Wt(kg): --  CVP(cm H2O): --      Exam:  Cardiac Rhythm: NSR  Perfusion     [x ]Adequate   [ ]Inadequate  Mentation   [ ]Normal       [ x]Reduced  Extremities  [x ]Warm         [ ]Cool  Volume Status [ ]Hypervolemic [ x]Euvolemic [ ]Hypovolemic  Meds: propranolol 20 milliGRAM(s) Oral every 6 hours        GI/NUTRITION  Exam: soft nd, PEG in place  Diet: Enteral feeds at goal  Meds: lactulose Syrup 20 Gram(s) Oral every 12 hours      GENITOURINARY  I&O's Detail    11-04 @ 07:01  -  11-05 @ 07:00  --------------------------------------------------------  IN:    Enteral Tube Flush: 660 mL    Pivot: 100 mL    propofol Infusion: 64.8 mL    Solution: 100 mL  Total IN: 924.8 mL    OUT:    Indwelling Catheter - Urethral: 1280 mL  Total OUT: 1280 mL    Total NET: -355.2 mL      11-05 @ 07:01 - 11-06 @ 00:46  --------------------------------------------------------  IN:    Enteral Tube Flush: 340 mL    Solution: 50 mL    Solution: 100 mL    Solution: 200 mL  Total IN: 690 mL    OUT:    Indwelling Catheter - Urethral: 645 mL  Total OUT: 645 mL    Total NET: 45 mL          11-05    128<L>  |  92<L>  |  14.0  ----------------------------<  80  4.9   |  25.0  |  0.20<L>    Ca    7.9<L>      05 Nov 2018 04:37  Phos  3.3     11-05  Mg     1.5     11-05    TPro  6.6  /  Alb  2.0<L>  /  TBili  1.9  /  DBili  1.1<H>  /  AST  154<H>  /  ALT  37  /  AlkPhos  616<H>  11-04    [ x] Apodaca catheter, indication: critical illness  Meds: albumin human 25% IVPB 100 milliLiter(s) IV Intermittent every 6 hours  folic acid 1 milliGRAM(s) Oral daily  thiamine 100 milliGRAM(s) Oral daily        HEMATOLOGIC  Meds: enoxaparin Injectable 40 milliGRAM(s) SubCutaneous daily    [x] VTE Prophylaxis                        8.6    27.4  )-----------( 392      ( 05 Nov 2018 04:37 )             26.7     PT/INR - ( 05 Nov 2018 10:54 )   PT: 19.3 sec;   INR: 1.65 ratio         PTT - ( 05 Nov 2018 10:54 )  PTT:34.5 sec  Transfusion     [ ] PRBC   [ ] Platelets   [ ] FFP   [ ] Cryoprecipitate      INFECTIOUS DISEASES  T(C): 37.8 (11-06-18 @ 00:00), Max: 37.8 (11-06-18 @ 00:00)  Wt(kg): --  WBC Count: 27.4 K/uL (11-05 @ 04:37)    Recent Cultures:  Specimen Source: .Body Fluid Peritoneal Fluid, 11-05 @ 17:05; Results --; Gram Stain:   Few White blood cells  No organisms seen; Organism: --  Specimen Source: .Urine Catheterized, 11-02 @ 10:34; Results   No growth; Gram Stain: --; Organism: --    Meds: ceFAZolin   IVPB      ceFAZolin   IVPB 2000 milliGRAM(s) IV Intermittent every 8 hours  rifaximin 550 milliGRAM(s) Oral two times a day        ENDOCRINE  Capillary Blood Glucose    Meds:       ACCESS DEVICES:  [ ] Peripheral IV  [ ] Central Venous Line	[ ] R	[ ] L	[ ] IJ	[ ] Fem	[ ] SC	Placed:   [x ] Arterial Line		[ ] R	[ ] L	[ ] Fem	[ ] Rad	[ ] Ax	Placed:   [ ] PICC:					[ ] Mediport  [x ] Urinary Catheter, Date Placed:   [x ] Necessity of urinary, arterial, and venous catheters discussed    OTHER MEDICATIONS:  chlorhexidine 0.12% Liquid 15 milliLiter(s) Swish and Spit two times a day  chlorhexidine 2% Cloths 1 Application(s) Topical daily      CODE STATUS:     IMAGING: HISTORY  32y Male    24 HOUR EVENTS:  Trialed on trach collar but tired out and returned to CPAP. Afebrile. No change in clinical exam. Tolerating enteral feedings. started on rifaximin and lactulose for presume hepatic encephalopathy. Peritoneal fluid sampled during day     SUBJECTIVE/ROS:  [ ] A ten-point review of systems was otherwise negative except as noted.  [x ] Due to altered mental status/intubation, subjective information were not able to be obtained from the patient. History was obtained, to the extent possible, from review of the chart and collateral sources of information.      NEURO  RASS:     GCS: 4T    CAM ICU:  Exam: Pupils unequal and non reactive. Over breathing ventilator. Decerebrating  Meds: acetaminophen    Suspension .. 650 milliGRAM(s) Oral every 6 hours PRN Temp greater or equal to 38.5C (101.3F)  bromocriptine Tablet 5 milliGRAM(s) Oral daily  levETIRAcetam  Solution 1000 milliGRAM(s) Oral two times a day  melatonin 5 milliGRAM(s) Oral at bedtime  oxyCODONE    Solution 10 milliGRAM(s) Oral every 4 hours    [x] Adequacy of sedation and pain control has been assessed and adjusted      RESPIRATORY  RR: 16 (11-06-18 @ 00:00) (12 - 30)  SpO2: 98% (11-06-18 @ 00:00) (94% - 100%)  Wt(kg): --  Exam: unlabored, clear to auscultation bilaterally  Mechanical Ventilation: Mode: CPAP with PS, RR (patient): 20, FiO2: 40, PEEP: 5, PS: 5, MAP: 6.8  ABG - ( 05 Nov 2018 04:26 )  pH: 7.51  /  pCO2: 37    /  pO2: 79    / HCO3: 30    / Base Excess: 6.1   /  SaO2: 97      Lactate: x          [ ] Extubation Readiness Assessed  Meds:       CARDIOVASCULAR  HR: 75 (11-06-18 @ 00:00) (70 - 81)  BP: 101/55 (11-06-18 @ 00:00) (96/53 - 153/98)  BP(mean): 72 (11-06-18 @ 00:00) (68 - 119)  ABP: 117/61 (11-06-18 @ 00:00) (72/58 - 174/94)  ABP(mean): 75 (11-06-18 @ 00:00) (65 - 127)  Wt(kg): --  CVP(cm H2O): --      Exam:  Cardiac Rhythm: NSR  Perfusion     [x ]Adequate   [ ]Inadequate  Mentation   [ ]Normal       [ x]Reduced  Extremities  [x ]Warm         [ ]Cool  Volume Status [ ]Hypervolemic [ x]Euvolemic [ ]Hypovolemic  Meds: propranolol 20 milliGRAM(s) Oral every 6 hours        GI/NUTRITION  Exam: soft nd, PEG in place  Diet: Enteral feeds at goal  Meds: lactulose Syrup 20 Gram(s) Oral every 12 hours      GENITOURINARY  I&O's Detail    11-04 @ 07:01  -  11-05 @ 07:00  --------------------------------------------------------  IN:    Enteral Tube Flush: 660 mL    Pivot: 100 mL    propofol Infusion: 64.8 mL    Solution: 100 mL  Total IN: 924.8 mL    OUT:    Indwelling Catheter - Urethral: 1280 mL  Total OUT: 1280 mL    Total NET: -355.2 mL      11-05 @ 07:01  -  11-06 @ 00:46  --------------------------------------------------------  IN:    Enteral Tube Flush: 340 mL    Solution: 50 mL    Solution: 100 mL    Solution: 200 mL  Total IN: 690 mL    OUT:    Indwelling Catheter - Urethral: 645 mL  Total OUT: 645 mL    Total NET: 45 mL          11-05    128<L>  |  92<L>  |  14.0  ----------------------------<  80  4.9   |  25.0  |  0.20<L>    Ca    7.9<L>      05 Nov 2018 04:37  Phos  3.3     11-05  Mg     1.5     11-05    TPro  6.6  /  Alb  2.0<L>  /  TBili  1.9  /  DBili  1.1<H>  /  AST  154<H>  /  ALT  37  /  AlkPhos  616<H>  11-04    [ x] Apodaca catheter, indication: critical illness  Meds: albumin human 25% IVPB 100 milliLiter(s) IV Intermittent every 6 hours  folic acid 1 milliGRAM(s) Oral daily  thiamine 100 milliGRAM(s) Oral daily        HEMATOLOGIC  Meds: enoxaparin Injectable 40 milliGRAM(s) SubCutaneous daily    [x] VTE Prophylaxis                        8.6    27.4  )-----------( 392      ( 05 Nov 2018 04:37 )             26.7     PT/INR - ( 05 Nov 2018 10:54 )   PT: 19.3 sec;   INR: 1.65 ratio         PTT - ( 05 Nov 2018 10:54 )  PTT:34.5 sec  Transfusion     [ ] PRBC   [ ] Platelets   [ ] FFP   [ ] Cryoprecipitate      INFECTIOUS DISEASES  T(C): 37.8 (11-06-18 @ 00:00), Max: 37.8 (11-06-18 @ 00:00)  Wt(kg): --  WBC Count: 27.4 K/uL (11-05 @ 04:37)    Recent Cultures:  Specimen Source: .Body Fluid Peritoneal Fluid, 11-05 @ 17:05; Results --; Gram Stain:   Few White blood cells  No organisms seen; Organism: --  Specimen Source: .Urine Catheterized, 11-02 @ 10:34; Results   No growth; Gram Stain: --; Organism: --    Meds: ceFAZolin   IVPB      ceFAZolin   IVPB 2000 milliGRAM(s) IV Intermittent every 8 hours  rifaximin 550 milliGRAM(s) Oral two times a day        ENDOCRINE  Capillary Blood Glucose    Meds:       ACCESS DEVICES:  [ ] Peripheral IV  [ ] Central Venous Line	[ ] R	[ ] L	[ ] IJ	[ ] Fem	[ ] SC	Placed:   [x ] Arterial Line		[ ] R	[ ] L	[ ] Fem	[ ] Rad	[ ] Ax	Placed:   [ ] PICC:					[ ] Mediport  [x ] Urinary Catheter, Date Placed:   [x ] Necessity of urinary, arterial, and venous catheters discussed    OTHER MEDICATIONS:  chlorhexidine 0.12% Liquid 15 milliLiter(s) Swish and Spit two times a day  chlorhexidine 2% Cloths 1 Application(s) Topical daily      CODE STATUS:     IMAGING:

## 2018-11-06 NOTE — PROGRESS NOTE ADULT - PROBLEM SELECTOR PLAN 1
Continue with supportive care  Trach collar trials as tolerated  enteral feeds at goal  continue dvt proph  f/u neurosurgery  continue with fluid restrictive strategy  functional prognosis remains poor. Continue with supportive care, neuro stim  Trach collar trials as tolerated  enteral feeds at goal  continue dvt proph  f/u neurosurgery  continue with fluid restrictive strategy  functional prognosis remains poor.

## 2018-11-06 NOTE — PROGRESS NOTE ADULT - ATTENDING COMMENTS
Peritoneal fluid with PMN count of 220/mm3.  Given pt has been on abx, will treat empirically for bacterial peritonitis, spontaneous vs secondary from DPL.  measure creat clearance 485ml/min - augmented renal clearance - switch to cefepime 2g q8.  Start ascites management with diuretics.  Add oral vit K.

## 2018-11-06 NOTE — PROGRESS NOTE ADULT - SUBJECTIVE AND OBJECTIVE BOX
SUBJECTIVE  Patient seen and examined. Trialed on trach collar yesterday but fatigued and returned to CPAP. Continue to open eyes spontaneously during exam.     TODAY'S REVIEW OF SYMPTOMS  Unable to obtain    VITALS  T(C): 37 (11-06-18 @ 08:00)  T(F): 98.6 (11-06-18 @ 08:00), Max: 100 (11-06-18 @ 00:00)  HR: 79 (11-06-18 @ 10:00) (70 - 84)  BP: 109/63 (11-06-18 @ 10:00) (93/70 - 153/98)  RR:  (15 - 25)  SpO2:  (96% - 100%)  Wt(kg): --    RECENT LABS/IMAGING             7.3    16.0  )-----------( 322      ( 06 Nov 2018 04:24 )             22.3     129<L>  |  92<L>  |  13.0  ----------------------------<  92  3.9   |  27.0  |  0.29<L>    Ca    8.0<L>      06 Nov 2018 04:24  Phos  3.4     11-06  Mg     1.7     11-06    PT/INR - ( 05 Nov 2018 10:54 )   PT: 19.3 sec;   INR: 1.65 ratio    PTT - ( 05 Nov 2018 10:54 )  PTT:34.5 sec    MEDICATIONS   MEDICATIONS  (STANDING):  albumin human 25% IVPB 100 milliLiter(s) IV Intermittent every 6 hours  bromocriptine Tablet 5 milliGRAM(s) Oral <User Schedule>    cefepime   IVPB 2000 milliGRAM(s) IV Intermittent every 8 hours  chlorhexidine 0.12% Liquid 15 milliLiter(s) Swish and Spit two times a day  chlorhexidine 2% Cloths 1 Application(s) Topical daily  enoxaparin Injectable 40 milliGRAM(s) SubCutaneous daily  folic acid 1 milliGRAM(s) Oral daily  lactulose Syrup 20 Gram(s) Oral every 12 hours  levETIRAcetam  Solution 1000 milliGRAM(s) Oral two times a day  melatonin 5 milliGRAM(s) Oral at bedtime  oxyCODONE    Solution 10 milliGRAM(s) Oral every 4 hours  phytonadione   Solution 5 milliGRAM(s) Oral daily  propranolol 20 milliGRAM(s) Oral every 6 hours  rifaximin 550 milliGRAM(s) Oral two times a day  spironolactone 25 milliGRAM(s) Oral once  thiamine 100 milliGRAM(s) Oral daily    MEDICATIONS  (PRN):  acetaminophen    Suspension .. 650 milliGRAM(s) Oral every 6 hours PRN Temp greater or equal to 38.5C (101.3F)    PHYSICAL EXAM  HEENT - Cranial incisions with staples, (+) Tongue swelling, (+) Spontaneous eye opening, (+) Blink to threat R > L, (-) Auditory startle, (+) Trach  Cardiovascular - No tachycardia noted, All extremities warm, Diffuse edema  Extremities - Diffuse swelling, Extensor posturing to pain response, Increased tone noted with bilateral hip flexion  Reflexes - +Babinski and Meza's bilaterally, Michael's reflex 2+/4, No clonus appreciated in bilateral lower extremities, (+) Glabellar reflex    Coma Recovery Scale - Revised  AUDITORY FUNCTION SCALE  0 - None  VISUAL FUNCTION SCALE  1 - Visual startle   MOTOR FUNCTION SCALE  1 - Abnormal posturing (generalized extensor posturing)  OROMOTOR/VERBAL FUNCTION SCALE  0 - None  COMMUNICATION SCALE  0 - None  AROUSAL SCALE  1 - Eye Opening with Stimulation  TOTAL SCORE= 3 = Vegetative State     ASSESSMENT/PLAN  32y Male unknown PMH found unresponsive on sidewalk found with b/l SDH and SAH now s/p b/l hemicraniectomy and EVD placement s/p removal. Coma x 12 days, Vegetative State at Day 13. Vegetative state x 1 day    Continues to be in vegetative state with spontaneous eye opening during exam.   Pulm - Trialing on trach collar per ICU team  Hepatic encephalopathy - Rifaximin, Lactulose, Vit K, Spironolactone  Sleep/Wake Cycles - Melatonin 5mg HS (10/31), Bromocriptine increased to 5mg Q6AM/Q12PM on 11/6  ID - Cefazolin  Possible seizure activity - Keppra   Dysautonomia - Propranolol, Bromocriptine  Pain - Oxycodone Q4hrs, Tylenol  Oral care - Aggressive oral care as patient at risk for necrotic tongue, Chlorhexidine  DVT PPX - SCDs, Lovenox  Rehab - COMA STIM by all services. Will continue to follow.

## 2018-11-06 NOTE — PROGRESS NOTE ADULT - ATTENDING COMMENTS
Agree with Dr. Vera.  Patient is demonstrating some flexion withdrawal to the left side, although not full.  Unclear if there is hemiparesis.  Continue Bromocriptine.   Continue COMA stim.  Will continue to follow.

## 2018-11-07 LAB
AMMONIA BLD-MCNC: 105 UMOL/L — HIGH (ref 11–55)
ANION GAP SERPL CALC-SCNC: 14 MMOL/L — SIGNIFICANT CHANGE UP (ref 5–17)
BASOPHILS # BLD AUTO: 0 K/UL — SIGNIFICANT CHANGE UP (ref 0–0.2)
BASOPHILS NFR BLD AUTO: 0.1 % — SIGNIFICANT CHANGE UP (ref 0–2)
BUN SERPL-MCNC: 12 MG/DL — SIGNIFICANT CHANGE UP (ref 8–20)
CALCIUM SERPL-MCNC: 8.2 MG/DL — LOW (ref 8.6–10.2)
CHLORIDE SERPL-SCNC: 90 MMOL/L — LOW (ref 98–107)
CO2 SERPL-SCNC: 26 MMOL/L — SIGNIFICANT CHANGE UP (ref 22–29)
CREAT SERPL-MCNC: 0.23 MG/DL — LOW (ref 0.5–1.3)
EOSINOPHIL # BLD AUTO: 0.2 K/UL — SIGNIFICANT CHANGE UP (ref 0–0.5)
EOSINOPHIL NFR BLD AUTO: 1.7 % — SIGNIFICANT CHANGE UP (ref 0–5)
GAS PNL BLDA: SIGNIFICANT CHANGE UP
GLUCOSE SERPL-MCNC: 155 MG/DL — HIGH (ref 70–115)
HCT VFR BLD CALC: 23.2 % — LOW (ref 42–52)
HGB BLD-MCNC: 7.4 G/DL — LOW (ref 14–18)
LYMPHOCYTES # BLD AUTO: 0.6 K/UL — LOW (ref 1–4.8)
LYMPHOCYTES # BLD AUTO: 4.4 % — LOW (ref 20–55)
MAGNESIUM SERPL-MCNC: 1.5 MG/DL — LOW (ref 1.6–2.6)
MCHC RBC-ENTMCNC: 27.5 PG — SIGNIFICANT CHANGE UP (ref 27–31)
MCHC RBC-ENTMCNC: 31.9 G/DL — LOW (ref 32–36)
MCV RBC AUTO: 86.2 FL — SIGNIFICANT CHANGE UP (ref 80–94)
MONOCYTES # BLD AUTO: 0.6 K/UL — SIGNIFICANT CHANGE UP (ref 0–0.8)
MONOCYTES NFR BLD AUTO: 4.3 % — SIGNIFICANT CHANGE UP (ref 3–10)
NEUTROPHILS # BLD AUTO: 12.4 K/UL — HIGH (ref 1.8–8)
NEUTROPHILS NFR BLD AUTO: 88.6 % — HIGH (ref 37–73)
PHOSPHATE SERPL-MCNC: 2.4 MG/DL — SIGNIFICANT CHANGE UP (ref 2.4–4.7)
PLATELET # BLD AUTO: 292 K/UL — SIGNIFICANT CHANGE UP (ref 150–400)
POTASSIUM SERPL-MCNC: 4 MMOL/L — SIGNIFICANT CHANGE UP (ref 3.5–5.3)
POTASSIUM SERPL-SCNC: 4 MMOL/L — SIGNIFICANT CHANGE UP (ref 3.5–5.3)
RBC # BLD: 2.69 M/UL — LOW (ref 4.6–6.2)
RBC # FLD: 17.6 % — HIGH (ref 11–15.6)
SODIUM SERPL-SCNC: 130 MMOL/L — LOW (ref 135–145)
WBC # BLD: 14 K/UL — HIGH (ref 4.8–10.8)
WBC # FLD AUTO: 14 K/UL — HIGH (ref 4.8–10.8)

## 2018-11-07 PROCEDURE — 99232 SBSQ HOSP IP/OBS MODERATE 35: CPT | Mod: GC

## 2018-11-07 PROCEDURE — 99232 SBSQ HOSP IP/OBS MODERATE 35: CPT

## 2018-11-07 RX ORDER — BROMOCRIPTINE MESYLATE 5 MG/1
10 CAPSULE ORAL
Qty: 0 | Refills: 0 | Status: DISCONTINUED | OUTPATIENT
Start: 2018-11-07 | End: 2018-11-08

## 2018-11-07 RX ORDER — CEFEPIME 1 G/1
2000 INJECTION, POWDER, FOR SOLUTION INTRAMUSCULAR; INTRAVENOUS EVERY 8 HOURS
Qty: 0 | Refills: 0 | Status: DISCONTINUED | OUTPATIENT
Start: 2018-11-07 | End: 2018-11-10

## 2018-11-07 RX ORDER — MAGNESIUM SULFATE 500 MG/ML
2 VIAL (ML) INJECTION ONCE
Qty: 0 | Refills: 0 | Status: COMPLETED | OUTPATIENT
Start: 2018-11-07 | End: 2018-11-07

## 2018-11-07 RX ADMIN — OXYCODONE HYDROCHLORIDE 10 MILLIGRAM(S): 5 TABLET ORAL at 03:00

## 2018-11-07 RX ADMIN — Medication 100 MILLIGRAM(S): at 11:41

## 2018-11-07 RX ADMIN — Medication 20 MILLIGRAM(S): at 17:07

## 2018-11-07 RX ADMIN — Medication 5 MILLIGRAM(S): at 21:09

## 2018-11-07 RX ADMIN — OXYCODONE HYDROCHLORIDE 10 MILLIGRAM(S): 5 TABLET ORAL at 06:13

## 2018-11-07 RX ADMIN — OXYCODONE HYDROCHLORIDE 10 MILLIGRAM(S): 5 TABLET ORAL at 10:50

## 2018-11-07 RX ADMIN — OXYCODONE HYDROCHLORIDE 10 MILLIGRAM(S): 5 TABLET ORAL at 17:02

## 2018-11-07 RX ADMIN — OXYCODONE HYDROCHLORIDE 10 MILLIGRAM(S): 5 TABLET ORAL at 21:09

## 2018-11-07 RX ADMIN — CEFEPIME 100 MILLIGRAM(S): 1 INJECTION, POWDER, FOR SOLUTION INTRAMUSCULAR; INTRAVENOUS at 14:05

## 2018-11-07 RX ADMIN — Medication 20 MILLIGRAM(S): at 00:39

## 2018-11-07 RX ADMIN — CEFEPIME 100 MILLIGRAM(S): 1 INJECTION, POWDER, FOR SOLUTION INTRAMUSCULAR; INTRAVENOUS at 05:28

## 2018-11-07 RX ADMIN — Medication 1 MILLIGRAM(S): at 11:41

## 2018-11-07 RX ADMIN — OXYCODONE HYDROCHLORIDE 10 MILLIGRAM(S): 5 TABLET ORAL at 17:32

## 2018-11-07 RX ADMIN — Medication 50 GRAM(S): at 06:44

## 2018-11-07 RX ADMIN — OXYCODONE HYDROCHLORIDE 10 MILLIGRAM(S): 5 TABLET ORAL at 10:20

## 2018-11-07 RX ADMIN — Medication 50 MILLILITER(S): at 06:11

## 2018-11-07 RX ADMIN — OXYCODONE HYDROCHLORIDE 10 MILLIGRAM(S): 5 TABLET ORAL at 21:13

## 2018-11-07 RX ADMIN — ENOXAPARIN SODIUM 40 MILLIGRAM(S): 100 INJECTION SUBCUTANEOUS at 11:40

## 2018-11-07 RX ADMIN — CHLORHEXIDINE GLUCONATE 15 MILLILITER(S): 213 SOLUTION TOPICAL at 17:02

## 2018-11-07 RX ADMIN — CEFEPIME 100 MILLIGRAM(S): 1 INJECTION, POWDER, FOR SOLUTION INTRAMUSCULAR; INTRAVENOUS at 21:09

## 2018-11-07 RX ADMIN — OXYCODONE HYDROCHLORIDE 10 MILLIGRAM(S): 5 TABLET ORAL at 04:00

## 2018-11-07 RX ADMIN — LEVETIRACETAM 1000 MILLIGRAM(S): 250 TABLET, FILM COATED ORAL at 05:27

## 2018-11-07 RX ADMIN — CHLORHEXIDINE GLUCONATE 15 MILLILITER(S): 213 SOLUTION TOPICAL at 05:29

## 2018-11-07 RX ADMIN — BROMOCRIPTINE MESYLATE 5 MILLIGRAM(S): 5 CAPSULE ORAL at 11:42

## 2018-11-07 RX ADMIN — BROMOCRIPTINE MESYLATE 5 MILLIGRAM(S): 5 CAPSULE ORAL at 05:28

## 2018-11-07 RX ADMIN — Medication 20 MILLIGRAM(S): at 05:29

## 2018-11-07 RX ADMIN — OXYCODONE HYDROCHLORIDE 10 MILLIGRAM(S): 5 TABLET ORAL at 07:00

## 2018-11-07 RX ADMIN — OXYCODONE HYDROCHLORIDE 10 MILLIGRAM(S): 5 TABLET ORAL at 14:35

## 2018-11-07 RX ADMIN — OXYCODONE HYDROCHLORIDE 10 MILLIGRAM(S): 5 TABLET ORAL at 14:05

## 2018-11-07 RX ADMIN — LACTULOSE 20 GRAM(S): 10 SOLUTION ORAL at 05:28

## 2018-11-07 RX ADMIN — Medication 20 MILLIGRAM(S): at 11:41

## 2018-11-07 RX ADMIN — CHLORHEXIDINE GLUCONATE 1 APPLICATION(S): 213 SOLUTION TOPICAL at 11:40

## 2018-11-07 RX ADMIN — SPIRONOLACTONE 25 MILLIGRAM(S): 25 TABLET, FILM COATED ORAL at 06:12

## 2018-11-07 RX ADMIN — Medication 50 MILLILITER(S): at 00:39

## 2018-11-07 RX ADMIN — Medication 5 MILLIGRAM(S): at 11:41

## 2018-11-07 RX ADMIN — LEVETIRACETAM 1000 MILLIGRAM(S): 250 TABLET, FILM COATED ORAL at 17:08

## 2018-11-07 RX ADMIN — LACTULOSE 20 GRAM(S): 10 SOLUTION ORAL at 17:06

## 2018-11-07 NOTE — PROGRESS NOTE ADULT - ATTENDING COMMENTS
Patient continues to be in a vegetative state.  Increase bromocriptine 10mg BID.  Some slight left LE flexion, but not fully.   Will continue to monitor for ongoing improvement.

## 2018-11-07 NOTE — PROGRESS NOTE ADULT - ASSESSMENT
32y Male severe TBI, s/p bilateral craniectomy, respiratory failure, hyponatremia  Neuro: ammonia improving, will continue to treat hepatic enchephalopathy, continue coma stim  Pulm: continue trach collar as tolerated  GI: continue rifaximin, full enteral nutrition as tolerated, peritoneal culture neg to date  Renal: hyponatremia, slightly improved will continue to monitor  ID: on cefepime for ?secondary peritonitis, will continue to monitor culture  ppx: lovenox for dvt ppx  dispo: will continue to require ICU level care

## 2018-11-07 NOTE — PROGRESS NOTE ADULT - SUBJECTIVE AND OBJECTIVE BOX
[facility]                                        Neu[facility] at [place]                                 [facility]                                  [address]                                        [place], [place], [address]                                             [phone]        CC: Traumatic Brain Injury and seizure.    HPI:   The patient is a young man, estimated to be in 30's who was found unresponsive on [address].  Per chart initial CT head showed subarachnoid hemorrhage and bilateral subdural hematomas.  He was then transferred from [facility] to [facility] ER for emergent neurosurgical treatment.  He was posturing with agonal respirations on arrival to [facility].  He was taken to the OR emergently by Dr. Fraser for evacuation and decompression of subdural hematomas.  He had bilateral craniectomy and extraventricular drain placed.  He had been having non- rhythmic movements of his feet and rhythmic movements of his tongue, causing concern for seizure/status epilepticus.    Interim history:  remains unresponsive    ROS:   Unobtainable due to patient's condition.     MEDICATIONS  (STANDING):  bromocriptine Tablet 5 milliGRAM(s) Oral <User Schedule>  cefepime   IVPB 2000 milliGRAM(s) IV Intermittent every 8 hours  chlorhexidine 0.12% Liquid 15 milliLiter(s) Swish and Spit two times a day  chlorhexidine 2% Cloths 1 Application(s) Topical daily  enoxaparin Injectable 40 milliGRAM(s) SubCutaneous daily  folic acid 1 milliGRAM(s) Oral daily  lactulose Syrup 20 Gram(s) Oral every 12 hours  levETIRAcetam  Solution 1000 milliGRAM(s) Oral two times a day  melatonin 5 milliGRAM(s) Oral at bedtime  oxyCODONE    Solution 10 milliGRAM(s) Oral every 4 hours  phytonadione   Solution 5 milliGRAM(s) Oral daily  propranolol 20 milliGRAM(s) Oral every 6 hours  rifaximin 550 milliGRAM(s) Oral two times a day  spironolactone 25 milliGRAM(s) Oral daily  thiamine 100 milliGRAM(s) Oral daily    MEDICATIONS  (PRN):  acetaminophen    Suspension .. 650 milliGRAM(s) Oral every 6 hours PRN Temp greater or equal to 38.5C (101.3F)      Vital Signs Last 24 Hrs  T(C): 37.2 (07 Nov 2018 08:00), Max: 37.6 (06 Nov 2018 16:00)  T(F): 99 (07 Nov 2018 08:00), Max: 99.7 (06 Nov 2018 16:00)  HR: 85 (07 Nov 2018 12:00) (73 - 87)  BP: 101/56 (06 Nov 2018 14:00) (96/55 - 101/56)  BP(mean): 72 (06 Nov 2018 14:00) (70 - 72)  RR: 24 (07 Nov 2018 12:00) (16 - 32)  SpO2: 96% (07 Nov 2018 12:00) (94% - 100%)    Detailed Neurologic Exam:  Remains on mechanical ventilator via tracheostomy.    Mental status: Unresponsive to voice.     Cranial nerves: Pupils: left 2.0  mm NR right 4 mm not reactive. There is no visual response to threat.  Extraocular motion is not assessed. Absent corneal reflexes.      Motor/Sensory:   There is  bilateral extensor posturing to pinch and sternal rub.  There is triple flexion to nail bed pressure in feet    Reflexes: absent throughout and plantar responses are extensor.    Cerebellar:  Unable to test finger to nose testing.    Labs:                           7.4    14.0  )-----------( 292      ( 07 Nov 2018 04:54 )             23.2     11-07    130<L>  |  90<L>  |  12.0  ----------------------------<  155<H>  4.0   |  26.0  |  0.23<L>    Ca    8.2<L>      07 Nov 2018 04:54  Phos  2.4     11-07  Mg     1.5     11-07

## 2018-11-07 NOTE — OCCUPATIONAL THERAPY INITIAL EVALUATION ADULT - ADDITIONAL COMMENTS
unknown at this time 2 unable to communicate and as per RN family who was contacted had not heard from pt for awhile and unknown of his previous history and nobody able to provide any assist/supervision

## 2018-11-07 NOTE — PROGRESS NOTE ADULT - SUBJECTIVE AND OBJECTIVE BOX
SUBJECTIVE  Patient seen and examined. Less responsive to eye opening with painful stimuli this morning.     TODAY'S REVIEW OF SYMPTOMS  Unable to obtain    VITALS  T(C): 37.2 (11-07-18 @ 08:00)  T(F): 99 (11-07-18 @ 08:00), Max: 99.7 (11-06-18 @ 16:00)  HR: 85 (11-07-18 @ 12:15) (73 - 87)  BP: 101/56 (11-06-18 @ 14:00) (101/56 - 101/56)  RR:  (16 - 32)  SpO2:  (94% - 100%)  Wt(kg): --    RECENT LABS/IMAGING             7.4    14.0  )-----------( 292      ( 07 Nov 2018 04:54 )             23.2     130<L>  |  90<L>  |  12.0  ----------------------------<  155<H>  4.0   |  26.0  |  0.23<L>    Ca    8.2<L>      07 Nov 2018 04:54  Phos  2.4     11-07  Mg     1.5     11-07    MEDICATIONS   MEDICATIONS  (STANDING):  bromocriptine Tablet 5 milliGRAM(s) Oral <User Schedule>  cefepime   IVPB 2000 milliGRAM(s) IV Intermittent every 8 hours  chlorhexidine 0.12% Liquid 15 milliLiter(s) Swish and Spit two times a day  chlorhexidine 2% Cloths 1 Application(s) Topical daily  enoxaparin Injectable 40 milliGRAM(s) SubCutaneous daily  folic acid 1 milliGRAM(s) Oral daily  lactulose Syrup 20 Gram(s) Oral every 12 hours  levETIRAcetam  Solution 1000 milliGRAM(s) Oral two times a day  melatonin 5 milliGRAM(s) Oral at bedtime  oxyCODONE    Solution 10 milliGRAM(s) Oral every 4 hours  phytonadione   Solution 5 milliGRAM(s) Oral daily  propranolol 20 milliGRAM(s) Oral every 6 hours  rifaximin 550 milliGRAM(s) Oral two times a day  spironolactone 25 milliGRAM(s) Oral daily  thiamine 100 milliGRAM(s) Oral daily    MEDICATIONS  (PRN):  acetaminophen    Suspension .. 650 milliGRAM(s) Oral every 6 hours PRN Temp greater or equal to 38.5C (101.3F)    PHYSICAL EXAM  HEENT - Cranial incisions with staples, (+) Tongue swelling with wet towel, (+) Eye opening with painful stimuli, (+) Blink to threat R>L, (-) Auditory startle, (+) Trach  Cardiovascular - No tachycardia noted, All extremities warm, Diffuse edema  Extremities - Diffuse swelling, Extensor posturing to pain response  Reflexes - +Babinski and Meza's bilaterally, Felipe's reflex 2+/4, No clonus appreciated in bilateral lower extremities, (+) Glabellar reflex    Coma Recovery Scale - Revised  AUDITORY FUNCTION SCALE  0 - None  VISUAL FUNCTION SCALE  1 - Visual startle   MOTOR FUNCTION SCALE  1 - Abnormal posturing (generalized extensor posturing)  OROMOTOR/VERBAL FUNCTION SCALE  0 - None  COMMUNICATION SCALE  0 - None  AROUSAL SCALE  1 - Eye Opening with Stimulation  TOTAL SCORE= 3 = Vegetative State     ASSESSMENT/PLAN  32y Male unknown PMH found unresponsive on sidewalk found with b/l SDH and SAH now s/p b/l hemicraniectomy and EVD placement s/p removal. Coma x 12 days, Vegetative State at Day 13. Vegetative state x 2 days    Sleep/Wake Cycles - Recommend increasing Bromocriptine to 10mg Q6AM/Q12PM, Continue Melatonin 5mg HS (10/31)  Hepatic encephalopathy - Rifaximin, Lactulose, Vit K, Spironolactone  ID - Cefepime  Possible seizure activity - Keppra   Dysautonomia - Propranolol, Bromocriptine  Pain - Oxycodone Q4hrs, Tylenol  Oral care - Aggressive oral care as patient at risk for necrotic tongue, Chlorhexidine  DVT PPX - SCDs, Lovenox  Rehab - COMA STIM by all services. Will continue to follow. SUBJECTIVE  Patient seen and examined. Less responsive to eye opening with painful stimuli this morning.     TODAY'S REVIEW OF SYMPTOMS  Unable to obtain    VITALS  T(C): 37.2 (11-07-18 @ 08:00)  T(F): 99 (11-07-18 @ 08:00), Max: 99.7 (11-06-18 @ 16:00)  HR: 85 (11-07-18 @ 12:15) (73 - 87)  BP: 101/56 (11-06-18 @ 14:00) (101/56 - 101/56)  RR:  (16 - 32)  SpO2:  (94% - 100%)  Wt(kg): --    RECENT LABS/IMAGING             7.4    14.0  )-----------( 292      ( 07 Nov 2018 04:54 )             23.2     130<L>  |  90<L>  |  12.0  ----------------------------<  155<H>  4.0   |  26.0  |  0.23<L>    Ca    8.2<L>      07 Nov 2018 04:54  Phos  2.4     11-07  Mg     1.5     11-07    MEDICATIONS   MEDICATIONS  (STANDING):  bromocriptine Tablet 5 milliGRAM(s) Oral <User Schedule>  cefepime   IVPB 2000 milliGRAM(s) IV Intermittent every 8 hours  chlorhexidine 0.12% Liquid 15 milliLiter(s) Swish and Spit two times a day  chlorhexidine 2% Cloths 1 Application(s) Topical daily  enoxaparin Injectable 40 milliGRAM(s) SubCutaneous daily  folic acid 1 milliGRAM(s) Oral daily  lactulose Syrup 20 Gram(s) Oral every 12 hours  levETIRAcetam  Solution 1000 milliGRAM(s) Oral two times a day  melatonin 5 milliGRAM(s) Oral at bedtime  oxyCODONE    Solution 10 milliGRAM(s) Oral every 4 hours  phytonadione   Solution 5 milliGRAM(s) Oral daily  propranolol 20 milliGRAM(s) Oral every 6 hours  rifaximin 550 milliGRAM(s) Oral two times a day  spironolactone 25 milliGRAM(s) Oral daily  thiamine 100 milliGRAM(s) Oral daily    MEDICATIONS  (PRN):  acetaminophen    Suspension .. 650 milliGRAM(s) Oral every 6 hours PRN Temp greater or equal to 38.5C (101.3F)    PHYSICAL EXAM  HEENT - Cranial incisions with staples, (+) Tongue swelling with wet towel, (+) Eye opening with painful stimuli, (+) Blink to threat R>L, (-) Auditory startle, (+) Trach  Cardiovascular - No tachycardia noted, All extremities warm, Diffuse edema  Extremities - Diffuse swelling, Extensor posturing to pain response  Reflexes - +Babinski and Meza's bilaterally, Felipe's reflex 2+/4, No clonus appreciated in bilateral lower extremities, (+) Glabellar reflex    Coma Recovery Scale - Revised  AUDITORY FUNCTION SCALE  0 - None  VISUAL FUNCTION SCALE  1 - Visual startle   MOTOR FUNCTION SCALE  1 - Abnormal posturing (generalized extensor posturing)  OROMOTOR/VERBAL FUNCTION SCALE  0 - None  COMMUNICATION SCALE  0 - None  AROUSAL SCALE  1 - Eye Opening with Stimulation  TOTAL SCORE= 3 = Vegetative State     ASSESSMENT/PLAN  32y Male unknown PMH found unresponsive on sidewalk found with b/l SDH and SAH now s/p b/l hemicraniectomy and EVD placement s/p removal. Coma x 12 days, Vegetative State at Day 13 (11/3)     Sleep/Wake Cycles - Recommend increasing Bromocriptine to 10mg Q6AM/Q12PM, Continue Melatonin 5mg HS (10/31)  Hepatic encephalopathy - Rifaximin, Lactulose, Vit K, Spironolactone  ID - Cefepime  Possible seizure activity - Keppra   Dysautonomia - Propranolol, Bromocriptine  Pain - Oxycodone Q4hrs, Tylenol  Oral care - Aggressive oral care as patient at risk for necrotic tongue, Chlorhexidine  DVT PPX - SCDs, Lovenox  Rehab - COMA STIM by all services. Will continue to follow.

## 2018-11-07 NOTE — PROGRESS NOTE ADULT - ASSESSMENT
32 y Male who is followed by neurology because of TBI/possible seizure    TBI  Now status post decompressive bilateral hemicraniectomy and extraventricular drain.  Significant amount of intracranial hemorrhage, improving post decompression.  Multiple areas of parenchymal CVA/ischemia seen  No significant improvement in neuro exam- remains comatose    Possible seizure  EEG: no seizure, breech rhythm over craniectomies, slowing suggesting diffuse cerebral dysfunction.  Rhythmic tongue movements and random foot movement has stopped.  Continue Keppra.    Neurologic status remains unchanged.    Poor prognosis for meaningful recovery.    will follow with you    Martell Mendoza MD PhD   [id number]

## 2018-11-07 NOTE — PROGRESS NOTE ADULT - ASSESSMENT
31 y/o M HD #15 s/p found down and transferred from [facility], s/p b/l craniectomy, trach/PEG and paracentesis    - Continue management per ICU team  - continue keppra for seizure prophylaxis per neurology recommendations. Poor prognosis for meaningful recovery  - Culture from paracentesis, no organisms seen, few WBC, on Cefepime for peritonitis. WBC trending down  - Continue TF, advance as tolerates to goal  - Continue trach collar per ICU recommendations  - Rehab - COMA stim by all services

## 2018-11-07 NOTE — PROGRESS NOTE ADULT - SUBJECTIVE AND OBJECTIVE BOX
HISTORY  32y Male severe TBI, s/p bilateral craniectomy, respiratory failure, hyponatremia     24 HOUR EVENTS: Tolerating trach collar trials.    SUBJECTIVE/ROS:  [ ] A ten-point review of systems was otherwise negative except as noted.  [x ] Due to altered mental status/intubation, subjective information were not able to be obtained from the patient. History was obtained, to the extent possible, from review of the chart and collateral sources of information.      NEURO  RASS:  -2   GCS:  4T3   CAM ICU: N/A  Exam: decoratacte posturing today, opens eyes spontanously at time, does not track  Meds: acetaminophen    Suspension .. 650 milliGRAM(s) Oral every 6 hours PRN Temp greater or equal to 38.5C (101.3F)  bromocriptine Tablet 5 milliGRAM(s) Oral <User Schedule>  levETIRAcetam  Solution 1000 milliGRAM(s) Oral two times a day  melatonin 5 milliGRAM(s) Oral at bedtime  oxyCODONE    Solution 10 milliGRAM(s) Oral every 4 hours    [x] Adequacy of sedation and pain control has been assessed and adjusted      RESPIRATORY  RR: 20 (11-07-18 @ 07:00) (15 - 25)  SpO2: 94% (11-07-18 @ 07:00) (94% - 100%)  Wt(kg): --  Exam: unlabored, clear to auscultation bilaterally  Mechanical Ventilation: Mode: CPAP with PS, RR (patient): 20, FiO2: 40, PEEP: 8, PS: 10, ITime: 40, MAP: 11  ABG - ( 07 Nov 2018 04:14 )  pH: 7.46  /  pCO2: 43    /  pO2: 84    / HCO3: 30    / Base Excess: 6.5   /  SaO2: 98      Lactate: x                [ ] Extubation Readiness Assessed  Meds:       CARDIOVASCULAR  HR: 83 (11-07-18 @ 07:00) (71 - 85)  BP: 101/56 (11-06-18 @ 14:00) (96/53 - 150/85)  BP(mean): 72 (11-06-18 @ 14:00) (68 - 109)  ABP: 116/57 (11-07-18 @ 07:00) (107/58 - 190/99)  ABP(mean): 76 (11-07-18 @ 07:00) (73 - 132)  Wt(kg): --  CVP(cm H2O): --      Exam:  Cardiac Rhythm:  Perfusion     [x ]Adequate   [ ]Inadequate  Mentation   [ ]Normal       [x ]Reduced  Extremities  [x ]Warm         [ ]Cool  Volume Status [ ]Hypervolemic [ x]Euvolemic [ ]Hypovolemic  Meds: propranolol 20 milliGRAM(s) Oral every 6 hours  spironolactone 25 milliGRAM(s) Oral daily        GI/NUTRITION  Exam: soft, NT ND  Diet: pivot  Meds: lactulose Syrup 20 Gram(s) Oral every 12 hours      GENITOURINARY  I&O's Detail    11-06 @ 07:01  -  11-07 @ 07:00  --------------------------------------------------------  IN:    Enteral Tube Flush: 680 mL    Pivot: 1150 mL    Solution: 150 mL    Solution: 400 mL  Total IN: 2380 mL    OUT:    Indwelling Catheter - Urethral: 1030 mL  Total OUT: 1030 mL    Total NET: 1350 mL          11-07    130<L>  |  90<L>  |  12.0  ----------------------------<  155<H>  4.0   |  26.0  |  0.23<L>    Ca    8.2<L>      07 Nov 2018 04:54  Phos  2.4     11-07  Mg     1.5     11-07      [ ] Apodaca catheter, indication: N/A  Meds: folic acid 1 milliGRAM(s) Oral daily  phytonadione   Solution 5 milliGRAM(s) Oral daily  thiamine 100 milliGRAM(s) Oral daily        HEMATOLOGIC  Meds: enoxaparin Injectable 40 milliGRAM(s) SubCutaneous daily    [x] VTE Prophylaxis                        7.4    14.0  )-----------( 292      ( 07 Nov 2018 04:54 )             23.2     PT/INR - ( 05 Nov 2018 10:54 )   PT: 19.3 sec;   INR: 1.65 ratio         PTT - ( 05 Nov 2018 10:54 )  PTT:34.5 sec  Transfusion     [ ] PRBC   [ ] Platelets   [ ] FFP   [ ] Cryoprecipitate      INFECTIOUS DISEASES  T(C): 36.7 (11-07-18 @ 00:00), Max: 37.7 (11-06-18 @ 12:00)  Wt(kg): --  WBC Count: 14.0 K/uL (11-07 @ 04:54)    Recent Cultures:  Specimen Source: .Body Fluid Peritoneal Fluid, 11-05 @ 17:05; Results   No growth at 1 day.  Culture in progress; Gram Stain:   Few White blood cells  No organisms seen; Organism: --  Specimen Source: .Urine Catheterized, 11-02 @ 10:34; Results   No growth; Gram Stain: --; Organism: --    Meds: cefepime   IVPB      cefepime   IVPB 2000 milliGRAM(s) IV Intermittent every 8 hours  rifaximin 550 milliGRAM(s) Oral two times a day        ENDOCRINE  Capillary Blood Glucose    Meds:       ACCESS DEVICES:  [x ] Peripheral IV  [ ] Central Venous Line	[ ] R	[ ] L	[ ] IJ	[ ] Fem	[ ] SC	Placed:   [ ] Arterial Line		[ ] R	[ ] L	[ ] Fem	[ ] Rad	[ ] Ax	Placed:   [ ] PICC:					[ ] Mediport  [ ] Urinary Catheter, Date Placed:   [ ] Necessity of urinary, arterial, and venous catheters discussed    OTHER MEDICATIONS:  chlorhexidine 0.12% Liquid 15 milliLiter(s) Swish and Spit two times a day  chlorhexidine 2% Cloths 1 Application(s) Topical daily      CODE STATUS:     IMAGING: HISTORY  32y Male severe TBI, s/p bilateral craniectomy, respiratory failure, hyponatremia     24 HOUR EVENTS: Tolerating trach collar trials.    SUBJECTIVE/ROS:  [ ] A ten-point review of systems was otherwise negative except as noted.  [x ] Due to altered mental status/intubation, subjective information were not able to be obtained from the patient. History was obtained, to the extent possible, from review of the chart and collateral sources of information.      NEURO  RASS:  -2   GCS:  4T3   CAM ICU: N/A  Exam: decoratacte posturing today, opens eyes spontanously at time, does not track  Meds: acetaminophen    Suspension .. 650 milliGRAM(s) Oral every 6 hours PRN Temp greater or equal to 38.5C (101.3F)  bromocriptine Tablet 5 milliGRAM(s) Oral <User Schedule>  levETIRAcetam  Solution 1000 milliGRAM(s) Oral two times a day  melatonin 5 milliGRAM(s) Oral at bedtime  oxyCODONE    Solution 10 milliGRAM(s) Oral every 4 hours    [x] Adequacy of sedation and pain control has been assessed and adjusted      RESPIRATORY  RR: 20 (11-07-18 @ 07:00) (15 - 25)  SpO2: 94% (11-07-18 @ 07:00) (94% - 100%)  Wt(kg): --  Exam: unlabored, coarse bilaterally  Mechanical Ventilation: Mode: CPAP with PS, RR (patient): 20, FiO2: 40, PEEP: 8, PS: 10, ITime: 40, MAP: 11  ABG - ( 07 Nov 2018 04:14 )  pH: 7.46  /  pCO2: 43    /  pO2: 84    / HCO3: 30    / Base Excess: 6.5   /  SaO2: 98      Lactate: x                [ ] Extubation Readiness Assessed  Meds:       CARDIOVASCULAR  HR: 83 (11-07-18 @ 07:00) (71 - 85)  BP: 101/56 (11-06-18 @ 14:00) (96/53 - 150/85)  BP(mean): 72 (11-06-18 @ 14:00) (68 - 109)  ABP: 116/57 (11-07-18 @ 07:00) (107/58 - 190/99)  ABP(mean): 76 (11-07-18 @ 07:00) (73 - 132)  Wt(kg): --  CVP(cm H2O): --      Exam: s1s2  Cardiac Rhythm: sinu  Perfusion     [x ]Adequate   [ ]Inadequate  Mentation   [ ]Normal       [x ]Reduced  Extremities  [x ]Warm         [ ]Cool  Volume Status [ ]Hypervolemic [ x]Euvolemic [ ]Hypovolemic  Meds: propranolol 20 milliGRAM(s) Oral every 6 hours  spironolactone 25 milliGRAM(s) Oral daily        GI/NUTRITION  Exam: soft, NT ND  Diet: pivot  Meds: lactulose Syrup 20 Gram(s) Oral every 12 hours      GENITOURINARY  I&O's Detail    11-06 @ 07:01  -  11-07 @ 07:00  --------------------------------------------------------  IN:    Enteral Tube Flush: 680 mL    Pivot: 1150 mL    Solution: 150 mL    Solution: 400 mL  Total IN: 2380 mL    OUT:    Indwelling Catheter - Urethral: 1030 mL  Total OUT: 1030 mL    Total NET: 1350 mL          11-07    130<L>  |  90<L>  |  12.0  ----------------------------<  155<H>  4.0   |  26.0  |  0.23<L>    Ca    8.2<L>      07 Nov 2018 04:54  Phos  2.4     11-07  Mg     1.5     11-07      [ ] Apodaca catheter, indication: N/A  Meds: folic acid 1 milliGRAM(s) Oral daily  phytonadione   Solution 5 milliGRAM(s) Oral daily  thiamine 100 milliGRAM(s) Oral daily        HEMATOLOGIC  Meds: enoxaparin Injectable 40 milliGRAM(s) SubCutaneous daily    [x] VTE Prophylaxis                        7.4    14.0  )-----------( 292      ( 07 Nov 2018 04:54 )             23.2     PT/INR - ( 05 Nov 2018 10:54 )   PT: 19.3 sec;   INR: 1.65 ratio         PTT - ( 05 Nov 2018 10:54 )  PTT:34.5 sec  Transfusion     [ ] PRBC   [ ] Platelets   [ ] FFP   [ ] Cryoprecipitate      INFECTIOUS DISEASES  T(C): 36.7 (11-07-18 @ 00:00), Max: 37.7 (11-06-18 @ 12:00)  Wt(kg): --  WBC Count: 14.0 K/uL (11-07 @ 04:54)    Recent Cultures:  Specimen Source: .Body Fluid Peritoneal Fluid, 11-05 @ 17:05; Results   No growth at 1 day.  Culture in progress; Gram Stain:   Few White blood cells  No organisms seen; Organism: --  Specimen Source: .Urine Catheterized, 11-02 @ 10:34; Results   No growth; Gram Stain: --; Organism: --    Meds: cefepime   IVPB      cefepime   IVPB 2000 milliGRAM(s) IV Intermittent every 8 hours  rifaximin 550 milliGRAM(s) Oral two times a day        ENDOCRINE  Capillary Blood Glucose    Meds:       ACCESS DEVICES:  [x ] Peripheral IV  [ ] Central Venous Line	[ ] R	[ ] L	[ ] IJ	[ ] Fem	[ ] SC	Placed:   [ ] Arterial Line		[ ] R	[ ] L	[ ] Fem	[ ] Rad	[ ] Ax	Placed:   [ ] PICC:					[ ] Mediport  [ ] Urinary Catheter, Date Placed:   [ ] Necessity of urinary, arterial, and venous catheters discussed    OTHER MEDICATIONS:  chlorhexidine 0.12% Liquid 15 milliLiter(s) Swish and Spit two times a day  chlorhexidine 2% Cloths 1 Application(s) Topical daily      CODE STATUS:     IMAGING:

## 2018-11-07 NOTE — OCCUPATIONAL THERAPY INITIAL EVALUATION ADULT - PERTINENT HX OF CURRENT PROBLEM, REHAB EVAL
Pt is a 31 y/o male who was transferred from [place] with b/l SDH/SAH. Pt was found unconscious/unresponsive on a sidewalk.

## 2018-11-08 LAB
AMMONIA BLD-MCNC: 114 UMOL/L — HIGH (ref 11–55)
ANION GAP SERPL CALC-SCNC: 9 MMOL/L — SIGNIFICANT CHANGE UP (ref 5–17)
BASOPHILS # BLD AUTO: 0 K/UL — SIGNIFICANT CHANGE UP (ref 0–0.2)
BASOPHILS NFR BLD AUTO: 0.1 % — SIGNIFICANT CHANGE UP (ref 0–2)
BLD GP AB SCN SERPL QL: SIGNIFICANT CHANGE UP
BUN SERPL-MCNC: 11 MG/DL — SIGNIFICANT CHANGE UP (ref 8–20)
CALCIUM SERPL-MCNC: 8.3 MG/DL — LOW (ref 8.6–10.2)
CHLORIDE SERPL-SCNC: 89 MMOL/L — LOW (ref 98–107)
CO2 SERPL-SCNC: 30 MMOL/L — HIGH (ref 22–29)
CREAT SERPL-MCNC: <0.2 MG/DL — LOW (ref 0.5–1.3)
EOSINOPHIL # BLD AUTO: 0.2 K/UL — SIGNIFICANT CHANGE UP (ref 0–0.5)
EOSINOPHIL NFR BLD AUTO: 1.6 % — SIGNIFICANT CHANGE UP (ref 0–5)
GAS PNL BLDA: SIGNIFICANT CHANGE UP
GLUCOSE SERPL-MCNC: 122 MG/DL — HIGH (ref 70–115)
HCT VFR BLD CALC: 21.9 % — LOW (ref 42–52)
HGB BLD-MCNC: 7 G/DL — CRITICAL LOW (ref 14–18)
LYMPHOCYTES # BLD AUTO: 0.6 K/UL — LOW (ref 1–4.8)
LYMPHOCYTES # BLD AUTO: 4.1 % — LOW (ref 20–55)
MAGNESIUM SERPL-MCNC: 1.7 MG/DL — SIGNIFICANT CHANGE UP (ref 1.6–2.6)
MCHC RBC-ENTMCNC: 27.9 PG — SIGNIFICANT CHANGE UP (ref 27–31)
MCHC RBC-ENTMCNC: 32 G/DL — SIGNIFICANT CHANGE UP (ref 32–36)
MCV RBC AUTO: 87.3 FL — SIGNIFICANT CHANGE UP (ref 80–94)
MONOCYTES # BLD AUTO: 0.7 K/UL — SIGNIFICANT CHANGE UP (ref 0–0.8)
MONOCYTES NFR BLD AUTO: 5.1 % — SIGNIFICANT CHANGE UP (ref 3–10)
NEUTROPHILS # BLD AUTO: 12.7 K/UL — HIGH (ref 1.8–8)
NEUTROPHILS NFR BLD AUTO: 88.6 % — HIGH (ref 37–73)
PHOSPHATE SERPL-MCNC: 2.2 MG/DL — LOW (ref 2.4–4.7)
PLATELET # BLD AUTO: 249 K/UL — SIGNIFICANT CHANGE UP (ref 150–400)
POTASSIUM SERPL-MCNC: 4.1 MMOL/L — SIGNIFICANT CHANGE UP (ref 3.5–5.3)
POTASSIUM SERPL-SCNC: 4.1 MMOL/L — SIGNIFICANT CHANGE UP (ref 3.5–5.3)
RBC # BLD: 2.51 M/UL — LOW (ref 4.6–6.2)
RBC # FLD: 18 % — HIGH (ref 11–15.6)
SODIUM SERPL-SCNC: 128 MMOL/L — LOW (ref 135–145)
TYPE + AB SCN PNL BLD: SIGNIFICANT CHANGE UP
WBC # BLD: 14.4 K/UL — HIGH (ref 4.8–10.8)
WBC # FLD AUTO: 14.4 K/UL — HIGH (ref 4.8–10.8)

## 2018-11-08 PROCEDURE — 99233 SBSQ HOSP IP/OBS HIGH 50: CPT | Mod: GC

## 2018-11-08 PROCEDURE — 99232 SBSQ HOSP IP/OBS MODERATE 35: CPT

## 2018-11-08 PROCEDURE — 99291 CRITICAL CARE FIRST HOUR: CPT

## 2018-11-08 RX ORDER — POTASSIUM PHOSPHATE, MONOBASIC POTASSIUM PHOSPHATE, DIBASIC 236; 224 MG/ML; MG/ML
30 INJECTION, SOLUTION INTRAVENOUS ONCE
Qty: 0 | Refills: 0 | Status: DISCONTINUED | OUTPATIENT
Start: 2018-11-08 | End: 2018-11-08

## 2018-11-08 RX ORDER — MAGNESIUM SULFATE 500 MG/ML
2 VIAL (ML) INJECTION ONCE
Qty: 0 | Refills: 0 | Status: COMPLETED | OUTPATIENT
Start: 2018-11-08 | End: 2018-11-08

## 2018-11-08 RX ORDER — BROMOCRIPTINE MESYLATE 5 MG/1
10 CAPSULE ORAL
Qty: 0 | Refills: 0 | Status: DISCONTINUED | OUTPATIENT
Start: 2018-11-08 | End: 2018-11-26

## 2018-11-08 RX ORDER — ALBUMIN HUMAN 25 %
50 VIAL (ML) INTRAVENOUS ONCE
Qty: 0 | Refills: 0 | Status: COMPLETED | OUTPATIENT
Start: 2018-11-08 | End: 2018-11-08

## 2018-11-08 RX ORDER — BROMOCRIPTINE MESYLATE 5 MG/1
10 CAPSULE ORAL
Qty: 0 | Refills: 0 | Status: DISCONTINUED | OUTPATIENT
Start: 2018-11-08 | End: 2018-11-08

## 2018-11-08 RX ORDER — LACTULOSE 10 G/15ML
20 SOLUTION ORAL EVERY 8 HOURS
Qty: 0 | Refills: 0 | Status: DISCONTINUED | OUTPATIENT
Start: 2018-11-08 | End: 2018-11-09

## 2018-11-08 RX ADMIN — Medication 1 MILLIGRAM(S): at 11:24

## 2018-11-08 RX ADMIN — CHLORHEXIDINE GLUCONATE 15 MILLILITER(S): 213 SOLUTION TOPICAL at 17:35

## 2018-11-08 RX ADMIN — OXYCODONE HYDROCHLORIDE 10 MILLIGRAM(S): 5 TABLET ORAL at 10:59

## 2018-11-08 RX ADMIN — Medication 20 MILLIGRAM(S): at 11:25

## 2018-11-08 RX ADMIN — Medication 20 MILLIGRAM(S): at 17:35

## 2018-11-08 RX ADMIN — OXYCODONE HYDROCHLORIDE 10 MILLIGRAM(S): 5 TABLET ORAL at 14:29

## 2018-11-08 RX ADMIN — LACTULOSE 20 GRAM(S): 10 SOLUTION ORAL at 21:34

## 2018-11-08 RX ADMIN — Medication 20 MILLIGRAM(S): at 23:53

## 2018-11-08 RX ADMIN — LACTULOSE 20 GRAM(S): 10 SOLUTION ORAL at 14:31

## 2018-11-08 RX ADMIN — LEVETIRACETAM 1000 MILLIGRAM(S): 250 TABLET, FILM COATED ORAL at 17:35

## 2018-11-08 RX ADMIN — SPIRONOLACTONE 25 MILLIGRAM(S): 25 TABLET, FILM COATED ORAL at 06:04

## 2018-11-08 RX ADMIN — OXYCODONE HYDROCHLORIDE 10 MILLIGRAM(S): 5 TABLET ORAL at 02:00

## 2018-11-08 RX ADMIN — OXYCODONE HYDROCHLORIDE 10 MILLIGRAM(S): 5 TABLET ORAL at 21:44

## 2018-11-08 RX ADMIN — Medication 100 MILLIGRAM(S): at 11:24

## 2018-11-08 RX ADMIN — OXYCODONE HYDROCHLORIDE 10 MILLIGRAM(S): 5 TABLET ORAL at 17:35

## 2018-11-08 RX ADMIN — BROMOCRIPTINE MESYLATE 10 MILLIGRAM(S): 5 CAPSULE ORAL at 11:23

## 2018-11-08 RX ADMIN — CEFEPIME 100 MILLIGRAM(S): 1 INJECTION, POWDER, FOR SOLUTION INTRAMUSCULAR; INTRAVENOUS at 21:34

## 2018-11-08 RX ADMIN — OXYCODONE HYDROCHLORIDE 10 MILLIGRAM(S): 5 TABLET ORAL at 18:05

## 2018-11-08 RX ADMIN — Medication 62.5 MILLIMOLE(S): at 10:09

## 2018-11-08 RX ADMIN — CEFEPIME 100 MILLIGRAM(S): 1 INJECTION, POWDER, FOR SOLUTION INTRAMUSCULAR; INTRAVENOUS at 14:34

## 2018-11-08 RX ADMIN — ENOXAPARIN SODIUM 40 MILLIGRAM(S): 100 INJECTION SUBCUTANEOUS at 11:24

## 2018-11-08 RX ADMIN — OXYCODONE HYDROCHLORIDE 10 MILLIGRAM(S): 5 TABLET ORAL at 06:08

## 2018-11-08 RX ADMIN — Medication 20 MILLIGRAM(S): at 06:05

## 2018-11-08 RX ADMIN — Medication 5 MILLIGRAM(S): at 11:25

## 2018-11-08 RX ADMIN — Medication 20 MILLIGRAM(S): at 00:10

## 2018-11-08 RX ADMIN — CHLORHEXIDINE GLUCONATE 1 APPLICATION(S): 213 SOLUTION TOPICAL at 11:23

## 2018-11-08 RX ADMIN — CEFEPIME 100 MILLIGRAM(S): 1 INJECTION, POWDER, FOR SOLUTION INTRAMUSCULAR; INTRAVENOUS at 06:07

## 2018-11-08 RX ADMIN — Medication 50 GRAM(S): at 06:40

## 2018-11-08 RX ADMIN — LEVETIRACETAM 1000 MILLIGRAM(S): 250 TABLET, FILM COATED ORAL at 06:05

## 2018-11-08 RX ADMIN — LACTULOSE 20 GRAM(S): 10 SOLUTION ORAL at 06:05

## 2018-11-08 RX ADMIN — BROMOCRIPTINE MESYLATE 10 MILLIGRAM(S): 5 CAPSULE ORAL at 06:08

## 2018-11-08 RX ADMIN — CHLORHEXIDINE GLUCONATE 15 MILLILITER(S): 213 SOLUTION TOPICAL at 06:04

## 2018-11-08 RX ADMIN — OXYCODONE HYDROCHLORIDE 10 MILLIGRAM(S): 5 TABLET ORAL at 01:30

## 2018-11-08 RX ADMIN — OXYCODONE HYDROCHLORIDE 10 MILLIGRAM(S): 5 TABLET ORAL at 10:29

## 2018-11-08 RX ADMIN — Medication 50 MILLILITER(S): at 20:05

## 2018-11-08 RX ADMIN — Medication 5 MILLIGRAM(S): at 21:34

## 2018-11-08 RX ADMIN — OXYCODONE HYDROCHLORIDE 10 MILLIGRAM(S): 5 TABLET ORAL at 06:04

## 2018-11-08 RX ADMIN — OXYCODONE HYDROCHLORIDE 10 MILLIGRAM(S): 5 TABLET ORAL at 21:34

## 2018-11-08 RX ADMIN — OXYCODONE HYDROCHLORIDE 10 MILLIGRAM(S): 5 TABLET ORAL at 15:15

## 2018-11-08 NOTE — CHART NOTE - NSCHARTNOTEFT_GEN_A_CORE
Source: Patient [ ]  Family [ ]   other [x ]  EMR     Enteral /Parenteral Nutrition: Diet, NPO with Tube Feed:   Tube Feeding Modality: Gastrostomy  Pivot 1.5 Ryan  Total Volume for 24 Hours (mL): 1200  Continuous  Starting Tube Feed Rate {mL per Hour}: 50  Until Goal Tube Feed Rate (mL per Hour): 50  Tube Feed Duration (in Hours): 24  Tube Feed Start Time: 16:25 (11-01-18 @ 16:25)      Current Weight:   11/2: 87kg   11/1: 82 kg   10/27: 82kg   (+edema noted)       Pertinent Medications: MEDICATIONS  (STANDING):  bromocriptine Tablet 10 milliGRAM(s) Oral <User Schedule>  cefepime   IVPB 2000 milliGRAM(s) IV Intermittent every 8 hours  chlorhexidine 0.12% Liquid 15 milliLiter(s) Swish and Spit two times a day  chlorhexidine 2% Cloths 1 Application(s) Topical daily  enoxaparin Injectable 40 milliGRAM(s) SubCutaneous daily  folic acid 1 milliGRAM(s) Oral daily  levETIRAcetam  Solution 1000 milliGRAM(s) Oral two times a day  melatonin 5 milliGRAM(s) Oral at bedtime  oxyCODONE    Solution 10 milliGRAM(s) Oral every 4 hours  phytonadione   Solution 5 milliGRAM(s) Oral daily  propranolol 20 milliGRAM(s) Oral every 6 hours  rifaximin 550 milliGRAM(s) Oral two times a day  spironolactone 25 milliGRAM(s) Oral daily  thiamine 100 milliGRAM(s) Oral daily    MEDICATIONS  (PRN):  acetaminophen    Suspension .. 650 milliGRAM(s) Oral every 6 hours PRN Temp greater or equal to 38.5C (101.3F)    Pertinent Labs: CBC Full  -  ( 08 Nov 2018 05:08 )  WBC Count : 14.4 K/uL  Hemoglobin : 7.0 g/dL  Hematocrit : 21.9 %  Platelet Count - Automated : 249 K/uL  Mean Cell Volume : 87.3 fl  Mean Cell Hemoglobin : 27.9 pg  Mean Cell Hemoglobin Concentration : 32.0 g/dL  Auto Neutrophil # : 12.7 K/uL  Auto Lymphocyte # : 0.6 K/uL  Auto Monocyte # : 0.7 K/uL  Auto Eosinophil # : 0.2 K/uL  Auto Basophil # : 0.0 K/uL  Auto Neutrophil % : 88.6 %  Auto Lymphocyte % : 4.1 %  Auto Monocyte % : 5.1 %  Auto Eosinophil % : 1.6 %  Auto Basophil % : 0.1 %        Skin: B/L head surgical sites     Nutrition focused physical exam NOT conducted - found signs of malnutrition [ ]absent [ ]present    Subcutaneous fat loss: [ ] Orbital fat pads region, [ ]Buccal fat region, [ ]Triceps region,  [ ]Ribs region    Muscle wasting: [ ]Temples region, [ ]Clavicle region, [ ]Shoulder region, [ ]Scapula region, [ ]Interosseous region,  [ ]thigh region, [ ]Calf region    Estimated Needs:   [ x] no change since previous assessment  [ ] recalculated:     Current Nutrition Diagnosis:  Pt remains at high nutrition risk secondary to increased nutrient needs related to increased physiologic demand for healing as evidenced by s/p TBI, with multicompartment ICH and brain compression from unknown mechanism, VAP, s/p trach and PEG. Pt now receiving enteral feeds of Pivot @ 50ml/hr ([id number]) 1000ml/day 1500kcal kcal, 97gm protein, not yet meeting estimated nutrition needs. Aware pt possible comfort care next week. Recommendations below:     Recommendations:   1. Rx: MVI and Vit. C daily (500mg)   2. Check wt daily to monitor trends   3. Monitor H/H, Na+   4. Consider increased enteral feeds to 60ml/hr x 20 hours 1200ml/day (1800kcal, 116gm protein)   5. Prostat BID (200kcal. 30gm protein    Monitoring and Evaluation:   [ ] PO intake [x ] Tolerance to diet prescription [X] Weights  [X] Follow up per protocol [X] Labs:

## 2018-11-08 NOTE — PROGRESS NOTE ADULT - SUBJECTIVE AND OBJECTIVE BOX
INTERVAL HPI/OVERNIGHT EVENTS/SUBJECTIVE: Bromocritine increased per rec of RehabA detailed, extensive conversation was had between myself, social work, and Uncle through bedside .  Prognosis explained to family and family decided DNR would be appropriate at this time.  Uncle stated he would discuss with remote Family members about possibility of focusing purely on comfort care sometime next week.  Family explains that pt has had decline into alcohol dependence and is likely homeless recently.  They explain pt would not want to live in nursing home.    ICU Vital Signs Last 24 Hrs  T(C): 36.2 (08 Nov 2018 08:00), Max: 37.6 (07 Nov 2018 20:00)  T(F): 97.2 (08 Nov 2018 08:00), Max: 99.7 (07 Nov 2018 20:00)  HR: 71 (08 Nov 2018 09:00) (71 - 86)  BP: --  BP(mean): --  ABP: 116/65 (08 Nov 2018 09:00) (110/58 - 181/95)  ABP(mean): 82 (08 Nov 2018 09:00) (74 - 127)  RR: 14 (08 Nov 2018 09:00) (12 - 32)  SpO2: 99% (08 Nov 2018 09:00) (95% - 100%)      I&O's Detail    07 Nov 2018 07:01  -  08 Nov 2018 07:00  --------------------------------------------------------  IN:    Enteral Tube Flush: 350 mL    Pivot: 1200 mL    Solution: 150 mL    Solution: 100 mL  Total IN: 1800 mL    OUT:    Indwelling Catheter - Urethral: 1235 mL  Total OUT: 1235 mL    Total NET: 565 mL      08 Nov 2018 07:01  -  08 Nov 2018 10:06  --------------------------------------------------------  IN:  Total IN: 0 mL    OUT:    Indwelling Catheter - Urethral: 140 mL  Total OUT: 140 mL    Total NET: -140 mL          Mode: CPAP with PS  FiO2: 40  PEEP: 8  PS: 10  MAP: 11    ABG - ( 08 Nov 2018 04:04 )  pH, Arterial: 7.45  pH, Blood: x     /  pCO2: 48    /  pO2: 132   / HCO3: 32    / Base Excess: 8.2   /  SaO2: 100                 MEDICATIONS  (STANDING):  bromocriptine Tablet 10 milliGRAM(s) Oral <User Schedule>  cefepime   IVPB 2000 milliGRAM(s) IV Intermittent every 8 hours  chlorhexidine 0.12% Liquid 15 milliLiter(s) Swish and Spit two times a day  chlorhexidine 2% Cloths 1 Application(s) Topical daily  enoxaparin Injectable 40 milliGRAM(s) SubCutaneous daily  folic acid 1 milliGRAM(s) Oral daily  lactulose Syrup 20 Gram(s) Oral every 12 hours  levETIRAcetam  Solution 1000 milliGRAM(s) Oral two times a day  melatonin 5 milliGRAM(s) Oral at bedtime  oxyCODONE    Solution 10 milliGRAM(s) Oral every 4 hours  phytonadione   Solution 5 milliGRAM(s) Oral daily  propranolol 20 milliGRAM(s) Oral every 6 hours  rifaximin 550 milliGRAM(s) Oral two times a day  sodium phosphate IVPB 15 milliMole(s) IV Intermittent once  spironolactone 25 milliGRAM(s) Oral daily  thiamine 100 milliGRAM(s) Oral daily    MEDICATIONS  (PRN):  acetaminophen    Suspension .. 650 milliGRAM(s) Oral every 6 hours PRN Temp greater or equal to 38.5C (101.3F)      NUTRITION/IVF:     CENTRAL LINE:  LOCATION:   DATE INSERTED:  CVP:  SCVO2:    PARHAM:   DATE INSERTED:    A-LINE:    LOCATION:   DATE INSERTED:   SVV:  CO/CI:     CHEST TUBE:  LOCATION:  DATE INSERTED: OUTPUT/24 HRS:  SUCTION/WATER SEAL:     NG/OG TUBE:  DATE INSERTED:  OUTPUT/24 HRS:    MISC:     PHYSICAL EXAM:     Gen:NAD, Well appearing, No cyanosis, Pallor.  On Ventilator    Eyes: PERRL ~ 3mm, EOMI,     Neurological: A&O, GCS 4/1T/6, No focal defficit.     ENMT: Clear canals,    Neck: Supple. NT AT, FROM no pain.  No JVD. No meningeal signs    Pulmonary: NAD, CTA, = BL .  Minimal secreations.    Cardiovascular: RRR, S1, S2, No murmurs noted.    Gastrointestinal:ND, Soft, NT.    Genitourinary:     Back:     Extremities: NT, AT, Mild-Moderate 2+ pitting edema.  No erythema or palpable cord noted.  FROM, = 2+ pulses throughout.    Skin:     Musculoskeletal:          LABS:  CBC Full  -  ( 08 Nov 2018 05:08 )  WBC Count : 14.4 K/uL  Hemoglobin : 7.0 g/dL  Hematocrit : 21.9 %  Platelet Count - Automated : 249 K/uL  Mean Cell Volume : 87.3 fl  Mean Cell Hemoglobin : 27.9 pg  Mean Cell Hemoglobin Concentration : 32.0 g/dL  Auto Neutrophil # : 12.7 K/uL  Auto Lymphocyte # : 0.6 K/uL  Auto Monocyte # : 0.7 K/uL  Auto Eosinophil # : 0.2 K/uL  Auto Basophil # : 0.0 K/uL  Auto Neutrophil % : 88.6 %  Auto Lymphocyte % : 4.1 %  Auto Monocyte % : 5.1 %  Auto Eosinophil % : 1.6 %  Auto Basophil % : 0.1 %    11-08    128<L>  |  89<L>  |  11.0  ----------------------------<  122<H>  4.1   |  30.0<H>  |  <0.20<L>    Ca    8.3<L>      08 Nov 2018 05:08  Phos  2.2     11-08  Mg     1.7     11-08          RECENT CULTURES:  11-05 .Body Fluid Peritoneal Fluid XXXX   Few White blood cells  No organisms seen   No growth at 2 days.  Culture in progress    11-02 .Urine Catheterized XXXX XXXX   No growth              CAPILLARY BLOOD GLUCOSE      RADIOLOGY & ADDITIONAL STUDIES:    ASSESSMENT/PLAN:  32yMale presenting with:        Neurological:    ENMT:    Neck:    Pulmonary:    Cardiovascular:    Gastrointestinal:    Genitourinary:    Heme:    ID:    Skin:    Lines/ Tubes:    Dispo:            CRITICAL CARE TIME SPENT: INTERVAL HPI/OVERNIGHT EVENTS/SUBJECTIVE: Bromocritine increased per rec of RehabA detailed, extensive conversation was had between myself, social work, and Uncle through bedside .  Prognosis explained to family and family decided DNR would be appropriate at this time.  Uncle stated he would discuss with remote Family members about possibility of focusing purely on comfort care sometime next week.  Family explains that pt has had decline into alcohol dependence and is likely homeless recently.  They explain pt would not want to live in nursing home.   Pt has no acute changes overnight.     ICU Vital Signs Last 24 Hrs  T(C): 36.2 (08 Nov 2018 08:00), Max: 37.6 (07 Nov 2018 20:00)  T(F): 97.2 (08 Nov 2018 08:00), Max: 99.7 (07 Nov 2018 20:00)  HR: 71 (08 Nov 2018 09:00) (71 - 86)  BP: --  BP(mean): --  ABP: 116/65 (08 Nov 2018 09:00) (110/58 - 181/95)  ABP(mean): 82 (08 Nov 2018 09:00) (74 - 127)  RR: 14 (08 Nov 2018 09:00) (12 - 32)  SpO2: 99% (08 Nov 2018 09:00) (95% - 100%)      I&O's Detail    07 Nov 2018 07:01  -  08 Nov 2018 07:00  --------------------------------------------------------  IN:    Enteral Tube Flush: 350 mL    Pivot: 1200 mL    Solution: 150 mL    Solution: 100 mL  Total IN: 1800 mL    OUT:    Indwelling Catheter - Urethral: 1235 mL  Total OUT: 1235 mL    Total NET: 565 mL      08 Nov 2018 07:01  -  08 Nov 2018 10:06  --------------------------------------------------------  IN:  Total IN: 0 mL    OUT:    Indwelling Catheter - Urethral: 140 mL  Total OUT: 140 mL    Total NET: -140 mL          Mode: CPAP with PS  FiO2: 40  PEEP: 8  PS: 10  MAP: 11    ABG - ( 08 Nov 2018 04:04 )  pH, Arterial: 7.45  pH, Blood: x     /  pCO2: 48    /  pO2: 132   / HCO3: 32    / Base Excess: 8.2   /  SaO2: 100                 MEDICATIONS  (STANDING):  bromocriptine Tablet 10 milliGRAM(s) Oral <User Schedule>  cefepime   IVPB 2000 milliGRAM(s) IV Intermittent every 8 hours  chlorhexidine 0.12% Liquid 15 milliLiter(s) Swish and Spit two times a day  chlorhexidine 2% Cloths 1 Application(s) Topical daily  enoxaparin Injectable 40 milliGRAM(s) SubCutaneous daily  folic acid 1 milliGRAM(s) Oral daily  lactulose Syrup 20 Gram(s) Oral every 12 hours  levETIRAcetam  Solution 1000 milliGRAM(s) Oral two times a day  melatonin 5 milliGRAM(s) Oral at bedtime  oxyCODONE    Solution 10 milliGRAM(s) Oral every 4 hours  phytonadione   Solution 5 milliGRAM(s) Oral daily  propranolol 20 milliGRAM(s) Oral every 6 hours  rifaximin 550 milliGRAM(s) Oral two times a day  sodium phosphate IVPB 15 milliMole(s) IV Intermittent once  spironolactone 25 milliGRAM(s) Oral daily  thiamine 100 milliGRAM(s) Oral daily    MEDICATIONS  (PRN):  acetaminophen    Suspension .. 650 milliGRAM(s) Oral every 6 hours PRN Temp greater or equal to 38.5C (101.3F)      NUTRITION/IVF: Pivot @ 50.    PARHAM:   Yes    A-LINE:    R Rad A-line     NG/OG TUBE:  DATE INSERTED:  OUTPUT/24 HRS:    PHYSICAL EXAM:     Gen: NAD, Well appearing, No cyanosis, Pallor.  On Ventilator    Eyes: PERRL ~ 3mm, EOMI,     Neurological: GCS 2/1T/2, No focal deficit. Does not track with eyes to voice or movement. + Gag and corneals BL.     Neck: Supple. NT AT, FROM no pain.  No JVD. No meningeal signs.  Trach site CDI.    Pulmonary: NAD, CTA, = BL .  Minimal secretions.    Cardiovascular: RRR, S1, S2, No murmurs noted.    Gastrointestinal: ND, Soft, NT. PEG Site CDI    Genitourinary: Large amount of edema    Extremities: NT, AT, Mild-Moderate 2+ pitting edema.  No erythema or palpable cord noted.  FROM, = 2+ pulses throughout.    LABS:  CBC Full  -  ( 08 Nov 2018 05:08 )  WBC Count : 14.4 K/uL  Hemoglobin : 7.0 g/dL  Hematocrit : 21.9 %  Platelet Count - Automated : 249 K/uL  Mean Cell Volume : 87.3 fl  Mean Cell Hemoglobin : 27.9 pg  Mean Cell Hemoglobin Concentration : 32.0 g/dL  Auto Neutrophil # : 12.7 K/uL  Auto Lymphocyte # : 0.6 K/uL  Auto Monocyte # : 0.7 K/uL  Auto Eosinophil # : 0.2 K/uL  Auto Basophil # : 0.0 K/uL  Auto Neutrophil % : 88.6 %  Auto Lymphocyte % : 4.1 %  Auto Monocyte % : 5.1 %  Auto Eosinophil % : 1.6 %  Auto Basophil % : 0.1 %    11-08    128<L>  |  89<L>  |  11.0  ----------------------------<  122<H>  4.1   |  30.0<H>  |  <0.20<L>    Ca    8.3<L>      08 Nov 2018 05:08  Phos  2.2     11-08  Mg     1.7     11-08          RECENT CULTURES:  11-05 .Body Fluid Peritoneal Fluid XXXX   Few White blood cells  No organisms seen   No growth at 2 days.  Culture in progress    11-02 .Urine Catheterized XXXX XXXX   No growth              CAPILLARY BLOOD GLUCOSE      RADIOLOGY & ADDITIONAL STUDIES:    ASSESSMENT/PLAN:  32yMale presenting with: Severe TBI, Seizure resolved, PNA improved, Possible SBP.  Possible added encephalopathy from elevated Ammonia.  Not having adequate BM.    Neurological: Will increase Lactulose until 3 BM /day.  Appreciate rehab recs    ENMT: Trach care    Pulmonary: TC as tolerated    Cardiovascular: CW propranolol    Gastrointestinal: CW TF as tolerated    Genitourinary: Keep Parham due to anatomical edema    Heme: CW Lovenox    ID: CW Abx    Lines/ Tubes: As above    Dispo: CW Care in SICU for resp causes.  If tolerates TC > 72 hours then we can consider move to 6 tower.      CARE TIME SPENT: 35 minutes.

## 2018-11-08 NOTE — PROGRESS NOTE ADULT - ASSESSMENT
33 y/o M HD #16 s/p found down and transferred [place] [facility] [place]p b/l craniectomy, trach/PEG and paracentesis    - Continue management per ICU team  - continue keppra for seizure prophylaxis per neurology recommendations. Poor prognosis for meaningful recovery  - Culture from paracentesis, no organisms seen, few WBC, on Cefepime for peritonitis. WBC trending down  - Continue TF, advance as tolerates to goal  - Continue trach collar/vent management per ICU recommendations  - Rehab - COMA stim by all services

## 2018-11-08 NOTE — PROGRESS NOTE ADULT - ASSESSMENT
32 y Male who is followed by neurology because of TBI/possible seizure    TBI  Now status post decompressive bilateral hemicraniectomy and extraventricular drain.  Significant amount of intracranial hemorrhage, improving post decompression.  Multiple areas of parenchymal CVA/ischemia seen on previous CTs  No significant improvement in neuro exam- remains comatose    Possible seizure  EEG: no seizure, breech rhythm over craniectomies, slowing suggesting diffuse cerebral dysfunction.  Rhythmic tongue movements and random foot movement has stopped.  Continue Keppra.    Neurologic status remains unchanged.    Poor prognosis for meaningful recovery. Now DNR    will follow with you    Martell Mendoza MD PhD   [id number]

## 2018-11-08 NOTE — PROGRESS NOTE ADULT - SUBJECTIVE AND OBJECTIVE BOX
SUBJECTIVE  Patient seen and examined. Currently on CPAP. Patient now opening eyes to vocal stimuli. More localization to painful stimuli on right > left.     TODAY'S REVIEW OF SYMPTOMS  Unable to obtain    VITALS  T(C): 36.2 (11-08-18 @ 08:00)  T(F): 97.2 (11-08-18 @ 08:00), Max: 99.7 (11-07-18 @ 20:00)  HR: 71 (11-08-18 @ 09:00) (71 - 87)  BP: --  RR:  (12 - 32)  SpO2:  (95% - 100%)  Wt(kg): --    RECENT LABS/IMAGING             7.0    14.4  )-----------( 249      ( 08 Nov 2018 05:08 )             21.9     128<L>  |  89<L>  |  11.0  ----------------------------<  122<H>  4.1   |  30.0<H>  |  <0.20<L>    Ca    8.3<L>      08 Nov 2018 05:08  Phos  2.2     11-08  Mg     1.7     11-08    MEDICATIONS   MEDICATIONS  (STANDING):  bromocriptine Tablet 10 milliGRAM(s) Oral <User Schedule>  cefepime   IVPB 2000 milliGRAM(s) IV Intermittent every 8 hours  chlorhexidine 0.12% Liquid 15 milliLiter(s) Swish and Spit two times a day  chlorhexidine 2% Cloths 1 Application(s) Topical daily  enoxaparin Injectable 40 milliGRAM(s) SubCutaneous daily  folic acid 1 milliGRAM(s) Oral daily  lactulose Syrup 20 Gram(s) Oral every 12 hours  levETIRAcetam  Solution 1000 milliGRAM(s) Oral two times a day  melatonin 5 milliGRAM(s) Oral at bedtime  oxyCODONE    Solution 10 milliGRAM(s) Oral every 4 hours  phytonadione   Solution 5 milliGRAM(s) Oral daily  propranolol 20 milliGRAM(s) Oral every 6 hours  rifaximin 550 milliGRAM(s) Oral two times a day  sodium phosphate IVPB 15 milliMole(s) IV Intermittent once  spironolactone 25 milliGRAM(s) Oral daily  thiamine 100 milliGRAM(s) Oral daily    MEDICATIONS  (PRN):  acetaminophen    Suspension .. 650 milliGRAM(s) Oral every 6 hours PRN Temp greater or equal to 38.5C (101.3F)    PHYSICAL EXAM  HEENT - Cranial incisions with staples, (+) Tongue swelling with wet towel, (+) Eye opening with auditory and painful stimuli, (+) Blink to threat R>L, (-) Auditory startle  Cardiovascular - All extremities warm, Diffuse edema  Pulm - (+) Trach on CPAP  Extremities - Diffuse swelling, Extensor posturing to pain response right > left  Reflexes - +Babinski and Meza's bilaterally, DTR's - Patella 3+/4, Felipe's 2+/4, No clonus appreciated in bilateral lower extremities    Coma Recovery Scale - Revised    AUDITORY FUNCTION SCALE  0 - None  VISUAL FUNCTION SCALE  1 - Visual Startle  MOTOR FUNCTION SCALE  1 - Abnormal Posturing (extensor posturing, right > left)  OROMOTOR/VERBAL FUNCTION SCALE  0 - None  COMMUNICATION SCALE  0 - None  AROUSAL SCALE  1 - Eye Opening with Stimulation (now eye opening with auditory stimuli, previously only to painful/tactile stimuli    TOTAL SCORE = 3 = Vegetative state    ASSESSMENT/PLAN  32y Male unknown PMH found unresponsive on sidewalk found with bilateral SDH and SAH now s/p bilateral hemicraniectomy and EVD placement s/p removal. Coma x 12 days, Vegetative State at Day 13 (11/3)     Sleep/Wake Cycles - Bromocriptine increased to 10mg on 11/8, Continue Melatonin 5mg HS (10/31)  Hepatic encephalopathy - Rifaximin, Lactulose, Vit K, Spironolactone  ID - Cefepime  Possible seizure activity - Keppra   Dysautonomia - Propranolol, Bromocriptine, Oxycodone  Oral care - Aggressive oral care, Chlorhexidine  DVT PPX - SCDs, Lovenox  Rehab - COMA STIM by all services. Will continue to follow. SUBJECTIVE  Patient seen and examined. Currently on CPAP. Patient now opening eyes to vocal stimuli. More localization to painful stimuli on right > left.     TODAY'S REVIEW OF SYMPTOMS  Unable to obtain    VITALS  T(C): 36.2 (11-08-18 @ 08:00)  T(F): 97.2 (11-08-18 @ 08:00), Max: 99.7 (11-07-18 @ 20:00)  HR: 71 (11-08-18 @ 09:00) (71 - 87)  BP: --  RR:  (12 - 32)  SpO2:  (95% - 100%)  Wt(kg): --    RECENT LABS/IMAGING             7.0    14.4  )-----------( 249      ( 08 Nov 2018 05:08 )             21.9     128<L>  |  89<L>  |  11.0  ----------------------------<  122<H>  4.1   |  30.0<H>  |  <0.20<L>    Ca    8.3<L>      08 Nov 2018 05:08  Phos  2.2     11-08  Mg     1.7     11-08    MEDICATIONS   MEDICATIONS  (STANDING):  bromocriptine Tablet 10 milliGRAM(s) Oral <User Schedule>  cefepime   IVPB 2000 milliGRAM(s) IV Intermittent every 8 hours  chlorhexidine 0.12% Liquid 15 milliLiter(s) Swish and Spit two times a day  chlorhexidine 2% Cloths 1 Application(s) Topical daily  enoxaparin Injectable 40 milliGRAM(s) SubCutaneous daily  folic acid 1 milliGRAM(s) Oral daily  lactulose Syrup 20 Gram(s) Oral every 12 hours  levETIRAcetam  Solution 1000 milliGRAM(s) Oral two times a day  melatonin 5 milliGRAM(s) Oral at bedtime  oxyCODONE    Solution 10 milliGRAM(s) Oral every 4 hours  phytonadione   Solution 5 milliGRAM(s) Oral daily  propranolol 20 milliGRAM(s) Oral every 6 hours  rifaximin 550 milliGRAM(s) Oral two times a day  sodium phosphate IVPB 15 milliMole(s) IV Intermittent once  spironolactone 25 milliGRAM(s) Oral daily  thiamine 100 milliGRAM(s) Oral daily    MEDICATIONS  (PRN):  acetaminophen    Suspension .. 650 milliGRAM(s) Oral every 6 hours PRN Temp greater or equal to 38.5C (101.3F)    PHYSICAL EXAM  HEENT - Cranial incisions with staples, (+) Tongue swelling with wet towel, (+) Eye opening with auditory and painful stimuli, (+) Blink to threat R>L, (-) Auditory startle  Cardiovascular - All extremities warm, Diffuse edema  Pulm - (+) Trach on CPAP  Extremities - Diffuse swelling, Extensor posturing to pain response right > left  Reflexes - +Babinski and Meza's bilaterally, DTR's - Patella 3+/4, Felipe's 2+/4, No clonus appreciated in bilateral lower extremities    Coma Recovery Scale - Revised    AUDITORY FUNCTION SCALE  0 - None  VISUAL FUNCTION SCALE  1 - Visual Startle  MOTOR FUNCTION SCALE  1 - Abnormal Posturing (extensor posturing, right > left)  OROMOTOR/VERBAL FUNCTION SCALE  0 - None  COMMUNICATION SCALE  0 - None  AROUSAL SCALE  1 - Eye Opening with Stimulation (now eye opening with auditory stimuli, previously only to painful/tactile stimuli    TOTAL SCORE = 3 = Vegetative state    ASSESSMENT/PLAN  32y Male unknown PMH found unresponsive on sidewalk found with bilateral SDH and SAH now s/p bilateral hemicraniectomy and EVD placement s/p removal. Coma x 12 days, Vegetative State at Day 13 (11/3)     Sleep/Wake Cycles - Bromocriptine 10mg BID (increased from 5mg 11/8), Continue Melatonin 5mg HS (10/31)  Hepatic encephalopathy - Rifaximin, Lactulose, Vit K, Spironolactone  ID - Cefepime  Possible seizure activity - Keppra   Dysautonomia - Propranolol, Bromocriptine, Oxycodone  Oral care - Aggressive oral care, Chlorhexidine  DVT PPX - SCDs, Lovenox  Rehab - COMA STIM by all services. Will continue to follow.

## 2018-11-08 NOTE — PROGRESS NOTE ADULT - SUBJECTIVE AND OBJECTIVE BOX
SUBJECTIVE: JONG overnight. Tolerating trach collar without difficulty. Tolerating tube feeds at 50 cc/hr.       MEDICATIONS  (STANDING):  bromocriptine Capsule 10 milliGRAM(s) Oral <User Schedule>  cefepime   IVPB 2000 milliGRAM(s) IV Intermittent every 8 hours  chlorhexidine 0.12% Liquid 15 milliLiter(s) Swish and Spit two times a day  chlorhexidine 2% Cloths 1 Application(s) Topical daily  enoxaparin Injectable 40 milliGRAM(s) SubCutaneous daily  folic acid 1 milliGRAM(s) Oral daily  lactulose Syrup 20 Gram(s) Oral every 12 hours  levETIRAcetam  Solution 1000 milliGRAM(s) Oral two times a day  melatonin 5 milliGRAM(s) Oral at bedtime  oxyCODONE    Solution 10 milliGRAM(s) Oral every 4 hours  phytonadione   Solution 5 milliGRAM(s) Oral daily  propranolol 20 milliGRAM(s) Oral every 6 hours  rifaximin 550 milliGRAM(s) Oral two times a day  spironolactone 25 milliGRAM(s) Oral daily  thiamine 100 milliGRAM(s) Oral daily    MEDICATIONS  (PRN):  acetaminophen    Suspension .. 650 milliGRAM(s) Oral every 6 hours PRN Temp greater or equal to 38.5C (101.3F)      Vital Signs Last 24 Hrs  T(C): 36.2 (08 Nov 2018 04:00), Max: 37.6 (07 Nov 2018 20:00)  T(F): 97.2 (08 Nov 2018 04:00), Max: 99.7 (07 Nov 2018 20:00)  HR: 74 (08 Nov 2018 06:00) (71 - 87)  BP: --  BP(mean): --  RR: 13 (08 Nov 2018 06:00) (12 - 32)  SpO2: 100% (08 Nov 2018 06:00) (94% - 100%)    PE  Constitutional: NAD  Neck: No JVD  Respiratory: Breath Sounds equal & clear to percussion & auscultation, no accessory muscle use, on PS  Cardiovascular: Regular rate & rhythm, normal S1, S2  Gastrointestinal: Soft, non-tender, non-distended, PEG tube in place  Extremities: No peripheral edema, No cyanosis, clubbing   Vascular: Equal and normal pulses: 2+ peripheral pulses throughout    I&O's Detail    06 Nov 2018 07:01  -  07 Nov 2018 07:00  --------------------------------------------------------  IN:    Enteral Tube Flush: 680 mL    Pivot: 1150 mL    Solution: 150 mL    Solution: 400 mL  Total IN: 2380 mL    OUT:    Indwelling Catheter - Urethral: 1030 mL  Total OUT: 1030 mL    Total NET: 1350 mL      07 Nov 2018 07:01  -  08 Nov 2018 06:19  --------------------------------------------------------  IN:    Enteral Tube Flush: 350 mL    Pivot: 1200 mL    Solution: 50 mL    Solution: 100 mL  Total IN: 1700 mL    OUT:    Indwelling Catheter - Urethral: 1235 mL  Total OUT: 1235 mL    Total NET: 465 mL          LABS:                        7.0    14.4  )-----------( 249      ( 08 Nov 2018 05:08 )             21.9     11-08    128<L>  |  89<L>  |  11.0  ----------------------------<  122<H>  4.1   |  30.0<H>  |  <0.20<L>    Ca    8.3<L>      08 Nov 2018 05:08  Phos  2.2     11-08  Mg     1.7     11-08            RADIOLOGY & ADDITIONAL STUDIES:

## 2018-11-08 NOTE — PROGRESS NOTE ADULT - SUBJECTIVE AND OBJECTIVE BOX
[facility]                                        Neurology at [place]                                 Kelly Toth, & Vincent                                  [address]                                        [place], [place], [address]                                             [phone]        CC: Traumatic Brain Injury and seizure.    HPI:   The patient is a young man, estimated to be in 30's who was found unresponsive on sidewal.  Per chart initial CT head showed subarachnoid hemorrhage and bilateral subdural hematomas.  He was then transferred from [facility] to [facility] ER for emergent neurosurgical treatment.  He was posturing with agonal respirations on arrival to [facility].  He was taken to the OR emergently by Dr. Fraser for evacuation and decompression of subdural hematomas.  He had bilateral craniectomy and extraventricular drain placed.  He had been having non- rhythmic movements of his feet and rhythmic movements of his tongue, causing concern for seizure/status epilepticus.    Interim history:  remains unresponsive. no change in neuro exam    ROS:  Unobtainable due to patient's condition.     MEDICATIONS  (STANDING):  bromocriptine Tablet 10 milliGRAM(s) Oral <User Schedule>  cefepime   IVPB 2000 milliGRAM(s) IV Intermittent every 8 hours  chlorhexidine 0.12% Liquid 15 milliLiter(s) Swish and Spit two times a day  chlorhexidine 2% Cloths 1 Application(s) Topical daily  enoxaparin Injectable 40 milliGRAM(s) SubCutaneous daily  folic acid 1 milliGRAM(s) Oral daily  lactulose Syrup 20 Gram(s) Oral every 8 hours  levETIRAcetam  Solution 1000 milliGRAM(s) Oral two times a day  melatonin 5 milliGRAM(s) Oral at bedtime  oxyCODONE    Solution 10 milliGRAM(s) Oral every 4 hours  phytonadione   Solution 5 milliGRAM(s) Oral daily  propranolol 20 milliGRAM(s) Oral every 6 hours  rifaximin 550 milliGRAM(s) Oral two times a day  spironolactone 25 milliGRAM(s) Oral daily  thiamine 100 milliGRAM(s) Oral daily    MEDICATIONS  (PRN):  acetaminophen    Suspension .. 650 milliGRAM(s) Oral every 6 hours PRN Temp greater or equal to 38.5C (101.3F)      Vital Signs Last 24 Hrs  T(C): 36.4 (08 Nov 2018 12:00), Max: 37.6 (07 Nov 2018 20:00)  T(F): 97.5 (08 Nov 2018 12:00), Max: 99.7 (07 Nov 2018 20:00)  HR: 78 (08 Nov 2018 12:00) (71 - 85)  BP: --  BP(mean): --  RR: 23 (08 Nov 2018 12:57) (12 - 28)  SpO2: 100% (08 Nov 2018 12:57) (96% - 100%)    Detailed Neurologic Exam:  Remains on mechanical ventilator via tracheostomy.    Mental status: Unresponsive to voice.     Cranial nerves: Pupils: left 2.0  mm NR right 4 mm not reactive. There is no visual response to threat.  Extraocular motion is not assessed. Absent corneal reflexes.      Motor/Sensory:   There is  bilateral extensor posturing to pinch and sternal rub.  There is triple flexion to nail bed pressure in feet    Reflexes: absent throughout and plantar responses are extensor.    Cerebellar:  Unable to test finger to nose testing.    Labs:                                   7.0    14.4  )-----------( 249      ( 08 Nov 2018 05:08 )             21.9     11-08    128<L>  |  89<L>  |  11.0  ----------------------------<  122<H>  4.1   |  30.0<H>  |  <0.20<L>    Ca    8.3<L>      08 Nov 2018 05:08  Phos  2.2     11-08  Mg     1.7     11-08

## 2018-11-09 LAB
ALBUMIN SERPL ELPH-MCNC: 3.2 G/DL — LOW (ref 3.3–5.2)
ALP SERPL-CCNC: 390 U/L — HIGH (ref 40–120)
ALT FLD-CCNC: 24 U/L — SIGNIFICANT CHANGE UP
ANION GAP SERPL CALC-SCNC: 7 MMOL/L — SIGNIFICANT CHANGE UP (ref 5–17)
ANISOCYTOSIS BLD QL: SLIGHT — SIGNIFICANT CHANGE UP
APTT BLD: 37 SEC — HIGH (ref 27.5–36.3)
AST SERPL-CCNC: 101 U/L — HIGH
BASE EXCESS BLDA CALC-SCNC: 9.9 MMOL/L — HIGH (ref -2–2)
BASOPHILS # BLD AUTO: 0 K/UL — SIGNIFICANT CHANGE UP (ref 0–0.2)
BASOPHILS NFR BLD AUTO: 0.1 % — SIGNIFICANT CHANGE UP (ref 0–2)
BILIRUB DIRECT SERPL-MCNC: 0.8 MG/DL — HIGH (ref 0–0.3)
BILIRUB INDIRECT FLD-MCNC: 0.8 MG/DL — SIGNIFICANT CHANGE UP (ref 0.2–1)
BILIRUB SERPL-MCNC: 1.6 MG/DL — SIGNIFICANT CHANGE UP (ref 0.4–2)
BLOOD GAS COMMENTS ARTERIAL: SIGNIFICANT CHANGE UP
BUN SERPL-MCNC: 11 MG/DL — SIGNIFICANT CHANGE UP (ref 8–20)
CALCIUM SERPL-MCNC: 8.4 MG/DL — LOW (ref 8.6–10.2)
CHLORIDE SERPL-SCNC: 89 MMOL/L — LOW (ref 98–107)
CHLORIDE UR-SCNC: 108 MMOL/L — SIGNIFICANT CHANGE UP
CO2 SERPL-SCNC: 32 MMOL/L — HIGH (ref 22–29)
CREAT SERPL-MCNC: 0.21 MG/DL — LOW (ref 0.5–1.3)
EOSINOPHIL # BLD AUTO: 0.2 K/UL — SIGNIFICANT CHANGE UP (ref 0–0.5)
EOSINOPHIL NFR BLD AUTO: 2.1 % — SIGNIFICANT CHANGE UP (ref 0–5)
FOLATE SERPL-MCNC: 13.5 NG/ML — SIGNIFICANT CHANGE UP
GAS PNL BLDA: SIGNIFICANT CHANGE UP
GLUCOSE SERPL-MCNC: 117 MG/DL — HIGH (ref 70–115)
HCO3 BLDA-SCNC: 34 MMOL/L — HIGH (ref 20–26)
HCT VFR BLD CALC: 21.7 % — LOW (ref 42–52)
HGB BLD-MCNC: 6.8 G/DL — CRITICAL LOW (ref 14–18)
HOROWITZ INDEX BLDA+IHG-RTO: 40 — SIGNIFICANT CHANGE UP
HYPOCHROMIA BLD QL: SIGNIFICANT CHANGE UP
INR BLD: 1.56 RATIO — HIGH (ref 0.88–1.16)
LYMPHOCYTES # BLD AUTO: 0.7 K/UL — LOW (ref 1–4.8)
LYMPHOCYTES # BLD AUTO: 5.6 % — LOW (ref 20–55)
MACROCYTES BLD QL: SLIGHT — SIGNIFICANT CHANGE UP
MAGNESIUM SERPL-MCNC: 1.6 MG/DL — SIGNIFICANT CHANGE UP (ref 1.6–2.6)
MCHC RBC-ENTMCNC: 27.5 PG — SIGNIFICANT CHANGE UP (ref 27–31)
MCHC RBC-ENTMCNC: 31.3 G/DL — LOW (ref 32–36)
MCV RBC AUTO: 87.9 FL — SIGNIFICANT CHANGE UP (ref 80–94)
MICROCYTES BLD QL: SLIGHT — SIGNIFICANT CHANGE UP
MONOCYTES # BLD AUTO: 0.6 K/UL — SIGNIFICANT CHANGE UP (ref 0–0.8)
MONOCYTES NFR BLD AUTO: 5.1 % — SIGNIFICANT CHANGE UP (ref 3–10)
NEUTROPHILS # BLD AUTO: 10.4 K/UL — HIGH (ref 1.8–8)
NEUTROPHILS NFR BLD AUTO: 86.6 % — HIGH (ref 37–73)
OVALOCYTES BLD QL SMEAR: SLIGHT — SIGNIFICANT CHANGE UP
PCO2 BLDA: 49 MMHG — HIGH (ref 35–45)
PH BLDA: 7.46 — HIGH (ref 7.35–7.45)
PHOSPHATE SERPL-MCNC: 2.5 MG/DL — SIGNIFICANT CHANGE UP (ref 2.4–4.7)
PLAT MORPH BLD: NORMAL — SIGNIFICANT CHANGE UP
PLATELET # BLD AUTO: 224 K/UL — SIGNIFICANT CHANGE UP (ref 150–400)
PO2 BLDA: 100 MMHG — SIGNIFICANT CHANGE UP (ref 83–108)
POLYCHROMASIA BLD QL SMEAR: SLIGHT — SIGNIFICANT CHANGE UP
POTASSIUM SERPL-MCNC: 4.3 MMOL/L — SIGNIFICANT CHANGE UP (ref 3.5–5.3)
POTASSIUM SERPL-SCNC: 4.3 MMOL/L — SIGNIFICANT CHANGE UP (ref 3.5–5.3)
PROT SERPL-MCNC: 7 G/DL — SIGNIFICANT CHANGE UP (ref 6.6–8.7)
PROTHROM AB SERPL-ACNC: 18.2 SEC — HIGH (ref 10–12.9)
RBC # BLD: 2.47 M/UL — LOW (ref 4.6–6.2)
RBC # FLD: 18.4 % — HIGH (ref 11–15.6)
RBC BLD AUTO: ABNORMAL
SAO2 % BLDA: 99 % — SIGNIFICANT CHANGE UP (ref 95–99)
SCHISTOCYTES BLD QL AUTO: SLIGHT — SIGNIFICANT CHANGE UP
SODIUM SERPL-SCNC: 128 MMOL/L — LOW (ref 135–145)
VIT B12 SERPL-MCNC: 1456 PG/ML — HIGH (ref 232–1245)
WBC # BLD: 12 K/UL — HIGH (ref 4.8–10.8)
WBC # FLD AUTO: 12 K/UL — HIGH (ref 4.8–10.8)

## 2018-11-09 PROCEDURE — 99232 SBSQ HOSP IP/OBS MODERATE 35: CPT

## 2018-11-09 PROCEDURE — 99291 CRITICAL CARE FIRST HOUR: CPT

## 2018-11-09 PROCEDURE — 99233 SBSQ HOSP IP/OBS HIGH 50: CPT | Mod: GC

## 2018-11-09 RX ORDER — LACTULOSE 10 G/15ML
30 SOLUTION ORAL EVERY 8 HOURS
Qty: 0 | Refills: 0 | Status: DISCONTINUED | OUTPATIENT
Start: 2018-11-09 | End: 2018-11-11

## 2018-11-09 RX ORDER — FUROSEMIDE 40 MG
20 TABLET ORAL ONCE
Qty: 0 | Refills: 0 | Status: COMPLETED | OUTPATIENT
Start: 2018-11-09 | End: 2018-11-09

## 2018-11-09 RX ORDER — ACETAZOLAMIDE 250 MG/1
250 TABLET ORAL ONCE
Qty: 0 | Refills: 0 | Status: COMPLETED | OUTPATIENT
Start: 2018-11-09 | End: 2018-11-09

## 2018-11-09 RX ORDER — MAGNESIUM SULFATE 500 MG/ML
2 VIAL (ML) INJECTION
Qty: 0 | Refills: 0 | Status: COMPLETED | OUTPATIENT
Start: 2018-11-09 | End: 2018-11-09

## 2018-11-09 RX ORDER — MODAFINIL 200 MG/1
100 TABLET ORAL
Qty: 0 | Refills: 0 | Status: DISCONTINUED | OUTPATIENT
Start: 2018-11-09 | End: 2018-11-14

## 2018-11-09 RX ADMIN — OXYCODONE HYDROCHLORIDE 10 MILLIGRAM(S): 5 TABLET ORAL at 13:17

## 2018-11-09 RX ADMIN — OXYCODONE HYDROCHLORIDE 10 MILLIGRAM(S): 5 TABLET ORAL at 01:38

## 2018-11-09 RX ADMIN — OXYCODONE HYDROCHLORIDE 10 MILLIGRAM(S): 5 TABLET ORAL at 21:14

## 2018-11-09 RX ADMIN — BROMOCRIPTINE MESYLATE 10 MILLIGRAM(S): 5 CAPSULE ORAL at 12:13

## 2018-11-09 RX ADMIN — CEFEPIME 100 MILLIGRAM(S): 1 INJECTION, POWDER, FOR SOLUTION INTRAMUSCULAR; INTRAVENOUS at 05:43

## 2018-11-09 RX ADMIN — Medication 20 MILLIGRAM(S): at 12:14

## 2018-11-09 RX ADMIN — Medication 50 GRAM(S): at 11:00

## 2018-11-09 RX ADMIN — OXYCODONE HYDROCHLORIDE 10 MILLIGRAM(S): 5 TABLET ORAL at 05:44

## 2018-11-09 RX ADMIN — Medication 85 MILLIMOLE(S): at 12:14

## 2018-11-09 RX ADMIN — Medication 20 MILLIGRAM(S): at 05:43

## 2018-11-09 RX ADMIN — Medication 20 MILLIGRAM(S): at 17:03

## 2018-11-09 RX ADMIN — OXYCODONE HYDROCHLORIDE 10 MILLIGRAM(S): 5 TABLET ORAL at 22:15

## 2018-11-09 RX ADMIN — LEVETIRACETAM 1000 MILLIGRAM(S): 250 TABLET, FILM COATED ORAL at 05:43

## 2018-11-09 RX ADMIN — OXYCODONE HYDROCHLORIDE 10 MILLIGRAM(S): 5 TABLET ORAL at 05:43

## 2018-11-09 RX ADMIN — OXYCODONE HYDROCHLORIDE 10 MILLIGRAM(S): 5 TABLET ORAL at 09:58

## 2018-11-09 RX ADMIN — LEVETIRACETAM 1000 MILLIGRAM(S): 250 TABLET, FILM COATED ORAL at 17:03

## 2018-11-09 RX ADMIN — OXYCODONE HYDROCHLORIDE 10 MILLIGRAM(S): 5 TABLET ORAL at 17:15

## 2018-11-09 RX ADMIN — Medication 100 MILLIGRAM(S): at 12:14

## 2018-11-09 RX ADMIN — CHLORHEXIDINE GLUCONATE 1 APPLICATION(S): 213 SOLUTION TOPICAL at 12:15

## 2018-11-09 RX ADMIN — CEFEPIME 100 MILLIGRAM(S): 1 INJECTION, POWDER, FOR SOLUTION INTRAMUSCULAR; INTRAVENOUS at 14:30

## 2018-11-09 RX ADMIN — CHLORHEXIDINE GLUCONATE 15 MILLILITER(S): 213 SOLUTION TOPICAL at 17:03

## 2018-11-09 RX ADMIN — ENOXAPARIN SODIUM 40 MILLIGRAM(S): 100 INJECTION SUBCUTANEOUS at 12:15

## 2018-11-09 RX ADMIN — CHLORHEXIDINE GLUCONATE 15 MILLILITER(S): 213 SOLUTION TOPICAL at 05:44

## 2018-11-09 RX ADMIN — SPIRONOLACTONE 25 MILLIGRAM(S): 25 TABLET, FILM COATED ORAL at 05:44

## 2018-11-09 RX ADMIN — LACTULOSE 30 GRAM(S): 10 SOLUTION ORAL at 21:12

## 2018-11-09 RX ADMIN — Medication 20 MILLIGRAM(S): at 22:59

## 2018-11-09 RX ADMIN — BROMOCRIPTINE MESYLATE 10 MILLIGRAM(S): 5 CAPSULE ORAL at 05:43

## 2018-11-09 RX ADMIN — ACETAZOLAMIDE 105 MILLIGRAM(S): 250 TABLET ORAL at 12:14

## 2018-11-09 RX ADMIN — Medication 1 MILLIGRAM(S): at 12:14

## 2018-11-09 RX ADMIN — LACTULOSE 30 GRAM(S): 10 SOLUTION ORAL at 13:17

## 2018-11-09 RX ADMIN — CEFEPIME 100 MILLIGRAM(S): 1 INJECTION, POWDER, FOR SOLUTION INTRAMUSCULAR; INTRAVENOUS at 21:07

## 2018-11-09 RX ADMIN — Medication 50 GRAM(S): at 12:17

## 2018-11-09 RX ADMIN — LACTULOSE 20 GRAM(S): 10 SOLUTION ORAL at 05:43

## 2018-11-09 RX ADMIN — Medication 5 MILLIGRAM(S): at 21:12

## 2018-11-09 RX ADMIN — Medication 5 MILLIGRAM(S): at 12:15

## 2018-11-09 RX ADMIN — MODAFINIL 100 MILLIGRAM(S): 200 TABLET ORAL at 15:14

## 2018-11-09 RX ADMIN — OXYCODONE HYDROCHLORIDE 10 MILLIGRAM(S): 5 TABLET ORAL at 01:41

## 2018-11-09 NOTE — PROGRESS NOTE ADULT - SUBJECTIVE AND OBJECTIVE BOX
24 HOUR EVENTS: No acute events overnight.  Tolerating tube feeds.  Urinating via borjas catheter.  Afebrile overnight.    SUBJECTIVE/ROS:  [ ] A ten-point review of systems was otherwise negative except as noted.  [x ] Due to altered mental status/intubation, subjective information were not able to be obtained from the patient. History was obtained, to the extent possible, from review of the chart and collateral sources of information.      NEURO  RASS:  -3   GCS:  4T (1 1T 2)    Exam: 4mm fixed right pupil, 2mm fixed left pupil.    Meds: acetaminophen    Suspension .. 650 milliGRAM(s) Oral every 6 hours PRN Temp greater or equal to 38.5C (101.3F)  bromocriptine Tablet 10 milliGRAM(s) Oral <User Schedule>  levETIRAcetam  Solution 1000 milliGRAM(s) Oral two times a day  melatonin 5 milliGRAM(s) Oral at bedtime  oxyCODONE    Solution 10 milliGRAM(s) Oral every 4 hours    [x] Adequacy of sedation and pain control has been assessed and adjusted      RESPIRATORY  RR: 15 (11-09-18 @ 07:00) (13 - 30)  SpO2: 99% (11-09-18 @ 07:00) (96% - 100%)  Wt(kg): --  Exam: unlabored, clear to auscultation bilaterally  Mechanical Ventilation: Mode: CPAP with PS, RR (patient): 15, FiO2: 40, PEEP: 8, PS: 10, MAP: 11  ABG - ( 09 Nov 2018 04:35 )  pH: 7.46  /  pCO2: 49    /  pO2: 100   / HCO3: 34    / Base Excess: 9.9   /  SaO2: 99            CARDIOVASCULAR  HR: 76 (11-09-18 @ 07:00) (71 - 83)    ABP: 114/60 (11-09-18 @ 07:00) (106/58 - 165/95)  ABP(mean): 76 (11-09-18 @ 07:00) (75 - 122)        Exam: no murmurs/rubs, gallops  Cardiac Rhythm: NSR  Perfusion     [x ]Adequate   [ ]Inadequate  Mentation   [ ]Normal       [x ]Reduced  Extremities  [x ]Warm         [ ]Cool  Volume Status [ ]Hypervolemic [x ]Euvolemic [ ]Hypovolemic  Meds: propranolol 20 milliGRAM(s) Oral every 6 hours  spironolactone 25 milliGRAM(s) Oral daily        GI/NUTRITION  Exam: Softly distended, no reaction to palpation, no masses.  prior paracentesis site healed without drainage  Diet: Pivot tube feeds  Meds: lactulose Syrup 20 Gram(s) Oral every 8 hours  BM x1 yesterday      GENITOURINARY  I&O's Detail    11-08 @ 07:01  -  11-09 @ 07:00  --------------------------------------------------------  IN:    Enteral Tube Flush: 220 mL    Pivot: 1200 mL    Solution: 100 mL    Solution: 250 mL    Solution: 50 mL  Total IN: 1820 mL    OUT:    Indwelling Catheter - Urethral: 1550 mL  Total OUT: 1550 mL    Total NET: 270 mL          11-09    128<L>  |  89<L>  |  11.0  ----------------------------<  117<H>  4.3   |  32.0<H>  |  0.21<L>    Ca    8.4<L>      09 Nov 2018 05:20  Phos  2.5     11-09  Mg     1.6     11-09    TPro  7.0  /  Alb  3.2<L>  /  TBili  1.6  /  DBili  0.8<H>  /  AST  101<H>  /  ALT  24  /  AlkPhos  390<H>  11-09    [x ] Borjas catheter, indication: monitoring in critical patient, not a candidate for [place] catheter at this time due to skin excoriation on shaft of penis  Meds: folic acid 1 milliGRAM(s) Oral daily  phytonadione   Solution 5 milliGRAM(s) Oral daily  thiamine 100 milliGRAM(s) Oral daily        HEMATOLOGIC  Meds: enoxaparin Injectable 40 milliGRAM(s) SubCutaneous daily    [x] VTE Prophylaxis                        6.8    12.0  )-----------( 224      ( 09 Nov 2018 05:20 )             21.7     PT/INR - ( 09 Nov 2018 05:20 )   PT: 18.2 sec;   INR: 1.56 ratio         PTT - ( 09 Nov 2018 05:20 )  PTT:37.0 sec  Transfusion     [ ] PRBC   [ ] Platelets   [ ] FFP   [ ] Cryoprecipitate      INFECTIOUS DISEASES  T(C): 36.2 (11-09-18 @ 04:00), Max: 37.6 (11-08-18 @ 16:00)  Wt(kg): --  WBC Count: 12.0 K/uL (11-09 @ 05:20)    Recent Cultures:  Specimen Source: .Body Fluid Peritoneal Fluid, 11-05 @ 17:05; Results   No growth at 3 days.  Culture in progress; Gram Stain:   Few White blood cells  No organisms seen; Organism: --  Specimen Source: .Urine Catheterized, 11-02 @ 10:34; Results   No growth; Gram Stain: --; Organism: --    Meds: cefepime   IVPB 2000 milliGRAM(s) IV Intermittent every 8 hours  rifaximin 550 milliGRAM(s) Oral two times a day        ENDOCRINE  Capillary Blood Glucose    Meds:       ACCESS DEVICES:  [x ] Peripheral IV  [ ] Central Venous Line	[ ] R	[ ] L	[ ] IJ	[ ] Fem	[ ] SC	Placed:   [ ] Arterial Line		[ ] R	[x ] L	[ ] Fem	[x ] Rad	[ ] Ax	Placed:  [ ] PICC:					[ ] Mediport  [x ] Urinary Catheter, Date Placed:   [x ] Necessity of urinary, arterial, and venous catheters discussed    OTHER MEDICATIONS:  chlorhexidine 0.12% Liquid 15 milliLiter(s) Swish and Spit two times a day  chlorhexidine 2% Cloths 1 Application(s) Topical daily      CODE STATUS: DNR

## 2018-11-09 NOTE — PROGRESS NOTE ADULT - SUBJECTIVE AND OBJECTIVE BOX
SUBJECTIVE  Patient seen and examined. Eye opening to painful stimuli this AM. Continues to have extensor posturing more on right > left.     TODAY'S REVIEW OF SYMPTOMS  Unable to obtain    VITALS  T(C): 36.2 (11-09-18 @ 04:00)  T(F): 97.2 (11-09-18 @ 04:00), Max: 99.6 (11-08-18 @ 16:00)  HR: 82 (11-09-18 @ 10:00) (75 - 83)  BP: --  RR:  (13 - 30)  SpO2:  (96% - 100%)  Wt(kg): --    RECENT LABS/IMAGING             6.8    12.0  )-----------( 224      ( 09 Nov 2018 05:20 )             21.7     128<L>  |  89<L>  |  11.0  ----------------------------<  117<H>  4.3   |  32.0<H>  |  0.21<L>    Ca    8.4<L>      09 Nov 2018 05:20  Phos  2.5     11-09  Mg     1.6     11-09    TPro  7.0  /  Alb  3.2<L>  /  TBili  1.6  /  DBili  0.8<H>  /  AST  101<H>  /  ALT  24  /  AlkPhos  390<H>  11-09    PT/INR - ( 09 Nov 2018 05:20 )   PT: 18.2 sec;   INR: 1.56 ratio    PTT - ( 09 Nov 2018 05:20 )  PTT:37.0 sec    MEDICATIONS   MEDICATIONS  (STANDING):  bromocriptine Tablet 10 milliGRAM(s) Oral <User Schedule>  cefepime   IVPB 2000 milliGRAM(s) IV Intermittent every 8 hours  chlorhexidine 0.12% Liquid 15 milliLiter(s) Swish and Spit two times a day  chlorhexidine 2% Cloths 1 Application(s) Topical daily  enoxaparin Injectable 40 milliGRAM(s) SubCutaneous daily  folic acid 1 milliGRAM(s) Oral daily  lactulose Syrup 30 Gram(s) Oral every 8 hours  levETIRAcetam  Solution 1000 milliGRAM(s) Oral two times a day  melatonin 5 milliGRAM(s) Oral at bedtime  oxyCODONE    Solution 10 milliGRAM(s) Oral every 4 hours  phytonadione   Solution 5 milliGRAM(s) Oral daily  propranolol 20 milliGRAM(s) Oral every 6 hours  rifaximin 550 milliGRAM(s) Oral two times a day  spironolactone 25 milliGRAM(s) Oral daily  thiamine 100 milliGRAM(s) Oral daily    MEDICATIONS  (PRN):  acetaminophen    Suspension .. 650 milliGRAM(s) Oral every 6 hours PRN Temp greater or equal to 38.5C (101.3F)    PHYSICAL EXAM  HEENT - Cranial incisions with staples, (+) Tongue swelling, (+) Eye opening with auditory and painful stimuli, (+) Blink to threat R>L, (-) Auditory startle  Cardiovascular - All extremities warm, Diffuse edema  Pulm - (+) Trach on CPAP  Extremities - Diffuse swelling, Extensor posturing to pain response right > left  Reflexes - +Babinski and Meza's bilaterally, DTR's - Patella 3+/4, Felipe's 2+/4, No clonus appreciated in bilateral lower extremities    Coma Recovery Scale - Revised  AUDITORY FUNCTION SCALE  0 - None  VISUAL FUNCTION SCALE  1 - Visual Startle  MOTOR FUNCTION SCALE  1 - Abnormal Posturing (extensor posturing, right > left)  OROMOTOR/VERBAL FUNCTION SCALE  0 - None  COMMUNICATION SCALE  0 - None  AROUSAL SCALE  1 - Eye Opening with Stimulation (now eye opening with auditory stimuli, previously only to painful/tactile stimuli    TOTAL SCORE = 3 = Vegetative state    ASSESSMENT/PLAN  32y Male unknown PMH found unresponsive on sidewalk found with bilateral SDH and SAH now s/p bilateral hemicraniectomy and EVD placement s/p removal. Coma x 12 days, Vegetative State at Day 13 (11/3) SUBJECTIVE  Patient seen and examined. Eye opening to painful stimuli this AM. Continues to have extensor posturing more on right > left.     TODAY'S REVIEW OF SYMPTOMS  Unable to obtain    VITALS  T(C): 36.2 (11-09-18 @ 04:00)  T(F): 97.2 (11-09-18 @ 04:00), Max: 99.6 (11-08-18 @ 16:00)  HR: 82 (11-09-18 @ 10:00) (75 - 83)  BP: --  RR:  (13 - 30)  SpO2:  (96% - 100%)  Wt(kg): --    RECENT LABS/IMAGING             6.8    12.0  )-----------( 224      ( 09 Nov 2018 05:20 )             21.7     128<L>  |  89<L>  |  11.0  ----------------------------<  117<H>  4.3   |  32.0<H>  |  0.21<L>    Ca    8.4<L>      09 Nov 2018 05:20  Phos  2.5     11-09  Mg     1.6     11-09    TPro  7.0  /  Alb  3.2<L>  /  TBili  1.6  /  DBili  0.8<H>  /  AST  101<H>  /  ALT  24  /  AlkPhos  390<H>  11-09    PT/INR - ( 09 Nov 2018 05:20 )   PT: 18.2 sec;   INR: 1.56 ratio    PTT - ( 09 Nov 2018 05:20 )  PTT:37.0 sec    MEDICATIONS   MEDICATIONS  (STANDING):  bromocriptine Tablet 10 milliGRAM(s) Oral <User Schedule>  cefepime   IVPB 2000 milliGRAM(s) IV Intermittent every 8 hours  chlorhexidine 0.12% Liquid 15 milliLiter(s) Swish and Spit two times a day  chlorhexidine 2% Cloths 1 Application(s) Topical daily  enoxaparin Injectable 40 milliGRAM(s) SubCutaneous daily  folic acid 1 milliGRAM(s) Oral daily  lactulose Syrup 30 Gram(s) Oral every 8 hours  levETIRAcetam  Solution 1000 milliGRAM(s) Oral two times a day  melatonin 5 milliGRAM(s) Oral at bedtime  oxyCODONE    Solution 10 milliGRAM(s) Oral every 4 hours  phytonadione   Solution 5 milliGRAM(s) Oral daily  propranolol 20 milliGRAM(s) Oral every 6 hours  rifaximin 550 milliGRAM(s) Oral two times a day  spironolactone 25 milliGRAM(s) Oral daily  thiamine 100 milliGRAM(s) Oral daily    MEDICATIONS  (PRN):  acetaminophen    Suspension .. 650 milliGRAM(s) Oral every 6 hours PRN Temp greater or equal to 38.5C (101.3F)    PHYSICAL EXAM  HEENT - Cranial incisions with staples, (+) Tongue swelling, (+) Eye opening with auditory and painful stimuli, (+) Blink to threat R>L, (-) Auditory startle  Cardiovascular - All extremities warm, Diffuse edema  Pulm - (+) Trach on CPAP  Extremities - Diffuse swelling, Extensor posturing to pain response right > left  Reflexes - +Babinski and Meza's bilaterally, DTR's - Patella 3+/4, Felipe's 2+/4, No clonus appreciated in bilateral lower extremities    Coma Recovery Scale - Revised  AUDITORY FUNCTION SCALE  0 - None  VISUAL FUNCTION SCALE  1 - Visual Startle  MOTOR FUNCTION SCALE  1 - Abnormal Posturing (extensor posturing, right > left)  OROMOTOR/VERBAL FUNCTION SCALE  0 - None  COMMUNICATION SCALE  0 - None  AROUSAL SCALE  1 - Eye Opening with Stimulation (now eye opening with auditory stimuli, previously only to painful/tactile stimuli    TOTAL SCORE = 3 = Vegetative state    ASSESSMENT/PLAN  32y Male unknown PMH found unresponsive on sidewalk found with bilateral SDH and SAH now s/p bilateral hemicraniectomy and EVD placement s/p removal. Coma x 12 days, Vegetative State at Day 13 (11/3)     Sleep/Wake Cycles - Bromocriptine 10mg BID (increased from 5mg 11/8), Continue Melatonin 5mg HS (10/31)  Hepatic encephalopathy - Rifaximin, Lactulose, Vit K, Spironolactone  ID - Cefepime  Possible seizure activity - Keppra   Dysautonomia - Propranolol, Bromocriptine, Oxycodone  Oral care - Aggressive oral care, Chlorhexidine  DVT PPX - SCDs, Lovenox  Rehab - COMA STIM by all services. Will continue to follow. SUBJECTIVE  Patient seen and examined. Eye opening to painful stimuli this AM. Continues to have extensor posturing more on right > left.     TODAY'S REVIEW OF SYMPTOMS  Unable to obtain    VITALS  T(C): 36.2 (11-09-18 @ 04:00)  T(F): 97.2 (11-09-18 @ 04:00), Max: 99.6 (11-08-18 @ 16:00)  HR: 82 (11-09-18 @ 10:00) (75 - 83)  BP: --  RR:  (13 - 30)  SpO2:  (96% - 100%)  Wt(kg): --    RECENT LABS/IMAGING             6.8    12.0  )-----------( 224      ( 09 Nov 2018 05:20 )             21.7     128<L>  |  89<L>  |  11.0  ----------------------------<  117<H>  4.3   |  32.0<H>  |  0.21<L>    Ca    8.4<L>      09 Nov 2018 05:20  Phos  2.5     11-09  Mg     1.6     11-09    TPro  7.0  /  Alb  3.2<L>  /  TBili  1.6  /  DBili  0.8<H>  /  AST  101<H>  /  ALT  24  /  AlkPhos  390<H>  11-09    PT/INR - ( 09 Nov 2018 05:20 )   PT: 18.2 sec;   INR: 1.56 ratio    PTT - ( 09 Nov 2018 05:20 )  PTT:37.0 sec    MEDICATIONS   MEDICATIONS  (STANDING):  bromocriptine Tablet 10 milliGRAM(s) Oral <User Schedule>  cefepime   IVPB 2000 milliGRAM(s) IV Intermittent every 8 hours  chlorhexidine 0.12% Liquid 15 milliLiter(s) Swish and Spit two times a day  chlorhexidine 2% Cloths 1 Application(s) Topical daily  enoxaparin Injectable 40 milliGRAM(s) SubCutaneous daily  folic acid 1 milliGRAM(s) Oral daily  lactulose Syrup 30 Gram(s) Oral every 8 hours  levETIRAcetam  Solution 1000 milliGRAM(s) Oral two times a day  melatonin 5 milliGRAM(s) Oral at bedtime  oxyCODONE    Solution 10 milliGRAM(s) Oral every 4 hours  phytonadione   Solution 5 milliGRAM(s) Oral daily  propranolol 20 milliGRAM(s) Oral every 6 hours  rifaximin 550 milliGRAM(s) Oral two times a day  spironolactone 25 milliGRAM(s) Oral daily  thiamine 100 milliGRAM(s) Oral daily    MEDICATIONS  (PRN):  acetaminophen    Suspension .. 650 milliGRAM(s) Oral every 6 hours PRN Temp greater or equal to 38.5C (101.3F)    PHYSICAL EXAM  HEENT - Cranial incisions with staples, (+) Tongue swelling, (+) Eye opening with auditory and painful stimuli, (+) Blink to threat R>L, (-) Auditory startle  Cardiovascular - All extremities warm, Diffuse edema  Pulm - (+) Trach on CPAP  Extremities - Diffuse swelling, Extensor posturing to pain response right > left  Reflexes - +Babinski and Meza's bilaterally, DTR's - Patella 3+/4, Felipe's 2+/4, No clonus appreciated in bilateral lower extremities    Coma Recovery Scale - Revised  AUDITORY FUNCTION SCALE  0 - None  VISUAL FUNCTION SCALE  1 - Visual Startle  MOTOR FUNCTION SCALE  1 - Abnormal Posturing (extensor posturing, right > left)  OROMOTOR/VERBAL FUNCTION SCALE  0 - None  COMMUNICATION SCALE  0 - None  AROUSAL SCALE  1 - Eye Opening with Stimulation (now eye opening with auditory stimuli, previously only to painful/tactile stimuli    TOTAL SCORE = 3 = Vegetative state    ASSESSMENT/PLAN  32y Male unknown PMH found unresponsive on sidewalk found with bilateral SDH and SAH now s/p bilateral hemicraniectomy and EVD placement s/p removal. Coma x 12 days, Vegetative State at Day 13 (11/3)     Sleep/Wake Cycles - Recommend adding Modafinil 100mg Q6AM, Bromocriptine 10mg BID (increased from 5mg 11/8), Continue Melatonin 5mg HS (10/31)  Hepatic encephalopathy - Rifaximin, Lactulose, Vit K, Spironolactone  ID - Cefepime  Possible seizure activity - Keppra   Dysautonomia - Propranolol, Bromocriptine, Oxycodone  Oral care - Aggressive oral care, Chlorhexidine  DVT PPX - SCDs, Lovenox  Rehab - COMA STIM by all services. Will continue to follow.

## 2018-11-09 NOTE — PROGRESS NOTE ADULT - SUBJECTIVE AND OBJECTIVE BOX
INTERVAL HPI/OVERNIGHT EVENTS:  Patient was seen and examined at bedside this AM.  No acute events overnight. Patient tolerating trach collar for most of the day and was placed back on pressure support overnight. Patient's family yesterday decided to make the patient DNR. Are continuing to have goals of care conversations and deciding on escalating/de-escalating care.     MEDICATIONS  (STANDING):  bromocriptine Tablet 10 milliGRAM(s) Oral <User Schedule>  cefepime   IVPB 2000 milliGRAM(s) IV Intermittent every 8 hours  chlorhexidine 0.12% Liquid 15 milliLiter(s) Swish and Spit two times a day  chlorhexidine 2% Cloths 1 Application(s) Topical daily  enoxaparin Injectable 40 milliGRAM(s) SubCutaneous daily  folic acid 1 milliGRAM(s) Oral daily  lactulose Syrup 30 Gram(s) Oral every 8 hours  levETIRAcetam  Solution 1000 milliGRAM(s) Oral two times a day  melatonin 5 milliGRAM(s) Oral at bedtime  modafinil 100 milliGRAM(s) Oral <User Schedule>  oxyCODONE    Solution 10 milliGRAM(s) Oral every 4 hours  phytonadione   Solution 5 milliGRAM(s) Oral daily  propranolol 20 milliGRAM(s) Oral every 6 hours  rifaximin 550 milliGRAM(s) Oral two times a day  spironolactone 25 milliGRAM(s) Oral daily  thiamine 100 milliGRAM(s) Oral daily    MEDICATIONS  (PRN):  acetaminophen    Suspension .. 650 milliGRAM(s) Oral every 6 hours PRN Temp greater or equal to 38.5C (101.3F)      Vital Signs Last 24 Hrs  T(C): 37.7 (09 Nov 2018 12:00), Max: 37.7 (09 Nov 2018 12:00)  T(F): 99.8 (09 Nov 2018 12:00), Max: 99.8 (09 Nov 2018 12:00)  HR: 82 (09 Nov 2018 10:00) (75 - 83)  BP: --  BP(mean): --  RR: 24 (09 Nov 2018 10:00) (13 - 28)  SpO2: 100% (09 Nov 2018 10:00) (97% - 100%)    Physical Exam:  Gen: NAD  Neurological:  No sensory/motor deficits  HEENT: PERRLA, EOMI  Respiratory: Breath Sounds equal & CTA bilaterally, no accessory muscle use  Cardiovascular: Regular rate & rhythm, normal S1, S2; no murmurs, gallops or rubs  Gastrointestinal: Soft, non-tender, nondistended  Vascular: Equal and normal pulses: 2+ peripheral pulses throughout  Musculoskeletal: No joint pain, swelling or deformity; no limitation of movement  Skin: No rashes      I&O's Detail    08 Nov 2018 07:01  -  09 Nov 2018 07:00  --------------------------------------------------------  IN:    Enteral Tube Flush: 220 mL    Pivot: 1200 mL    Solution: 100 mL    Solution: 250 mL    Solution: 50 mL  Total IN: 1820 mL    OUT:    Indwelling Catheter - Urethral: 1550 mL  Total OUT: 1550 mL    Total NET: 270 mL      09 Nov 2018 07:01  -  09 Nov 2018 14:55  --------------------------------------------------------  IN:    Pivot: 150 mL  Total IN: 150 mL    OUT:    Indwelling Catheter - Urethral: 210 mL  Total OUT: 210 mL    Total NET: -60 mL          LABS:                        6.8    12.0  )-----------( 224      ( 09 Nov 2018 05:20 )             21.7     11-09    128<L>  |  89<L>  |  11.0  ----------------------------<  117<H>  4.3   |  32.0<H>  |  0.21<L>    Ca    8.4<L>      09 Nov 2018 05:20  Phos  2.5     11-09  Mg     1.6     11-09    TPro  7.0  /  Alb  3.2<L>  /  TBili  1.6  /  DBili  0.8<H>  /  AST  101<H>  /  ALT  24  /  AlkPhos  390<H>  11-09    PT/INR - ( 09 Nov 2018 05:20 )   PT: 18.2 sec;   INR: 1.56 ratio         PTT - ( 09 Nov 2018 05:20 )  PTT:37.0 sec

## 2018-11-09 NOTE — PROGRESS NOTE ADULT - ATTENDING COMMENTS
Agree with Dr. Vera.   Patient noted to maintain his status in vegetative state.   Only opening eyes with painful stimulus today.  Recommend adding Provigil.  Will continue to follow.

## 2018-11-09 NOTE — PROGRESS NOTE ADULT - ATTENDING COMMENTS
Unchanged clinical exam.      Anemia worsened.  Would transfuse 1 U pRBC and 2 U FFP for coagulopathy as labs with rising bicarb suggests intravascular hypovolemia.  Family unwilling to consent to transfusion overnight.  Plan for meeting this evening to further define goals of care but sentiment seems to be to transition to comfort as priority.  Will give diamox now to decrease serum bicarb to promote respiratory drive.  If family decides to continue aggressive therapy will once again recommend transfusions.  If refuse transfusion will use albumin as volume expander.  Given ascites will likely add lasix following colloid infusion.  Prognosis is very poor and limitations of escalation in therapy and deescalation in therapy are reasonable options.      33 minutes of critical care time spent providing medical care for patient's acute illness/conditions that impairs at least one vital organ system and/or poses a high risk of imminent or life threatening deterioration in the patient's condition. It includes time spent evaluating and treating the patient's acute illness as well as time spent reviewing labs, radiology, discussing goals of care with patient and/or patient's family, and discussing the case with a multidisciplinary team in an effort to prevent further life threatening deterioration or end organ damage. This time is independent of any procedures performed.

## 2018-11-09 NOTE — PROGRESS NOTE ADULT - ASSESSMENT
31 y/o M HD #17 s/p found down and transferred from [facility], s/p b/l craniectomy, trach/PEG and paracentesis    - Continue management per ICU team  - continue keppra for seizure prophylaxis per neurology recommendations. Poor prognosis for meaningful recovery  - Culture from paracentesis, no organisms seen, few WBC, on Cefepime for peritonitis. WBC trending down  - Continue TF, advance as tolerates to goal  - Continue trach collar/vent management per ICU recommendations  - Rehab - COMA stim by all services  - anemic today which would require transfusion, awaiting decision from family on whether they want to continue pursuing this and will consent, await outcome of family discussions

## 2018-11-09 NOTE — PROGRESS NOTE ADULT - SUBJECTIVE AND OBJECTIVE BOX
[facility]                                        Neurology at [place]                                 Kelly Toth, & Vincent                                  [address]                                        [place], [place], [address]                                             [phone]        CC: Traumatic Brain Injury and seizure.    HPI:   The patient is a young man, estimated to be in 30's who was found unresponsive on [facility].  Per chart initial CT head showed subarachnoid hemorrhage and bilateral subdural hematomas.  He was then transferred from [facility] to [facility] ER for emergent neurosurgical treatment.  He was posturing with agonal respirations on arrival to [facility].  He was taken to the OR emergently by Dr. Fraser for evacuation and decompression of subdural hematomas.  He had bilateral craniectomy and extraventricular drain placed.  He had been having non- rhythmic movements of his feet and rhythmic movements of his tongue, causing concern for seizure/status epilepticus.    Interim history:  remains unresponsive. no change in neuro exam    ROS:  Unobtainable due to patient's condition.   MEDICATIONS  (STANDING):  bromocriptine Tablet 10 milliGRAM(s) Oral <User Schedule>  cefepime   IVPB 2000 milliGRAM(s) IV Intermittent every 8 hours  chlorhexidine 0.12% Liquid 15 milliLiter(s) Swish and Spit two times a day  chlorhexidine 2% Cloths 1 Application(s) Topical daily  enoxaparin Injectable 40 milliGRAM(s) SubCutaneous daily  folic acid 1 milliGRAM(s) Oral daily  lactulose Syrup 30 Gram(s) Oral every 8 hours  levETIRAcetam  Solution 1000 milliGRAM(s) Oral two times a day  melatonin 5 milliGRAM(s) Oral at bedtime  modafinil 100 milliGRAM(s) Oral <User Schedule>  oxyCODONE    Solution 10 milliGRAM(s) Oral every 4 hours  phytonadione   Solution 5 milliGRAM(s) Oral daily  propranolol 20 milliGRAM(s) Oral every 6 hours  rifaximin 550 milliGRAM(s) Oral two times a day  spironolactone 25 milliGRAM(s) Oral daily  thiamine 100 milliGRAM(s) Oral daily    MEDICATIONS  (PRN):  acetaminophen    Suspension .. 650 milliGRAM(s) Oral every 6 hours PRN Temp greater or equal to 38.5C (101.3F)      Vital Signs Last 24 Hrs  T(C): 37.7 (09 Nov 2018 12:00), Max: 37.7 (09 Nov 2018 12:00)  T(F): 99.8 (09 Nov 2018 12:00), Max: 99.8 (09 Nov 2018 12:00)  HR: 82 (09 Nov 2018 10:00) (75 - 83)  BP: --  BP(mean): --  RR: 24 (09 Nov 2018 10:00) (13 - 30)  SpO2: 100% (09 Nov 2018 10:00) (97% - 100%)    Detailed Neurologic Exam:  Remains on mechanical ventilator via tracheostomy. remains with unequal pupils and posturing    Mental status: Unresponsive to voice.     Cranial nerves: Pupils: left 2.0  mm NR right 4 mm not reactive. There is no visual response to threat.  Extraocular motion is not assessed. Absent corneal reflexes.      Motor/Sensory:   There is  slight bilateral extensor posturing to pinch and sternal rub.  There is triple flexion to nail bed pressure in feet    Reflexes: absent throughout and plantar responses are extensor.    Cerebellar:  Unable to test finger to nose testing.    Labs:                    6.8    12.0  )-----------( 224      ( 09 Nov 2018 05:20 )             21.7     11-09    128<L>  |  89<L>  |  11.0  ----------------------------<  117<H>  4.3   |  32.0<H>  |  0.21<L>    Ca    8.4<L>      09 Nov 2018 05:20  Phos  2.5     11-09  Mg     1.6     11-09    TPro  7.0  /  Alb  3.2<L>  /  TBili  1.6  /  DBili  0.8<H>  /  AST  101<H>  /  ALT  24  /  AlkPhos  390<H>  11-09    LIVER FUNCTIONS - ( 09 Nov 2018 05:20 )  Alb: 3.2 g/dL / Pro: 7.0 g/dL / ALK PHOS: 390 U/L / ALT: 24 U/L / AST: 101 U/L / GGT: x           PT/INR - ( 09 Nov 2018 05:20 )   PT: 18.2 sec;   INR: 1.56 ratio         PTT - ( 09 Nov 2018 05:20 )  PTT:37.0 sec

## 2018-11-09 NOTE — PROGRESS NOTE ADULT - ASSESSMENT
33yo male found down  1) Severe TBI  2) Seizure - resolved  3) Pneumonia -improving  4) Concern for Secondary bacterial peritonitis - improving  5) Augmented creatinine clearance    N: Will increase lactulose today with goal of 3-4 bowel movements daily.    R: Continue trach collar trials as tolerated.  AM ABG today without concerning changes  CV: Continue Propranolol, spironolactone  GI: Continue tube feeds  : continue borjas catheter and monitor penile skin for healing  Endo: NI  Heme: Will discuss PRBC/FFP transfusion with attending and contact family for consent    ID: Continue cefepime for PNA/Bacterial peritonitis, will discuss course with attending  .  Continue rifazimine.    Dispo: consider if tolerating trach collar for >72 hrs or if family decides for comfort care

## 2018-11-10 LAB
AMMONIA BLD-MCNC: 87 UMOL/L — HIGH (ref 11–55)
ANION GAP SERPL CALC-SCNC: 8 MMOL/L — SIGNIFICANT CHANGE UP (ref 5–17)
ANISOCYTOSIS BLD QL: SLIGHT — SIGNIFICANT CHANGE UP
BASOPHILS # BLD AUTO: 0 K/UL — SIGNIFICANT CHANGE UP (ref 0–0.2)
BASOPHILS NFR BLD AUTO: 0.2 % — SIGNIFICANT CHANGE UP (ref 0–2)
BUN SERPL-MCNC: 13 MG/DL — SIGNIFICANT CHANGE UP (ref 8–20)
CALCIUM SERPL-MCNC: 8.6 MG/DL — SIGNIFICANT CHANGE UP (ref 8.6–10.2)
CHLORIDE SERPL-SCNC: 93 MMOL/L — LOW (ref 98–107)
CO2 SERPL-SCNC: 30 MMOL/L — HIGH (ref 22–29)
CREAT SERPL-MCNC: 0.27 MG/DL — LOW (ref 0.5–1.3)
CULTURE RESULTS: SIGNIFICANT CHANGE UP
EOSINOPHIL # BLD AUTO: 0.3 K/UL — SIGNIFICANT CHANGE UP (ref 0–0.5)
EOSINOPHIL NFR BLD AUTO: 2.5 % — SIGNIFICANT CHANGE UP (ref 0–5)
GAS PNL BLDA: SIGNIFICANT CHANGE UP
GLUCOSE SERPL-MCNC: 128 MG/DL — HIGH (ref 70–115)
HCT VFR BLD CALC: 23.3 % — LOW (ref 42–52)
HGB BLD-MCNC: 7.5 G/DL — LOW (ref 14–18)
HYPOCHROMIA BLD QL: SIGNIFICANT CHANGE UP
LYMPHOCYTES # BLD AUTO: 0.6 K/UL — LOW (ref 1–4.8)
LYMPHOCYTES # BLD AUTO: 4.4 % — LOW (ref 20–55)
MACROCYTES BLD QL: SLIGHT — SIGNIFICANT CHANGE UP
MAGNESIUM SERPL-MCNC: 1.9 MG/DL — SIGNIFICANT CHANGE UP (ref 1.6–2.6)
MCHC RBC-ENTMCNC: 28.1 PG — SIGNIFICANT CHANGE UP (ref 27–31)
MCHC RBC-ENTMCNC: 32.2 G/DL — SIGNIFICANT CHANGE UP (ref 32–36)
MCV RBC AUTO: 87.3 FL — SIGNIFICANT CHANGE UP (ref 80–94)
MICROCYTES BLD QL: SLIGHT — SIGNIFICANT CHANGE UP
MONOCYTES # BLD AUTO: 0.7 K/UL — SIGNIFICANT CHANGE UP (ref 0–0.8)
MONOCYTES NFR BLD AUTO: 5.9 % — SIGNIFICANT CHANGE UP (ref 3–10)
NEUTROPHILS # BLD AUTO: 10.8 K/UL — HIGH (ref 1.8–8)
NEUTROPHILS NFR BLD AUTO: 86.6 % — HIGH (ref 37–73)
OVALOCYTES BLD QL SMEAR: SLIGHT — SIGNIFICANT CHANGE UP
PHOSPHATE SERPL-MCNC: 3.7 MG/DL — SIGNIFICANT CHANGE UP (ref 2.4–4.7)
PLAT MORPH BLD: NORMAL — SIGNIFICANT CHANGE UP
PLATELET # BLD AUTO: 198 K/UL — SIGNIFICANT CHANGE UP (ref 150–400)
POIKILOCYTOSIS BLD QL AUTO: SLIGHT — SIGNIFICANT CHANGE UP
POLYCHROMASIA BLD QL SMEAR: SLIGHT — SIGNIFICANT CHANGE UP
POTASSIUM SERPL-MCNC: 4.2 MMOL/L — SIGNIFICANT CHANGE UP (ref 3.5–5.3)
POTASSIUM SERPL-SCNC: 4.2 MMOL/L — SIGNIFICANT CHANGE UP (ref 3.5–5.3)
RBC # BLD: 2.67 M/UL — LOW (ref 4.6–6.2)
RBC # FLD: 18.1 % — HIGH (ref 11–15.6)
RBC BLD AUTO: ABNORMAL
SODIUM SERPL-SCNC: 131 MMOL/L — LOW (ref 135–145)
SPECIMEN SOURCE: SIGNIFICANT CHANGE UP
WBC # BLD: 12.5 K/UL — HIGH (ref 4.8–10.8)
WBC # FLD AUTO: 12.5 K/UL — HIGH (ref 4.8–10.8)

## 2018-11-10 PROCEDURE — 99232 SBSQ HOSP IP/OBS MODERATE 35: CPT

## 2018-11-10 PROCEDURE — 99291 CRITICAL CARE FIRST HOUR: CPT

## 2018-11-10 RX ORDER — MAGNESIUM SULFATE 500 MG/ML
1 VIAL (ML) INJECTION ONCE
Qty: 0 | Refills: 0 | Status: COMPLETED | OUTPATIENT
Start: 2018-11-10 | End: 2018-11-10

## 2018-11-10 RX ADMIN — CHLORHEXIDINE GLUCONATE 15 MILLILITER(S): 213 SOLUTION TOPICAL at 17:02

## 2018-11-10 RX ADMIN — OXYCODONE HYDROCHLORIDE 10 MILLIGRAM(S): 5 TABLET ORAL at 17:01

## 2018-11-10 RX ADMIN — BROMOCRIPTINE MESYLATE 10 MILLIGRAM(S): 5 CAPSULE ORAL at 05:00

## 2018-11-10 RX ADMIN — Medication 1 MILLIGRAM(S): at 11:18

## 2018-11-10 RX ADMIN — OXYCODONE HYDROCHLORIDE 10 MILLIGRAM(S): 5 TABLET ORAL at 10:34

## 2018-11-10 RX ADMIN — OXYCODONE HYDROCHLORIDE 10 MILLIGRAM(S): 5 TABLET ORAL at 06:05

## 2018-11-10 RX ADMIN — OXYCODONE HYDROCHLORIDE 10 MILLIGRAM(S): 5 TABLET ORAL at 13:56

## 2018-11-10 RX ADMIN — OXYCODONE HYDROCHLORIDE 10 MILLIGRAM(S): 5 TABLET ORAL at 23:56

## 2018-11-10 RX ADMIN — Medication 100 GRAM(S): at 05:37

## 2018-11-10 RX ADMIN — LACTULOSE 30 GRAM(S): 10 SOLUTION ORAL at 22:21

## 2018-11-10 RX ADMIN — LEVETIRACETAM 1000 MILLIGRAM(S): 250 TABLET, FILM COATED ORAL at 17:02

## 2018-11-10 RX ADMIN — Medication 5 MILLIGRAM(S): at 11:18

## 2018-11-10 RX ADMIN — SPIRONOLACTONE 25 MILLIGRAM(S): 25 TABLET, FILM COATED ORAL at 05:00

## 2018-11-10 RX ADMIN — MODAFINIL 100 MILLIGRAM(S): 200 TABLET ORAL at 05:00

## 2018-11-10 RX ADMIN — Medication 20 MILLIGRAM(S): at 11:18

## 2018-11-10 RX ADMIN — ENOXAPARIN SODIUM 40 MILLIGRAM(S): 100 INJECTION SUBCUTANEOUS at 11:19

## 2018-11-10 RX ADMIN — OXYCODONE HYDROCHLORIDE 10 MILLIGRAM(S): 5 TABLET ORAL at 04:30

## 2018-11-10 RX ADMIN — CHLORHEXIDINE GLUCONATE 1 APPLICATION(S): 213 SOLUTION TOPICAL at 11:19

## 2018-11-10 RX ADMIN — Medication 20 MILLIGRAM(S): at 05:00

## 2018-11-10 RX ADMIN — BROMOCRIPTINE MESYLATE 10 MILLIGRAM(S): 5 CAPSULE ORAL at 11:18

## 2018-11-10 RX ADMIN — CEFEPIME 100 MILLIGRAM(S): 1 INJECTION, POWDER, FOR SOLUTION INTRAMUSCULAR; INTRAVENOUS at 05:00

## 2018-11-10 RX ADMIN — CHLORHEXIDINE GLUCONATE 15 MILLILITER(S): 213 SOLUTION TOPICAL at 05:00

## 2018-11-10 RX ADMIN — LEVETIRACETAM 1000 MILLIGRAM(S): 250 TABLET, FILM COATED ORAL at 05:00

## 2018-11-10 RX ADMIN — Medication 20 MILLIGRAM(S): at 17:01

## 2018-11-10 RX ADMIN — Medication 5 MILLIGRAM(S): at 22:04

## 2018-11-10 RX ADMIN — OXYCODONE HYDROCHLORIDE 10 MILLIGRAM(S): 5 TABLET ORAL at 03:17

## 2018-11-10 RX ADMIN — Medication 100 MILLIGRAM(S): at 11:18

## 2018-11-10 RX ADMIN — LACTULOSE 30 GRAM(S): 10 SOLUTION ORAL at 05:00

## 2018-11-10 RX ADMIN — LACTULOSE 30 GRAM(S): 10 SOLUTION ORAL at 13:56

## 2018-11-10 RX ADMIN — OXYCODONE HYDROCHLORIDE 10 MILLIGRAM(S): 5 TABLET ORAL at 22:03

## 2018-11-10 RX ADMIN — CEFEPIME 100 MILLIGRAM(S): 1 INJECTION, POWDER, FOR SOLUTION INTRAMUSCULAR; INTRAVENOUS at 13:56

## 2018-11-10 RX ADMIN — Medication 20 MILLIGRAM(S): at 00:17

## 2018-11-10 NOTE — PROCEDURE NOTE - NSINDICATIONS_GEN_A_CORE
staple removal
critical illness/emergency venous access
hemodynamic instability
suspected spontaneous bacterial peritonitis
arterial monitoring

## 2018-11-10 NOTE — PROGRESS NOTE ADULT - SUBJECTIVE AND OBJECTIVE BOX
[facility]                                        Neurology at [place]                                 Kelly Toth, & Vincent                                  [address]                                        [place], [place], [address]                                             [phone]        CC: Traumatic Brain Injury and seizure.    HPI:   The patient is a young man, estimated to be in 30's who was found unresponsive on [facility].  Per chart initial CT head showed subarachnoid hemorrhage and bilateral subdural hematomas.  He was then transferred from [facility] to [facility] ER for emergent neurosurgical treatment.  He was posturing with agonal respirations on arrival to [facility].  He was taken to the OR emergently by Dr. Fraser for evacuation and decompression of subdural hematomas.  He had bilateral craniectomy and extraventricular drain placed.  He had been having non- rhythmic movements of his feet and rhythmic movements of his tongue, causing concern for seizure/status epilepticus. (JW)    Interim history:  remains unresponsive. no change in neuro exam    ROS:  Unobtainable due to patient's condition.     MEDICATIONS  (STANDING):  bromocriptine Tablet 10 milliGRAM(s) Oral <User Schedule>  cefepime   IVPB 2000 milliGRAM(s) IV Intermittent every 8 hours  chlorhexidine 0.12% Liquid 15 milliLiter(s) Swish and Spit two times a day  chlorhexidine 2% Cloths 1 Application(s) Topical daily  enoxaparin Injectable 40 milliGRAM(s) SubCutaneous daily  folic acid 1 milliGRAM(s) Oral daily  lactulose Syrup 30 Gram(s) Oral every 8 hours  levETIRAcetam  Solution 1000 milliGRAM(s) Oral two times a day  melatonin 5 milliGRAM(s) Oral at bedtime  modafinil 100 milliGRAM(s) Oral <User Schedule>  oxyCODONE    Solution 10 milliGRAM(s) Oral every 4 hours  phytonadione   Solution 5 milliGRAM(s) Oral daily  propranolol 20 milliGRAM(s) Oral every 6 hours  rifaximin 550 milliGRAM(s) Oral two times a day  spironolactone 25 milliGRAM(s) Oral daily  thiamine 100 milliGRAM(s) Oral daily    MEDICATIONS  (PRN):  acetaminophen    Suspension .. 650 milliGRAM(s) Oral every 6 hours PRN Temp greater or equal to 38.5C (101.3F)      Vital Signs Last 24 Hrs  T(C): 36.7 (10 Nov 2018 16:00), Max: 37.3 (09 Nov 2018 20:00)  T(F): 98.1 (10 Nov 2018 16:00), Max: 99.1 (09 Nov 2018 20:00)  HR: 78 (10 Nov 2018 16:00) (72 - 83)  BP: --  BP(mean): --  RR: 21 (10 Nov 2018 16:00) (17 - 28)  SpO2: 96% (10 Nov 2018 16:00) (95% - 100%)    Detailed Neurologic Exam:  Remains on mechanical ventilator via tracheostomy. Remains with unequal pupils and posturing    Mental status: Unresponsive to voice. Comatose    Cranial nerves: Pupils: left 2.0  mm NR right 4 mm not reactive. There is no visual response to threat.  Extraocular motion is not assessed. Absent corneal reflexes.      Motor/Sensory:   There is bilateral extensor posturing to pinch and sternal rub.  There is triple flexion to nail bed pressure in feet  No grimace to nailbed pressure    Reflexes: absent throughout and plantar responses are extensor.    Cerebellar:  Unable to test finger to nose testing.    Labs:                              7.5    12.5  )-----------( 198      ( 10 Nov 2018 03:43 )             23.3     11-10    131<L>  |  93<L>  |  13.0  ----------------------------<  128<H>  4.2   |  30.0<H>  |  0.27<L>    Ca    8.6      10 Nov 2018 03:43  Phos  3.7     11-10  Mg     1.9     11-10    TPro  7.0  /  Alb  3.2<L>  /  TBili  1.6  /  DBili  0.8<H>  /  AST  101<H>  /  ALT  24  /  AlkPhos  390<H>  11-09    LIVER FUNCTIONS - ( 09 Nov 2018 05:20 )  Alb: 3.2 g/dL / Pro: 7.0 g/dL / ALK PHOS: 390 U/L / ALT: 24 U/L / AST: 101 U/L / GGT: x           PT/INR - ( 09 Nov 2018 05:20 )   PT: 18.2 sec;   INR: 1.56 ratio         PTT - ( 09 Nov 2018 05:20 )  PTT:37.0 sec

## 2018-11-10 NOTE — PROGRESS NOTE ADULT - ASSESSMENT
32 y Male who is followed by neurology because of TBI/possible seizure    TBI  Now status post decompressive bilateral hemicraniectomy and extraventricular drain.  EVD removed due to controlled/low ICP  s/p trach/PEG  No significant improvement in neuro exam- remains comatose    Possible seizure  EEG: no seizure, breech rhythm over craniectomies, slowing suggesting diffuse cerebral dysfunction.  Rhythmic tongue movements and random foot movement has stopped.  Continue Keppra.    Neurologic status remains unchanged, comatose s/p TBI    Poor prognosis for meaningful recovery. Now DNR      will follow with you    Martell Mendoza MD PhD   [id number]

## 2018-11-10 NOTE — PROGRESS NOTE ADULT - SUBJECTIVE AND OBJECTIVE BOX
INTERVAL HPI/OVERNIGHT EVENTS/SUBJECTIVE:  Give diamox for metabolic alkalosis. Lactulose increased for elevated ammonia level. Transfused pRBC and FFP yesterday.     ICU Vital Signs Last 24 Hrs  T(C): 37 (10 Nov 2018 08:00), Max: 37.7 (09 Nov 2018 12:00)  T(F): 98.6 (10 Nov 2018 08:00), Max: 99.8 (09 Nov 2018 12:00)  HR: 75 (10 Nov 2018 08:00) (75 - 85)  BP: --  BP(mean): --  ABP: 108/62 (10 Nov 2018 08:00) (104/55 - 158/88)  ABP(mean): 78 (10 Nov 2018 08:00) (71 - 113)  RR: 21 (10 Nov 2018 08:00) (20 - 28)  SpO2: 98% (10 Nov 2018 08:00) (97% - 100%)      I&O's Detail    09 Nov 2018 07:01  -  10 Nov 2018 07:00  --------------------------------------------------------  IN:    Enteral Tube Flush: 510 mL    Packed Red Blood Cells: 313 mL    Pivot: 1200 mL    Plasma: 292 mL    Solution: 100 mL    Solution: 500 mL    Solution: 50 mL    Solution: 100 mL  Total IN: 3065 mL    OUT:    Indwelling Catheter - Urethral: 2895 mL    Stool: 1600 mL  Total OUT: 4495 mL    Total NET: -1430 mL      10 Nov 2018 07:01  -  10 Nov 2018 09:34  --------------------------------------------------------  IN:    Pivot: 100 mL  Total IN: 100 mL    OUT:    Indwelling Catheter - Urethral: 300 mL  Total OUT: 300 mL    Total NET: -200 mL          ABG - ( 10 Nov 2018 03:47 )  pH, Arterial: 7.41  pH, Blood: x     /  pCO2: 52    /  pO2: 123   / HCO3: 31    / Base Excess: 7.2   /  SaO2: 100                 MEDICATIONS  (STANDING):  bromocriptine Tablet 10 milliGRAM(s) Oral <User Schedule>  cefepime   IVPB 2000 milliGRAM(s) IV Intermittent every 8 hours  chlorhexidine 0.12% Liquid 15 milliLiter(s) Swish and Spit two times a day  chlorhexidine 2% Cloths 1 Application(s) Topical daily  enoxaparin Injectable 40 milliGRAM(s) SubCutaneous daily  folic acid 1 milliGRAM(s) Oral daily  lactulose Syrup 30 Gram(s) Oral every 8 hours  levETIRAcetam  Solution 1000 milliGRAM(s) Oral two times a day  melatonin 5 milliGRAM(s) Oral at bedtime  modafinil 100 milliGRAM(s) Oral <User Schedule>  oxyCODONE    Solution 10 milliGRAM(s) Oral every 4 hours  phytonadione   Solution 5 milliGRAM(s) Oral daily  propranolol 20 milliGRAM(s) Oral every 6 hours  rifaximin 550 milliGRAM(s) Oral two times a day  spironolactone 25 milliGRAM(s) Oral daily  thiamine 100 milliGRAM(s) Oral daily    MEDICATIONS  (PRN):  acetaminophen    Suspension .. 650 milliGRAM(s) Oral every 6 hours PRN Temp greater or equal to 38.5C (101.3F)          Physical Exam:    Eyes: Pupils fixed b/l 3mm on the right and 2mm on the left    Neurological: weak decerebrate posturing to deep noxious stimuli    Neck: Trach site without evidence of infection    Pulmonary: Mild end expiratory wheeze    Cardiovascular: S1S2    Gastrointestinal: Soft, non-distended, +PEG    : Apodaca in place draining yellow urine    Extremities: diffuse edema    Skin: surgical site without evidence of infection        LABS:  CBC Full  -  ( 10 Nov 2018 03:43 )  WBC Count : 12.5 K/uL  Hemoglobin : 7.5 g/dL  Hematocrit : 23.3 %  Platelet Count - Automated : 198 K/uL  Mean Cell Volume : 87.3 fl  Mean Cell Hemoglobin : 28.1 pg  Mean Cell Hemoglobin Concentration : 32.2 g/dL  Auto Neutrophil # : 10.8 K/uL  Auto Lymphocyte # : 0.6 K/uL  Auto Monocyte # : 0.7 K/uL  Auto Eosinophil # : 0.3 K/uL  Auto Basophil # : 0.0 K/uL  Auto Neutrophil % : 86.6 %  Auto Lymphocyte % : 4.4 %  Auto Monocyte % : 5.9 %  Auto Eosinophil % : 2.5 %  Auto Basophil % : 0.2 %    11-10    131<L>  |  93<L>  |  13.0  ----------------------------<  128<H>  4.2   |  30.0<H>  |  0.27<L>    Ca    8.6      10 Nov 2018 03:43  Phos  3.7     11-10  Mg     1.9     11-10    TPro  7.0  /  Alb  3.2<L>  /  TBili  1.6  /  DBili  0.8<H>  /  AST  101<H>  /  ALT  24  /  AlkPhos  390<H>  11-09    PT/INR - ( 09 Nov 2018 05:20 )   PT: 18.2 sec;   INR: 1.56 ratio         PTT - ( 09 Nov 2018 05:20 )  PTT:37.0 sec    RECENT CULTURES:  11-05 .Body Fluid Peritoneal Fluid XXXX   Few White blood cells  No organisms seen   No growth at 4 days.  Culture in progress        LIVER FUNCTIONS - ( 09 Nov 2018 05:20 )  Alb: 3.2 g/dL / Pro: 7.0 g/dL / ALK PHOS: 390 U/L / ALT: 24 U/L / AST: 101 U/L / GGT: x                   ASSESSMENT/PLAN:  32y Male with severe TBI now being treated for SBP and elevated ammonia levels    Neuro: continue current regiment, monitor for change in exam    CV: No active issues    Pulm: PSV with t-piece trials    GI/Nutrition: Continue tube feedings, rifaximin, lactulose, and sprinolactone    /Renal: Apodaca for strict I&O and for wound care of penile excoriations 2/2 [place] catheter    ID: Cefepime for SBP    Endo: No issue    Skin: Repositioning for DTI prevention while in bed    DVT Prophylaxis: SCDs, lovenox    Dispo: Continue in SICU    Critical care time spent: 45 minutes of critical care time spent providing medical care for patient's acute illness/conditions that impairs at least one vital organ system and/or poses a high risk of imminent or life threatening deterioration in the patient's condition. It includes time spent evaluating and treating the patient's acute illness as well as time spent reviewing labs, radiology, discussing goals of care with patient and/or patient's family, and discussing the case with a multidisciplinary team in an effort to prevent further life threatening deterioration or end organ damage. This time is independent of any procedures performed.

## 2018-11-10 NOTE — PROCEDURE NOTE - NSPROCNAME_GEN_A_CORE
Central Line Insertion
Paracentesis
Paracentesis
Arterial Puncture/Cannulation
Suture/Staple Removal

## 2018-11-11 LAB
ANION GAP SERPL CALC-SCNC: 9 MMOL/L — SIGNIFICANT CHANGE UP (ref 5–17)
BASOPHILS # BLD AUTO: 0 K/UL — SIGNIFICANT CHANGE UP (ref 0–0.2)
BASOPHILS NFR BLD AUTO: 0.3 % — SIGNIFICANT CHANGE UP (ref 0–2)
BUN SERPL-MCNC: 14 MG/DL — SIGNIFICANT CHANGE UP (ref 8–20)
CALCIUM SERPL-MCNC: 8.8 MG/DL — SIGNIFICANT CHANGE UP (ref 8.6–10.2)
CHLORIDE SERPL-SCNC: 96 MMOL/L — LOW (ref 98–107)
CO2 SERPL-SCNC: 30 MMOL/L — HIGH (ref 22–29)
CREAT SERPL-MCNC: 0.2 MG/DL — LOW (ref 0.5–1.3)
EOSINOPHIL # BLD AUTO: 0.3 K/UL — SIGNIFICANT CHANGE UP (ref 0–0.5)
EOSINOPHIL NFR BLD AUTO: 2.4 % — SIGNIFICANT CHANGE UP (ref 0–6)
GAS PNL BLDA: SIGNIFICANT CHANGE UP
GLUCOSE SERPL-MCNC: 101 MG/DL — SIGNIFICANT CHANGE UP (ref 70–115)
HCT VFR BLD CALC: 25.2 % — LOW (ref 42–52)
HGB BLD-MCNC: 7.9 G/DL — LOW (ref 14–18)
LYMPHOCYTES # BLD AUTO: 0.7 K/UL — LOW (ref 1–4.8)
LYMPHOCYTES # BLD AUTO: 5.4 % — LOW (ref 20–55)
MAGNESIUM SERPL-MCNC: 1.5 MG/DL — LOW (ref 1.8–2.6)
MCHC RBC-ENTMCNC: 27.6 PG — SIGNIFICANT CHANGE UP (ref 27–31)
MCHC RBC-ENTMCNC: 31.3 G/DL — LOW (ref 32–36)
MCV RBC AUTO: 88.1 FL — SIGNIFICANT CHANGE UP (ref 80–94)
MONOCYTES # BLD AUTO: 0.9 K/UL — HIGH (ref 0–0.8)
MONOCYTES NFR BLD AUTO: 6.9 % — SIGNIFICANT CHANGE UP (ref 3–10)
NEUTROPHILS # BLD AUTO: 10.7 K/UL — HIGH (ref 1.8–8)
NEUTROPHILS NFR BLD AUTO: 84.6 % — HIGH (ref 37–73)
PHOSPHATE SERPL-MCNC: 3.4 MG/DL — SIGNIFICANT CHANGE UP (ref 2.4–4.7)
PLATELET # BLD AUTO: 196 K/UL — SIGNIFICANT CHANGE UP (ref 150–400)
POTASSIUM SERPL-MCNC: 4.4 MMOL/L — SIGNIFICANT CHANGE UP (ref 3.5–5.3)
POTASSIUM SERPL-SCNC: 4.4 MMOL/L — SIGNIFICANT CHANGE UP (ref 3.5–5.3)
RBC # BLD: 2.86 M/UL — LOW (ref 4.6–6.2)
RBC # FLD: 18.8 % — HIGH (ref 11–15.6)
SODIUM SERPL-SCNC: 135 MMOL/L — SIGNIFICANT CHANGE UP (ref 135–145)
WBC # BLD: 12.7 K/UL — HIGH (ref 4.8–10.8)
WBC # FLD AUTO: 12.7 K/UL — HIGH (ref 4.8–10.8)

## 2018-11-11 RX ORDER — CEFEPIME 1 G/1
2000 INJECTION, POWDER, FOR SOLUTION INTRAMUSCULAR; INTRAVENOUS EVERY 8 HOURS
Qty: 0 | Refills: 0 | Status: DISCONTINUED | OUTPATIENT
Start: 2018-11-11 | End: 2018-11-11

## 2018-11-11 RX ORDER — MAGNESIUM SULFATE 500 MG/ML
2 VIAL (ML) INJECTION ONCE
Qty: 0 | Refills: 0 | Status: COMPLETED | OUTPATIENT
Start: 2018-11-11 | End: 2018-11-11

## 2018-11-11 RX ORDER — ALBUTEROL 90 UG/1
2.5 AEROSOL, METERED ORAL EVERY 6 HOURS
Qty: 0 | Refills: 0 | Status: DISCONTINUED | OUTPATIENT
Start: 2018-11-11 | End: 2018-12-31

## 2018-11-11 RX ORDER — LACTULOSE 10 G/15ML
30 SOLUTION ORAL EVERY 12 HOURS
Qty: 0 | Refills: 0 | Status: DISCONTINUED | OUTPATIENT
Start: 2018-11-11 | End: 2018-11-11

## 2018-11-11 RX ADMIN — Medication 20 MILLIGRAM(S): at 17:01

## 2018-11-11 RX ADMIN — LEVETIRACETAM 1000 MILLIGRAM(S): 250 TABLET, FILM COATED ORAL at 05:46

## 2018-11-11 RX ADMIN — ALBUTEROL 2.5 MILLIGRAM(S): 90 AEROSOL, METERED ORAL at 14:36

## 2018-11-11 RX ADMIN — MODAFINIL 100 MILLIGRAM(S): 200 TABLET ORAL at 05:19

## 2018-11-11 RX ADMIN — Medication 50 GRAM(S): at 06:56

## 2018-11-11 RX ADMIN — Medication 20 MILLIGRAM(S): at 23:11

## 2018-11-11 RX ADMIN — ALBUTEROL 2.5 MILLIGRAM(S): 90 AEROSOL, METERED ORAL at 20:31

## 2018-11-11 RX ADMIN — Medication 50 GRAM(S): at 05:46

## 2018-11-11 RX ADMIN — ENOXAPARIN SODIUM 40 MILLIGRAM(S): 100 INJECTION SUBCUTANEOUS at 11:37

## 2018-11-11 RX ADMIN — CHLORHEXIDINE GLUCONATE 15 MILLILITER(S): 213 SOLUTION TOPICAL at 05:19

## 2018-11-11 RX ADMIN — Medication 5 MILLIGRAM(S): at 11:38

## 2018-11-11 RX ADMIN — Medication 20 MILLIGRAM(S): at 11:37

## 2018-11-11 RX ADMIN — BROMOCRIPTINE MESYLATE 10 MILLIGRAM(S): 5 CAPSULE ORAL at 05:19

## 2018-11-11 RX ADMIN — CHLORHEXIDINE GLUCONATE 15 MILLILITER(S): 213 SOLUTION TOPICAL at 17:01

## 2018-11-11 RX ADMIN — Medication 1 MILLIGRAM(S): at 11:37

## 2018-11-11 RX ADMIN — CEFEPIME 100 MILLIGRAM(S): 1 INJECTION, POWDER, FOR SOLUTION INTRAMUSCULAR; INTRAVENOUS at 05:19

## 2018-11-11 RX ADMIN — BROMOCRIPTINE MESYLATE 10 MILLIGRAM(S): 5 CAPSULE ORAL at 11:37

## 2018-11-11 RX ADMIN — SPIRONOLACTONE 25 MILLIGRAM(S): 25 TABLET, FILM COATED ORAL at 05:19

## 2018-11-11 RX ADMIN — LACTULOSE 30 GRAM(S): 10 SOLUTION ORAL at 05:19

## 2018-11-11 RX ADMIN — Medication 20 MILLIGRAM(S): at 05:19

## 2018-11-11 RX ADMIN — CHLORHEXIDINE GLUCONATE 1 APPLICATION(S): 213 SOLUTION TOPICAL at 11:38

## 2018-11-11 RX ADMIN — LEVETIRACETAM 1000 MILLIGRAM(S): 250 TABLET, FILM COATED ORAL at 17:01

## 2018-11-11 RX ADMIN — Medication 100 MILLIGRAM(S): at 11:37

## 2018-11-11 RX ADMIN — Medication 5 MILLIGRAM(S): at 22:49

## 2018-11-11 NOTE — PROGRESS NOTE ADULT - SUBJECTIVE AND OBJECTIVE BOX
HISTORY  32y Male    24 HOUR EVENTS: No acute events.  Patient had approriate response to blood products with improvement in hgb 7.5.  Patient will have repeat labs for coagulation.  Continues to be afebrile and no leukocytosis.     SUBJECTIVE/ROS:  [ ] A ten-point review of systems was otherwise negative except as noted.  [ x] Due to altered mental status/intubation, subjective information were not able to be obtained from the patient. History was obtained, to the extent possible, from review of the chart and collateral sources of information.      NEURO  RASS:  -1   GCS: 4T (1/T/2)    CAM ICU: neg   Exam: Decerebrate posturing to deep stimuli, + cough, gag, 2mm L pupil, 4mm R pupil   Meds: acetaminophen    Suspension .. 650 milliGRAM(s) Oral every 6 hours PRN Temp greater or equal to 38.5C (101.3F)  bromocriptine Tablet 10 milliGRAM(s) Oral <User Schedule>  levETIRAcetam  Solution 1000 milliGRAM(s) Oral two times a day  melatonin 5 milliGRAM(s) Oral at bedtime  modafinil 100 milliGRAM(s) Oral <User Schedule>    [x] Adequacy of sedation and pain control has been assessed and adjusted      RESPIRATORY  RR: 19 (11-11-18 @ 03:00) (17 - 41)  SpO2: 100% (11-11-18 @ 03:00) (95% - 100%)  Wt(kg): --  Exam: unlabored, clear to auscultation bilaterally  Mechanical Ventilation: Mode: standby  ABG - ( 10 Nov 2018 03:47 )  pH: 7.41  /  pCO2: 52    /  pO2: 123   / HCO3: 31    / Base Excess: 7.2   /  SaO2: 100     Lactate: x                [ ] Extubation Readiness Assessed  Meds:       CARDIOVASCULAR  HR: 73 (11-11-18 @ 03:00) (72 - 81)  BP: 109/68 (11-11-18 @ 03:00) (103/63 - 111/66)  BP(mean): 84 (11-11-18 @ 03:00) (76 - 84)  ABP: 139/69 (11-11-18 @ 03:00) (108/62 - 158/78)  ABP(mean): 88 (11-11-18 @ 03:00) (76 - 105)  Wt(kg): --  CVP(cm H2O): --      Exam: RRR  Cardiac Rhythm: NSR  Perfusion     [x ]Adequate   [ ]Inadequate  Mentation   [ x]Normal       [ ]Reduced  Extremities  [ x]Warm         [ ]Cool  Volume Status [ ]Hypervolemic [x ]Euvolemic [ ]Hypovolemic  Meds: propranolol 20 milliGRAM(s) Oral every 6 hours  spironolactone 25 milliGRAM(s) Oral daily        GI/NUTRITION  Exam: softly distended, nttp, no grimacing   Diet: TF @ goal  Meds: lactulose Syrup 30 Gram(s) Oral every 8 hours      GENITOURINARY  I&O's Detail    11-09 @ 07:01  -  11-10 @ 07:00  --------------------------------------------------------  IN:    Enteral Tube Flush: 510 mL    Packed Red Blood Cells: 313 mL    Pivot: 1200 mL    Plasma: 292 mL    Solution: 100 mL    Solution: 500 mL    Solution: 50 mL    Solution: 100 mL  Total IN: 3065 mL    OUT:    Indwelling Catheter - Urethral: 2895 mL    Stool: 1600 mL  Total OUT: 4495 mL    Total NET: -1430 mL      11-10 @ 07:01 - 11-11 @ 04:03  --------------------------------------------------------  IN:    Enteral Tube Flush: 380 mL    Pivot: 950 mL    Solution: 50 mL  Total IN: 1380 mL    OUT:    Indwelling Catheter - Urethral: 1890 mL    Stool: 1400 mL  Total OUT: 3290 mL    Total NET: -1910 mL          11-10    131<L>  |  93<L>  |  13.0  ----------------------------<  128<H>  4.2   |  30.0<H>  |  0.27<L>    Ca    8.6      10 Nov 2018 03:43  Phos  3.7     11-10  Mg     1.9     11-10    TPro  7.0  /  Alb  3.2<L>  /  TBili  1.6  /  DBili  0.8<H>  /  AST  101<H>  /  ALT  24  /  AlkPhos  390<H>  11-09    [x ] Apodaca catheter, indication: critically ill   Meds: folic acid 1 milliGRAM(s) Oral daily  phytonadione   Solution 5 milliGRAM(s) Oral daily  thiamine 100 milliGRAM(s) Oral daily        HEMATOLOGIC  Meds: enoxaparin Injectable 40 milliGRAM(s) SubCutaneous daily    [x] VTE Prophylaxis                        7.5    12.5  )-----------( 198      ( 10 Nov 2018 03:43 )             23.3     PT/INR - ( 09 Nov 2018 05:20 )   PT: 18.2 sec;   INR: 1.56 ratio         PTT - ( 09 Nov 2018 05:20 )  PTT:37.0 sec  Transfusion     [ ] PRBC   [ ] Platelets   [ ] FFP   [ ] Cryoprecipitate      INFECTIOUS DISEASES  T(C): 36.6 (11-11-18 @ 00:00), Max: 37 (11-10-18 @ 08:00)  Wt(kg): --    Recent Cultures:  Specimen Source: .Body Fluid Peritoneal Fluid, 11-05 @ 17:05; Results   No growth at 5 days.; Gram Stain:   Few White blood cells  No organisms seen; Organism: --    Meds: cefepime   IVPB 2000 milliGRAM(s) IV Intermittent every 8 hours  rifaximin 550 milliGRAM(s) Oral two times a day        ENDOCRINE  Capillary Blood Glucose    Meds:       ACCESS DEVICES:  [x ] Peripheral IV  [ ] Central Venous Line	[ ] R	[ ] L	[ ] IJ	[ ] Fem	[ ] SC	Placed:   [ ] Arterial Line		[ ] R	[ ] L	[ ] Fem	[ ] Rad	[ ] Ax	Placed:   [ ] PICC:					[ ] Mediport  [ ] Urinary Catheter, Date Placed:   [ ] Necessity of urinary, arterial, and venous catheters discussed    OTHER MEDICATIONS:  chlorhexidine 0.12% Liquid 15 milliLiter(s) Swish and Spit two times a day  chlorhexidine 2% Cloths 1 Application(s) Topical daily      CODE STATUS: Yes      IMAGING:

## 2018-11-11 NOTE — PROGRESS NOTE ADULT - ASSESSMENT
32y Male with severe TBI now being treated for SBP and elevated ammonia levels    Neuro: Continue current regimen, Analgesia prn, Monitor any change in exam    CV: Cont hemodynamic monitoring     Pulm: PSV with t-piece trials, consider re collaborating EtCo2     GI/Nutrition: Continue tube feedings, rifaximin, lactulose, and sprinolactone    /Renal: Apodaca for strict I&O and for wound care of penile excoriations 2/2 [place] catheter    ID: Cefepime for SBP    Endo: No issue    Skin: Repositioning for DTI prevention while in bed    DVT Prophylaxis: SCDs, lovenox    Dispo: SICU

## 2018-11-12 DIAGNOSIS — R56.1 POST TRAUMATIC SEIZURES: ICD-10-CM

## 2018-11-12 DIAGNOSIS — Z51.5 ENCOUNTER FOR PALLIATIVE CARE: ICD-10-CM

## 2018-11-12 PROCEDURE — 99223 1ST HOSP IP/OBS HIGH 75: CPT

## 2018-11-12 PROCEDURE — 99497 ADVNCD CARE PLAN 30 MIN: CPT | Mod: 25

## 2018-11-12 PROCEDURE — 99232 SBSQ HOSP IP/OBS MODERATE 35: CPT

## 2018-11-12 PROCEDURE — 99233 SBSQ HOSP IP/OBS HIGH 50: CPT | Mod: GC

## 2018-11-12 RX ADMIN — BROMOCRIPTINE MESYLATE 10 MILLIGRAM(S): 5 CAPSULE ORAL at 05:33

## 2018-11-12 RX ADMIN — Medication 5 MILLIGRAM(S): at 23:18

## 2018-11-12 RX ADMIN — Medication 100 MILLIGRAM(S): at 11:51

## 2018-11-12 RX ADMIN — CHLORHEXIDINE GLUCONATE 1 APPLICATION(S): 213 SOLUTION TOPICAL at 11:50

## 2018-11-12 RX ADMIN — ALBUTEROL 2.5 MILLIGRAM(S): 90 AEROSOL, METERED ORAL at 08:26

## 2018-11-12 RX ADMIN — LEVETIRACETAM 1000 MILLIGRAM(S): 250 TABLET, FILM COATED ORAL at 05:56

## 2018-11-12 RX ADMIN — ENOXAPARIN SODIUM 40 MILLIGRAM(S): 100 INJECTION SUBCUTANEOUS at 11:50

## 2018-11-12 RX ADMIN — ALBUTEROL 2.5 MILLIGRAM(S): 90 AEROSOL, METERED ORAL at 03:45

## 2018-11-12 RX ADMIN — CHLORHEXIDINE GLUCONATE 15 MILLILITER(S): 213 SOLUTION TOPICAL at 05:37

## 2018-11-12 RX ADMIN — LEVETIRACETAM 1000 MILLIGRAM(S): 250 TABLET, FILM COATED ORAL at 17:15

## 2018-11-12 RX ADMIN — Medication 1 MILLIGRAM(S): at 11:51

## 2018-11-12 RX ADMIN — ALBUTEROL 2.5 MILLIGRAM(S): 90 AEROSOL, METERED ORAL at 13:50

## 2018-11-12 RX ADMIN — ALBUTEROL 2.5 MILLIGRAM(S): 90 AEROSOL, METERED ORAL at 21:26

## 2018-11-12 RX ADMIN — MODAFINIL 100 MILLIGRAM(S): 200 TABLET ORAL at 05:37

## 2018-11-12 RX ADMIN — CHLORHEXIDINE GLUCONATE 15 MILLILITER(S): 213 SOLUTION TOPICAL at 17:15

## 2018-11-12 RX ADMIN — BROMOCRIPTINE MESYLATE 10 MILLIGRAM(S): 5 CAPSULE ORAL at 11:49

## 2018-11-12 RX ADMIN — SPIRONOLACTONE 25 MILLIGRAM(S): 25 TABLET, FILM COATED ORAL at 05:34

## 2018-11-12 RX ADMIN — Medication 20 MILLIGRAM(S): at 17:16

## 2018-11-12 RX ADMIN — Medication 20 MILLIGRAM(S): at 05:34

## 2018-11-12 RX ADMIN — Medication 5 MILLIGRAM(S): at 11:49

## 2018-11-12 RX ADMIN — Medication 20 MILLIGRAM(S): at 11:51

## 2018-11-12 NOTE — CHART NOTE - NSCHARTNOTEFT_GEN_A_CORE
Palliative care social work note. SW followed up with SICU team after initial discussion on Friday regarding family planning and thoughts going forward. SICU team reports that family did not come to meeting on Friday and no contact made over weekend. SW attempted to contact pts Uncle Vero and cousin Eric and left messages. SW also corresponded with  to inquire further regarding any family follow up.  SW advised by SICU team that telephone conference arranged with interpetor and family for 2 pm today. Palliative physician plans to attend to discuss comfort care options and palliative services.

## 2018-11-12 NOTE — PROGRESS NOTE ADULT - SUBJECTIVE AND OBJECTIVE BOX
INTERVAL HPI/OVERNIGHT EVENTS:  Patient was seen and examined at bedside this AM.  No acute events overnight.  Patient remains with unchanged neuro status. Patient now DNR. Awaiting family discussion regarding escalation of care. As of now patient on trach collar which he has been tolerating.        MEDICATIONS  (STANDING):  ALBUTerol    0.083% 2.5 milliGRAM(s) Nebulizer every 6 hours  bromocriptine Tablet 10 milliGRAM(s) Oral <User Schedule>  chlorhexidine 0.12% Liquid 15 milliLiter(s) Swish and Spit two times a day  chlorhexidine 2% Cloths 1 Application(s) Topical daily  enoxaparin Injectable 40 milliGRAM(s) SubCutaneous daily  folic acid 1 milliGRAM(s) Oral daily  levETIRAcetam  Solution 1000 milliGRAM(s) Oral two times a day  melatonin 5 milliGRAM(s) Oral at bedtime  modafinil 100 milliGRAM(s) Oral <User Schedule>  phytonadione   Solution 5 milliGRAM(s) Oral daily  propranolol 20 milliGRAM(s) Oral every 6 hours  spironolactone 25 milliGRAM(s) Oral daily  thiamine 100 milliGRAM(s) Oral daily    MEDICATIONS  (PRN):  acetaminophen    Suspension .. 650 milliGRAM(s) Oral every 6 hours PRN Temp greater or equal to 38.5C (101.3F)      Vital Signs Last 24 Hrs  T(C): 37.5 (12 Nov 2018 12:00), Max: 37.5 (12 Nov 2018 12:00)  T(F): 99.5 (12 Nov 2018 12:00), Max: 99.5 (12 Nov 2018 12:00)  HR: 82 (12 Nov 2018 12:00) (74 - 83)  BP: 146/89 (12 Nov 2018 12:00) (104/58 - 156/94)  BP(mean): 113 (12 Nov 2018 12:00) (77 - 119)  RR: 41 (12 Nov 2018 12:00) (21 - 41)  SpO2: 100% (12 Nov 2018 12:00) (96% - 100%)    Physical Exam:  Gen: NAD  Neurological:  Best GCS 4T  HEENT: PERRLA, EOMI  Respiratory: Breath Sounds equal & CTA bilaterally, no accessory muscle use  Cardiovascular: Regular rate & rhythm, normal S1, S2; no murmurs, gallops or rubs  Gastrointestinal: Soft, non-tender, nondistended, PEG tube in place  Vascular: Equal and normal pulses: 2+ peripheral pulses throughout  Musculoskeletal: No joint pain, swelling or deformity; no limitation of movement  Skin: No rashes      I&O's Detail    11 Nov 2018 07:01  -  12 Nov 2018 07:00  --------------------------------------------------------  IN:    Enteral Tube Flush: 360 mL    Pivot: 1200 mL  Total IN: 1560 mL    OUT:    Indwelling Catheter - Urethral: 2790 mL    Stool: 1200 mL  Total OUT: 3990 mL    Total NET: -2430 mL      12 Nov 2018 07:01  -  12 Nov 2018 12:46  --------------------------------------------------------  IN:    Pivot: 150 mL  Total IN: 150 mL    OUT:    Indwelling Catheter - Urethral: 305 mL  Total OUT: 305 mL    Total NET: -155 mL          LABS:                        7.9    12.7  )-----------( 196      ( 11 Nov 2018 04:29 )             25.2     11-11    135  |  96<L>  |  14.0  ----------------------------<  101  4.4   |  30.0<H>  |  0.20<L>    Ca    8.8      11 Nov 2018 04:29  Phos  3.4     11-11  Mg     1.5     11-11

## 2018-11-12 NOTE — PROGRESS NOTE ADULT - SUBJECTIVE AND OBJECTIVE BOX
HISTORY    24 HOUR EVENTS:  Remains on trach collar 40%. No acute ICU issues. Albuterol nebs on board. Awaiting 6T bed  SUBJECTIVE/ROS:  [ ] A ten-point review of systems was otherwise negative except as noted.  [x ] Due to altered mental status/intubation, subjective information were not able to be obtained from the patient. History was obtained, to the extent possible, from review of the chart and collateral sources of information.      NEURO    GCS: 4T  Exam: Mildly withdraws to noxious stimuli  Meds: acetaminophen    Suspension .. 650 milliGRAM(s) Oral every 6 hours PRN Temp greater or equal to 38.5C (101.3F)  bromocriptine Tablet 10 milliGRAM(s) Oral <User Schedule>  levETIRAcetam  Solution 1000 milliGRAM(s) Oral two times a day  melatonin 5 milliGRAM(s) Oral at bedtime  modafinil 100 milliGRAM(s) Oral <User Schedule>    [x] Adequacy of sedation and pain control has been assessed and adjusted      RESPIRATORY  RR: 30 (11-12-18 @ 08:00) (19 - 31)  SpO2: 98% (11-12-18 @ 08:00) (96% - 100%)  Wt(kg): --  Exam: unlabored, coarse BS to auscultation bilaterally  Mechanical Ventilation: Mode: standby  ABG - ( 11 Nov 2018 04:45 )  pH: 7.43  /  pCO2: 48    /  pO2: 111   / HCO3: 31    / Base Excess: 7.2   /  SaO2: 99      Lactate: x        [NA ] Extubation Readiness Assessed  Meds: ALBUTerol    0.083% 2.5 milliGRAM(s) Nebulizer every 6 hours    CARDIOVASCULAR  HR: 79 (11-12-18 @ 08:00) (73 - 83)  BP: 130/81 (11-12-18 @ 08:00) (104/58 - 156/94)  BP(mean): 101 (11-12-18 @ 08:00) (77 - 119)  ABP: 138/68 (11-11-18 @ 16:00) (110/50 - 140/66)  ABP(mean): 91 (11-11-18 @ 16:00) (67 - 91)  Wt(kg): --  CVP(cm H2O): --      Exam:  Cardiac Rhythm: NSR  Perfusion     [ x]Adequate   [ ]Inadequate  Mentation   [ ]Normal       [ x]Reduced  Extremities  [ x]Warm         [ ]Cool  Volume Status [ ]Hypervolemic [ x]Euvolemic [ ]Hypovolemic  Meds: propranolol 20 milliGRAM(s) Oral every 6 hours  spironolactone 25 milliGRAM(s) Oral daily    GI/NUTRITION:  Exam: PEG in place. softly distended  Diet: Enteral feeds at goal  Meds:     GENITOURINARY  I&O's Detail    11-11 @ 07:01  -  11-12 @ 07:00  --------------------------------------------------------  IN:    Enteral Tube Flush: 360 mL    Pivot: 1200 mL  Total IN: 1560 mL    OUT:    Indwelling Catheter - Urethral: 2790 mL    Stool: 1200 mL  Total OUT: 3990 mL    Total NET: -2430 mL11-11    135  |  96<L>  |  14.0  ----------------------------<  101  4.4   |  30.0<H>  |  0.20<L>    Ca    8.8      11 Nov 2018 04:29  Phos  3.4     11-11  Mg     1.5     11-11      [x ] Apodaca cathete  Meds: folic acid 1 milliGRAM(s) Oral daily  phytonadione   Solution 5 milliGRAM(s) Oral daily  thiamine 100 milliGRAM(s) Oral daily    HEMATOLOGIC  Meds: enoxaparin Injectable 40 milliGRAM(s) SubCutaneous daily    [x] VTE Prophylaxis                        7.9    12.7  )-----------( 196      ( 11 Nov 2018 04:29 )             25.2       Transfusion     [ ] PRBC   [ ] Platelets   [ ] FFP   [ ] Cryoprecipitate      INFECTIOUS DISEASES  T(C): 36.9 (11-12-18 @ 08:00), Max: 37.3 (11-11-18 @ 16:00)  Wt(kg): --    Recent Cultures:  Specimen Source: .Body Fluid Peritoneal Fluid, 11-05 @ 17:05; Results   No growth at 5 days.; Gram Stain:   Few White blood cells  No organisms seen; Organism: --    Meds:       ENDOCRINE  Capillary Blood Glucose    Meds:       ACCESS DEVICES:  [x ] Peripheral IV  [ ] Central Venous Line	[ ] R	[ ] L	[ ] IJ	[ ] Fem	[ ] SC	Placed:   [ ] Arterial Line		[ ] R	[ ] L	[ ] Fem	[ ] Rad	[ ] Ax	Placed:   [ ] PICC:					[ ] Mediport  [ x] Urinary Catheter, Date Placed:   [ x] Necessity of urinary, arterial, and venous catheters discussed    OTHER MEDICATIONS:  chlorhexidine 0.12% Liquid 15 milliLiter(s) Swish and Spit two times a day  chlorhexidine 2% Cloths 1 Application(s) Topical daily      CODE STATUS: Yes      IMAGING:
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
## 2018-11-12 NOTE — PROGRESS NOTE ADULT - SUBJECTIVE AND OBJECTIVE BOX
[facility]                                                                     NEUR[facility] AT [place]                                                                       [address]                                                                      [address]                                                                           [place], [place], [address]                                                                                 [phone]                                                                           Neurology Progress Note        No reported seizures  Unresponsive to voice  Pupils NR  Intact corneals  Intact oculocephalics  Extensor posturing    Severe Head Injury    No overall neurological change

## 2018-11-12 NOTE — CONSULT NOTE ADULT - PROBLEM SELECTOR RECOMMENDATION 4
s/p VAP and completed course of antibiotics. Trach collar and spontaneous breathing trails as tolerated
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
## 2018-11-12 NOTE — CONSULT NOTE ADULT - PROBLEM SELECTOR RECOMMENDATION 5
Rhythmic jerking noted in tongue and foot this admission. EEG without epileptiform activity. C/W keppra for prophylaxis. Plan per neurology
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
## 2018-11-12 NOTE — PROGRESS NOTE ADULT - SUBJECTIVE AND OBJECTIVE BOX
SUBJECTIVE  Patient seen and examined. Unable to get patient to open eyes during exam this AM. Noted to have auditory startle. Continues to have extensor response to painful stimuli. On trach collar.    TODAY'S REVIEW OF SYMPTOMS  Unable to obtain    VITALS  T(C): 36.9 (11-12-18 @ 08:00)  T(F): 98.4 (11-12-18 @ 08:00), Max: 99.1 (11-11-18 @ 16:00)  HR: 81 (11-12-18 @ 09:00) (73 - 83)  BP: 122/79 (11-12-18 @ 09:00) (104/58 - 156/94)  RR:  (21 - 40)  SpO2:  (96% - 100%)  Wt(kg): --    RECENT LABS/IMAGING             7.9    12.7  )-----------( 196      ( 11 Nov 2018 04:29 )             25.2     135  |  96<L>  |  14.0  ----------------------------<  101  4.4   |  30.0<H>  |  0.20<L>    Ca    8.8      11 Nov 2018 04:29  Phos  3.4     11-11  Mg     1.5     11-11    MEDICATIONS   MEDICATIONS  (STANDING):  ALBUTerol    0.083% 2.5 milliGRAM(s) Nebulizer every 6 hours  bromocriptine Tablet 10 milliGRAM(s) Oral <User Schedule>  chlorhexidine 0.12% Liquid 15 milliLiter(s) Swish and Spit two times a day  chlorhexidine 2% Cloths 1 Application(s) Topical daily  enoxaparin Injectable 40 milliGRAM(s) SubCutaneous daily  folic acid 1 milliGRAM(s) Oral daily  levETIRAcetam  Solution 1000 milliGRAM(s) Oral two times a day  melatonin 5 milliGRAM(s) Oral at bedtime  modafinil 100 milliGRAM(s) Oral <User Schedule>  phytonadione   Solution 5 milliGRAM(s) Oral daily  propranolol 20 milliGRAM(s) Oral every 6 hours  spironolactone 25 milliGRAM(s) Oral daily  thiamine 100 milliGRAM(s) Oral daily    MEDICATIONS  (PRN):  acetaminophen    Suspension .. 650 milliGRAM(s) Oral every 6 hours PRN Temp greater or equal to 38.5C (101.3F)    PHYSICAL EXAM  HEENT - Cranial incisions, (+) Tongue swelling - mildly improved, Eyes closed during exam this AM, (+) Blink to threat R>L, (+) Auditory startle  Cardiovascular - All extremities warm, Diffuse edema  Pulm - (+) Trach collar  Extremities - Diffuse swelling, Extensor posturing to pain response  Reflexes - +Babinski and Meza's bilaterally, DTR's - Patella 3+/4, Felipe's 2+/4, No clonus appreciated in bilateral lower extremities    Coma Recovery Scale - Revised  AUDITORY FUNCTION SCALE  1 - Auditory Startle  VISUAL FUNCTION SCALE  1 - Visual Startle  MOTOR FUNCTION SCALE  1 - Abnormal Posturing (extensor posturing, right > left)  OROMOTOR/VERBAL FUNCTION SCALE  0 - None  COMMUNICATION SCALE  0 - None  AROUSAL SCALE  0 - Unarousable    TOTAL SCORE = 3 = Vegetative state    ASSESSMENT/PLAN  32y Male unknown PMH found unresponsive on sidewalk found with bilateral SDH and SAH now s/p bilateral hemicraniectomy and EVD placement s/p removal. Coma x 12 days, Vegetative State at Day 13 (11/3)     Sleep/Wake Cycles - Modafinil 100mg Q6AM (started 11/9), Bromocriptine 10mg BID (increased from 5mg 11/8), Melatonin 5mg HS (10/31)  Hepatic encephalopathy - Vit K, Spironolactone  Possible seizure activity - Keppra   Dysautonomia - Propranolol, Bromocriptine, Off Oxycodone since 11/10, Tylenol PRN  Oral care - Aggressive oral care, Chlorhexidine  DVT PPX - SCDs, Lovenox  Rehab - COMA STIM by all services. Will continue to follow. SUBJECTIVE  Patient seen and examined. Unable to get patient to open eyes during exam this AM. Noted to have auditory startle. Continues to have extensor response to painful stimuli. On trach collar.    TODAY'S REVIEW OF SYMPTOMS  Unable to obtain    VITALS  T(C): 36.9 (11-12-18 @ 08:00)  T(F): 98.4 (11-12-18 @ 08:00), Max: 99.1 (11-11-18 @ 16:00)  HR: 81 (11-12-18 @ 09:00) (73 - 83)  BP: 122/79 (11-12-18 @ 09:00) (104/58 - 156/94)  RR:  (21 - 40)  SpO2:  (96% - 100%)  Wt(kg): --    RECENT LABS/IMAGING             7.9    12.7  )-----------( 196      ( 11 Nov 2018 04:29 )             25.2     135  |  96<L>  |  14.0  ----------------------------<  101  4.4   |  30.0<H>  |  0.20<L>    Ca    8.8      11 Nov 2018 04:29  Phos  3.4     11-11  Mg     1.5     11-11    MEDICATIONS   MEDICATIONS  (STANDING):  ALBUTerol    0.083% 2.5 milliGRAM(s) Nebulizer every 6 hours  bromocriptine Tablet 10 milliGRAM(s) Oral <User Schedule>  chlorhexidine 0.12% Liquid 15 milliLiter(s) Swish and Spit two times a day  chlorhexidine 2% Cloths 1 Application(s) Topical daily  enoxaparin Injectable 40 milliGRAM(s) SubCutaneous daily  folic acid 1 milliGRAM(s) Oral daily  levETIRAcetam  Solution 1000 milliGRAM(s) Oral two times a day  melatonin 5 milliGRAM(s) Oral at bedtime  modafinil 100 milliGRAM(s) Oral <User Schedule>  phytonadione   Solution 5 milliGRAM(s) Oral daily  propranolol 20 milliGRAM(s) Oral every 6 hours  spironolactone 25 milliGRAM(s) Oral daily  thiamine 100 milliGRAM(s) Oral daily    MEDICATIONS  (PRN):  acetaminophen    Suspension .. 650 milliGRAM(s) Oral every 6 hours PRN Temp greater or equal to 38.5C (101.3F)    PHYSICAL EXAM  HEENT - Cranial incisions, (+) Tongue swelling - mildly improved, Eyes closed during exam this AM, (+) Blink to threat R>L, (+) Auditory startle  Cardiovascular - All extremities warm, Diffuse edema  Pulm - (+) Trach collar  Extremities - Diffuse swelling, Extensor posturing to pain response  Reflexes - +Babinski and Meza's bilaterally, DTR's - Patella 3+/4, Felipe's 2+/4, No clonus appreciated in bilateral lower extremities    Coma Recovery Scale - Revised  AUDITORY FUNCTION SCALE  1 - Auditory Startle  VISUAL FUNCTION SCALE  1 - Visual Startle  MOTOR FUNCTION SCALE  1 - Abnormal Posturing (extensor posturing, right > left)  OROMOTOR/VERBAL FUNCTION SCALE  0 - None  COMMUNICATION SCALE  0 - None  AROUSAL SCALE  0 - Unarousable    TOTAL SCORE = 3 = Vegetative state    ASSESSMENT/PLAN  32y Male unknown PMH found unresponsive on sidewalk found with bilateral SDH and SAH now s/p bilateral hemicraniectomy and EVD placement s/p removal. Coma x 12 days, Vegetative State at Day 13 (11/3)     Sleep/Wake Cycles - Modafinil 100mg Q6AM (11/9), Bromocriptine 10mg BID (increased from 5mg 11/8), Melatonin 5mg HS (10/31)  Hepatic encephalopathy - Vit K, Spironolactone  Possible seizure activity - Keppra   Dysautonomia - Propranolol, Bromocriptine, Off Oxycodone since 11/10, Tylenol PRN  Oral care - Aggressive oral care, Chlorhexidine  DVT PPX - SCDs, Lovenox  Rehab - COMA STIM by all services. Will continue to follow.

## 2018-11-12 NOTE — PROGRESS NOTE ADULT - PROBLEM SELECTOR PLAN 1
-Patient is awaiting transfer to floor  -Continue lactulose, Rifaximin  -Continue brain stim  -Placement discussion as patient's medical issues are now stable  -continue enteral feeds at goal -Patient is awaiting transfer to floor  -Continue to avoid hepatotoxic medications  -Continue brain stim  -Placement discussion as patient's medical issues are now stable  -continue enteral feeds at goal -Patient is awaiting transfer to floor  -Continue to avoid hepatotoxic medications  -Continue brain stim  -Placement discussion as patient's medical issues are now stable  -continue enteral feeds at goal  -Remove [place] catheter, [place] cath

## 2018-11-12 NOTE — CONSULT NOTE ADULT - ATTENDING COMMENTS
VENTURA Attg:    see my progress note
D/W SICU team    Spent total of 75 minutes of which >50% spent on counseling and coordinating care.     Thank you for the opportunity to assist with the care of this patient.   [facility] [phone].

## 2018-11-12 NOTE — CHART NOTE - NSCHARTNOTEFT_GEN_A_CORE
32M found down and unresponsive, transferred from OSH for emergency craniectomy for b/l SDH, now s/p trach and PEG, and made DNR/DNI. Patient's ICU course was complicated by seizures, possible hepatic encepahlopathy, HCAP, diagnostic paracentesis to r/o SBP, who has stabilized. Patient is doing well clinically on trach collar, had borjas removed today in the unit. Patient passed trial of void.     At time of my exam, patient lying in bed in no acute distress.    Vital Signs Last 24 Hrs  T(C): 37.4 (12 Nov 2018 17:53), Max: 37.5 (12 Nov 2018 12:00)  T(F): 99.3 (12 Nov 2018 17:53), Max: 99.5 (12 Nov 2018 12:00)  HR: 80 (12 Nov 2018 17:53) (74 - 84)  BP: 118/75 (12 Nov 2018 17:53) (104/58 - 156/94)  BP(mean): 93 (12 Nov 2018 17:00) (77 - 119)  RR: 20 (12 Nov 2018 17:53) (20 - 47)  SpO2: 94% (12 Nov 2018 17:53) (94% - 100%)    PE  Gen: Lying in bed in no acute distress  HEENT: Helmet in place, tongue protruding, trach collar in place with blood stained secretions  Abd: Soft, ND, PEG tube in place  Vasc: 2+ Radial, DP pulses    33 y/o M s/p found down with TBI, now s/p trach/PEG, made DNR/DNI    - pain control as needed  - continue trach collar as tolerated  - dispo planning

## 2018-11-12 NOTE — PROGRESS NOTE ADULT - ATTENDING COMMENTS
Agree with Dr. Vera.  Patient is demonstrating limited progress.   Provigil started on 11/9, and bromocriptine increased 11/8.  As per discussion/notes, family has not attended family meeting last week. Unclear of decisions for ongoing care.  Will continue to follow to assist with improving arousal.   Patient's prognosis for recovery to achieving independence is very low and will likely need 24 hour care.

## 2018-11-13 DIAGNOSIS — R40.2434 GLASGOW COMA SCALE SCORE 3-8, 24 HOURS OR MORE AFTER HOSPITAL ADMISSION: ICD-10-CM

## 2018-11-13 LAB
ALBUMIN SERPL ELPH-MCNC: 3 G/DL — LOW (ref 3.3–5.2)
ALP SERPL-CCNC: 331 U/L — HIGH (ref 40–120)
ALT FLD-CCNC: 25 U/L — SIGNIFICANT CHANGE UP
ANION GAP SERPL CALC-SCNC: 11 MMOL/L — SIGNIFICANT CHANGE UP (ref 5–17)
AST SERPL-CCNC: 97 U/L — HIGH
BASOPHILS # BLD AUTO: 0 K/UL — SIGNIFICANT CHANGE UP (ref 0–0.2)
BASOPHILS NFR BLD AUTO: 0.2 % — SIGNIFICANT CHANGE UP (ref 0–2)
BILIRUB SERPL-MCNC: 1.6 MG/DL — SIGNIFICANT CHANGE UP (ref 0.4–2)
BUN SERPL-MCNC: 13 MG/DL — SIGNIFICANT CHANGE UP (ref 8–20)
CALCIUM SERPL-MCNC: 8.6 MG/DL — SIGNIFICANT CHANGE UP (ref 8.6–10.2)
CHLORIDE SERPL-SCNC: 89 MMOL/L — LOW (ref 98–107)
CHLORIDE UR-SCNC: 59 MMOL/L — SIGNIFICANT CHANGE UP
CO2 SERPL-SCNC: 26 MMOL/L — SIGNIFICANT CHANGE UP (ref 22–29)
CREAT SERPL-MCNC: 0.29 MG/DL — LOW (ref 0.5–1.3)
EOSINOPHIL # BLD AUTO: 0.3 K/UL — SIGNIFICANT CHANGE UP (ref 0–0.5)
EOSINOPHIL NFR BLD AUTO: 3 % — SIGNIFICANT CHANGE UP (ref 0–5)
GLUCOSE SERPL-MCNC: 106 MG/DL — SIGNIFICANT CHANGE UP (ref 70–115)
HCT VFR BLD CALC: 25.8 % — LOW (ref 42–52)
HGB BLD-MCNC: 8.5 G/DL — LOW (ref 14–18)
LYMPHOCYTES # BLD AUTO: 0.8 K/UL — LOW (ref 1–4.8)
LYMPHOCYTES # BLD AUTO: 7.6 % — LOW (ref 20–55)
MAGNESIUM SERPL-MCNC: 1.3 MG/DL — LOW (ref 1.6–2.6)
MCHC RBC-ENTMCNC: 28.1 PG — SIGNIFICANT CHANGE UP (ref 27–31)
MCHC RBC-ENTMCNC: 32.9 G/DL — SIGNIFICANT CHANGE UP (ref 32–36)
MCV RBC AUTO: 85.1 FL — SIGNIFICANT CHANGE UP (ref 80–94)
MONOCYTES # BLD AUTO: 0.8 K/UL — SIGNIFICANT CHANGE UP (ref 0–0.8)
MONOCYTES NFR BLD AUTO: 7 % — SIGNIFICANT CHANGE UP (ref 3–10)
NEUTROPHILS # BLD AUTO: 8.8 K/UL — HIGH (ref 1.8–8)
NEUTROPHILS NFR BLD AUTO: 82 % — HIGH (ref 37–73)
OSMOLALITY SERPL: 278 MOSM/KG — LOW (ref 280–300)
OSMOLALITY UR: 424 MOSM/KG — SIGNIFICANT CHANGE UP (ref 300–1000)
PLATELET # BLD AUTO: 131 K/UL — LOW (ref 150–400)
POTASSIUM SERPL-MCNC: 4.1 MMOL/L — SIGNIFICANT CHANGE UP (ref 3.5–5.3)
POTASSIUM SERPL-SCNC: 4.1 MMOL/L — SIGNIFICANT CHANGE UP (ref 3.5–5.3)
PROT SERPL-MCNC: 7.7 G/DL — SIGNIFICANT CHANGE UP (ref 6.6–8.7)
RBC # BLD: 3.03 M/UL — LOW (ref 4.6–6.2)
RBC # FLD: 18.4 % — HIGH (ref 11–15.6)
SODIUM SERPL-SCNC: 126 MMOL/L — LOW (ref 135–145)
SODIUM UR-SCNC: 94 MMOL/L — SIGNIFICANT CHANGE UP
WBC # BLD: 10.7 K/UL — SIGNIFICANT CHANGE UP (ref 4.8–10.8)
WBC # FLD AUTO: 10.7 K/UL — SIGNIFICANT CHANGE UP (ref 4.8–10.8)

## 2018-11-13 PROCEDURE — 99233 SBSQ HOSP IP/OBS HIGH 50: CPT | Mod: GC

## 2018-11-13 PROCEDURE — 99222 1ST HOSP IP/OBS MODERATE 55: CPT

## 2018-11-13 RX ORDER — MAGNESIUM SULFATE 500 MG/ML
2 VIAL (ML) INJECTION
Qty: 0 | Refills: 0 | Status: COMPLETED | OUTPATIENT
Start: 2018-11-13 | End: 2018-11-13

## 2018-11-13 RX ADMIN — CHLORHEXIDINE GLUCONATE 1 APPLICATION(S): 213 SOLUTION TOPICAL at 14:03

## 2018-11-13 RX ADMIN — ALBUTEROL 2.5 MILLIGRAM(S): 90 AEROSOL, METERED ORAL at 04:01

## 2018-11-13 RX ADMIN — ALBUTEROL 2.5 MILLIGRAM(S): 90 AEROSOL, METERED ORAL at 21:08

## 2018-11-13 RX ADMIN — CHLORHEXIDINE GLUCONATE 15 MILLILITER(S): 213 SOLUTION TOPICAL at 06:18

## 2018-11-13 RX ADMIN — ALBUTEROL 2.5 MILLIGRAM(S): 90 AEROSOL, METERED ORAL at 15:32

## 2018-11-13 RX ADMIN — MODAFINIL 100 MILLIGRAM(S): 200 TABLET ORAL at 06:18

## 2018-11-13 RX ADMIN — Medication 5 MILLIGRAM(S): at 22:01

## 2018-11-13 RX ADMIN — Medication 5 MILLIGRAM(S): at 17:02

## 2018-11-13 RX ADMIN — Medication 50 GRAM(S): at 19:54

## 2018-11-13 RX ADMIN — Medication 1 MILLIGRAM(S): at 14:03

## 2018-11-13 RX ADMIN — BROMOCRIPTINE MESYLATE 10 MILLIGRAM(S): 5 CAPSULE ORAL at 06:18

## 2018-11-13 RX ADMIN — Medication 50 GRAM(S): at 22:00

## 2018-11-13 RX ADMIN — ENOXAPARIN SODIUM 40 MILLIGRAM(S): 100 INJECTION SUBCUTANEOUS at 14:03

## 2018-11-13 RX ADMIN — ALBUTEROL 2.5 MILLIGRAM(S): 90 AEROSOL, METERED ORAL at 09:48

## 2018-11-13 RX ADMIN — BROMOCRIPTINE MESYLATE 10 MILLIGRAM(S): 5 CAPSULE ORAL at 17:01

## 2018-11-13 RX ADMIN — CHLORHEXIDINE GLUCONATE 15 MILLILITER(S): 213 SOLUTION TOPICAL at 17:04

## 2018-11-13 RX ADMIN — LEVETIRACETAM 1000 MILLIGRAM(S): 250 TABLET, FILM COATED ORAL at 06:18

## 2018-11-13 RX ADMIN — LEVETIRACETAM 1000 MILLIGRAM(S): 250 TABLET, FILM COATED ORAL at 17:04

## 2018-11-13 RX ADMIN — SPIRONOLACTONE 25 MILLIGRAM(S): 25 TABLET, FILM COATED ORAL at 06:18

## 2018-11-13 RX ADMIN — Medication 100 MILLIGRAM(S): at 17:02

## 2018-11-13 NOTE — CONSULT NOTE ADULT - SUBJECTIVE AND OBJECTIVE BOX
patient in coma unresponsive quadriplegic on ventilator  surgical, neurosurgical  and neurology notes appreciated  Unknown identity Found "on street face down" head injury unresponsive Transferred via [facility] to [facility] for emergent neurosurgical intervention B/L hemicraniectomies for b/L subdural hematomas  No next of kin no ID  ROS: not obtainable unresponsive  medical history unknown  psych hx unknown  social hx unknown  Vital Signs Last 24 Hrs  T(C): 37.2 (13 Nov 2018 09:52), Max: 37.4 (12 Nov 2018 17:53)  T(F): 98.9 (13 Nov 2018 09:52), Max: 99.3 (12 Nov 2018 17:53)  HR: 81 (13 Nov 2018 09:52) (75 - 87)  BP: 111/63 (13 Nov 2018 09:52) (109/63 - 140/79)  BP(mean): 93 (12 Nov 2018 17:00) (89 - 104)  RR: 18 (13 Nov 2018 09:52) (18 - 47)  SpO2: 98% (13 Nov 2018 09:52) (94% - 100%)  MEDICATIONS  (STANDING):  ALBUTerol    0.083% 2.5 milliGRAM(s) Nebulizer every 6 hours  bromocriptine Tablet 10 milliGRAM(s) Oral <User Schedule>  chlorhexidine 0.12% Liquid 15 milliLiter(s) Swish and Spit two times a day  chlorhexidine 2% Cloths 1 Application(s) Topical daily  enoxaparin Injectable 40 milliGRAM(s) SubCutaneous daily  folic acid 1 milliGRAM(s) Oral daily  levETIRAcetam  Solution 1000 milliGRAM(s) Oral two times a day  melatonin 5 milliGRAM(s) Oral at bedtime  modafinil 100 milliGRAM(s) Oral <User Schedule>  phytonadione   Solution 5 milliGRAM(s) Oral daily  propranolol 20 milliGRAM(s) Oral every 6 hours  spironolactone 25 milliGRAM(s) Oral daily  thiamine 100 milliGRAM(s) Oral daily  < from: CT Head No Cont (10.26.18 @ 16:44) >  IMPRESSION:    Since the CT of 10/24/18: Interval bilateral frontoparietal craniectomies   for decompression and evacuation of bilateral subdural hematomas, with   decrease in size of bilateral holohemispheric subdural hematomas. Near   complete resolution of rightward midline shift.    Extensive acute-subacute scattered bilateral cerebral ischemic changes,   detailed above. Mild bilateral uncal herniations.    More conspicuous small right frontal and posterior right occipitotemporal   parenchymal hematomas.    Mild improvement of small-moderate amount of scattered subarachnoid   hemorrhage.    Right frontal approach ventriculostomy with tip in the body of the left   lateral ventricle. New small amount of intraventricular hemorrhage. No   hydrocephalus.    < end of copied text >

## 2018-11-13 NOTE — PROGRESS NOTE ADULT - SUBJECTIVE AND OBJECTIVE BOX
SUBJECTIVE  Patient seen and examined. Transferred to 6T. Has a lot of secretions. Tolerating trach collar. Opens eyes intermittently to auditory and painful stimuli. With oral reflexive movements.    TODAY'S REVIEW OF SYMPTOMS  Unable to obtain    VITALS  T(C): 37.2 (11-13-18 @ 09:52)  T(F): 98.9 (11-13-18 @ 09:52), Max: 99.3 (11-12-18 @ 17:53)  HR: 81 (11-13-18 @ 09:52) (75 - 87)  BP: 111/63 (11-13-18 @ 09:52) (109/63 - 128/79)  RR:  (18 - 47)  SpO2:  (94% - 100%)  Wt(kg): --    RECENT LABS/IMAGING             8.5    10.7  )-----------( 131      ( 13 Nov 2018 11:18 )             25.8    126<L>  |  89<L>  |  13.0  ----------------------------<  106  4.1   |  26.0  |  0.29<L>    Ca    8.6      13 Nov 2018 11:18  Mg     1.3     11-13    TPro  7.7  /  Alb  3.0<L>  /  TBili  1.6  /  DBili  x   /  AST  97<H>  /  ALT  25  /  AlkPhos  331<H>  11-13    MEDICATIONS   MEDICATIONS  (STANDING):  ALBUTerol    0.083% 2.5 milliGRAM(s) Nebulizer every 6 hours  bromocriptine Tablet 10 milliGRAM(s) Oral <User Schedule>  chlorhexidine 0.12% Liquid 15 milliLiter(s) Swish and Spit two times a day  chlorhexidine 2% Cloths 1 Application(s) Topical daily  enoxaparin Injectable 40 milliGRAM(s) SubCutaneous daily  folic acid 1 milliGRAM(s) Oral daily  levETIRAcetam  Solution 1000 milliGRAM(s) Oral two times a day  melatonin 5 milliGRAM(s) Oral at bedtime  modafinil 100 milliGRAM(s) Oral <User Schedule>  phytonadione   Solution 5 milliGRAM(s) Oral daily  propranolol 20 milliGRAM(s) Oral every 6 hours  spironolactone 25 milliGRAM(s) Oral daily  thiamine 100 milliGRAM(s) Oral daily    MEDICATIONS  (PRN):  acetaminophen    Suspension .. 650 milliGRAM(s) Oral every 6 hours PRN Temp greater or equal to 38.5C (101.3F)    PHYSICAL EXAM  HEENT - Cranial incisions, (+) Tongue swelling - mildly improved, Opened eye to painful stimulation, (+) Visual startle (R>L), (-) Auditory startle  Cardiovascular - All extremities warm, Diffuse edema  Pulm - (+) Trach collar with secretions  Extremities - Diffuse swelling, Extensor posturing to pain response  Reflexes - +Babinski and Meza's bilaterally, DTR's - Patella 3+/4, Felipe's 2+/4, No clonus appreciated in bilateral lower extremities    Coma Recovery Scale - Revised  AUDITORY FUNCTION SCALE  0 - None  VISUAL FUNCTION SCALE  1 - Visual Startle  MOTOR FUNCTION SCALE  1 - Abnormal Posturing (extensor posturing, right > left)  OROMOTOR/VERBAL FUNCTION SCALE  1 - Oral reflexive movements  COMMUNICATION SCALE  0 - None  AROUSAL SCALE  1 - Eye opening with stimulation    TOTAL SCORE = 4 = Vegetative state    ASSESSMENT/PLAN  32y Male unknown PMH found unresponsive on sidewalk found with bilateral SDH and SAH now s/p bilateral hemicraniectomy and EVD placement s/p removal. Coma x 12 days, Vegetative State at Day 13 (11/3)     Sleep/Wake Cycles - Modafinil 100mg Q6AM (11/9), Bromocriptine 10mg BID (increased from 5mg 11/8), Melatonin 5mg HS (10/31)  Hepatic encephalopathy - Vit K, Spironolactone  Possible seizure activity - Keppra   Dysautonomia - Propranolol, Bromocriptine, Tylenol PRN  Oral care - Aggressive oral care, Chlorhexidine  DVT PPX - SCDs, Lovenox  Rehab - COMA STIM by all services. Will continue to follow.

## 2018-11-13 NOTE — CONSULT NOTE ADULT - PROBLEM SELECTOR PROBLEM 1
Robbins coma scale total score 3-8, 24 hours or more after hospital admission
Traumatic brain injury, with loss of consciousness of 1 hour to 5 hours 59 minutes, initial encounter

## 2018-11-13 NOTE — PROGRESS NOTE ADULT - SUBJECTIVE AND OBJECTIVE BOX
INTERVAL HPI/OVERNIGHT EVENTS: NAOE. Transferred from the SICU to the floor in the evening of .    SUBJECTIVE:    Patient was seen and examined at bedside this AM.  No acute events overnight.  Patient remains with unchanged neuro status. Patient now DNR, DNI. Awaiting family discussion regarding DEescalation of care. As of now patient on trach collar which he has been tolerating.    MEDICATIONS  (STANDING):  ALBUTerol    0.083% 2.5 milliGRAM(s) Nebulizer every 6 hours  bromocriptine Tablet 10 milliGRAM(s) Oral <User Schedule>  chlorhexidine 0.12% Liquid 15 milliLiter(s) Swish and Spit two times a day  chlorhexidine 2% Cloths 1 Application(s) Topical daily  enoxaparin Injectable 40 milliGRAM(s) SubCutaneous daily  folic acid 1 milliGRAM(s) Oral daily  levETIRAcetam  Solution 1000 milliGRAM(s) Oral two times a day  melatonin 5 milliGRAM(s) Oral at bedtime  modafinil 100 milliGRAM(s) Oral <User Schedule>  phytonadione   Solution 5 milliGRAM(s) Oral daily  propranolol 20 milliGRAM(s) Oral every 6 hours  spironolactone 25 milliGRAM(s) Oral daily  thiamine 100 milliGRAM(s) Oral daily    MEDICATIONS  (PRN):  acetaminophen    Suspension .. 650 milliGRAM(s) Oral every 6 hours PRN Temp greater or equal to 38.5C (101.3F)      Vital Signs Last 24 Hrs  T(C): 37.3 (2018 23:56), Max: 37.5 (2018 12:00)  T(F): 99.1 (2018 23:56), Max: 99.5 (2018 12:00)  HR: 82 (2018 06:00) (75 - 87)  BP: 109/63 (2018 06:00) (109/63 - 149/89)  BP(mean): 93 (2018 17:00) (89 - 114)  RR: 21 (2018 23:56) (20 - 47)  SpO2: 100% (2018 06:00) (94% - 100%)    Physical Exam:  Gen: NAD  Neurological:  Best GCS 4T  HEENT: PERRLA, EOMI  Respiratory: Breath Sounds equal & CTA bilaterally, no accessory muscle use  Cardiovascular: Regular rate & rhythm, normal S1, S2; no murmurs, gallops or rubs  Gastrointestinal: Soft, non-tender, nondistended, PEG tube in place  Vascular: Equal and normal pulses: 2+ peripheral pulses throughout  Musculoskeletal: No joint pain, swelling or deformity; no limitation of movement  Skin: No rashes    I&O's Detail    2018 07:01  -  2018 07:00  --------------------------------------------------------  IN:    Enteral Tube Flush: 625 mL    Pivot: 1150 mL  Total IN: 1775 mL    OUT:    Incontinent per Collection Ba mL    Indwelling Catheter - Urethral: 305 mL    Stool: 200 mL  Total OUT: 2455 mL    Total NET: -680 mL          LABS:                RADIOLOGY & ADDITIONAL STUDIES:

## 2018-11-13 NOTE — CONSULT NOTE ADULT - PROBLEM SELECTOR RECOMMENDATION 9
agree with neurologist Poor prognosis  guardianship indicated
Subdural hematoma with compression s/p surgical intervention. Plan per trauma and Neurosurgery

## 2018-11-13 NOTE — PROGRESS NOTE ADULT - ATTENDING COMMENTS
Agree with Dr. Vera.  Recovery is still posturing with eye opening with stimulation.  Current state is vegetative.  Will consider altering medication to continue to attempt stimulation.

## 2018-11-13 NOTE — CONSULT NOTE ADULT - ASSESSMENT
comatose
IMP:   PATIENT FOUND UNRESPONSIVE  NO HX OF MECHANISM  GCS=4    PLAN:  OR
Mr. Aneudy Clemente is a 45 M ( 1973) found to have severe TBI with subdural hematoma s/p surgical intervention, s/p trach and peg and course complicated by sepsis, acute anemia, hepatic encephalopathy and ?cholecystitis. We are assisting with goc.
The patient is a 32y Male who is followed by neurology because of TBI/possible seizure    TBI  s/p decompressive b/l hemicraniectomy and EVD  significant amount of intracranial hemorrhage    possible seizure  likely can not have EEG due to surgery  rhythmic tongue movements, random foot movement  unable to tolerate propofol because of hypotension  can start fosphenytoin, but not clearly seizures    poor prognosis for meaningful recovery    will follow with you    Martell Mendoza MD PhD   [id number]

## 2018-11-13 NOTE — PROGRESS NOTE ADULT - ASSESSMENT
31 y/o M s/p found down with TBI, now s/p trach/PEG, made DNR awaiting family discussion regarding escalation of care and goals of care. Currently discussion re: DEESCALATION of care will likely be underway throughout this week.    - pain control as needed  - continue trach collar as tolerated  -Patient passed TOV - borjas was removed  -Continue DVT ppx with Lovenox; SCDs  -All abx completed   -Will follow up family's discussion of care goals this week; consider planning for longterm placement

## 2018-11-14 DIAGNOSIS — Z93.0 TRACHEOSTOMY STATUS: ICD-10-CM

## 2018-11-14 PROCEDURE — 99232 SBSQ HOSP IP/OBS MODERATE 35: CPT

## 2018-11-14 PROCEDURE — 99233 SBSQ HOSP IP/OBS HIGH 50: CPT | Mod: GC

## 2018-11-14 RX ORDER — MODAFINIL 200 MG/1
100 TABLET ORAL
Qty: 0 | Refills: 0 | Status: DISCONTINUED | OUTPATIENT
Start: 2018-11-14 | End: 2018-11-20

## 2018-11-14 RX ADMIN — ALBUTEROL 2.5 MILLIGRAM(S): 90 AEROSOL, METERED ORAL at 10:24

## 2018-11-14 RX ADMIN — MODAFINIL 100 MILLIGRAM(S): 200 TABLET ORAL at 05:34

## 2018-11-14 RX ADMIN — CHLORHEXIDINE GLUCONATE 15 MILLILITER(S): 213 SOLUTION TOPICAL at 05:34

## 2018-11-14 RX ADMIN — Medication 5 MILLIGRAM(S): at 18:06

## 2018-11-14 RX ADMIN — ALBUTEROL 2.5 MILLIGRAM(S): 90 AEROSOL, METERED ORAL at 03:41

## 2018-11-14 RX ADMIN — ALBUTEROL 2.5 MILLIGRAM(S): 90 AEROSOL, METERED ORAL at 15:52

## 2018-11-14 RX ADMIN — ALBUTEROL 2.5 MILLIGRAM(S): 90 AEROSOL, METERED ORAL at 21:18

## 2018-11-14 RX ADMIN — BROMOCRIPTINE MESYLATE 10 MILLIGRAM(S): 5 CAPSULE ORAL at 05:28

## 2018-11-14 RX ADMIN — Medication 20 MILLIGRAM(S): at 05:27

## 2018-11-14 RX ADMIN — Medication 1 MILLIGRAM(S): at 12:23

## 2018-11-14 RX ADMIN — Medication 100 MILLIGRAM(S): at 12:23

## 2018-11-14 RX ADMIN — Medication 0.5 MILLIGRAM(S): at 03:37

## 2018-11-14 RX ADMIN — Medication 5 MILLIGRAM(S): at 22:30

## 2018-11-14 RX ADMIN — BROMOCRIPTINE MESYLATE 10 MILLIGRAM(S): 5 CAPSULE ORAL at 12:23

## 2018-11-14 RX ADMIN — SPIRONOLACTONE 25 MILLIGRAM(S): 25 TABLET, FILM COATED ORAL at 05:27

## 2018-11-14 RX ADMIN — LEVETIRACETAM 1000 MILLIGRAM(S): 250 TABLET, FILM COATED ORAL at 05:27

## 2018-11-14 RX ADMIN — MODAFINIL 100 MILLIGRAM(S): 200 TABLET ORAL at 18:05

## 2018-11-14 RX ADMIN — ENOXAPARIN SODIUM 40 MILLIGRAM(S): 100 INJECTION SUBCUTANEOUS at 12:23

## 2018-11-14 RX ADMIN — LEVETIRACETAM 1000 MILLIGRAM(S): 250 TABLET, FILM COATED ORAL at 18:05

## 2018-11-14 RX ADMIN — Medication 20 MILLIGRAM(S): at 18:06

## 2018-11-14 RX ADMIN — CHLORHEXIDINE GLUCONATE 15 MILLILITER(S): 213 SOLUTION TOPICAL at 18:06

## 2018-11-14 RX ADMIN — CHLORHEXIDINE GLUCONATE 1 APPLICATION(S): 213 SOLUTION TOPICAL at 13:18

## 2018-11-14 NOTE — PROGRESS NOTE ADULT - SUBJECTIVE AND OBJECTIVE BOX
CC: follow up, [facility]    BRIEF HOSPITAL COURSE  Pt's name is Eric Clemente ( 1973) per chart. Pt was found down in the field with LOC for unknown duration with severe traumatic brain injury, CT showing bilateral subdural hematoma with compression s/p emergent bilateral hemicraniectomy, decompression and EVD placement on 10/24. S/P trach and peg placement. Course complicated by seizures, possible hepatic encephalopathy, sepsis due to ventilator associated pneumonia, severe anemia s/p transfusion, ?cholecystitis s/p diagnostic paracentesis  to rule SBP.    INTERVAL HISTORY:  Pt seen and examined at bedside. No acute events overnight per RN. He remains unresponsive and nonverbal. He appears comfortable and no acute distress.     MEDICATIONS  (STANDING):  ALBUTerol    0.083% 2.5 milliGRAM(s) Nebulizer every 6 hours  bromocriptine Tablet 10 milliGRAM(s) Oral <User Schedule>  chlorhexidine 0.12% Liquid 15 milliLiter(s) Swish and Spit two times a day  chlorhexidine 2% Cloths 1 Application(s) Topical daily  enoxaparin Injectable 40 milliGRAM(s) SubCutaneous daily  folic acid 1 milliGRAM(s) Oral daily  levETIRAcetam  Solution 1000 milliGRAM(s) Oral two times a day  melatonin 5 milliGRAM(s) Oral at bedtime  modafinil 100 milliGRAM(s) Oral <User Schedule>  phytonadione   Solution 5 milliGRAM(s) Oral daily  propranolol 20 milliGRAM(s) Oral every 6 hours  spironolactone 25 milliGRAM(s) Oral daily  thiamine 100 milliGRAM(s) Oral daily    MEDICATIONS  (PRN):  acetaminophen    Suspension .. 650 milliGRAM(s) Oral every 6 hours PRN Temp greater or equal to 38.5C (101.3F)      PHYSICAL EXAM:    Vital Signs Last 24 Hrs  T(C): 37.6 (2018 15:41), Max: 37.6 (2018 15:41)  T(F): 99.6 (2018 15:41), Max: 99.6 (2018 15:41)  HR: 88 (2018 15:53) (79 - 88)  BP: 116/71 (2018 15:41) (109/62 - 121/74)  BP(mean): --  RR: 20 (2018 15:41) (18 - 20)  SpO2: 96% (2018 15:53) (93% - 98%)    General: nonverbal and unresponsive. Resting comfortably. No acute distress.   HEENT: mucous membrane moist. enlarged and protruding tongue edema  Lungs: coarse breath sounds. +trach   CV: +s1/s2. Regular rate and rhythm.   GI: +bowel sound. abdomen soft, non-tender, obese. +PEG.  MSK: No cyanosis. +edema.  Neuro: unresponsive and nonverbal.   Skin: warm and dry.   LABS:                          8.5    10.7  )-----------( 131      ( 2018 11:18 )             25.8     1113    126<L>  |  89<L>  |  13.0  ----------------------------<  106  4.1   |  26.0  |  0.29<L>    Ca    8.6      2018 11:18  Mg     1.3         TPro  7.7  /  Alb  3.0<L>  /  TBili  1.6  /  DBili  x   /  AST  97<H>  /  ALT  25  /  AlkPhos  331<H>  1113        I&O's Summary    2018 07:01  -  2018 07:00  --------------------------------------------------------  IN: 0 mL / OUT: 3250 mL / NET: -3250 mL    2018 07:01  -  2018 16:26  --------------------------------------------------------  IN: 850 mL / OUT: 1400 mL / NET: -550 mL        RADIOLOGY & ADDITIONAL STUDIES: Reviewed, no new recent studies    ADVANCE DIRECTIVES:   DNR YES NO  Completed on:                     MOLST  YES NO   Completed on:  Living Will  YES NO   Completed on:

## 2018-11-14 NOTE — PROGRESS NOTE ADULT - SUBJECTIVE AND OBJECTIVE BOX
INTERVAL HPI/OVERNIGHT EVENTS/SUBJECTIVE:  Patient was seen and examined at bedside this AM.  No acute events overnight.  Patient remains with unchanged neuro status. Patient now DNR, DNI. Awaiting family discussion regarding DEescalation of care. As of now patient on trach collar which he has been tolerating.       ICU Vital Signs Last 24 Hrs  T(C): 37 (2018 23:25), Max: 37.2 (2018 09:52)  T(F): 98.6 (2018 23:25), Max: 98.9 (2018 09:52)  HR: 87 (2018 23:25) (75 - 87)  BP: 121/74 (2018 23:25) (101/60 - 121/74)  BP(mean): --  ABP: --  ABP(mean): --  RR: 20 (2018 23:25) (18 - 20)  SpO2: 96% (2018 23:25) (95% - 98%)      I&O's Detail    2018 07:01  -  2018 07:00  --------------------------------------------------------  IN:  Total IN: 0 mL    OUT:    Incontinent per Collection Ba mL  Total OUT: 3250 mL    Total NET: -3250 mL      MEDICATIONS  (STANDING):  ALBUTerol    0.083% 2.5 milliGRAM(s) Nebulizer every 6 hours  bromocriptine Tablet 10 milliGRAM(s) Oral <User Schedule>  chlorhexidine 0.12% Liquid 15 milliLiter(s) Swish and Spit two times a day  chlorhexidine 2% Cloths 1 Application(s) Topical daily  enoxaparin Injectable 40 milliGRAM(s) SubCutaneous daily  folic acid 1 milliGRAM(s) Oral daily  levETIRAcetam  Solution 1000 milliGRAM(s) Oral two times a day  melatonin 5 milliGRAM(s) Oral at bedtime  modafinil 100 milliGRAM(s) Oral <User Schedule>  phytonadione   Solution 5 milliGRAM(s) Oral daily  propranolol 20 milliGRAM(s) Oral every 6 hours  spironolactone 25 milliGRAM(s) Oral daily  thiamine 100 milliGRAM(s) Oral daily    MEDICATIONS  (PRN):  acetaminophen    Suspension .. 650 milliGRAM(s) Oral every 6 hours PRN Temp greater or equal to 38.5C (101.3F)    MISC:     PHYSICAL EXAM:  Gen: NAD  Neurological:  Best GCS 4T  Respiratory: non-labored, no accessory muscle use  Cardiovascular: s1/s2  Gastrointestinal: Soft, non-tender, nondistended, PEG tube in place  Musculoskeletal: No joint pain, swelling or deformity; no limitation of movement  Skin: No rashes      LABS:  CBC Full  -  ( 2018 11:18 )  WBC Count : 10.7 K/uL  Hemoglobin : 8.5 g/dL  Hematocrit : 25.8 %  Platelet Count - Automated : 131 K/uL  Mean Cell Volume : 85.1 fl  Mean Cell Hemoglobin : 28.1 pg  Mean Cell Hemoglobin Concentration : 32.9 g/dL  Auto Neutrophil # : 8.8 K/uL  Auto Lymphocyte # : 0.8 K/uL  Auto Monocyte # : 0.8 K/uL  Auto Eosinophil # : 0.3 K/uL  Auto Basophil # : 0.0 K/uL  Auto Neutrophil % : 82.0 %  Auto Lymphocyte % : 7.6 %  Auto Monocyte % : 7.0 %  Auto Eosinophil % : 3.0 %  Auto Basophil % : 0.2 %        126<L>  |  89<L>  |  13.0  ----------------------------<  106  4.1   |  26.0  |  0.29<L>    Ca    8.6      2018 11:18  Mg     1.3         TPro  7.7  /  Alb  3.0<L>  /  TBili  1.6  /  DBili  x   /  AST  97<H>  /  ALT  25  /  AlkPhos  331<H>        LIVER FUNCTIONS - ( 2018 11:18 )  Alb: 3.0 g/dL / Pro: 7.7 g/dL / ALK PHOS: 331 U/L / ALT: 25 U/L / AST: 97 U/L / GGT: x           ASSESSMENT/PLAN:  32yMale s/p found down with TBI, now s/p trach/PEG, made DNR awaiting family discussion regarding escalation of care and goals of care. Currently discussion re: DEESCALATION of care will likely be underway throughout this week.    - pain control as needed  - continue trach collar as tolerated  -Continue DVT ppx with Lovenox; SCDs  -All abx completed   -Will follow up family's discussion of care goals this week; consider planning for longterm placement  -discuss with attending

## 2018-11-14 NOTE — PROGRESS NOTE ADULT - PROBLEM SELECTOR PLAN 1
Subdural hematoma with compression s/p surgical intervention. Plan per trauma and Neurosurgery. C/W anti-epileptic meds for seizure prophylaxis

## 2018-11-14 NOTE — PROGRESS NOTE ADULT - ATTENDING COMMENTS
Agree with Dr. Vera.  Will increase Provigil to 200mg.  Will consider alternate stimulants to assist in attempt to improve arousal.

## 2018-11-14 NOTE — PROGRESS NOTE ADULT - SUBJECTIVE AND OBJECTIVE BOX
SUBJECTIVE  Patient seen and examined. Tolerating trach collar. Made DNR/DNI by family, in talks about deescalation of care. No eye opening noted. Continues to have extensor posturing response to painful stimuli.     TODAY'S REVIEW OF SYMPTOMS  Unable to obtain    VITALS  T(C): 37 (11-13-18 @ 23:25)  T(F): 98.6 (11-13-18 @ 23:25), Max: 98.9 (11-13-18 @ 09:52)  HR: 87 (11-13-18 @ 23:25) (75 - 87)  BP: 121/74 (11-13-18 @ 23:25) (101/60 - 121/74)  RR:  (18 - 20)  SpO2:  (95% - 98%)  Wt(kg): --    RECENT LABS/IMAGING             8.5    10.7  )-----------( 131      ( 13 Nov 2018 11:18 )             25.8     126<L>  |  89<L>  |  13.0  ----------------------------<  106  4.1   |  26.0  |  0.29<L>    Ca    8.6      13 Nov 2018 11:18  Mg     1.3     11-13    TPro  7.7  /  Alb  3.0<L>  /  TBili  1.6  /  DBili  x   /  AST  97<H>  /  ALT  25  /  AlkPhos  331<H>  11-13    MEDICATIONS   MEDICATIONS  (STANDING):  ALBUTerol    0.083% 2.5 milliGRAM(s) Nebulizer every 6 hours  bromocriptine Tablet 10 milliGRAM(s) Oral <User Schedule>  chlorhexidine 0.12% Liquid 15 milliLiter(s) Swish and Spit two times a day  chlorhexidine 2% Cloths 1 Application(s) Topical daily  enoxaparin Injectable 40 milliGRAM(s) SubCutaneous daily  folic acid 1 milliGRAM(s) Oral daily  levETIRAcetam  Solution 1000 milliGRAM(s) Oral two times a day  melatonin 5 milliGRAM(s) Oral at bedtime  modafinil 100 milliGRAM(s) Oral <User Schedule>  phytonadione   Solution 5 milliGRAM(s) Oral daily  propranolol 20 milliGRAM(s) Oral every 6 hours  spironolactone 25 milliGRAM(s) Oral daily  thiamine 100 milliGRAM(s) Oral daily    MEDICATIONS  (PRN):  acetaminophen    Suspension .. 650 milliGRAM(s) Oral every 6 hours PRN Temp greater or equal to 38.5C (101.3F)    PHYSICAL EXAM  HEENT - Cranial incisions, (+) Tongue swelling - mildly improved, No eye opening noted today, (-) Visual startle, (-) Auditory startle, Right pupil more reactive to light than left pupil  Cardiovascular - All extremities warm, Diffuse edema  Pulm - (+) Trach collar with secretions  Extremities - Diffuse swelling, Extensor posturing to pain response primarily in upper extremities  Reflexes - +Babinski and Meza's bilaterally, DTR's - Patella 3+/4, Felipe's 2+/4, No clonus appreciated in bilateral lower extremities    Coma Recovery Scale - Revised  AUDITORY FUNCTION SCALE  0 - None  VISUAL FUNCTION SCALE  0 - None  MOTOR FUNCTION SCALE  1 - Abnormal Posturing (extensor posturing, right > left)  OROMOTOR/VERBAL FUNCTION SCALE  1 - Oral reflexive movements  COMMUNICATION SCALE  0 - None  AROUSAL SCALE  0 - None    TOTAL SCORE = 2 = Vegetative state    ASSESSMENT/PLAN  32y Male unknown PMH found unresponsive on sidewalk found with bilateral SDH and SAH now s/p bilateral hemicraniectomy and EVD placement s/p removal. Coma x 12 days, Vegetative State at Day 13 (11/3)     Sleep/Wake Cycles - Modafinil 100mg Q6AM (11/9), Bromocriptine 10mg BID (increased from 5mg 11/8), Melatonin 5mg HS (10/31)  Hepatic encephalopathy - Vit K, Spironolactone  Possible seizure activity - Keppra   Dysautonomia - Propranolol, Bromocriptine, Tylenol PRN  Oral care - Aggressive oral care, Chlorhexidine  DVT PPX - SCDs, Lovenox  Rehab - COMA STIM by all services. Will continue to follow. SUBJECTIVE  Patient seen and examined. Tolerating trach collar. Made DNR/DNI by family, in talks about deescalation of care. No eye opening noted. Continues to have extensor posturing response to painful stimuli.     TODAY'S REVIEW OF SYMPTOMS  Unable to obtain    VITALS  T(C): 37 (11-13-18 @ 23:25)  T(F): 98.6 (11-13-18 @ 23:25), Max: 98.9 (11-13-18 @ 09:52)  HR: 87 (11-13-18 @ 23:25) (75 - 87)  BP: 121/74 (11-13-18 @ 23:25) (101/60 - 121/74)  RR:  (18 - 20)  SpO2:  (95% - 98%)  Wt(kg): --    RECENT LABS/IMAGING             8.5    10.7  )-----------( 131      ( 13 Nov 2018 11:18 )             25.8     126<L>  |  89<L>  |  13.0  ----------------------------<  106  4.1   |  26.0  |  0.29<L>    Ca    8.6      13 Nov 2018 11:18  Mg     1.3     11-13    TPro  7.7  /  Alb  3.0<L>  /  TBili  1.6  /  DBili  x   /  AST  97<H>  /  ALT  25  /  AlkPhos  331<H>  11-13    MEDICATIONS   MEDICATIONS  (STANDING):  ALBUTerol    0.083% 2.5 milliGRAM(s) Nebulizer every 6 hours  bromocriptine Tablet 10 milliGRAM(s) Oral <User Schedule>  chlorhexidine 0.12% Liquid 15 milliLiter(s) Swish and Spit two times a day  chlorhexidine 2% Cloths 1 Application(s) Topical daily  enoxaparin Injectable 40 milliGRAM(s) SubCutaneous daily  folic acid 1 milliGRAM(s) Oral daily  levETIRAcetam  Solution 1000 milliGRAM(s) Oral two times a day  melatonin 5 milliGRAM(s) Oral at bedtime  modafinil 100 milliGRAM(s) Oral <User Schedule>  phytonadione   Solution 5 milliGRAM(s) Oral daily  propranolol 20 milliGRAM(s) Oral every 6 hours  spironolactone 25 milliGRAM(s) Oral daily  thiamine 100 milliGRAM(s) Oral daily    MEDICATIONS  (PRN):  acetaminophen    Suspension .. 650 milliGRAM(s) Oral every 6 hours PRN Temp greater or equal to 38.5C (101.3F)    PHYSICAL EXAM  HEENT - Cranial incisions, (+) Tongue swelling - mildly improved, No eye opening noted today, (-) Visual startle, (-) Auditory startle, Right pupil more reactive to light than left pupil  Cardiovascular - All extremities warm, Diffuse edema  Pulm - (+) Trach collar with secretions  Extremities - Diffuse swelling, Extensor posturing to pain response primarily in upper extremities  Reflexes - +Babinski and Meza's bilaterally, DTR's - Patella 3+/4, Felipe's 2+/4, No clonus appreciated in bilateral lower extremities    Coma Recovery Scale - Revised  AUDITORY FUNCTION SCALE  0 - None  VISUAL FUNCTION SCALE  0 - None  MOTOR FUNCTION SCALE  1 - Abnormal Posturing (extensor posturing, right > left)  OROMOTOR/VERBAL FUNCTION SCALE  1 - Oral reflexive movements  COMMUNICATION SCALE  0 - None  AROUSAL SCALE  0 - None    TOTAL SCORE = 2 = Vegetative state    ASSESSMENT/PLAN  32y Male unknown PMH found unresponsive on sidewalk found with bilateral SDH and SAH now s/p bilateral hemicraniectomy and EVD placement s/p removal. Coma x 12 days, Vegetative State at Day 13 (11/3)     Sleep/Wake Cycles - Increased Modafinil to 100mg Q6AM/Q12PM (11/14), Bromocriptine 10mg BID (increased from 5mg 11/8), Melatonin 5mg HS (10/31)  Hepatic encephalopathy - Vit K, Spironolactone  Possible seizure activity - Keppra   Dysautonomia - Propranolol, Bromocriptine, Tylenol PRN  Oral care - Aggressive oral care, Chlorhexidine  DVT PPX - SCDs, Lovenox  Rehab - COMA STIM by all services. Will continue to follow.

## 2018-11-14 NOTE — PROGRESS NOTE ADULT - ASSESSMENT
Mr. Aneudy Clemente is a 45 M ( 1973) found to have severe TBI with subdural hematoma s/p surgical intervention, s/p trach and peg and course complicated by sepsis, acute anemia, hepatic encephalopathy and ?cholecystitis. Family meeting held on  with SICU team over phone with Uncle - next of kin, pt was made DNR/DNI. Uncle acknowledge understanding of pt's underlying medical conditions, hospital course and would like to continue all current medical management - would like to be informed if he should encounter any acute events to make decision moving forward. SW following and working on PRUC[employer] as pt is an undocumented citizen.

## 2018-11-15 LAB
ANION GAP SERPL CALC-SCNC: 13 MMOL/L — SIGNIFICANT CHANGE UP (ref 5–17)
BASOPHILS # BLD AUTO: 0 K/UL — SIGNIFICANT CHANGE UP (ref 0–0.2)
BASOPHILS NFR BLD AUTO: 0.1 % — SIGNIFICANT CHANGE UP (ref 0–2)
BUN SERPL-MCNC: 16 MG/DL — SIGNIFICANT CHANGE UP (ref 8–20)
CALCIUM SERPL-MCNC: 9.3 MG/DL — SIGNIFICANT CHANGE UP (ref 8.6–10.2)
CHLORIDE SERPL-SCNC: 94 MMOL/L — LOW (ref 98–107)
CO2 SERPL-SCNC: 26 MMOL/L — SIGNIFICANT CHANGE UP (ref 22–29)
CREAT SERPL-MCNC: 0.26 MG/DL — LOW (ref 0.5–1.3)
EOSINOPHIL # BLD AUTO: 0.3 K/UL — SIGNIFICANT CHANGE UP (ref 0–0.5)
EOSINOPHIL NFR BLD AUTO: 4.2 % — SIGNIFICANT CHANGE UP (ref 0–5)
GAS PNL BLDA: SIGNIFICANT CHANGE UP
GLUCOSE SERPL-MCNC: 93 MG/DL — SIGNIFICANT CHANGE UP (ref 70–115)
HCT VFR BLD CALC: 26.8 % — LOW (ref 42–52)
HGB BLD-MCNC: 8.7 G/DL — LOW (ref 14–18)
LYMPHOCYTES # BLD AUTO: 0.8 K/UL — LOW (ref 1–4.8)
LYMPHOCYTES # BLD AUTO: 11.4 % — LOW (ref 20–55)
MAGNESIUM SERPL-MCNC: 1.4 MG/DL — LOW (ref 1.6–2.6)
MCHC RBC-ENTMCNC: 28 PG — SIGNIFICANT CHANGE UP (ref 27–31)
MCHC RBC-ENTMCNC: 32.5 G/DL — SIGNIFICANT CHANGE UP (ref 32–36)
MCV RBC AUTO: 86.2 FL — SIGNIFICANT CHANGE UP (ref 80–94)
MONOCYTES # BLD AUTO: 0.5 K/UL — SIGNIFICANT CHANGE UP (ref 0–0.8)
MONOCYTES NFR BLD AUTO: 6.3 % — SIGNIFICANT CHANGE UP (ref 3–10)
NEUTROPHILS # BLD AUTO: 5.8 K/UL — SIGNIFICANT CHANGE UP (ref 1.8–8)
NEUTROPHILS NFR BLD AUTO: 77.7 % — HIGH (ref 37–73)
PHOSPHATE SERPL-MCNC: 5.1 MG/DL — HIGH (ref 2.4–4.7)
PLATELET # BLD AUTO: 103 K/UL — LOW (ref 150–400)
POTASSIUM SERPL-MCNC: 4.3 MMOL/L — SIGNIFICANT CHANGE UP (ref 3.5–5.3)
POTASSIUM SERPL-SCNC: 4.3 MMOL/L — SIGNIFICANT CHANGE UP (ref 3.5–5.3)
RBC # BLD: 3.11 M/UL — LOW (ref 4.6–6.2)
RBC # FLD: 18.4 % — HIGH (ref 11–15.6)
SODIUM SERPL-SCNC: 133 MMOL/L — LOW (ref 135–145)
WBC # BLD: 7.4 K/UL — SIGNIFICANT CHANGE UP (ref 4.8–10.8)
WBC # FLD AUTO: 7.4 K/UL — SIGNIFICANT CHANGE UP (ref 4.8–10.8)

## 2018-11-15 PROCEDURE — 99233 SBSQ HOSP IP/OBS HIGH 50: CPT | Mod: GC

## 2018-11-15 PROCEDURE — 99232 SBSQ HOSP IP/OBS MODERATE 35: CPT

## 2018-11-15 RX ORDER — MAGNESIUM SULFATE 500 MG/ML
2 VIAL (ML) INJECTION ONCE
Qty: 0 | Refills: 0 | Status: COMPLETED | OUTPATIENT
Start: 2018-11-15 | End: 2018-11-16

## 2018-11-15 RX ADMIN — Medication 5 MILLIGRAM(S): at 23:58

## 2018-11-15 RX ADMIN — LEVETIRACETAM 1000 MILLIGRAM(S): 250 TABLET, FILM COATED ORAL at 18:03

## 2018-11-15 RX ADMIN — Medication 1 MILLIGRAM(S): at 12:04

## 2018-11-15 RX ADMIN — MODAFINIL 100 MILLIGRAM(S): 200 TABLET ORAL at 12:04

## 2018-11-15 RX ADMIN — BROMOCRIPTINE MESYLATE 10 MILLIGRAM(S): 5 CAPSULE ORAL at 12:06

## 2018-11-15 RX ADMIN — BROMOCRIPTINE MESYLATE 10 MILLIGRAM(S): 5 CAPSULE ORAL at 06:18

## 2018-11-15 RX ADMIN — ENOXAPARIN SODIUM 40 MILLIGRAM(S): 100 INJECTION SUBCUTANEOUS at 12:07

## 2018-11-15 RX ADMIN — Medication 20 MILLIGRAM(S): at 00:47

## 2018-11-15 RX ADMIN — CHLORHEXIDINE GLUCONATE 15 MILLILITER(S): 213 SOLUTION TOPICAL at 18:03

## 2018-11-15 RX ADMIN — CHLORHEXIDINE GLUCONATE 1 APPLICATION(S): 213 SOLUTION TOPICAL at 12:06

## 2018-11-15 RX ADMIN — SPIRONOLACTONE 25 MILLIGRAM(S): 25 TABLET, FILM COATED ORAL at 06:17

## 2018-11-15 RX ADMIN — ALBUTEROL 2.5 MILLIGRAM(S): 90 AEROSOL, METERED ORAL at 10:00

## 2018-11-15 RX ADMIN — Medication 20 MILLIGRAM(S): at 18:03

## 2018-11-15 RX ADMIN — MODAFINIL 100 MILLIGRAM(S): 200 TABLET ORAL at 06:17

## 2018-11-15 RX ADMIN — ALBUTEROL 2.5 MILLIGRAM(S): 90 AEROSOL, METERED ORAL at 21:20

## 2018-11-15 RX ADMIN — Medication 5 MILLIGRAM(S): at 12:04

## 2018-11-15 RX ADMIN — Medication 20 MILLIGRAM(S): at 12:05

## 2018-11-15 RX ADMIN — Medication 100 MILLIGRAM(S): at 12:04

## 2018-11-15 RX ADMIN — LEVETIRACETAM 1000 MILLIGRAM(S): 250 TABLET, FILM COATED ORAL at 06:17

## 2018-11-15 RX ADMIN — Medication 20 MILLIGRAM(S): at 06:17

## 2018-11-15 RX ADMIN — ALBUTEROL 2.5 MILLIGRAM(S): 90 AEROSOL, METERED ORAL at 03:33

## 2018-11-15 RX ADMIN — ALBUTEROL 2.5 MILLIGRAM(S): 90 AEROSOL, METERED ORAL at 16:17

## 2018-11-15 NOTE — PROGRESS NOTE ADULT - SUBJECTIVE AND OBJECTIVE BOX
SUBJECTIVE:    Patient was seen and examined at bedside this AM.    The team and nurses attempted to place a bite block twice overnight but were unsuccessful given the patient's forcefully clenched jaw. Two vaseline covered tongue depressors were placed on his tongue to protect it from his top teeth. Patient still bit his tongue overnight.    No acute events overnight.  Patient remains with unchanged neuro status. Patient now DNR, DNI. Awaiting family discussion regarding DEescalation of care. As of now patient on trach collar which he has been tolerating.     MEDICATIONS  (STANDING):  ALBUTerol    0.083% 2.5 milliGRAM(s) Nebulizer every 6 hours  bromocriptine Tablet 10 milliGRAM(s) Oral <User Schedule>  chlorhexidine 0.12% Liquid 15 milliLiter(s) Swish and Spit two times a day  chlorhexidine 2% Cloths 1 Application(s) Topical daily  enoxaparin Injectable 40 milliGRAM(s) SubCutaneous daily  folic acid 1 milliGRAM(s) Oral daily  levETIRAcetam  Solution 1000 milliGRAM(s) Oral two times a day  melatonin 5 milliGRAM(s) Oral at bedtime  modafinil 100 milliGRAM(s) Oral <User Schedule>  phytonadione   Solution 5 milliGRAM(s) Oral daily  propranolol 20 milliGRAM(s) Oral every 6 hours  spironolactone 25 milliGRAM(s) Oral daily  thiamine 100 milliGRAM(s) Oral daily    MEDICATIONS  (PRN):  acetaminophen    Suspension .. 650 milliGRAM(s) Oral every 6 hours PRN Temp greater or equal to 38.5C (101.3F)    Vital Signs Last 24 Hrs  T(C): 37.1 (15 Nov 2018 08:26), Max: 37.6 (2018 15:41)  T(F): 98.7 (15 Nov 2018 08:26), Max: 99.6 (2018 15:41)  HR: 75 (15 Nov 2018 08:26) (73 - 93)  BP: 120/72 (15 Nov 2018 08:26) (116/71 - 120/72)  BP(mean): --  RR: 19 (15 Nov 2018 08:26) (19 - 20)  SpO2: 99% (15 Nov 2018 08:26) (93% - 99%)    PHYSICAL EXAM:  Gen: NAD; tongue protruding bathed in blood  Neurological:  Best GCS 4T  Respiratory: non-labored, no accessory muscle use; trach with audible secretions  Cardiovascular: s1/s2  Gastrointestinal: Soft, non-tender, nondistended, PEG tube in place  Musculoskeletal: No joint pain, swelling or deformity; no limitation of movement  Skin: No rashes    I&O's Detail    2018 07:01  -  15 Nov 2018 07:00  --------------------------------------------------------  IN:    Enteral Tube Flush: 520 mL    Pivot: 1050 mL  Total IN: 1570 mL    OUT:    Incontinent per Collection Ba mL  Total OUT: 3350 mL    Total NET: -1780 mL      LABS:                        8.5    10.7  )-----------( 131      ( 2018 11:18 )             25.8     11-15    133<L>  |  94<L>  |  16.0  ----------------------------<  93  4.3   |  26.0  |  0.26<L>    Ca    9.3      15 Nov 2018 07:08  Mg     1.3     -    TPro  7.7  /  Alb  3.0<L>  /  TBili  1.6  /  DBili  x   /  AST  97<H>  /  ALT  25  /  AlkPhos  331<H>  -          RADIOLOGY & ADDITIONAL STUDIES:

## 2018-11-15 NOTE — CHART NOTE - NSCHARTNOTEFT_GEN_A_CORE
Source: Patient [ ]  Family [ ]   other [ ] ID rounds; Pt in a coma     Current Diet:   Diet, NPO with Tube Feed:   Tube Feeding Modality: Gastrostomy  Pivot 1.5 Ryan  Total Volume for 24 Hours (mL): 1200  Continuous  Starting Tube Feed Rate {mL per Hour}: 50  Until Goal Tube Feed Rate (mL per Hour): 50  Tube Feed Duration (in Hours): 24  Tube Feed Start Time: 16:25 (11-01-18 @ 16:25)    Current Weight:   (11/11) 83.2kg  (11/2)   87kg   (11/1)   82 kg   (10/27) 82kg     % Weight Change: Recent weight consistent with admission weight, edema noted at 11/2 weight. Edema noted currently.    Pertinent Medications: MEDICATIONS  (STANDING):  ALBUTerol    0.083% 2.5 milliGRAM(s) Nebulizer every 6 hours  bromocriptine Tablet 10 milliGRAM(s) Oral <User Schedule>  chlorhexidine 0.12% Liquid 15 milliLiter(s) Swish and Spit two times a day  chlorhexidine 2% Cloths 1 Application(s) Topical daily  enoxaparin Injectable 40 milliGRAM(s) SubCutaneous daily  folic acid 1 milliGRAM(s) Oral daily  levETIRAcetam  Solution 1000 milliGRAM(s) Oral two times a day  melatonin 5 milliGRAM(s) Oral at bedtime  modafinil 100 milliGRAM(s) Oral <User Schedule>  phytonadione   Solution 5 milliGRAM(s) Oral daily  propranolol 20 milliGRAM(s) Oral every 6 hours  spironolactone 25 milliGRAM(s) Oral daily  thiamine 100 milliGRAM(s) Oral daily    MEDICATIONS  (PRN):  acetaminophen    Suspension .. 650 milliGRAM(s) Oral every 6 hours PRN Temp greater or equal to 38.5C (101.3F)    Pertinent Labs: CBC Full  -  ( 13 Nov 2018 11:18 )  WBC Count : 10.7 K/uL  Hemoglobin : 8.5 g/dL  Hematocrit : 25.8 %  Platelet Count - Automated : 131 K/uL  Mean Cell Volume : 85.1 fl  Mean Cell Hemoglobin : 28.1 pg  Mean Cell Hemoglobin Concentration : 32.9 g/dL  Auto Neutrophil # : 8.8 K/uL  Auto Lymphocyte # : 0.8 K/uL  Auto Monocyte # : 0.8 K/uL  Auto Eosinophil # : 0.3 K/uL  Auto Basophil # : 0.0 K/uL  Auto Neutrophil % : 82.0 %  Auto Lymphocyte % : 7.6 %  Auto Monocyte % : 7.0 %  Auto Eosinophil % : 3.0 %  Auto Basophil % : 0.2 %    11-15 Na133 mmol/L<L> Glu 93 mg/dL K+ 4.3 mmol/L Cr  0.26 mg/dL<L> BUN 16.0 mg/dL Phos n/a   Alb n/a   PAB n/a       Skin: Surgical incision B/L head; skin lesions to the scrotum  Edema: 1+ dependent; 3+ bilateral hands; 4+ tongue    Nutrition focused physical exam NOT conducted - found signs of malnutrition [ ]absent [ ]present; No physical signs of malnutrition present  Subcutaneous fat loss: [ ] Orbital fat pads region, [ ]Buccal fat region, [ ]Triceps region,  [ ]Ribs region    Muscle wasting: [ ]Temples region, [ ]Clavicle region, [ ]Shoulder region, [ ]Scapula region, [ ]Interosseous region,  [ ]thigh region, [ ]Calf region    Estimated Needs:   [X] no change since previous assessment  [ ] recalculated:     Current Nutrition Diagnosis: Pt remains at high nutrition risk secondary to increased nutrient needs related to increased physiologic demand for healing as evidenced by s/p TBI, with multicompartment ICH and brain compression from unknown mechanism, VAP, s/p trach and PEG. Current diet order provides Pivot @ 50ml/hr ([id number]) 1000ml/day 1500kcal kcal, 97gm protein, not yet meeting estimated nutrition needs. TF monitor was reset unable to obtain hx, per RN currently holding TF due to Pt medical status/interventions. Family is not ready to make decisions regarding advanced care at this time. RD to remain available.     Recommendations:   1) When medically feasible, consider increasing TF Pivot rate to 60ml/hr x 20 hours 1200ml/day (1800kcal, 116gm protein)   2) Add MVI and Vit C 500 mg daily  3) Prostat 1 pkt BID to provide 200kcal and 30g PRO via PEG    Monitoring and Evaluation:   [ ] PO intake [X] Tolerance to diet prescription [X] Weights  [X] Follow up per protocol [X] Labs:

## 2018-11-15 NOTE — PROGRESS NOTE ADULT - ATTENDING COMMENTS
Agree with Dr. Vera.   Patient with significant blood clots through trach.  Noted to have blink to auditory stimulus.  Some more eye movements noted.   Will continue to monitor.   Prognosis for functional outcomes continue to still be poor given the length of time in his current state.   Will continue to follow.

## 2018-11-15 NOTE — PROGRESS NOTE ADULT - ASSESSMENT
32yMale s/p found down with TBI, now s/p trach/PEG, made DNR awaiting family discussion regarding escalation of care and goals of care. Currently discussion re: DEESCALATION of care will likely be underway throughout this week.    - pain control as needed  - continue trach collar as tolerated  -Continue DVT ppx with Lovenox; SCDs  -All abx completed   -Will follow up family's discussion of care goals this week; consider planning for longterm placement  -discuss with attending

## 2018-11-15 NOTE — PROGRESS NOTE ADULT - SUBJECTIVE AND OBJECTIVE BOX
SUBJECTIVE  Patient seen and examined. With increased breathing from tongue, able to get somewhat protection for tongue in place this morning. Attempted to put bite block in place last night but was unsuccessful. Continues to have limited eye opening, increased Modafinil to BID - to start today. Tolerating trach collar.    TODAY'S REVIEW OF SYMPTOMS  Unable to obtain    VITALS  T(C): 37.1 (11-15-18 @ 08:26)  T(F): 98.7 (11-15-18 @ 08:26), Max: 99.6 (11-14-18 @ 15:41)  HR: 75 (11-15-18 @ 08:26) (73 - 93)  BP: 120/72 (11-15-18 @ 08:26) (116/71 - 120/72)  RR:  (19 - 20)  SpO2:  (93% - 99%)  Wt(kg): --    RECENT LABS/IMAGING             8.5    10.7  )-----------( 131      ( 13 Nov 2018 11:18 )             25.8     133<L>  |  94<L>  |  16.0  ----------------------------<  93  4.3   |  26.0  |  0.26<L>    Ca    9.3      15 Nov 2018 07:08  Mg     1.3     11-13    TPro  7.7  /  Alb  3.0<L>  /  TBili  1.6  /  DBili  x   /  AST  97<H>  /  ALT  25  /  AlkPhos  331<H>  11-13    MEDICATIONS   MEDICATIONS  (STANDING):  ALBUTerol    0.083% 2.5 milliGRAM(s) Nebulizer every 6 hours  bromocriptine Tablet 10 milliGRAM(s) Oral <User Schedule>  chlorhexidine 0.12% Liquid 15 milliLiter(s) Swish and Spit two times a day  chlorhexidine 2% Cloths 1 Application(s) Topical daily  enoxaparin Injectable 40 milliGRAM(s) SubCutaneous daily  folic acid 1 milliGRAM(s) Oral daily  levETIRAcetam  Solution 1000 milliGRAM(s) Oral two times a day  melatonin 5 milliGRAM(s) Oral at bedtime  modafinil 100 milliGRAM(s) Oral <User Schedule>  phytonadione   Solution 5 milliGRAM(s) Oral daily  propranolol 20 milliGRAM(s) Oral every 6 hours  spironolactone 25 milliGRAM(s) Oral daily  thiamine 100 milliGRAM(s) Oral daily    MEDICATIONS  (PRN):  acetaminophen    Suspension .. 650 milliGRAM(s) Oral every 6 hours PRN Temp greater or equal to 38.5C (101.3F)    PHYSICAL EXAM  HEENT - Cranial incisions, (+) Tongue swelling - mildly increased with blood, Minimal eye opening to auditory stimuli, (-) Visual startle, (+) Auditory startle, Right pupil more reactive to light than left pupil  Cardiovascular - All extremities warm, Diffuse edema  Pulm - (+) Trach collar   Extremities - Diffuse swelling, Extensor posturing to pain response primarily in upper extremities  Reflexes - +Babinski and Meza's bilaterally, DTR's - Patella 3+/4, Jordan's 2+/4, No clonus appreciated in bilateral lower extremities    Coma Recovery Scale - Revised  AUDITORY FUNCTION SCALE  1 - Auditory startle  VISUAL FUNCTION SCALE  0 - None  MOTOR FUNCTION SCALE  1 - Abnormal Posturing (extensor posturing, right > left)  OROMOTOR/VERBAL FUNCTION SCALE  1 - Oral reflexive movements  COMMUNICATION SCALE  0 - None  AROUSAL SCALE  1 - Eye opening with stimulation    TOTAL SCORE = 4 = Vegetative state    ASSESSMENT/PLAN  32y Male unknown PMH found unresponsive on sidewalk found with bilateral SDH and SAH now s/p bilateral hemicraniectomy and EVD placement s/p removal. Coma x 12 days, Vegetative State at Day 13 (11/3)     Sleep/Wake Cycles - Increased Modafinil to 100mg Q6AM/Q12PM (11/14), Bromocriptine 10mg BID (increased from 5mg 11/8), Melatonin 5mg HS (10/31)  Hepatic encephalopathy - Vit K, Spironolactone  Possible seizure activity - Keppra   Dysautonomia - Propranolol, Bromocriptine, Tylenol PRN  Oral care - Aggressive oral care, Chlorhexidine  DVT PPX - SCDs, Lovenox  Rehab - COMA STIM by all services. Will continue to follow.

## 2018-11-15 NOTE — PROGRESS NOTE ADULT - SUBJECTIVE AND OBJECTIVE BOX
[facility]                                        Neurology at [place]                                 Kelly Toth, & Vincent                                  [address]                                        [place], [place], [address]                                             [phone]        CC: Traumatic Brain Injury and seizure.    HPI:   The patient is a young man, estimated to be in 30's who was found unresponsive on sidewal.  Per chart initial CT head showed subarachnoid hemorrhage and bilateral subdural hematomas.  He was then transferred from [facility] to [facility] ER for emergent neurosurgical treatment.  He was posturing with agonal respirations on arrival to [facility].  He was taken to the OR emergently by Dr. Fraser for evacuation and decompression of subdural hematomas.  He had bilateral craniectomy and extraventricular drain placed.  He had been having non- rhythmic movements of his feet and rhythmic movements of his tongue, causing concern for seizure/status epilepticus. (JW)    Interim history:  remains unresponsive. no change in neuro exam on 6T on trach collar    ROS:  Unobtainable due to patient's condition.       MEDICATIONS  (STANDING):  ALBUTerol    0.083% 2.5 milliGRAM(s) Nebulizer every 6 hours  bromocriptine Tablet 10 milliGRAM(s) Oral <User Schedule>  chlorhexidine 0.12% Liquid 15 milliLiter(s) Swish and Spit two times a day  chlorhexidine 2% Cloths 1 Application(s) Topical daily  enoxaparin Injectable 40 milliGRAM(s) SubCutaneous daily  folic acid 1 milliGRAM(s) Oral daily  levETIRAcetam  Solution 1000 milliGRAM(s) Oral two times a day  magnesium sulfate  IVPB 2 Gram(s) IV Intermittent once  melatonin 5 milliGRAM(s) Oral at bedtime  modafinil 100 milliGRAM(s) Oral <User Schedule>  phytonadione   Solution 5 milliGRAM(s) Oral daily  propranolol 20 milliGRAM(s) Oral every 6 hours  spironolactone 25 milliGRAM(s) Oral daily  thiamine 100 milliGRAM(s) Oral daily    MEDICATIONS  (PRN):  acetaminophen    Suspension .. 650 milliGRAM(s) Oral every 6 hours PRN Temp greater or equal to 38.5C (101.3F)      Vital Signs Last 24 Hrs  T(C): 37.2 (15 Nov 2018 15:23), Max: 37.3 (14 Nov 2018 23:00)  T(F): 98.9 (15 Nov 2018 15:23), Max: 99.1 (14 Nov 2018 23:00)  HR: 76 (15 Nov 2018 16:18) (73 - 93)  BP: 103/66 (15 Nov 2018 15:23) (103/66 - 120/72)  BP(mean): --  RR: 18 (15 Nov 2018 15:23) (18 - 19)  SpO2: 100% (15 Nov 2018 16:18) (94% - 100%)    Detailed Neurologic Exam:  Remains on mechanical ventilator via tracheostomy. Remains with unequal pupils and posturing    Mental status: Unresponsive to voice. Comatose    Cranial nerves: Pupils: left 2.0  mm NR right 4 mm not reactive. There is no visual response to threat.  Extraocular motion is not assessed. Absent corneal reflexes.      Motor/Sensory:   There is bilateral extensor posturing to pinch and sternal rub.  There is triple flexion to nail bed pressure in feet  No grimace to nailbed pressure, opens eyes to pinch slightly    Reflexes: absent throughout and plantar responses are extensor.    Cerebellar:  Unable to test finger to nose testing.    Labs:                           8.7    7.4   )-----------( 103      ( 15 Nov 2018 09:59 )             26.8     11-15    133<L>  |  94<L>  |  16.0  ----------------------------<  93  4.3   |  26.0  |  0.26<L>    Ca    9.3      15 Nov 2018 07:08  Phos  5.1     11-15  Mg     1.4     11-15

## 2018-11-16 LAB
ANION GAP SERPL CALC-SCNC: 14 MMOL/L — SIGNIFICANT CHANGE UP (ref 5–17)
BASOPHILS # BLD AUTO: 0 K/UL — SIGNIFICANT CHANGE UP (ref 0–0.2)
BASOPHILS NFR BLD AUTO: 0.2 % — SIGNIFICANT CHANGE UP (ref 0–2)
BUN SERPL-MCNC: 17 MG/DL — SIGNIFICANT CHANGE UP (ref 8–20)
CALCIUM SERPL-MCNC: 9.6 MG/DL — SIGNIFICANT CHANGE UP (ref 8.6–10.2)
CHLORIDE SERPL-SCNC: 94 MMOL/L — LOW (ref 98–107)
CO2 SERPL-SCNC: 23 MMOL/L — SIGNIFICANT CHANGE UP (ref 22–29)
CREAT SERPL-MCNC: 0.27 MG/DL — LOW (ref 0.5–1.3)
EOSINOPHIL # BLD AUTO: 0.4 K/UL — SIGNIFICANT CHANGE UP (ref 0–0.5)
EOSINOPHIL NFR BLD AUTO: 6 % — HIGH (ref 0–5)
GLUCOSE SERPL-MCNC: 109 MG/DL — SIGNIFICANT CHANGE UP (ref 70–115)
HCT VFR BLD CALC: 28.1 % — LOW (ref 42–52)
HGB BLD-MCNC: 9.4 G/DL — LOW (ref 14–18)
LYMPHOCYTES # BLD AUTO: 0.9 K/UL — LOW (ref 1–4.8)
LYMPHOCYTES # BLD AUTO: 13.7 % — LOW (ref 20–55)
MAGNESIUM SERPL-MCNC: 1.3 MG/DL — LOW (ref 1.6–2.6)
MCHC RBC-ENTMCNC: 28.7 PG — SIGNIFICANT CHANGE UP (ref 27–31)
MCHC RBC-ENTMCNC: 33.5 G/DL — SIGNIFICANT CHANGE UP (ref 32–36)
MCV RBC AUTO: 85.9 FL — SIGNIFICANT CHANGE UP (ref 80–94)
MONOCYTES # BLD AUTO: 0.4 K/UL — SIGNIFICANT CHANGE UP (ref 0–0.8)
MONOCYTES NFR BLD AUTO: 5.5 % — SIGNIFICANT CHANGE UP (ref 3–10)
NEUTROPHILS # BLD AUTO: 4.8 K/UL — SIGNIFICANT CHANGE UP (ref 1.8–8)
NEUTROPHILS NFR BLD AUTO: 74.3 % — HIGH (ref 37–73)
PHOSPHATE SERPL-MCNC: 5.4 MG/DL — HIGH (ref 2.4–4.7)
PLATELET # BLD AUTO: 104 K/UL — LOW (ref 150–400)
POTASSIUM SERPL-MCNC: 4.3 MMOL/L — SIGNIFICANT CHANGE UP (ref 3.5–5.3)
POTASSIUM SERPL-SCNC: 4.3 MMOL/L — SIGNIFICANT CHANGE UP (ref 3.5–5.3)
RBC # BLD: 3.27 M/UL — LOW (ref 4.6–6.2)
RBC # FLD: 18.2 % — HIGH (ref 11–15.6)
SODIUM SERPL-SCNC: 131 MMOL/L — LOW (ref 135–145)
WBC # BLD: 6.5 K/UL — SIGNIFICANT CHANGE UP (ref 4.8–10.8)
WBC # FLD AUTO: 6.5 K/UL — SIGNIFICANT CHANGE UP (ref 4.8–10.8)

## 2018-11-16 PROCEDURE — 99233 SBSQ HOSP IP/OBS HIGH 50: CPT | Mod: GC

## 2018-11-16 PROCEDURE — 99232 SBSQ HOSP IP/OBS MODERATE 35: CPT

## 2018-11-16 RX ADMIN — MODAFINIL 100 MILLIGRAM(S): 200 TABLET ORAL at 11:41

## 2018-11-16 RX ADMIN — Medication 85 MILLIMOLE(S): at 18:16

## 2018-11-16 RX ADMIN — Medication 100 MILLIGRAM(S): at 11:36

## 2018-11-16 RX ADMIN — ALBUTEROL 2.5 MILLIGRAM(S): 90 AEROSOL, METERED ORAL at 16:01

## 2018-11-16 RX ADMIN — ALBUTEROL 2.5 MILLIGRAM(S): 90 AEROSOL, METERED ORAL at 03:00

## 2018-11-16 RX ADMIN — MODAFINIL 100 MILLIGRAM(S): 200 TABLET ORAL at 06:35

## 2018-11-16 RX ADMIN — LEVETIRACETAM 1000 MILLIGRAM(S): 250 TABLET, FILM COATED ORAL at 06:35

## 2018-11-16 RX ADMIN — Medication 5 MILLIGRAM(S): at 12:30

## 2018-11-16 RX ADMIN — Medication 1 MILLIGRAM(S): at 11:36

## 2018-11-16 RX ADMIN — BROMOCRIPTINE MESYLATE 10 MILLIGRAM(S): 5 CAPSULE ORAL at 06:36

## 2018-11-16 RX ADMIN — SPIRONOLACTONE 25 MILLIGRAM(S): 25 TABLET, FILM COATED ORAL at 06:35

## 2018-11-16 RX ADMIN — Medication 50 GRAM(S): at 12:30

## 2018-11-16 RX ADMIN — ALBUTEROL 2.5 MILLIGRAM(S): 90 AEROSOL, METERED ORAL at 20:48

## 2018-11-16 RX ADMIN — ALBUTEROL 2.5 MILLIGRAM(S): 90 AEROSOL, METERED ORAL at 09:27

## 2018-11-16 RX ADMIN — LEVETIRACETAM 1000 MILLIGRAM(S): 250 TABLET, FILM COATED ORAL at 18:16

## 2018-11-16 RX ADMIN — Medication 5 MILLIGRAM(S): at 21:56

## 2018-11-16 RX ADMIN — CHLORHEXIDINE GLUCONATE 15 MILLILITER(S): 213 SOLUTION TOPICAL at 18:16

## 2018-11-16 RX ADMIN — ENOXAPARIN SODIUM 40 MILLIGRAM(S): 100 INJECTION SUBCUTANEOUS at 11:36

## 2018-11-16 NOTE — PROGRESS NOTE ADULT - SUBJECTIVE AND OBJECTIVE BOX
[facility]                                        Neurology at [place]                                 Kelly Toht, & Vincent                                  [address]                                        [place], [place], [address]                                             [phone]        CC: Traumatic Brain Injury and seizure.    HPI:   The patient is a young man, estimated to be in 30's who was found unresponsive on sidewalk.  Per chart initial CT head showed subarachnoid hemorrhage and bilateral subdural hematomas.  He was then transferred from [facility] to [facility] ER for emergent neurosurgical treatment.  He was posturing with agonal respirations on arrival to [facility].  He was taken to the OR emergently by Dr. Fraser for evacuation and decompression of subdural hematomas.  He had bilateral craniectomy and extraventricular drain placed.  He had been having non- rhythmic movements of his feet and rhythmic movements of his tongue, causing concern for seizure/status epilepticus.     Interim history:    Remains unresponsive. No change in neuro exam on 6T on trach collar    ROS:  Unobtainable due to patient's condition.     MEDICATIONS  (STANDING):  ALBUTerol    0.083% 2.5 milliGRAM(s) Nebulizer every 6 hours  bromocriptine Tablet 10 milliGRAM(s) Oral <User Schedule>  chlorhexidine 0.12% Liquid 15 milliLiter(s) Swish and Spit two times a day  enoxaparin Injectable 40 milliGRAM(s) SubCutaneous daily  folic acid 1 milliGRAM(s) Oral daily  levETIRAcetam  Solution 1000 milliGRAM(s) Oral two times a day  magnesium sulfate  IVPB 2 Gram(s) IV Intermittent once  melatonin 5 milliGRAM(s) Oral at bedtime  modafinil 100 milliGRAM(s) Oral <User Schedule>  phytonadione   Solution 5 milliGRAM(s) Oral daily  propranolol 20 milliGRAM(s) Oral every 6 hours  spironolactone 25 milliGRAM(s) Oral daily  thiamine 100 milliGRAM(s) Oral daily      Vital Signs Last 24 Hrs  T(C): 36.7 (16 Nov 2018 07:47), Max: 37.2 (15 Nov 2018 15:23)  T(F): 98.1 (16 Nov 2018 07:47), Max: 98.9 (15 Nov 2018 15:23)  HR: 74 (16 Nov 2018 07:47) (71 - 89)  BP: 94/56 (16 Nov 2018 07:47) (94/56 - 118/76)  RR: 18 (16 Nov 2018 07:47) (18 - 18)  SpO2: 96% (16 Nov 2018 07:47) (96% - 100%)    Detailed Neurologic Exam:    Mental status: Unresponsive to voice. Comatose    Cranial nerves: Pupils: left 2.0  mm NR right 4 mm not reactive. There is no visual response to threat.  Extraocular motion is not assessed. Absent corneal reflexes.      Motor/Sensory:   There is bilateral extensor posturing to pinch and sternal rub.  There is triple flexion to nail bed pressure in feet  No grimace to nailbed pressure, opens eyes to pinch slightly    Reflexes: absent throughout and plantar responses are extensor.    Cerebellar:  Unable to test finger to nose testing.    Labs:     11-15    133<L>  |  94<L>  |  16.0  ----------------------------<  93  4.3   |  26.0  |  0.26<L>    Ca    9.3      15 Nov 2018 07:08  Phos  5.1     11-15  Mg     1.4     11-15                          8.7    7.4   )-----------( 103      ( 15 Nov 2018 09:59 )             26.8

## 2018-11-16 NOTE — PROGRESS NOTE ADULT - ATTENDING COMMENTS
Agree with Dr. Vera.   Patient noting to have auditory and visual startle, ? fixation.  Continue Provigil 100mg BID.  Will continue to follow.

## 2018-11-16 NOTE — PROGRESS NOTE ADULT - ASSESSMENT
32 y Male who is followed by neurology because of TBI/possible seizure    TBI  Now s/p trach/PEG  On trach collar  No significant improvement in neuro exam- remains comatose    Possible seizure  EEG: no seizure, breech rhythm over craniectomies, slowing suggesting diffuse cerebral dysfunction.  Rhythmic tongue movements and random foot movement has stopped.  Continue Keppra given TBI    Neurologic status remains unchanged.    Poor prognosis for meaningful recovery. Now DNR/DNI.    No further specific neurologic recommendations. Will be available as needed.   ? Eventual long term placement.

## 2018-11-16 NOTE — PROGRESS NOTE ADULT - SUBJECTIVE AND OBJECTIVE BOX
Acute Care Surgery /Trauma PROGRESS NOTE:     SUBJECTIVE: Pt seen and examined at bedside. No acute overnight events. Bite block successfully placed    Vital Signs Last 24 Hrs  T(C): 37.2 (15 Nov 2018 15:23), Max: 37.2 (15 Nov 2018 15:23)  T(F): 98.9 (15 Nov 2018 15:23), Max: 98.9 (15 Nov 2018 15:23)  HR: 72 (2018 03:01) (71 - 89)  BP: 104/66 (2018 06:30) (98/64 - 120/72)  BP(mean): --  RR: 18 (15 Nov 2018 15:23) (18 - 19)  SpO2: 96% (2018 03:01) (96% - 100%)    OBJECTIVE:  Gen: NAD; tongue nonbloody, bite block intact, helmet+  Neurological:  Best GCS 4T  Respiratory: non-labored, no accessory muscle use; trach with audible secretions  Cardiovascular: s1/s2  Gastrointestinal: Soft, non-tender, nondistended, PEG tube in place  Musculoskeletal: No joint pain, swelling or deformity; no limitation of movement  Skin: No rashes      I&O's Detail    15 Nov 2018 07:01  -  2018 07:00  --------------------------------------------------------  IN:    Enteral Tube Flush: 620 mL    Pivot: 1050 mL  Total IN: 1670 mL    OUT:    Incontinent per Collection Ba mL  Total OUT: 550 mL    Total NET: 1120 mL          MEDICATIONS  (STANDING):  ALBUTerol    0.083% 2.5 milliGRAM(s) Nebulizer every 6 hours  bromocriptine Tablet 10 milliGRAM(s) Oral <User Schedule>  chlorhexidine 0.12% Liquid 15 milliLiter(s) Swish and Spit two times a day  chlorhexidine 2% Cloths 1 Application(s) Topical daily  enoxaparin Injectable 40 milliGRAM(s) SubCutaneous daily  folic acid 1 milliGRAM(s) Oral daily  levETIRAcetam  Solution 1000 milliGRAM(s) Oral two times a day  magnesium sulfate  IVPB 2 Gram(s) IV Intermittent once  melatonin 5 milliGRAM(s) Oral at bedtime  modafinil 100 milliGRAM(s) Oral <User Schedule>  phytonadione   Solution 5 milliGRAM(s) Oral daily  propranolol 20 milliGRAM(s) Oral every 6 hours  spironolactone 25 milliGRAM(s) Oral daily  thiamine 100 milliGRAM(s) Oral daily    MEDICATIONS  (PRN):  acetaminophen    Suspension .. 650 milliGRAM(s) Oral every 6 hours PRN Temp greater or equal to 38.5C (101.3F)      LABS:                        8.7    7.4   )-----------( 103      ( 15 Nov 2018 09:59 )             26.8     11-15    133<L>  |  94<L>  |  16.0  ----------------------------<  93  4.3   |  26.0  |  0.26<L>    Ca    9.3      15 Nov 2018 07:08  Phos  5.1     11-15  Mg     1.4     11-15              RADIOLOGY & ADDITIONAL STUDIES:

## 2018-11-16 NOTE — PROGRESS NOTE ADULT - ASSESSMENT
32yMale s/p found down with TBI, now s/p trach/PEG, made DNR awaiting family discussion regarding escalation of care and goals of care. Currently discussion re: DEESCALATION of care will likely be underway throughout this week.    - pain control as needed  - maintain bite block  - continue trach collar as tolerated  -Continue DVT ppx with Lovenox; SCDs  -All abx completed   -Will follow up family's discussion of care goals this week; consider planning for longterm placement  -discuss with attending

## 2018-11-16 NOTE — PROGRESS NOTE ADULT - SUBJECTIVE AND OBJECTIVE BOX
SUBJECTIVE  Patient seen and examined. Able to get bite block in yesterday, still with a lot of drooling that requires suctioning. Eyes open this morning. Extensor response to painful stimuli. Has auditory startle.    TODAY'S REVIEW OF SYMPTOMS  Unable to obtain     VITALS  T(C): 36.7 (11-16-18 @ 07:47)  T(F): 98.1 (11-16-18 @ 07:47), Max: 98.9 (11-15-18 @ 15:23)  HR: 74 (11-16-18 @ 07:47) (71 - 89)  BP: 94/56 (11-16-18 @ 07:47) (94/56 - 118/76)  RR:  (18 - 18)  SpO2:  (96% - 100%)  Wt(kg): --    RECENT LABS/IMAGING             8.7    7.4   )-----------( 103      ( 15 Nov 2018 09:59 )             26.8     133<L>  |  94<L>  |  16.0  ----------------------------<  93  4.3   |  26.0  |  0.26<L>    Ca    9.3      15 Nov 2018 07:08  Phos  5.1     11-15  Mg     1.4     11-15    MEDICATIONS   MEDICATIONS  (STANDING):  ALBUTerol    0.083% 2.5 milliGRAM(s) Nebulizer every 6 hours  bromocriptine Tablet 10 milliGRAM(s) Oral <User Schedule>  chlorhexidine 0.12% Liquid 15 milliLiter(s) Swish and Spit two times a day  enoxaparin Injectable 40 milliGRAM(s) SubCutaneous daily  folic acid 1 milliGRAM(s) Oral daily  levETIRAcetam  Solution 1000 milliGRAM(s) Oral two times a day  magnesium sulfate  IVPB 2 Gram(s) IV Intermittent once  melatonin 5 milliGRAM(s) Oral at bedtime  modafinil 100 milliGRAM(s) Oral <User Schedule>  phytonadione   Solution 5 milliGRAM(s) Oral daily  propranolol 20 milliGRAM(s) Oral every 6 hours  spironolactone 25 milliGRAM(s) Oral daily  thiamine 100 milliGRAM(s) Oral daily    MEDICATIONS  (PRN):  acetaminophen    Suspension .. 650 milliGRAM(s) Oral every 6 hours PRN Temp greater or equal to 38.5C (101.3F)    PHYSICAL EXAM  HEENT - Cranial incisions, (+) Tongue swelling with bite block, Eye opening to auditory stimuli, (-) Visual startle, (+) Auditory startle, Right pupil more reactive to light than left pupil  Cardiovascular - All extremities warm, Diffuse edema  Pulm - (+) Trach collar with secretions  Extremities - Diffuse swelling, Extensor posturing to pain response primarily in upper extremities  Reflexes - +Babinski and Meza's bilaterally, DTR's - Patella 3+/4, [place]'s 2+/4, No clonus appreciated in bilateral lower extremities    Coma Recovery Scale - Revised  AUDITORY FUNCTION SCALE  1 - Auditory startle  VISUAL FUNCTION SCALE  0 - None  MOTOR FUNCTION SCALE  1 - Abnormal Posturing (extensor posturing, right > left)  OROMOTOR/VERBAL FUNCTION SCALE  1 - Oral reflexive movements  COMMUNICATION SCALE  0 - None  AROUSAL SCALE  1 - Eye opening with stimulation    TOTAL SCORE = 4 = Vegetative state    ASSESSMENT/PLAN  32y Male unknown PMH found unresponsive on sidewalk found with bilateral SDH and SAH now s/p bilateral hemicraniectomy and EVD placement s/p removal. Coma x 12 days, Vegetative State at Day 13 (11/3)     Sleep/Wake Cycles - Modafinil 100mg Q6AM/Q12PM (increased 11/14), Bromocriptine 10mg BID (increased from 5mg 11/8), Melatonin 5mg HS (10/31)  Hepatic encephalopathy - Vit K, Spironolactone  Possible seizure activity - Keppra   Dysautonomia - Propranolol - monitor BP, Bromocriptine, Tylenol PRN  Oral care - Aggressive oral care, Chlorhexidine, Suctioning PRN  DVT PPX - SCDs, Lovenox  Rehab - COMA STIM by all services. Will continue to follow. SUBJECTIVE  Patient seen and examined. Able to get bite block in yesterday, still with a lot of drooling that requires suctioning. Eyes open this morning. Extensor response to painful stimuli. Has auditory startle. Noted to have some fixation but not consistent.    TODAY'S REVIEW OF SYMPTOMS  Unable to obtain     VITALS  T(C): 36.7 (11-16-18 @ 07:47)  T(F): 98.1 (11-16-18 @ 07:47), Max: 98.9 (11-15-18 @ 15:23)  HR: 74 (11-16-18 @ 07:47) (71 - 89)  BP: 94/56 (11-16-18 @ 07:47) (94/56 - 118/76)  RR:  (18 - 18)  SpO2:  (96% - 100%)  Wt(kg): --    RECENT LABS/IMAGING             8.7    7.4   )-----------( 103      ( 15 Nov 2018 09:59 )             26.8     133<L>  |  94<L>  |  16.0  ----------------------------<  93  4.3   |  26.0  |  0.26<L>    Ca    9.3      15 Nov 2018 07:08  Phos  5.1     11-15  Mg     1.4     11-15    MEDICATIONS   MEDICATIONS  (STANDING):  ALBUTerol    0.083% 2.5 milliGRAM(s) Nebulizer every 6 hours  bromocriptine Tablet 10 milliGRAM(s) Oral <User Schedule>  chlorhexidine 0.12% Liquid 15 milliLiter(s) Swish and Spit two times a day  enoxaparin Injectable 40 milliGRAM(s) SubCutaneous daily  folic acid 1 milliGRAM(s) Oral daily  levETIRAcetam  Solution 1000 milliGRAM(s) Oral two times a day  magnesium sulfate  IVPB 2 Gram(s) IV Intermittent once  melatonin 5 milliGRAM(s) Oral at bedtime  modafinil 100 milliGRAM(s) Oral <User Schedule>  phytonadione   Solution 5 milliGRAM(s) Oral daily  propranolol 20 milliGRAM(s) Oral every 6 hours  spironolactone 25 milliGRAM(s) Oral daily  thiamine 100 milliGRAM(s) Oral daily    MEDICATIONS  (PRN):  acetaminophen    Suspension .. 650 milliGRAM(s) Oral every 6 hours PRN Temp greater or equal to 38.5C (101.3F)    PHYSICAL EXAM  HEENT - Cranial incisions, (+) Tongue swelling with bite block, Eye opening to auditory stimuli, (-) Visual startle, (+) Auditory startle, Right pupil more reactive to light than left pupil  Cardiovascular - All extremities warm, Diffuse edema  Pulm - (+) Trach collar with secretions  Extremities - Diffuse swelling, Extensor posturing to pain response primarily in upper extremities  Reflexes - +Babinski and Meza's bilaterally, DTR's - Patella 3+/4, Felipe's 2+/4, No clonus appreciated in bilateral lower extremities    Coma Recovery Scale - Revised  AUDITORY FUNCTION SCALE  1 - Auditory startle  VISUAL FUNCTION SCALE  0 - None (noted to have some fixation but not consistent)  MOTOR FUNCTION SCALE  1 - Abnormal Posturing (extensor posturing, right > left)  OROMOTOR/VERBAL FUNCTION SCALE  1 - Oral reflexive movements  COMMUNICATION SCALE  0 - None  AROUSAL SCALE  2 - Eye Opening w/o Stimulation    TOTAL SCORE = 5 = Vegetative state    ASSESSMENT/PLAN  32y Male unknown PMH found unresponsive on sidewalk found with bilateral SDH and SAH now s/p bilateral hemicraniectomy and EVD placement s/p removal. Coma x 12 days, Vegetative State at Day 13 (11/3)     Severe TBI - Noted to have some visual fixation today but not consistent, will monitor for progress. Eyes were open without stimulation throughout exam. Will continue to follow.  Sleep/Wake Cycles - Modafinil 100mg Q6AM/Q12PM (increased 11/14), Bromocriptine 10mg BID (increased from 5mg 11/8), Melatonin 5mg HS (10/31)  Hepatic encephalopathy - Vit K, Spironolactone  Possible seizure activity - Keppra   Dysautonomia - Propranolol - monitor BP, Bromocriptine, Tylenol PRN  Oral care - Aggressive oral care, Chlorhexidine, Suctioning PRN  DVT PPX - SCDs, Lovenox  Rehab - COMA STIM by all services. Will continue to follow. SUBJECTIVE  Patient seen and examined. Able to get bite block in yesterday, still with a lot of drooling that requires suctioning. Eyes open this morning. Extensor response to painful stimuli. Has auditory startle. Noted to have some fixation but not consistent.    TODAY'S REVIEW OF SYMPTOMS  Unable to obtain     VITALS  T(C): 36.7 (11-16-18 @ 07:47)  T(F): 98.1 (11-16-18 @ 07:47), Max: 98.9 (11-15-18 @ 15:23)  HR: 74 (11-16-18 @ 07:47) (71 - 89)  BP: 94/56 (11-16-18 @ 07:47) (94/56 - 118/76)  RR:  (18 - 18)  SpO2:  (96% - 100%)  Wt(kg): --    RECENT LABS/IMAGING             8.7    7.4   )-----------( 103      ( 15 Nov 2018 09:59 )             26.8     133<L>  |  94<L>  |  16.0  ----------------------------<  93  4.3   |  26.0  |  0.26<L>    Ca    9.3      15 Nov 2018 07:08  Phos  5.1     11-15  Mg     1.4     11-15    MEDICATIONS   MEDICATIONS  (STANDING):  ALBUTerol    0.083% 2.5 milliGRAM(s) Nebulizer every 6 hours  bromocriptine Tablet 10 milliGRAM(s) Oral <User Schedule>  chlorhexidine 0.12% Liquid 15 milliLiter(s) Swish and Spit two times a day  enoxaparin Injectable 40 milliGRAM(s) SubCutaneous daily  folic acid 1 milliGRAM(s) Oral daily  levETIRAcetam  Solution 1000 milliGRAM(s) Oral two times a day  magnesium sulfate  IVPB 2 Gram(s) IV Intermittent once  melatonin 5 milliGRAM(s) Oral at bedtime  modafinil 100 milliGRAM(s) Oral <User Schedule>  phytonadione   Solution 5 milliGRAM(s) Oral daily  propranolol 20 milliGRAM(s) Oral every 6 hours  spironolactone 25 milliGRAM(s) Oral daily  thiamine 100 milliGRAM(s) Oral daily    MEDICATIONS  (PRN):  acetaminophen    Suspension .. 650 milliGRAM(s) Oral every 6 hours PRN Temp greater or equal to 38.5C (101.3F)    PHYSICAL EXAM  HEENT - Cranial incisions, (+) Tongue swelling with bite block, Eye opening to auditory stimuli, (-) Visual startle, (+) Auditory startle, Right pupil more reactive to light than left pupil  Cardiovascular - All extremities warm, Diffuse edema  Pulm - (+) Trach collar with secretions  Extremities - Diffuse swelling, Extensor posturing to pain response primarily in upper extremities  Reflexes - +Babinski and Meza's bilaterally, DTR's - Patella 3+/4, Felipe's 2+/4, No clonus appreciated in bilateral lower extremities    Coma Recovery Scale - Revised  AUDITORY FUNCTION SCALE  1 - Auditory startle  VISUAL FUNCTION SCALE  1 - Visual Startle (noted to have some fixation but not consistent)  MOTOR FUNCTION SCALE  1 - Abnormal Posturing (extensor posturing, right > left)  OROMOTOR/VERBAL FUNCTION SCALE  1 - Oral reflexive movements  COMMUNICATION SCALE  0 - None  AROUSAL SCALE  2 - Eye Opening w/o Stimulation    TOTAL SCORE = 6 = Vegetative state    ASSESSMENT/PLAN  32y Male unknown PMH found unresponsive on sidewalk found with bilateral SDH and SAH now s/p bilateral hemicraniectomy and EVD placement s/p removal. Coma x 12 days, Vegetative State at Day 13 (11/3)     Severe TBI - Noted to have some visual fixation today but not consistent, will monitor for progress. Eyes were open without stimulation throughout exam. Will continue to follow.  Sleep/Wake Cycles - Modafinil 100mg Q6AM/Q12PM (increased 11/14), Bromocriptine 10mg BID (increased from 5mg 11/8), Melatonin 5mg HS (10/31)  Hepatic encephalopathy - Vit K, Spironolactone  Possible seizure activity - Keppra   Dysautonomia - Propranolol - monitor BP, Bromocriptine, Tylenol PRN  Oral care - Aggressive oral care, Chlorhexidine, Suctioning PRN  DVT PPX - SCDs, Lovenox  Rehab - COMA STIM by all services. Will continue to follow.

## 2018-11-17 LAB
BASOPHILS # BLD AUTO: 0 K/UL — SIGNIFICANT CHANGE UP (ref 0–0.2)
BASOPHILS NFR BLD AUTO: 0.2 % — SIGNIFICANT CHANGE UP (ref 0–2)
EOSINOPHIL # BLD AUTO: 0.3 K/UL — SIGNIFICANT CHANGE UP (ref 0–0.5)
EOSINOPHIL NFR BLD AUTO: 3.9 % — SIGNIFICANT CHANGE UP (ref 0–5)
HCT VFR BLD CALC: 28.3 % — LOW (ref 42–52)
HGB BLD-MCNC: 9.3 G/DL — LOW (ref 14–18)
LYMPHOCYTES # BLD AUTO: 1 K/UL — SIGNIFICANT CHANGE UP (ref 1–4.8)
LYMPHOCYTES # BLD AUTO: 12.2 % — LOW (ref 20–55)
MCHC RBC-ENTMCNC: 28.1 PG — SIGNIFICANT CHANGE UP (ref 27–31)
MCHC RBC-ENTMCNC: 32.9 G/DL — SIGNIFICANT CHANGE UP (ref 32–36)
MCV RBC AUTO: 85.5 FL — SIGNIFICANT CHANGE UP (ref 80–94)
MONOCYTES # BLD AUTO: 0.5 K/UL — SIGNIFICANT CHANGE UP (ref 0–0.8)
MONOCYTES NFR BLD AUTO: 6.5 % — SIGNIFICANT CHANGE UP (ref 3–10)
NEUTROPHILS # BLD AUTO: 6.2 K/UL — SIGNIFICANT CHANGE UP (ref 1.8–8)
NEUTROPHILS NFR BLD AUTO: 76.8 % — HIGH (ref 37–73)
PLATELET # BLD AUTO: 106 K/UL — LOW (ref 150–400)
RBC # BLD: 3.31 M/UL — LOW (ref 4.6–6.2)
RBC # FLD: 18.1 % — HIGH (ref 11–15.6)
WBC # BLD: 8 K/UL — SIGNIFICANT CHANGE UP (ref 4.8–10.8)
WBC # FLD AUTO: 8 K/UL — SIGNIFICANT CHANGE UP (ref 4.8–10.8)

## 2018-11-17 RX ADMIN — ALBUTEROL 2.5 MILLIGRAM(S): 90 AEROSOL, METERED ORAL at 10:50

## 2018-11-17 RX ADMIN — SPIRONOLACTONE 25 MILLIGRAM(S): 25 TABLET, FILM COATED ORAL at 05:41

## 2018-11-17 RX ADMIN — ALBUTEROL 2.5 MILLIGRAM(S): 90 AEROSOL, METERED ORAL at 04:01

## 2018-11-17 RX ADMIN — MODAFINIL 100 MILLIGRAM(S): 200 TABLET ORAL at 05:41

## 2018-11-17 RX ADMIN — Medication 1 MILLIGRAM(S): at 11:10

## 2018-11-17 RX ADMIN — Medication 5 MILLIGRAM(S): at 21:56

## 2018-11-17 RX ADMIN — ALBUTEROL 2.5 MILLIGRAM(S): 90 AEROSOL, METERED ORAL at 20:37

## 2018-11-17 RX ADMIN — CHLORHEXIDINE GLUCONATE 15 MILLILITER(S): 213 SOLUTION TOPICAL at 05:41

## 2018-11-17 RX ADMIN — Medication 100 MILLIGRAM(S): at 11:11

## 2018-11-17 RX ADMIN — LEVETIRACETAM 1000 MILLIGRAM(S): 250 TABLET, FILM COATED ORAL at 16:30

## 2018-11-17 RX ADMIN — Medication 20 MILLIGRAM(S): at 16:30

## 2018-11-17 RX ADMIN — ENOXAPARIN SODIUM 40 MILLIGRAM(S): 100 INJECTION SUBCUTANEOUS at 11:08

## 2018-11-17 RX ADMIN — ALBUTEROL 2.5 MILLIGRAM(S): 90 AEROSOL, METERED ORAL at 15:22

## 2018-11-17 RX ADMIN — BROMOCRIPTINE MESYLATE 10 MILLIGRAM(S): 5 CAPSULE ORAL at 11:31

## 2018-11-17 RX ADMIN — BROMOCRIPTINE MESYLATE 10 MILLIGRAM(S): 5 CAPSULE ORAL at 05:41

## 2018-11-17 RX ADMIN — CHLORHEXIDINE GLUCONATE 15 MILLILITER(S): 213 SOLUTION TOPICAL at 16:30

## 2018-11-17 RX ADMIN — MODAFINIL 100 MILLIGRAM(S): 200 TABLET ORAL at 11:27

## 2018-11-17 RX ADMIN — Medication 5 MILLIGRAM(S): at 11:27

## 2018-11-17 RX ADMIN — Medication 20 MILLIGRAM(S): at 11:11

## 2018-11-17 RX ADMIN — LEVETIRACETAM 1000 MILLIGRAM(S): 250 TABLET, FILM COATED ORAL at 05:41

## 2018-11-17 NOTE — PROGRESS NOTE ADULT - SUBJECTIVE AND OBJECTIVE BOX
INTERVAL HPI/OVERNIGHT EVENTS: No acute events overnight. Tongue finally stopped bleeding. Bite block changed several times overnight.     SUBJECTIVE: Resting in bed comfortably this AM, tongue in place, no more bleeding.       MEDICATIONS  (STANDING):  ALBUTerol    0.083% 2.5 milliGRAM(s) Nebulizer every 6 hours  bromocriptine Tablet 10 milliGRAM(s) Oral <User Schedule>  chlorhexidine 0.12% Liquid 15 milliLiter(s) Swish and Spit two times a day  enoxaparin Injectable 40 milliGRAM(s) SubCutaneous daily  folic acid 1 milliGRAM(s) Oral daily  levETIRAcetam  Solution 1000 milliGRAM(s) Oral two times a day  melatonin 5 milliGRAM(s) Oral at bedtime  modafinil 100 milliGRAM(s) Oral <User Schedule>  phytonadione   Solution 5 milliGRAM(s) Oral daily  propranolol 20 milliGRAM(s) Oral every 6 hours  spironolactone 25 milliGRAM(s) Oral daily  thiamine 100 milliGRAM(s) Oral daily    MEDICATIONS  (PRN):  acetaminophen    Suspension .. 650 milliGRAM(s) Oral every 6 hours PRN Temp greater or equal to 38.5C (101.3F)      Vital Signs Last 24 Hrs  T(C): 37.3 (2018 23:28), Max: 37.3 (2018 23:28)  T(F): 99.1 (2018 23:28), Max: 99.1 (2018 23:28)  HR: 84 (2018 05:39) (72 - 84)  BP: 108/72 (2018 05:39) (94/56 - 115/69)  BP(mean): --  RR: 20 (2018 23:28) (18 - 20)  SpO2: 99% (2018 04:01) (93% - 99%)    Gen: NAD; tongue nonbloody, bite block intact, helmet+  Neurological:  Best GCS 4T  Respiratory: non-labored, no accessory muscle use; trach with audible secretions  Cardiovascular: s1/s2  Gastrointestinal: Soft, non-tender, nondistended, PEG tube in place  Musculoskeletal: No joint pain, swelling or deformity; no limitation of movement  Skin: No rashes      I&O's Detail    2018 07:01  -  2018 07:00  --------------------------------------------------------  IN:    Enteral Tube Flush: 400 mL    Pivot: 450 mL  Total IN: 850 mL    OUT:    Incontinent per Collection Ba mL  Total OUT: 800 mL    Total NET: 50 mL          LABS:                        9.3    8.0   )-----------( 106      ( 2018 04:46 )             28.3     11-16    131<L>  |  94<L>  |  17.0  ----------------------------<  109  4.3   |  23.0  |  0.27<L>    Ca    9.6      2018 11:18  Phos  5.4     11-16  Mg     1.3     -            RADIOLOGY & ADDITIONAL STUDIES:

## 2018-11-17 NOTE — PROGRESS NOTE ADULT - PROBLEM SELECTOR PLAN 1
pain control as needed  - maintain bite block  - continue trach collar as tolerated  -Continue DVT ppx with Lovenox; SCDs  -All abx completed   -Will follow up family's discussion of care goals this week; consider planning for longterm placement

## 2018-11-17 NOTE — PROGRESS NOTE ADULT - ATTENDING COMMENTS
Agree with above assessment.  The patient is without any clinical change.  remains trauma stable.  Continue with supportive care.  Family planning for long term goals of care.

## 2018-11-17 NOTE — PROGRESS NOTE ADULT - ASSESSMENT
32yMale s/p found down with TBI, now s/p trach/PEG, made DNR awaiting family discussion regarding escalation of care and goals of care. Currently discussion re: DEESCALATION of care will likely be underway throughout this week.    - pain control as needed  - maintain bite block  - continue trach collar as tolerated  -Continue DVT ppx with Lovenox; SCDs  -All abx completed   -Will follow up family's discussion of care goals this week; consider planning for longterm placement

## 2018-11-18 LAB
HCT VFR BLD CALC: 28.4 % — LOW (ref 42–52)
HGB BLD-MCNC: 9.3 G/DL — LOW (ref 14–18)
MCHC RBC-ENTMCNC: 28.4 PG — SIGNIFICANT CHANGE UP (ref 27–31)
MCHC RBC-ENTMCNC: 32.7 G/DL — SIGNIFICANT CHANGE UP (ref 32–36)
MCV RBC AUTO: 86.6 FL — SIGNIFICANT CHANGE UP (ref 80–94)
PLATELET # BLD AUTO: 105 K/UL — LOW (ref 150–400)
RBC # BLD: 3.28 M/UL — LOW (ref 4.6–6.2)
RBC # FLD: 18.3 % — HIGH (ref 11–15.6)
WBC # BLD: 5.7 K/UL — SIGNIFICANT CHANGE UP (ref 4.8–10.8)
WBC # FLD AUTO: 5.7 K/UL — SIGNIFICANT CHANGE UP (ref 4.8–10.8)

## 2018-11-18 RX ADMIN — ALBUTEROL 2.5 MILLIGRAM(S): 90 AEROSOL, METERED ORAL at 03:59

## 2018-11-18 RX ADMIN — Medication 1 MILLIGRAM(S): at 12:02

## 2018-11-18 RX ADMIN — MODAFINIL 100 MILLIGRAM(S): 200 TABLET ORAL at 05:02

## 2018-11-18 RX ADMIN — LEVETIRACETAM 1000 MILLIGRAM(S): 250 TABLET, FILM COATED ORAL at 17:15

## 2018-11-18 RX ADMIN — Medication 20 MILLIGRAM(S): at 12:02

## 2018-11-18 RX ADMIN — BROMOCRIPTINE MESYLATE 10 MILLIGRAM(S): 5 CAPSULE ORAL at 05:02

## 2018-11-18 RX ADMIN — SPIRONOLACTONE 25 MILLIGRAM(S): 25 TABLET, FILM COATED ORAL at 05:02

## 2018-11-18 RX ADMIN — Medication 100 MILLIGRAM(S): at 12:02

## 2018-11-18 RX ADMIN — Medication 20 MILLIGRAM(S): at 23:44

## 2018-11-18 RX ADMIN — ALBUTEROL 2.5 MILLIGRAM(S): 90 AEROSOL, METERED ORAL at 15:42

## 2018-11-18 RX ADMIN — ALBUTEROL 2.5 MILLIGRAM(S): 90 AEROSOL, METERED ORAL at 20:17

## 2018-11-18 RX ADMIN — ENOXAPARIN SODIUM 40 MILLIGRAM(S): 100 INJECTION SUBCUTANEOUS at 12:01

## 2018-11-18 RX ADMIN — LEVETIRACETAM 1000 MILLIGRAM(S): 250 TABLET, FILM COATED ORAL at 05:02

## 2018-11-18 RX ADMIN — Medication 5 MILLIGRAM(S): at 12:02

## 2018-11-18 RX ADMIN — Medication 20 MILLIGRAM(S): at 17:15

## 2018-11-18 RX ADMIN — BROMOCRIPTINE MESYLATE 10 MILLIGRAM(S): 5 CAPSULE ORAL at 12:01

## 2018-11-18 RX ADMIN — MODAFINIL 100 MILLIGRAM(S): 200 TABLET ORAL at 12:02

## 2018-11-18 RX ADMIN — CHLORHEXIDINE GLUCONATE 15 MILLILITER(S): 213 SOLUTION TOPICAL at 05:02

## 2018-11-18 RX ADMIN — CHLORHEXIDINE GLUCONATE 15 MILLILITER(S): 213 SOLUTION TOPICAL at 17:15

## 2018-11-18 RX ADMIN — Medication 5 MILLIGRAM(S): at 21:47

## 2018-11-18 NOTE — PROGRESS NOTE ADULT - SUBJECTIVE AND OBJECTIVE BOX
SUBJECTIVE: JONG overnight. Resting in bed comfortably this AM. Tongue within mouth with bite block in place, no bleeding.       MEDICATIONS  (STANDING):  ALBUTerol    0.083% 2.5 milliGRAM(s) Nebulizer every 6 hours  bromocriptine Tablet 10 milliGRAM(s) Oral <User Schedule>  chlorhexidine 0.12% Liquid 15 milliLiter(s) Swish and Spit two times a day  enoxaparin Injectable 40 milliGRAM(s) SubCutaneous daily  folic acid 1 milliGRAM(s) Oral daily  levETIRAcetam  Solution 1000 milliGRAM(s) Oral two times a day  melatonin 5 milliGRAM(s) Oral at bedtime  modafinil 100 milliGRAM(s) Oral <User Schedule>  phytonadione   Solution 5 milliGRAM(s) Oral daily  propranolol 20 milliGRAM(s) Oral every 6 hours  spironolactone 25 milliGRAM(s) Oral daily  thiamine 100 milliGRAM(s) Oral daily    MEDICATIONS  (PRN):  acetaminophen    Suspension .. 650 milliGRAM(s) Oral every 6 hours PRN Temp greater or equal to 38.5C (101.3F)      Vital Signs Last 24 Hrs  T(C): 36.8 (17 Nov 2018 23:18), Max: 36.8 (17 Nov 2018 23:18)  T(F): 98.2 (17 Nov 2018 23:18), Max: 98.2 (17 Nov 2018 23:18)  HR: 70 (18 Nov 2018 04:54) (69 - 78)  BP: 90/57 (18 Nov 2018 04:54) (90/57 - 115/79)  BP(mean): --  RR: 18 (17 Nov 2018 23:18) (18 - 18)  SpO2: 99% (18 Nov 2018 03:59) (94% - 100%)    PE  Gen: NAD; tongue nonbloody, bite block intact, helmet+  Neurological:  Best GCS 4T  Respiratory: non-labored, no accessory muscle use; trach with audible secretions  Cardiovascular: s1/s2  Gastrointestinal: Soft, non-tender, nondistended, PEG tube in place  Musculoskeletal: No joint pain, swelling or deformity; no limitation of movement  Skin: No rashes        I&O's Detail    17 Nov 2018 07:01  -  18 Nov 2018 07:00  --------------------------------------------------------  IN:    Pivot: 1150 mL  Total IN: 1150 mL    OUT:  Total OUT: 0 mL    Total NET: 1150 mL          LABS:                        9.3    8.0   )-----------( 106      ( 17 Nov 2018 04:46 )             28.3     11-16    131<L>  |  94<L>  |  17.0  ----------------------------<  109  4.3   |  23.0  |  0.27<L>    Ca    9.6      16 Nov 2018 11:18  Phos  5.4     11-16  Mg     1.3     11-16            RADIOLOGY & ADDITIONAL STUDIES:

## 2018-11-18 NOTE — PROGRESS NOTE ADULT - ASSESSMENT
31 yo Male s/p found down with TBI, now s/p trach/PEG, made DNR/DNI - no escalation of care    - pain control as needed  - maintain bite block  - continue trach collar as tolerated  -Continue DVT ppx with Lovenox; SCDs  -All abx completed   -DNR/DNI - no escalation of care per family

## 2018-11-19 PROCEDURE — 99233 SBSQ HOSP IP/OBS HIGH 50: CPT | Mod: GC

## 2018-11-19 RX ADMIN — BROMOCRIPTINE MESYLATE 10 MILLIGRAM(S): 5 CAPSULE ORAL at 04:59

## 2018-11-19 RX ADMIN — SPIRONOLACTONE 25 MILLIGRAM(S): 25 TABLET, FILM COATED ORAL at 04:58

## 2018-11-19 RX ADMIN — Medication 20 MILLIGRAM(S): at 13:00

## 2018-11-19 RX ADMIN — Medication 100 MILLIGRAM(S): at 13:01

## 2018-11-19 RX ADMIN — CHLORHEXIDINE GLUCONATE 15 MILLILITER(S): 213 SOLUTION TOPICAL at 17:10

## 2018-11-19 RX ADMIN — ALBUTEROL 2.5 MILLIGRAM(S): 90 AEROSOL, METERED ORAL at 03:50

## 2018-11-19 RX ADMIN — Medication 20 MILLIGRAM(S): at 17:10

## 2018-11-19 RX ADMIN — BROMOCRIPTINE MESYLATE 10 MILLIGRAM(S): 5 CAPSULE ORAL at 12:59

## 2018-11-19 RX ADMIN — LEVETIRACETAM 1000 MILLIGRAM(S): 250 TABLET, FILM COATED ORAL at 17:10

## 2018-11-19 RX ADMIN — ALBUTEROL 2.5 MILLIGRAM(S): 90 AEROSOL, METERED ORAL at 15:20

## 2018-11-19 RX ADMIN — MODAFINIL 100 MILLIGRAM(S): 200 TABLET ORAL at 13:02

## 2018-11-19 RX ADMIN — ALBUTEROL 2.5 MILLIGRAM(S): 90 AEROSOL, METERED ORAL at 21:05

## 2018-11-19 RX ADMIN — Medication 1 MILLIGRAM(S): at 13:00

## 2018-11-19 RX ADMIN — ENOXAPARIN SODIUM 40 MILLIGRAM(S): 100 INJECTION SUBCUTANEOUS at 13:00

## 2018-11-19 RX ADMIN — Medication 20 MILLIGRAM(S): at 23:11

## 2018-11-19 RX ADMIN — ALBUTEROL 2.5 MILLIGRAM(S): 90 AEROSOL, METERED ORAL at 10:02

## 2018-11-19 RX ADMIN — Medication 5 MILLIGRAM(S): at 23:11

## 2018-11-19 RX ADMIN — CHLORHEXIDINE GLUCONATE 15 MILLILITER(S): 213 SOLUTION TOPICAL at 04:58

## 2018-11-19 RX ADMIN — MODAFINIL 100 MILLIGRAM(S): 200 TABLET ORAL at 04:58

## 2018-11-19 RX ADMIN — Medication 5 MILLIGRAM(S): at 13:00

## 2018-11-19 RX ADMIN — LEVETIRACETAM 1000 MILLIGRAM(S): 250 TABLET, FILM COATED ORAL at 04:58

## 2018-11-19 NOTE — PROGRESS NOTE ADULT - ATTENDING COMMENTS
The patient was seen and examined  No new problems  Trach site okay  PEG site is okay--no dressing beneath phalange please  Discharge planning

## 2018-11-19 NOTE — PROGRESS NOTE ADULT - SUBJECTIVE AND OBJECTIVE BOX
HPI/OVERNIGHT EVENTS: Patient seen and examined at bedside this AM. No acute events per nursing event. Subjective information limited by patient's mental status.     Vital Signs Last 24 Hrs  T(C): 36.7 (19 Nov 2018 08:00), Max: 36.7 (18 Nov 2018 15:40)  T(F): 98 (19 Nov 2018 08:00), Max: 98.1 (18 Nov 2018 15:40)  HR: 88 (19 Nov 2018 08:00) (70 - 88)  BP: 102/70 (19 Nov 2018 08:00) (102/70 - 125/81)  BP(mean): --  RR: 17 (19 Nov 2018 08:00) (17 - 18)  SpO2: 96% (19 Nov 2018 08:00) (94% - 100%)    I&O's Detail    18 Nov 2018 07:01  -  19 Nov 2018 07:00  --------------------------------------------------------  IN:    Enteral Tube Flush: 250 mL    Pivot: 1000 mL  Total IN: 1250 mL    OUT:  Total OUT: 0 mL    Total NET: 1250 mL    Constitutional: patient resting comfortably in bed, in no acute distress  HEENT: EOMI / PERRL b/l, tongue nonbloody, bite block intact, helmet+  Respiratory: CTAB with respirations are unlabored, no accessory muscle use, no conversational dyspnea  Cardiovascular: regular rate & rhythm  Gastrointestinal: Abdomen soft, non-tender, non-distended, no rebound tenderness / guarding   Musculoskeletal: No joint pain, swelling or deformity    LABS:                        9.3    5.7   )-----------( 105      ( 18 Nov 2018 08:32 )             28.4     MEDICATIONS  (STANDING):  ALBUTerol    0.083% 2.5 milliGRAM(s) Nebulizer every 6 hours  bromocriptine Tablet 10 milliGRAM(s) Oral <User Schedule>  chlorhexidine 0.12% Liquid 15 milliLiter(s) Swish and Spit two times a day  enoxaparin Injectable 40 milliGRAM(s) SubCutaneous daily  folic acid 1 milliGRAM(s) Oral daily  levETIRAcetam  Solution 1000 milliGRAM(s) Oral two times a day  melatonin 5 milliGRAM(s) Oral at bedtime  modafinil 100 milliGRAM(s) Oral <User Schedule>  phytonadione   Solution 5 milliGRAM(s) Oral daily  propranolol 20 milliGRAM(s) Oral every 6 hours  spironolactone 25 milliGRAM(s) Oral daily  thiamine 100 milliGRAM(s) Oral daily    MEDICATIONS  (PRN):  acetaminophen    Suspension .. 650 milliGRAM(s) Oral every 6 hours PRN Temp greater or equal to 38.5C (101.3F)    MICRO:     STUDIES:

## 2018-11-19 NOTE — PROGRESS NOTE ADULT - ASSESSMENT
32 year old male found down with TBI, now s/p trach/PEG currently made DNR/DNI with no escalation of care if clinical condition deteriorates     Plan:   - pain control as needed  - maintain bite block  - continue trach collar as tolerated  -Continue DVT ppx with Lovenox; SCDs  -All abx completed

## 2018-11-19 NOTE — PROGRESS NOTE ADULT - SUBJECTIVE AND OBJECTIVE BOX
SUBJECTIVE  Patient seen and examined. Patient with limited eye opening throughout exam. Tongue appears less swollen. Continues to have extensor response to pain response.    TODAY'S REVIEW OF SYMPTOMS  Unable to obtain    VITALS  T(C): 36.7 (11-19-18 @ 08:00)  T(F): 98 (11-19-18 @ 08:00), Max: 98.1 (11-18-18 @ 15:40)  HR: 88 (11-19-18 @ 08:00) (70 - 88)  BP: 102/70 (11-19-18 @ 08:00) (102/70 - 125/81)  RR:  (17 - 18)  SpO2:  (96% - 100%)  Wt(kg): --    RECENT LABS/IMAGING             9.3    5.7   )-----------( 105      ( 18 Nov 2018 08:32 )             28.4     MEDICATIONS   MEDICATIONS  (STANDING):  ALBUTerol    0.083% 2.5 milliGRAM(s) Nebulizer every 6 hours  bromocriptine Tablet 10 milliGRAM(s) Oral <User Schedule>  chlorhexidine 0.12% Liquid 15 milliLiter(s) Swish and Spit two times a day  enoxaparin Injectable 40 milliGRAM(s) SubCutaneous daily  folic acid 1 milliGRAM(s) Oral daily  levETIRAcetam  Solution 1000 milliGRAM(s) Oral two times a day  melatonin 5 milliGRAM(s) Oral at bedtime  modafinil 100 milliGRAM(s) Oral <User Schedule>  phytonadione   Solution 5 milliGRAM(s) Oral daily  propranolol 20 milliGRAM(s) Oral every 6 hours  spironolactone 25 milliGRAM(s) Oral daily  thiamine 100 milliGRAM(s) Oral daily    MEDICATIONS  (PRN):  acetaminophen    Suspension .. 650 milliGRAM(s) Oral every 6 hours PRN Temp greater or equal to 38.5C (101.3F)    PHYSICAL EXAM  HEENT - Cranial incisions with mild reduction in swelling on left side, (+) Tongue swelling, Intermittent eye opening, (+) Visual startle, (+) Auditory startle  Cardiovascular - All extremities warm, Diffuse edema  Pulm - (+) Trach collar   Extremities - Diffuse swelling, Extensor posturing to pain response primarily in upper extremities  Reflexes - +Babinski and Meza's bilaterally, DTR's - Patella 3+/4, Felipe's 2+/4, No clonus appreciated in bilateral lower extremities    Coma Recovery Scale - Revised  AUDITORY FUNCTION SCALE  1 - Auditory startle  VISUAL FUNCTION SCALE  1 - Visual Startle   MOTOR FUNCTION SCALE  1 - Abnormal Posturing (extensor posturing)  OROMOTOR/VERBAL FUNCTION SCALE  1 - Oral reflexive movements  COMMUNICATION SCALE  0 - None  AROUSAL SCALE  1 - Eye Opening with Stimulation    TOTAL SCORE = 5 = Vegetative state    ASSESSMENT/PLAN  32y Male unknown PMH found unresponsive on sidewalk found with bilateral SDH and SAH now s/p bilateral hemicraniectomy and EVD placement s/p removal. Coma x 12 days, Vegetative State at Day 13 (11/3)     Severe TBI - Continues to have some intermittent eye opening and extensor response to painful stimuli. Continues to be in vegetative state. Will continue to follow.  Sleep/Wake Cycles - Modafinil 100mg Q6AM/Q12PM (increased 11/14), Bromocriptine 10mg BID (increased from 5mg 11/8), Melatonin 5mg HS (10/31)  Hepatic encephalopathy - Vit K, Spironolactone  Possible seizure activity - Keppra   Dysautonomia - Propranolol, Bromocriptine, Tylenol PRN  Oral care - Aggressive oral care, Chlorhexidine, Suctioning PRN  DVT PPX - SCDs, Lovenox  Rehab - COMA STIM by all services. Will continue to follow.

## 2018-11-19 NOTE — PROGRESS NOTE ADULT - ATTENDING COMMENTS
Agree with Dr. Vera.  Continues to be in vegetative state.  Consider Aricept for alternate receptor stimulation.

## 2018-11-20 RX ORDER — MODAFINIL 200 MG/1
100 TABLET ORAL
Qty: 0 | Refills: 0 | Status: DISCONTINUED | OUTPATIENT
Start: 2018-11-20 | End: 2018-11-27

## 2018-11-20 RX ADMIN — ALBUTEROL 2.5 MILLIGRAM(S): 90 AEROSOL, METERED ORAL at 03:26

## 2018-11-20 RX ADMIN — ALBUTEROL 2.5 MILLIGRAM(S): 90 AEROSOL, METERED ORAL at 15:26

## 2018-11-20 RX ADMIN — Medication 5 MILLIGRAM(S): at 21:17

## 2018-11-20 RX ADMIN — Medication 100 MILLIGRAM(S): at 12:15

## 2018-11-20 RX ADMIN — Medication 20 MILLIGRAM(S): at 12:14

## 2018-11-20 RX ADMIN — MODAFINIL 100 MILLIGRAM(S): 200 TABLET ORAL at 12:23

## 2018-11-20 RX ADMIN — LEVETIRACETAM 1000 MILLIGRAM(S): 250 TABLET, FILM COATED ORAL at 05:03

## 2018-11-20 RX ADMIN — BROMOCRIPTINE MESYLATE 10 MILLIGRAM(S): 5 CAPSULE ORAL at 05:03

## 2018-11-20 RX ADMIN — ALBUTEROL 2.5 MILLIGRAM(S): 90 AEROSOL, METERED ORAL at 21:30

## 2018-11-20 RX ADMIN — MODAFINIL 100 MILLIGRAM(S): 200 TABLET ORAL at 05:03

## 2018-11-20 RX ADMIN — LEVETIRACETAM 1000 MILLIGRAM(S): 250 TABLET, FILM COATED ORAL at 19:08

## 2018-11-20 RX ADMIN — SPIRONOLACTONE 25 MILLIGRAM(S): 25 TABLET, FILM COATED ORAL at 05:03

## 2018-11-20 RX ADMIN — CHLORHEXIDINE GLUCONATE 15 MILLILITER(S): 213 SOLUTION TOPICAL at 19:08

## 2018-11-20 RX ADMIN — Medication 5 MILLIGRAM(S): at 12:25

## 2018-11-20 RX ADMIN — CHLORHEXIDINE GLUCONATE 15 MILLILITER(S): 213 SOLUTION TOPICAL at 05:03

## 2018-11-20 RX ADMIN — ALBUTEROL 2.5 MILLIGRAM(S): 90 AEROSOL, METERED ORAL at 08:54

## 2018-11-20 RX ADMIN — Medication 1 MILLIGRAM(S): at 12:14

## 2018-11-20 RX ADMIN — Medication 20 MILLIGRAM(S): at 05:03

## 2018-11-20 RX ADMIN — ENOXAPARIN SODIUM 40 MILLIGRAM(S): 100 INJECTION SUBCUTANEOUS at 12:14

## 2018-11-20 RX ADMIN — BROMOCRIPTINE MESYLATE 10 MILLIGRAM(S): 5 CAPSULE ORAL at 12:14

## 2018-11-20 NOTE — SPEECH LANGUAGE PATHOLOGY EVALUATION - SLP PERTINENT HISTORY OF CURRENT PROBLEM
As per h&p: Male transfer from [place] w/ bilateral subdural and SAH. Unknown mechanism, was found unconscious and unresponsive in the sidewalk.

## 2018-11-20 NOTE — PROGRESS NOTE ADULT - SUBJECTIVE AND OBJECTIVE BOX
INTERVAL HPI/OVERNIGHT EVENTS: No acute events overnight    Patient still with tongue protruding (easily reducible with jaw thrust) and bloody secretions.       MEDICATIONS  (STANDING):  ALBUTerol    0.083% 2.5 milliGRAM(s) Nebulizer every 6 hours  bromocriptine Tablet 10 milliGRAM(s) Oral <User Schedule>  chlorhexidine 0.12% Liquid 15 milliLiter(s) Swish and Spit two times a day  enoxaparin Injectable 40 milliGRAM(s) SubCutaneous daily  folic acid 1 milliGRAM(s) Oral daily  levETIRAcetam  Solution 1000 milliGRAM(s) Oral two times a day  melatonin 5 milliGRAM(s) Oral at bedtime  modafinil 100 milliGRAM(s) Oral <User Schedule>  phytonadione   Solution 5 milliGRAM(s) Oral daily  propranolol 20 milliGRAM(s) Oral every 6 hours  spironolactone 25 milliGRAM(s) Oral daily  thiamine 100 milliGRAM(s) Oral daily    MEDICATIONS  (PRN):  acetaminophen    Suspension .. 650 milliGRAM(s) Oral every 6 hours PRN Temp greater or equal to 38.5C (101.3F)      Vital Signs Last 24 Hrs  T(C): 36.8 (20 Nov 2018 00:53), Max: 36.8 (20 Nov 2018 00:53)  T(F): 98.3 (20 Nov 2018 00:53), Max: 98.3 (20 Nov 2018 00:53)  HR: 80 (20 Nov 2018 03:27) (80 - 88)  BP: 117/69 (20 Nov 2018 00:53) (99/66 - 117/69)  BP(mean): --  RR: 18 (20 Nov 2018 00:53) (17 - 18)  SpO2: 98% (20 Nov 2018 03:27) (94% - 100%)    Constitutional: patient resting comfortably in bed, in no acute distress  HEENT: EOMI / PERRL b/l, tongue nonbloody, bite block intact, helmet+  Respiratory: CTAB with respirations are unlabored, no accessory muscle use, no conversational dyspnea  Cardiovascular: regular rate & rhythm  Gastrointestinal: Abdomen soft, non-tender, non-distended, no rebound tenderness / guarding   Musculoskeletal: No joint pain, swelling or deformity    I&O's Detail    19 Nov 2018 07:01  -  20 Nov 2018 07:00  --------------------------------------------------------  IN:    Pivot: 550 mL  Total IN: 550 mL    OUT:    Incontinent per Condom Catheter: 600 mL  Total OUT: 600 mL    Total NET: -50 mL          LABS:                        9.3    5.7   )-----------( 105      ( 18 Nov 2018 08:32 )             28.4                 RADIOLOGY & ADDITIONAL STUDIES:

## 2018-11-20 NOTE — PROGRESS NOTE ADULT - SUBJECTIVE AND OBJECTIVE BOX
SUBJECTIVE  Patient seen and examined. No acute events overnight. Patient opens eyes to auditory stimuli. Had some bloody secretions from tongue.    TODAY'S REVIEW OF SYMPTOMS  Unable to obtain    VITALS  T(C): 37.3 (11-20-18 @ 07:00)  T(F): 99.2 (11-20-18 @ 07:00), Max: 99.2 (11-20-18 @ 07:00)  HR: 78 (11-20-18 @ 07:00) (78 - 86)  BP: 108/65 (11-20-18 @ 07:00) (99/66 - 117/69)  RR:  (18 - 18)  SpO2:  (94% - 100%)  Wt(kg): --    RECENT LABS/IMAGING  -------    MEDICATIONS   MEDICATIONS  (STANDING):  ALBUTerol    0.083% 2.5 milliGRAM(s) Nebulizer every 6 hours  bromocriptine Tablet 10 milliGRAM(s) Oral <User Schedule>  chlorhexidine 0.12% Liquid 15 milliLiter(s) Swish and Spit two times a day  enoxaparin Injectable 40 milliGRAM(s) SubCutaneous daily  folic acid 1 milliGRAM(s) Oral daily  levETIRAcetam  Solution 1000 milliGRAM(s) Oral two times a day  melatonin 5 milliGRAM(s) Oral at bedtime  modafinil 100 milliGRAM(s) Oral <User Schedule>  phytonadione   Solution 5 milliGRAM(s) Oral daily  propranolol 20 milliGRAM(s) Oral every 6 hours  spironolactone 25 milliGRAM(s) Oral daily  thiamine 100 milliGRAM(s) Oral daily    MEDICATIONS  (PRN):  acetaminophen    Suspension .. 650 milliGRAM(s) Oral every 6 hours PRN Temp greater or equal to 38.5C (101.3F)    PHYSICAL EXAM  HEENT - Cranial incisions with mild reduction in swelling on left side, (+) Tongue swelling, Intermittent eye opening to auditory stimuli, (+) Visual startle, (+) Auditory startle  Cardiovascular - All extremities warm, Diffuse edema  Pulm - (+) Trach collar   Extremities - Diffuse swelling, Extensor posturing to pain response primarily in upper extremities  Reflexes - +Babinski and Meza's bilaterally, DTR's - Patella 3+/4, Felipe's 2+/4, No clonus appreciated in bilateral lower extremities    Coma Recovery Scale - Revised  AUDITORY FUNCTION SCALE  1 - Auditory startle  VISUAL FUNCTION SCALE  1 - Visual Startle   MOTOR FUNCTION SCALE  1 - Abnormal Posturing (extensor posturing)  OROMOTOR/VERBAL FUNCTION SCALE  1 - Oral reflexive movements  COMMUNICATION SCALE  0 - None  AROUSAL SCALE  1 - Eye Opening with Stimulation    TOTAL SCORE = 5 = Vegetative state    ASSESSMENT/PLAN  32y Male unknown PMH found unresponsive on sidewalk found with bilateral SDH and SAH now s/p bilateral hemicraniectomy and EVD placement s/p removal. Coma x 12 days, Vegetative State at Day 13 (11/3)     Severe TBI - Continues to be in vegetative state. Will continue to follow.  Sleep/Wake Cycles - Modafinil 100mg Q6AM/Q12PM (increased 11/14), Bromocriptine 10mg BID (increased from 5mg 11/8), Melatonin 5mg HS (10/31)  Hepatic encephalopathy - Vit K, Spironolactone  Possible seizure activity - Keppra   Dysautonomia - Propranolol, Bromocriptine, Tylenol PRN  Oral care - Aggressive oral care, Chlorhexidine, Suctioning PRN  DVT PPX - SCDs, Lovenox  Precautions - Aspiration  Rehab - COMA STIM by all services. Will continue to follow

## 2018-11-20 NOTE — PROGRESS NOTE ADULT - ASSESSMENT
32 year old male found down with TBI, now s/p trach/PEG currently made DNR/DNI with no escalation of care if clinical condition deteriorates     Plan:   - pain control as needed  - bite block as needed  - continue trach collar as tolerated  -Continue DVT ppx with Lovenox; SCDs  -All abx completed

## 2018-11-20 NOTE — SPEECH LANGUAGE PATHOLOGY EVALUATION - SLP DIAGNOSIS
Based on Coma Recovery Scale-Revised pt with severe receptive and expressive language deficits, pt scored a 2/23 on Coma Recovery Scale. Auditory function scale , Visual function scale, Motor function, and Communication scale were all scored 0. Oromotor function scale and Arousal  scale were scored at 1.

## 2018-11-20 NOTE — SPEECH LANGUAGE PATHOLOGY EVALUATION - SLP GENERAL OBSERVATIONS
Pt awake with eyes open, +helmet, +trach with trach collar in place, no command following, open mouth posture with tongue protruding

## 2018-11-20 NOTE — SPEECH LANGUAGE PATHOLOGY EVALUATION - COMMENTS
Will continue to follow, ongoing assessment Non verbal expressions are not present at this time Unable to assess due to severity of language impairments

## 2018-11-21 RX ADMIN — Medication 20 MILLIGRAM(S): at 04:52

## 2018-11-21 RX ADMIN — MODAFINIL 100 MILLIGRAM(S): 200 TABLET ORAL at 14:02

## 2018-11-21 RX ADMIN — BROMOCRIPTINE MESYLATE 10 MILLIGRAM(S): 5 CAPSULE ORAL at 13:59

## 2018-11-21 RX ADMIN — SPIRONOLACTONE 25 MILLIGRAM(S): 25 TABLET, FILM COATED ORAL at 04:53

## 2018-11-21 RX ADMIN — BROMOCRIPTINE MESYLATE 10 MILLIGRAM(S): 5 CAPSULE ORAL at 04:52

## 2018-11-21 RX ADMIN — Medication 100 MILLIGRAM(S): at 13:59

## 2018-11-21 RX ADMIN — LEVETIRACETAM 1000 MILLIGRAM(S): 250 TABLET, FILM COATED ORAL at 18:50

## 2018-11-21 RX ADMIN — Medication 5 MILLIGRAM(S): at 18:50

## 2018-11-21 RX ADMIN — CHLORHEXIDINE GLUCONATE 15 MILLILITER(S): 213 SOLUTION TOPICAL at 18:51

## 2018-11-21 RX ADMIN — ALBUTEROL 2.5 MILLIGRAM(S): 90 AEROSOL, METERED ORAL at 09:28

## 2018-11-21 RX ADMIN — LEVETIRACETAM 1000 MILLIGRAM(S): 250 TABLET, FILM COATED ORAL at 04:53

## 2018-11-21 RX ADMIN — ALBUTEROL 2.5 MILLIGRAM(S): 90 AEROSOL, METERED ORAL at 21:15

## 2018-11-21 RX ADMIN — ALBUTEROL 2.5 MILLIGRAM(S): 90 AEROSOL, METERED ORAL at 03:22

## 2018-11-21 RX ADMIN — ALBUTEROL 2.5 MILLIGRAM(S): 90 AEROSOL, METERED ORAL at 15:57

## 2018-11-21 RX ADMIN — MODAFINIL 100 MILLIGRAM(S): 200 TABLET ORAL at 04:56

## 2018-11-21 RX ADMIN — CHLORHEXIDINE GLUCONATE 15 MILLILITER(S): 213 SOLUTION TOPICAL at 04:53

## 2018-11-21 RX ADMIN — ENOXAPARIN SODIUM 40 MILLIGRAM(S): 100 INJECTION SUBCUTANEOUS at 14:00

## 2018-11-21 RX ADMIN — Medication 1 MILLIGRAM(S): at 13:59

## 2018-11-21 RX ADMIN — Medication 5 MILLIGRAM(S): at 22:35

## 2018-11-21 NOTE — CHART NOTE - NSCHARTNOTEFT_GEN_A_CORE
Source: Patient [ ]  Family [ ]   other [ ] ID rounds; Pt is nonverbal, no family at bedside    Current Diet:   Diet, NPO with Tube Feed:   Tube Feeding Modality: Gastrostomy  Pivot 1.5 Ryan  Total Volume for 24 Hours (mL): 1200  Continuous  Starting Tube Feed Rate {mL per Hour}: 50  Until Goal Tube Feed Rate (mL per Hour): 50  Tube Feed Duration (in Hours): 24  Tube Feed Start Time: 16:25 (11-01-18 @ 16:25)    Enteral /Parenteral Nutrition: Current diet order provides Vital @ 50mL/hr (x 20 hrs) 1000mL daily (1500cal,     Current Weight:   (11/11) 83.2kg  (11/2)   87kg   (11/1)   82 kg   (10/27) 82kg     % Weight Change No recent weight documented    Pertinent Medications: MEDICATIONS  (STANDING):  ALBUTerol    0.083% 2.5 milliGRAM(s) Nebulizer every 6 hours  bromocriptine Tablet 10 milliGRAM(s) Oral <User Schedule>  chlorhexidine 0.12% Liquid 15 milliLiter(s) Swish and Spit two times a day  enoxaparin Injectable 40 milliGRAM(s) SubCutaneous daily  folic acid 1 milliGRAM(s) Oral daily  levETIRAcetam  Solution 1000 milliGRAM(s) Oral two times a day  melatonin 5 milliGRAM(s) Oral at bedtime  modafinil 100 milliGRAM(s) Oral <User Schedule>  phytonadione   Solution 5 milliGRAM(s) Oral daily  propranolol 20 milliGRAM(s) Oral every 6 hours  spironolactone 25 milliGRAM(s) Oral daily  thiamine 100 milliGRAM(s) Oral daily    MEDICATIONS  (PRN):  acetaminophen    Suspension .. 650 milliGRAM(s) Oral every 6 hours PRN Temp greater or equal to 38.5C (101.3F)    Pertinent Labs:         Skin: Wound site beneath trach collar; Surgical incision B/L head; skin lesions to the scrotum  Edema: 1+ bilateral hands    Nutrition focused physical exam NOT conducted     Estimated Needs:   [X] no change since previous assessment  [ ] recalculated:     Current Nutrition Diagnosis: Pt remains at high nutrition risk secondary to increased nutrient needs related to increased physiologic demand for healing as evidenced by s/p TBI, with multicompartment ICH and brain compression from unknown mechanism, VAP, s/p trach and PEG.  TF monitor was reset unable to obtain hx.  RD to remain available.     Edema resolving     Recommendations:   1) When medically feasible, consider change TF to Pivot rate to 60ml/hr (x 20 hours) to provide 1200ml/day (1800kcal, 116gm protein) to meet increased needs  2) Add MVI and Vit C 500mg daily   3) Prostat 1 pkt BID to provide 200kcal and 30g PRO via PEG    Monitoring and Evaluation:   [ ] PO intake [X] Tolerance to diet prescription [X] Weights  [X] Follow up per protocol [X] Labs: Source: Patient [ ]  Family [ ]   other [ ] ID rounds; Pt is nonverbal, no family at bedside    Current Diet:   Diet, NPO with Tube Feed:   Tube Feeding Modality: Gastrostomy  Pivot 1.5 Ryan  Total Volume for 24 Hours (mL): 1200  Continuous  Starting Tube Feed Rate {mL per Hour}: 50  Until Goal Tube Feed Rate (mL per Hour): 50  Tube Feed Duration (in Hours): 24  Tube Feed Start Time: 16:25 (11-01-18 @ 16:25)    Enteral /Parenteral Nutrition: Current diet order provides Pivot @ 50mL/hr (x 20 hrs) 1000mL daily (1500cal,     Current Weight:   (11/11) 83.2kg  (11/2)   87kg   (11/1)   82 kg   (10/27) 82kg     % Weight Change No recent weight documented    Pertinent Medications: MEDICATIONS  (STANDING):  ALBUTerol    0.083% 2.5 milliGRAM(s) Nebulizer every 6 hours  bromocriptine Tablet 10 milliGRAM(s) Oral <User Schedule>  chlorhexidine 0.12% Liquid 15 milliLiter(s) Swish and Spit two times a day  enoxaparin Injectable 40 milliGRAM(s) SubCutaneous daily  folic acid 1 milliGRAM(s) Oral daily  levETIRAcetam  Solution 1000 milliGRAM(s) Oral two times a day  melatonin 5 milliGRAM(s) Oral at bedtime  modafinil 100 milliGRAM(s) Oral <User Schedule>  phytonadione   Solution 5 milliGRAM(s) Oral daily  propranolol 20 milliGRAM(s) Oral every 6 hours  spironolactone 25 milliGRAM(s) Oral daily  thiamine 100 milliGRAM(s) Oral daily    MEDICATIONS  (PRN):  acetaminophen    Suspension .. 650 milliGRAM(s) Oral every 6 hours PRN Temp greater or equal to 38.5C (101.3F)    Pertinent Labs: No recent nutrition related labs    Skin: Wound site beneath trach collar; Surgical incision B/L head; skin lesions to the scrotum  Edema: 1+ bilateral hands    Nutrition focused physical exam NOT conducted     Estimated Needs:   [X] no change since previous assessment  [ ] recalculated:     Current Nutrition Diagnosis: Pt remains at high nutrition risk secondary to increased nutrient needs related to increased physiologic demand for healing as evidenced by s/p TBI, with multicompartment ICH and brain compression from unknown mechanism, VAP, s/p trach and PEG. Edema resolving. Pt tolerating TF's, TF monitor was reset unable to obtain hx.  RD to remain available.     Recommendations:   1) When medically feasible, consider increase TF rate to 60ml/hr (x 20 hours) to provide 1200ml/day (1800kcal, 116gm protein) to meet increased needs  2) Add MVI and Vit C 500mg daily   3) Prostat 1 pkt BID to provide 200kcal and 30g PRO via PEG    Monitoring and Evaluation:   [ ] PO intake [X] Tolerance to diet prescription [X] Weights  [X] Follow up per protocol [X] Labs: Source: Patient [ ]  Family [ ]   other [ ] ID rounds; Pt is nonverbal, no family at bedside    Current Diet:   Diet, NPO with Tube Feed:   Tube Feeding Modality: Gastrostomy  Pivot 1.5 Ryan  Total Volume for 24 Hours (mL): 1200  Continuous  Starting Tube Feed Rate {mL per Hour}: 50  Until Goal Tube Feed Rate (mL per Hour): 50  Tube Feed Duration (in Hours): 24  Tube Feed Start Time: 16:25 (11-01-18 @ 16:25)    Enteral /Parenteral Nutrition: Current diet order provides Pivot @ 50mL/hr (x 20 hrs) 1000mL daily (1500cal, 94g protein)    Current Weight:   (11/11) 83.2kg  (11/2)   87kg   (11/1)   82 kg   (10/27) 82kg     % Weight Change No recent weight documented    Pertinent Medications: MEDICATIONS  (STANDING):  ALBUTerol    0.083% 2.5 milliGRAM(s) Nebulizer every 6 hours  bromocriptine Tablet 10 milliGRAM(s) Oral <User Schedule>  chlorhexidine 0.12% Liquid 15 milliLiter(s) Swish and Spit two times a day  enoxaparin Injectable 40 milliGRAM(s) SubCutaneous daily  folic acid 1 milliGRAM(s) Oral daily  levETIRAcetam  Solution 1000 milliGRAM(s) Oral two times a day  melatonin 5 milliGRAM(s) Oral at bedtime  modafinil 100 milliGRAM(s) Oral <User Schedule>  phytonadione   Solution 5 milliGRAM(s) Oral daily  propranolol 20 milliGRAM(s) Oral every 6 hours  spironolactone 25 milliGRAM(s) Oral daily  thiamine 100 milliGRAM(s) Oral daily    MEDICATIONS  (PRN):  acetaminophen    Suspension .. 650 milliGRAM(s) Oral every 6 hours PRN Temp greater or equal to 38.5C (101.3F)    Pertinent Labs: No recent nutrition related labs    Skin: Wound site beneath trach collar; Surgical incision B/L head; skin lesions to the scrotum  Edema: 1+ bilateral hands    Nutrition focused physical exam NOT conducted     Estimated Needs:   [X] no change since previous assessment  [ ] recalculated:     Current Nutrition Diagnosis: Pt remains at high nutrition risk secondary to increased nutrient needs related to increased physiologic demand for healing as evidenced by s/p TBI, with multicompartment ICH and brain compression from unknown mechanism, VAP, s/p trach and PEG. Edema resolving. Pt tolerating TF's, TF monitor was reset unable to obtain hx.  RD to remain available.     Recommendations:   1) When medically feasible, consider increase TF rate to 60ml/hr (x 20 hours) to provide 1200ml/day (1800kcal, 116gm protein) to meet increased needs  2) Add MVI and Vit C 500mg daily   3) Prostat 1 pkt BID to provide 200kcal and 30g PRO via PEG    Monitoring and Evaluation:   [ ] PO intake [X] Tolerance to diet prescription [X] Weights  [X] Follow up per protocol [X] Labs:

## 2018-11-21 NOTE — PROGRESS NOTE ADULT - SUBJECTIVE AND OBJECTIVE BOX
SUBJECTIVE  Patient seen and examined. Unable to get patient to open eyes this morning. Still with protruding tongue but secretions slightly improved.     TODAY'S REVIEW OF SYMPTOMS  Unable to obtain    VITALS  T(C): 36.8 (11-21-18 @ 07:00)  T(F): 98.2 (11-21-18 @ 07:00), Max: 98.7 (11-20-18 @ 15:58)  HR: 74 (11-21-18 @ 07:00) (74 - 83)  BP: 98/68 (11-21-18 @ 07:00) (98/68 - 125/68)  RR:  (18 - 18)  SpO2:  (92% - 99%)  Wt(kg): --    RECENT LABS/IMAGING  --------    MEDICATIONS   MEDICATIONS  (STANDING):  ALBUTerol    0.083% 2.5 milliGRAM(s) Nebulizer every 6 hours  bromocriptine Tablet 10 milliGRAM(s) Oral <User Schedule>  chlorhexidine 0.12% Liquid 15 milliLiter(s) Swish and Spit two times a day  enoxaparin Injectable 40 milliGRAM(s) SubCutaneous daily  folic acid 1 milliGRAM(s) Oral daily  levETIRAcetam  Solution 1000 milliGRAM(s) Oral two times a day  melatonin 5 milliGRAM(s) Oral at bedtime  modafinil 100 milliGRAM(s) Oral <User Schedule>  phytonadione   Solution 5 milliGRAM(s) Oral daily  propranolol 20 milliGRAM(s) Oral every 6 hours  spironolactone 25 milliGRAM(s) Oral daily  thiamine 100 milliGRAM(s) Oral daily    MEDICATIONS  (PRN):  acetaminophen    Suspension .. 650 milliGRAM(s) Oral every 6 hours PRN Temp greater or equal to 38.5C (101.3F)    PHYSICAL EXAM  HEENT - Cranial incisions with mild reduction in swelling on left side, (+) Tongue swelling, Intermittent eye opening to auditory stimuli, (+) Visual startle, (+) Auditory startle  Cardiovascular - All extremities warm, Diffuse edema  Pulm - (+) Trach collar   Extremities - Diffuse swelling, Extensor posturing to pain response primarily in upper extremities  Reflexes - +Babinski and Meza's bilaterally, DTR's - Patella 3+/4, Felipe's 2+/4, No clonus appreciated in bilateral lower extremities    Coma Recovery Scale - Revised  AUDITORY FUNCTION SCALE  1 - Auditory startle  VISUAL FUNCTION SCALE  1 - Visual Startle   MOTOR FUNCTION SCALE  1 - Abnormal Posturing (extensor posturing)  OROMOTOR/VERBAL FUNCTION SCALE  1 - Oral reflexive movements  COMMUNICATION SCALE  0 - None  AROUSAL SCALE  0 - None    TOTAL SCORE = 4 = Vegetative state    ASSESSMENT/PLAN  32y Male unknown PMH found unresponsive on sidewalk found with bilateral SDH and SAH now s/p bilateral hemicraniectomy and EVD placement s/p removal. Coma x 12 days, Vegetative State at Day 13 (11/3)     Severe TBI - Continues to be in vegetative state. Consider adding Donepezil 5mg for potential to increase overall arousal. Will continue to follow.  Sleep/Wake Cycles - Modafinil 100mg Q6AM/Q12PM (increased 11/14), Bromocriptine 10mg BID (increased from 5mg 11/8), Melatonin 5mg HS (10/31)  Hepatic encephalopathy - Vit K, Spironolactone  Possible seizure activity - Keppra   Dysautonomia - Propranolol, Bromocriptine, Tylenol PRN  Oral care - Aggressive oral care, Chlorhexidine, Suctioning PRN  DVT PPX - SCDs, Lovenox  Precautions - Aspiration, Recommend PRAFO's/bunny boots to bilateral lower extremities (monitor skin)  Rehab - COMA STIM by all services. Will continue to follow

## 2018-11-21 NOTE — PROGRESS NOTE ADULT - SUBJECTIVE AND OBJECTIVE BOX
INTERVAL HPI/OVERNIGHT EVENTS:    Tongue gently pushed back behind teeth overnight x3    SUBJECTIVE: Patient unable to communicate in any way      MEDICATIONS  (STANDING):  ALBUTerol    0.083% 2.5 milliGRAM(s) Nebulizer every 6 hours  bromocriptine Tablet 10 milliGRAM(s) Oral <User Schedule>  chlorhexidine 0.12% Liquid 15 milliLiter(s) Swish and Spit two times a day  enoxaparin Injectable 40 milliGRAM(s) SubCutaneous daily  folic acid 1 milliGRAM(s) Oral daily  levETIRAcetam  Solution 1000 milliGRAM(s) Oral two times a day  melatonin 5 milliGRAM(s) Oral at bedtime  modafinil 100 milliGRAM(s) Oral <User Schedule>  phytonadione   Solution 5 milliGRAM(s) Oral daily  propranolol 20 milliGRAM(s) Oral every 6 hours  spironolactone 25 milliGRAM(s) Oral daily  thiamine 100 milliGRAM(s) Oral daily    MEDICATIONS  (PRN):  acetaminophen    Suspension .. 650 milliGRAM(s) Oral every 6 hours PRN Temp greater or equal to 38.5C (101.3F)      Vital Signs Last 24 Hrs  T(C): 36.6 (20 Nov 2018 23:19), Max: 37.3 (20 Nov 2018 07:00)  T(F): 97.8 (20 Nov 2018 23:19), Max: 99.2 (20 Nov 2018 07:00)  HR: 76 (21 Nov 2018 03:24) (74 - 83)  BP: 125/68 (20 Nov 2018 23:19) (108/65 - 125/68)  BP(mean): --  RR: 18 (20 Nov 2018 23:19) (18 - 18)  SpO2: 97% (21 Nov 2018 03:24) (96% - 99%)    PE  Constitutional: patient resting comfortably in bed, in no acute distress  HEENT: EOMI / PERRL b/l, tongue nonbloody, bite block intact, helmet+  Respiratory: CTAB with respirations are unlabored, no accessory muscle use, no conversational dyspnea  Cardiovascular: regular rate & rhythm  Gastrointestinal: Abdomen soft, non-tender, non-distended, no rebound tenderness / guarding   Musculoskeletal: No joint pain, swelling or deformity    I&O's Detail    19 Nov 2018 07:01  -  20 Nov 2018 07:00  --------------------------------------------------------  IN:    Pivot: 550 mL  Total IN: 550 mL    OUT:    Incontinent per Condom Catheter: 600 mL  Total OUT: 600 mL    Total NET: -50 mL      20 Nov 2018 07:01  -  21 Nov 2018 06:23  --------------------------------------------------------  IN:    Pivot: 550 mL  Total IN: 550 mL    OUT:    Incontinent per Condom Catheter: 700 mL  Total OUT: 700 mL    Total NET: -150 mL    LABS:    Only On Fridays

## 2018-11-21 NOTE — PROGRESS NOTE ADULT - ASSESSMENT
32 year old male found down with TBI, now s/p trach/PEG currently made DNR/DNI with no escalation of care if clinical condition deteriorates     Plan:   - pain control as needed  - bite block as needed  - Continue current course - tracheostomy, PEG both in place (TFs continue)  - No escalation of care per family

## 2018-11-22 RX ADMIN — ALBUTEROL 2.5 MILLIGRAM(S): 90 AEROSOL, METERED ORAL at 03:20

## 2018-11-22 RX ADMIN — MODAFINIL 100 MILLIGRAM(S): 200 TABLET ORAL at 05:27

## 2018-11-22 RX ADMIN — ALBUTEROL 2.5 MILLIGRAM(S): 90 AEROSOL, METERED ORAL at 21:24

## 2018-11-22 RX ADMIN — ALBUTEROL 2.5 MILLIGRAM(S): 90 AEROSOL, METERED ORAL at 16:03

## 2018-11-22 RX ADMIN — ALBUTEROL 2.5 MILLIGRAM(S): 90 AEROSOL, METERED ORAL at 09:17

## 2018-11-22 RX ADMIN — CHLORHEXIDINE GLUCONATE 15 MILLILITER(S): 213 SOLUTION TOPICAL at 05:27

## 2018-11-22 RX ADMIN — Medication 1 MILLIGRAM(S): at 12:07

## 2018-11-22 RX ADMIN — MODAFINIL 100 MILLIGRAM(S): 200 TABLET ORAL at 12:07

## 2018-11-22 RX ADMIN — CHLORHEXIDINE GLUCONATE 15 MILLILITER(S): 213 SOLUTION TOPICAL at 17:43

## 2018-11-22 RX ADMIN — Medication 20 MILLIGRAM(S): at 05:27

## 2018-11-22 RX ADMIN — Medication 100 MILLIGRAM(S): at 12:07

## 2018-11-22 RX ADMIN — Medication 5 MILLIGRAM(S): at 15:55

## 2018-11-22 RX ADMIN — Medication 5 MILLIGRAM(S): at 21:11

## 2018-11-22 RX ADMIN — SPIRONOLACTONE 25 MILLIGRAM(S): 25 TABLET, FILM COATED ORAL at 05:27

## 2018-11-22 RX ADMIN — BROMOCRIPTINE MESYLATE 10 MILLIGRAM(S): 5 CAPSULE ORAL at 12:07

## 2018-11-22 RX ADMIN — BROMOCRIPTINE MESYLATE 10 MILLIGRAM(S): 5 CAPSULE ORAL at 05:27

## 2018-11-22 RX ADMIN — LEVETIRACETAM 1000 MILLIGRAM(S): 250 TABLET, FILM COATED ORAL at 17:42

## 2018-11-22 RX ADMIN — ENOXAPARIN SODIUM 40 MILLIGRAM(S): 100 INJECTION SUBCUTANEOUS at 12:07

## 2018-11-22 RX ADMIN — LEVETIRACETAM 1000 MILLIGRAM(S): 250 TABLET, FILM COATED ORAL at 05:27

## 2018-11-22 NOTE — PROGRESS NOTE ADULT - ASSESSMENT
32 year old male found down with TBI, now s/p trach/PEG currently made DNR/DNI with no escalation of care if clinical condition deteriorates. Continues to require bite block. No changes 11/22/18.

## 2018-11-22 NOTE — PROGRESS NOTE ADULT - SUBJECTIVE AND OBJECTIVE BOX
HPI/OVERNIGHT EVENTS: Patient seen and examined at bedside this AM. No acute events overnight. Subjective info limited by patient's mental status.     Vital Signs Last 24 Hrs  T(C): 36.7 (2018 23:25), Max: 36.9 (2018 15:38)  T(F): 98.1 (2018 23:25), Max: 98.4 (2018 15:38)  HR: 93 (2018 05:23) (74 - 93)  BP: 112/70 (2018 05:23) (106/72 - 113/75)  BP(mean): --  RR: 18 (2018 05:23) (18 - 18)  SpO2: 100% (2018 05:23) (97% - 100%)    I&O's Detail    2018 07:01  -  2018 07:00  --------------------------------------------------------  IN:    Enteral Tube Flush: 250 mL    Pivot: 650 mL  Total IN: 900 mL    OUT:    Incontinent per Collection Ba mL    Incontinent per Condom Catheter: 350 mL  Total OUT: 950 mL    Total NET: -50 mL    Constitutional: patient resting comfortably in bed, in no acute distress  HEENT: EOMI / PERRL b/l, tongue nonbloody, bite block intact, helmet+  Respiratory: CTAB with respirations are unlabored, no accessory muscle use, no conversational dyspnea  Cardiovascular: regular rate & rhythm  Gastrointestinal: Abdomen soft, non-tender, non-distended, no rebound tenderness / guarding   Musculoskeletal: No joint pain, swelling or deformity    LABS:    MEDICATIONS  (STANDING):  ALBUTerol    0.083% 2.5 milliGRAM(s) Nebulizer every 6 hours  bromocriptine Tablet 10 milliGRAM(s) Oral <User Schedule>  chlorhexidine 0.12% Liquid 15 milliLiter(s) Swish and Spit two times a day  enoxaparin Injectable 40 milliGRAM(s) SubCutaneous daily  folic acid 1 milliGRAM(s) Oral daily  levETIRAcetam  Solution 1000 milliGRAM(s) Oral two times a day  melatonin 5 milliGRAM(s) Oral at bedtime  modafinil 100 milliGRAM(s) Oral <User Schedule>  phytonadione   Solution 5 milliGRAM(s) Oral daily  propranolol 20 milliGRAM(s) Oral every 6 hours  spironolactone 25 milliGRAM(s) Oral daily  thiamine 100 milliGRAM(s) Oral daily    MEDICATIONS  (PRN):  acetaminophen    Suspension .. 650 milliGRAM(s) Oral every 6 hours PRN Temp greater or equal to 38.5C (101.3F)    MICRO:     STUDIES:

## 2018-11-23 LAB
ANION GAP SERPL CALC-SCNC: 13 MMOL/L — SIGNIFICANT CHANGE UP (ref 5–17)
BUN SERPL-MCNC: 27 MG/DL — HIGH (ref 8–20)
CALCIUM SERPL-MCNC: 10.2 MG/DL — SIGNIFICANT CHANGE UP (ref 8.6–10.2)
CHLORIDE SERPL-SCNC: 96 MMOL/L — LOW (ref 98–107)
CO2 SERPL-SCNC: 26 MMOL/L — SIGNIFICANT CHANGE UP (ref 22–29)
CREAT SERPL-MCNC: 0.42 MG/DL — LOW (ref 0.5–1.3)
GLUCOSE SERPL-MCNC: 114 MG/DL — SIGNIFICANT CHANGE UP (ref 70–115)
HCT VFR BLD CALC: 27.2 % — LOW (ref 42–52)
HGB BLD-MCNC: 8.8 G/DL — LOW (ref 14–18)
MCHC RBC-ENTMCNC: 28.2 PG — SIGNIFICANT CHANGE UP (ref 27–31)
MCHC RBC-ENTMCNC: 32.4 G/DL — SIGNIFICANT CHANGE UP (ref 32–36)
MCV RBC AUTO: 87.2 FL — SIGNIFICANT CHANGE UP (ref 80–94)
PLATELET # BLD AUTO: 115 K/UL — LOW (ref 150–400)
POTASSIUM SERPL-MCNC: 4.2 MMOL/L — SIGNIFICANT CHANGE UP (ref 3.5–5.3)
POTASSIUM SERPL-SCNC: 4.2 MMOL/L — SIGNIFICANT CHANGE UP (ref 3.5–5.3)
RBC # BLD: 3.12 M/UL — LOW (ref 4.6–6.2)
RBC # FLD: 17.9 % — HIGH (ref 11–15.6)
SODIUM SERPL-SCNC: 135 MMOL/L — SIGNIFICANT CHANGE UP (ref 135–145)
WBC # BLD: 4.1 K/UL — LOW (ref 4.8–10.8)
WBC # FLD AUTO: 4.1 K/UL — LOW (ref 4.8–10.8)

## 2018-11-23 RX ADMIN — LEVETIRACETAM 1000 MILLIGRAM(S): 250 TABLET, FILM COATED ORAL at 18:57

## 2018-11-23 RX ADMIN — Medication 20 MILLIGRAM(S): at 23:17

## 2018-11-23 RX ADMIN — ALBUTEROL 2.5 MILLIGRAM(S): 90 AEROSOL, METERED ORAL at 15:20

## 2018-11-23 RX ADMIN — Medication 100 MILLIGRAM(S): at 16:55

## 2018-11-23 RX ADMIN — ALBUTEROL 2.5 MILLIGRAM(S): 90 AEROSOL, METERED ORAL at 09:06

## 2018-11-23 RX ADMIN — LEVETIRACETAM 1000 MILLIGRAM(S): 250 TABLET, FILM COATED ORAL at 05:22

## 2018-11-23 RX ADMIN — CHLORHEXIDINE GLUCONATE 15 MILLILITER(S): 213 SOLUTION TOPICAL at 18:49

## 2018-11-23 RX ADMIN — SPIRONOLACTONE 25 MILLIGRAM(S): 25 TABLET, FILM COATED ORAL at 05:23

## 2018-11-23 RX ADMIN — MODAFINIL 100 MILLIGRAM(S): 200 TABLET ORAL at 05:22

## 2018-11-23 RX ADMIN — MODAFINIL 100 MILLIGRAM(S): 200 TABLET ORAL at 13:41

## 2018-11-23 RX ADMIN — ENOXAPARIN SODIUM 40 MILLIGRAM(S): 100 INJECTION SUBCUTANEOUS at 13:38

## 2018-11-23 RX ADMIN — CHLORHEXIDINE GLUCONATE 15 MILLILITER(S): 213 SOLUTION TOPICAL at 05:23

## 2018-11-23 RX ADMIN — Medication 5 MILLIGRAM(S): at 16:55

## 2018-11-23 RX ADMIN — BROMOCRIPTINE MESYLATE 10 MILLIGRAM(S): 5 CAPSULE ORAL at 13:39

## 2018-11-23 RX ADMIN — ALBUTEROL 2.5 MILLIGRAM(S): 90 AEROSOL, METERED ORAL at 20:24

## 2018-11-23 RX ADMIN — BROMOCRIPTINE MESYLATE 10 MILLIGRAM(S): 5 CAPSULE ORAL at 05:22

## 2018-11-23 RX ADMIN — Medication 1 MILLIGRAM(S): at 13:39

## 2018-11-23 RX ADMIN — Medication 20 MILLIGRAM(S): at 18:57

## 2018-11-23 RX ADMIN — Medication 5 MILLIGRAM(S): at 21:28

## 2018-11-23 RX ADMIN — ALBUTEROL 2.5 MILLIGRAM(S): 90 AEROSOL, METERED ORAL at 03:36

## 2018-11-23 NOTE — PROGRESS NOTE ADULT - SUBJECTIVE AND OBJECTIVE BOX
INTERVAL HPI/OVERNIGHT EVENTS: No acute events overnight.    Patient stable      MEDICATIONS  (STANDING):  ALBUTerol    0.083% 2.5 milliGRAM(s) Nebulizer every 6 hours  bromocriptine Tablet 10 milliGRAM(s) Oral <User Schedule>  chlorhexidine 0.12% Liquid 15 milliLiter(s) Swish and Spit two times a day  enoxaparin Injectable 40 milliGRAM(s) SubCutaneous daily  folic acid 1 milliGRAM(s) Oral daily  levETIRAcetam  Solution 1000 milliGRAM(s) Oral two times a day  melatonin 5 milliGRAM(s) Oral at bedtime  modafinil 100 milliGRAM(s) Oral <User Schedule>  phytonadione   Solution 5 milliGRAM(s) Oral daily  propranolol 20 milliGRAM(s) Oral every 6 hours  spironolactone 25 milliGRAM(s) Oral daily  thiamine 100 milliGRAM(s) Oral daily    MEDICATIONS  (PRN):  acetaminophen    Suspension .. 650 milliGRAM(s) Oral every 6 hours PRN Temp greater or equal to 38.5C (101.3F)      Vital Signs Last 24 Hrs  T(C): 36.6 (22 Nov 2018 23:30), Max: 36.6 (22 Nov 2018 23:30)  T(F): 97.9 (22 Nov 2018 23:30), Max: 97.9 (22 Nov 2018 23:30)  HR: 105 (23 Nov 2018 05:21) (70 - 105)  BP: 103/68 (23 Nov 2018 05:21) (103/68 - 106/77)  BP(mean): --  RR: 19 (22 Nov 2018 23:30) (19 - 20)  SpO2: 100% (23 Nov 2018 03:37) (96% - 100%)    onstitutional: patient resting comfortably in bed, in no acute distress  HEENT: EOMI / PERRL b/l, tongue nonbloody, bite block intact, helmet+  Respiratory: CTAB with respirations are unlabored, no accessory muscle use, no conversational dyspnea  Cardiovascular: regular rate & rhythm  Gastrointestinal: Abdomen soft, non-tender, non-distended, no rebound tenderness / guarding       I&O's Detail    22 Nov 2018 07:01  -  23 Nov 2018 07:00  --------------------------------------------------------  IN:    Enteral Tube Flush: 200 mL    Pivot: 600 mL  Total IN: 800 mL    OUT:    Incontinent per Condom Catheter: 400 mL  Total OUT: 400 mL    Total NET: 400 mL          LABS:                RADIOLOGY & ADDITIONAL STUDIES:

## 2018-11-23 NOTE — PROGRESS NOTE ADULT - ASSESSMENT
32 year old male found down with TBI, now s/p trach/PEG currently made DNR/DNI with no escalation of care if clinical condition deteriorates. Patient tongue persistently continues to come out, however has been easily reducial    Plan:    -f/u q weekly labs today

## 2018-11-24 PROCEDURE — 71045 X-RAY EXAM CHEST 1 VIEW: CPT | Mod: 26

## 2018-11-24 RX ORDER — ASCORBIC ACID 60 MG
500 TABLET,CHEWABLE ORAL DAILY
Qty: 0 | Refills: 0 | Status: DISCONTINUED | OUTPATIENT
Start: 2018-11-24 | End: 2018-11-24

## 2018-11-24 RX ORDER — SENNA PLUS 8.6 MG/1
10 TABLET ORAL DAILY
Qty: 0 | Refills: 0 | Status: DISCONTINUED | OUTPATIENT
Start: 2018-11-24 | End: 2018-12-31

## 2018-11-24 RX ORDER — POLYETHYLENE GLYCOL 3350 17 G/17G
17 POWDER, FOR SOLUTION ORAL
Qty: 0 | Refills: 0 | Status: DISCONTINUED | OUTPATIENT
Start: 2018-11-24 | End: 2018-12-31

## 2018-11-24 RX ORDER — MAGNESIUM HYDROXIDE 400 MG/1
30 TABLET, CHEWABLE ORAL DAILY
Qty: 0 | Refills: 0 | Status: DISCONTINUED | OUTPATIENT
Start: 2018-11-24 | End: 2018-12-03

## 2018-11-24 RX ORDER — DOCUSATE SODIUM 100 MG
100 CAPSULE ORAL THREE TIMES A DAY
Qty: 0 | Refills: 0 | Status: DISCONTINUED | OUTPATIENT
Start: 2018-11-24 | End: 2018-11-24

## 2018-11-24 RX ORDER — ASCORBIC ACID 60 MG
500 TABLET,CHEWABLE ORAL DAILY
Qty: 0 | Refills: 0 | Status: DISCONTINUED | OUTPATIENT
Start: 2018-11-24 | End: 2018-12-31

## 2018-11-24 RX ADMIN — ALBUTEROL 2.5 MILLIGRAM(S): 90 AEROSOL, METERED ORAL at 03:51

## 2018-11-24 RX ADMIN — LEVETIRACETAM 1000 MILLIGRAM(S): 250 TABLET, FILM COATED ORAL at 05:24

## 2018-11-24 RX ADMIN — ALBUTEROL 2.5 MILLIGRAM(S): 90 AEROSOL, METERED ORAL at 15:21

## 2018-11-24 RX ADMIN — ALBUTEROL 2.5 MILLIGRAM(S): 90 AEROSOL, METERED ORAL at 08:44

## 2018-11-24 RX ADMIN — ALBUTEROL 2.5 MILLIGRAM(S): 90 AEROSOL, METERED ORAL at 20:56

## 2018-11-24 RX ADMIN — Medication 1 MILLIGRAM(S): at 14:52

## 2018-11-24 RX ADMIN — LEVETIRACETAM 1000 MILLIGRAM(S): 250 TABLET, FILM COATED ORAL at 17:55

## 2018-11-24 RX ADMIN — BROMOCRIPTINE MESYLATE 10 MILLIGRAM(S): 5 CAPSULE ORAL at 14:44

## 2018-11-24 RX ADMIN — Medication 100 MILLIGRAM(S): at 16:06

## 2018-11-24 RX ADMIN — CHLORHEXIDINE GLUCONATE 15 MILLILITER(S): 213 SOLUTION TOPICAL at 05:24

## 2018-11-24 RX ADMIN — MODAFINIL 100 MILLIGRAM(S): 200 TABLET ORAL at 05:24

## 2018-11-24 RX ADMIN — SENNA PLUS 10 MILLILITER(S): 8.6 TABLET ORAL at 16:06

## 2018-11-24 RX ADMIN — Medication 5 MILLIGRAM(S): at 23:00

## 2018-11-24 RX ADMIN — SPIRONOLACTONE 25 MILLIGRAM(S): 25 TABLET, FILM COATED ORAL at 05:24

## 2018-11-24 RX ADMIN — Medication 5 MILLIGRAM(S): at 14:44

## 2018-11-24 RX ADMIN — ENOXAPARIN SODIUM 40 MILLIGRAM(S): 100 INJECTION SUBCUTANEOUS at 14:44

## 2018-11-24 RX ADMIN — CHLORHEXIDINE GLUCONATE 15 MILLILITER(S): 213 SOLUTION TOPICAL at 17:55

## 2018-11-24 RX ADMIN — MODAFINIL 100 MILLIGRAM(S): 200 TABLET ORAL at 14:53

## 2018-11-24 RX ADMIN — Medication 500 MILLIGRAM(S): at 14:53

## 2018-11-24 RX ADMIN — BROMOCRIPTINE MESYLATE 10 MILLIGRAM(S): 5 CAPSULE ORAL at 05:23

## 2018-11-24 NOTE — PROGRESS NOTE ADULT - ASSESSMENT
32 year old male found down with TBI, now s/p trach/PEG currently made DNR/DNI with no escalation of care if clinical condition deteriorates, pending complicated disposition for citizenship via [employer]    Plan:   - pain control as needed  - bite block as needed  - Continue current course - tracheostomy, PEG both in place (TFs continue)  - No escalation of care per family  - f/u SW for citizenship  - Next of kin: Hung Clemente (uncle)  - TBI guidelines  - weekly labs qFriday  - per nutrition: increase TF rate to 60ml/hr (x 20 hours) to provide 1200ml/day (1800kcal, 116gm protein) to meet increased needs, Add MVI and Vit C 500mg daily , Prostat 1 pkt BID to provide 200kcal and 30g PRO via PEG 32 year old male found down with TBI, now s/p trach/PEG currently made DNR/DNI with no escalation of care if clinical condition deteriorates, pending complicated disposition for citizenship via [employer]    Plan:   - pain control as needed  - bite block as needed  - Continue current course - tracheostomy, PEG both in place (TFs continue)  - No escalation of care per family  - f/u SW for citizenship  - Next of kin: Hung Bryn (uncle)  - TBI guidelines  - weekly labs qFriday  - per nutrition: increase TF rate to 60ml/hr (x 20 hours) to provide 1200ml/day (1800kcal, 116gm protein) to meet increased needs, Add MVI (no PEG friendly version) and Vit C 500mg daily , Prostat 1 pkt BID to provide 200kcal and 30g PRO via PEG  - bowel reg

## 2018-11-24 NOTE — PROGRESS NOTE ADULT - SUBJECTIVE AND OBJECTIVE BOX
Acute Care Surgery /Trauma PROGRESS NOTE:     SUBJECTIVE: Pt seen and examined at bedside. No acute overnight events. Pain well controlled. Tolerating diet, no n/v. Voiding well. Passing flatus, normal BM. Denies f/c/sob/cp. Ambulatory OOB    Vital Signs Last 24 Hrs  T(C): 36.6 (24 Nov 2018 07:26), Max: 37.7 (23 Nov 2018 15:32)  T(F): 97.9 (24 Nov 2018 07:26), Max: 99.8 (23 Nov 2018 15:32)  HR: 98 (24 Nov 2018 09:10) (76 - 109)  BP: 96/61 (24 Nov 2018 07:26) (93/58 - 121/67)  BP(mean): --  RR: 19 (24 Nov 2018 07:26) (19 - 20)  SpO2: 99% (24 Nov 2018 09:10) (92% - 99%)    OBJECTIVE:  Gen: NAD  HEENT: helmet intact, trach intact with thick secretions, tongue protruded easily reducible  CV: RRR  R: No respiratory distress  Abd: soft, nondistended, nontender, no guarding or rebound. No peritoneal signs. PEG intact no erythema or drainge from side  Extrem: No pedal edema    I&O's Detail    23 Nov 2018 07:01  -  24 Nov 2018 07:00  --------------------------------------------------------  IN:    Pivot: 800 mL  Total IN: 800 mL    OUT:    Incontinent per Collection Bag: 3 mL    Incontinent per Condom Catheter: 325 mL  Total OUT: 328 mL    Total NET: 472 mL          MEDICATIONS  (STANDING):  ALBUTerol    0.083% 2.5 milliGRAM(s) Nebulizer every 6 hours  bromocriptine Tablet 10 milliGRAM(s) Oral <User Schedule>  chlorhexidine 0.12% Liquid 15 milliLiter(s) Swish and Spit two times a day  enoxaparin Injectable 40 milliGRAM(s) SubCutaneous daily  folic acid 1 milliGRAM(s) Oral daily  levETIRAcetam  Solution 1000 milliGRAM(s) Oral two times a day  melatonin 5 milliGRAM(s) Oral at bedtime  modafinil 100 milliGRAM(s) Oral <User Schedule>  phytonadione   Solution 5 milliGRAM(s) Oral daily  propranolol 20 milliGRAM(s) Oral every 6 hours  spironolactone 25 milliGRAM(s) Oral daily  thiamine 100 milliGRAM(s) Oral daily    MEDICATIONS  (PRN):  acetaminophen    Suspension .. 650 milliGRAM(s) Oral every 6 hours PRN Temp greater or equal to 38.5C (101.3F)      LABS:                        8.8    4.1   )-----------( 115      ( 23 Nov 2018 16:16 )             27.2     11-23    135  |  96<L>  |  27.0<H>  ----------------------------<  114  4.2   |  26.0  |  0.42<L>    Ca    10.2      23 Nov 2018 16:16 Acute Care Surgery /Trauma PROGRESS NOTE:     SUBJECTIVE: Pt seen and examined at bedside. No acute overnight events. Continues thick secretions but slowly decreasing in amount    Vital Signs Last 24 Hrs  T(C): 36.6 (24 Nov 2018 07:26), Max: 37.7 (23 Nov 2018 15:32)  T(F): 97.9 (24 Nov 2018 07:26), Max: 99.8 (23 Nov 2018 15:32)  HR: 98 (24 Nov 2018 09:10) (76 - 109)  BP: 96/61 (24 Nov 2018 07:26) (93/58 - 121/67)  BP(mean): --  RR: 19 (24 Nov 2018 07:26) (19 - 20)  SpO2: 99% (24 Nov 2018 09:10) (92% - 99%)    OBJECTIVE:  Gen: NAD  HEENT: helmet intact, trach intact with thick secretions, tongue protruded easily reducible  CV: RRR  R: No respiratory distress  Abd: soft, nondistended, nontender, no guarding or rebound. No peritoneal signs. PEG intact no erythema or drainge from side  Extrem: No pedal edema    I&O's Detail    23 Nov 2018 07:01  -  24 Nov 2018 07:00  --------------------------------------------------------  IN:    Pivot: 800 mL  Total IN: 800 mL    OUT:    Incontinent per Collection Bag: 3 mL    Incontinent per Condom Catheter: 325 mL  Total OUT: 328 mL    Total NET: 472 mL          MEDICATIONS  (STANDING):  ALBUTerol    0.083% 2.5 milliGRAM(s) Nebulizer every 6 hours  bromocriptine Tablet 10 milliGRAM(s) Oral <User Schedule>  chlorhexidine 0.12% Liquid 15 milliLiter(s) Swish and Spit two times a day  enoxaparin Injectable 40 milliGRAM(s) SubCutaneous daily  folic acid 1 milliGRAM(s) Oral daily  levETIRAcetam  Solution 1000 milliGRAM(s) Oral two times a day  melatonin 5 milliGRAM(s) Oral at bedtime  modafinil 100 milliGRAM(s) Oral <User Schedule>  phytonadione   Solution 5 milliGRAM(s) Oral daily  propranolol 20 milliGRAM(s) Oral every 6 hours  spironolactone 25 milliGRAM(s) Oral daily  thiamine 100 milliGRAM(s) Oral daily    MEDICATIONS  (PRN):  acetaminophen    Suspension .. 650 milliGRAM(s) Oral every 6 hours PRN Temp greater or equal to 38.5C (101.3F)      LABS:                        8.8    4.1   )-----------( 115      ( 23 Nov 2018 16:16 )             27.2     11-23    135  |  96<L>  |  27.0<H>  ----------------------------<  114  4.2   |  26.0  |  0.42<L>    Ca    10.2      23 Nov 2018 16:16

## 2018-11-25 RX ADMIN — Medication 100 MILLIGRAM(S): at 12:10

## 2018-11-25 RX ADMIN — SPIRONOLACTONE 25 MILLIGRAM(S): 25 TABLET, FILM COATED ORAL at 05:35

## 2018-11-25 RX ADMIN — MODAFINIL 100 MILLIGRAM(S): 200 TABLET ORAL at 05:40

## 2018-11-25 RX ADMIN — LEVETIRACETAM 1000 MILLIGRAM(S): 250 TABLET, FILM COATED ORAL at 17:57

## 2018-11-25 RX ADMIN — Medication 1 MILLIGRAM(S): at 12:11

## 2018-11-25 RX ADMIN — Medication 5 MILLIGRAM(S): at 22:27

## 2018-11-25 RX ADMIN — Medication 20 MILLIGRAM(S): at 12:11

## 2018-11-25 RX ADMIN — ALBUTEROL 2.5 MILLIGRAM(S): 90 AEROSOL, METERED ORAL at 03:48

## 2018-11-25 RX ADMIN — Medication 5 MILLIGRAM(S): at 12:14

## 2018-11-25 RX ADMIN — Medication 20 MILLIGRAM(S): at 08:18

## 2018-11-25 RX ADMIN — Medication 20 MILLIGRAM(S): at 22:27

## 2018-11-25 RX ADMIN — ENOXAPARIN SODIUM 40 MILLIGRAM(S): 100 INJECTION SUBCUTANEOUS at 12:13

## 2018-11-25 RX ADMIN — LEVETIRACETAM 1000 MILLIGRAM(S): 250 TABLET, FILM COATED ORAL at 05:35

## 2018-11-25 RX ADMIN — CHLORHEXIDINE GLUCONATE 15 MILLILITER(S): 213 SOLUTION TOPICAL at 05:35

## 2018-11-25 RX ADMIN — ALBUTEROL 2.5 MILLIGRAM(S): 90 AEROSOL, METERED ORAL at 15:13

## 2018-11-25 RX ADMIN — ALBUTEROL 2.5 MILLIGRAM(S): 90 AEROSOL, METERED ORAL at 20:30

## 2018-11-25 RX ADMIN — Medication 20 MILLIGRAM(S): at 17:56

## 2018-11-25 RX ADMIN — BROMOCRIPTINE MESYLATE 10 MILLIGRAM(S): 5 CAPSULE ORAL at 05:35

## 2018-11-25 RX ADMIN — ALBUTEROL 2.5 MILLIGRAM(S): 90 AEROSOL, METERED ORAL at 08:59

## 2018-11-25 RX ADMIN — BROMOCRIPTINE MESYLATE 10 MILLIGRAM(S): 5 CAPSULE ORAL at 12:12

## 2018-11-25 RX ADMIN — Medication 500 MILLIGRAM(S): at 12:13

## 2018-11-25 RX ADMIN — MODAFINIL 100 MILLIGRAM(S): 200 TABLET ORAL at 12:10

## 2018-11-25 RX ADMIN — CHLORHEXIDINE GLUCONATE 15 MILLILITER(S): 213 SOLUTION TOPICAL at 17:54

## 2018-11-25 NOTE — PROGRESS NOTE ADULT - ATTENDING COMMENTS
bloody tracheal aspirate noted prompting CXR and reinflation of trach cuff. No further blood noted from trach. CXR appears clean. Cuff deflated. Bite block to prevent patient clamping down on his tongue.

## 2018-11-25 NOTE — PROGRESS NOTE ADULT - ASSESSMENT
32 year old male found down with TBI, now s/p trach/PEG currently made DNR/DNI with no escalation of care if clinical condition deteriorates, pending complicated disposition for citizenship via [employer]    Plan:   - pain control as needed  - bite block as needed  - Continue current course - tracheostomy, PEG both in place (TFs continue)  - f/u SW for citizenship  - TBI guidelines  - weekly labs qFriday

## 2018-11-25 NOTE — PROGRESS NOTE ADULT - SUBJECTIVE AND OBJECTIVE BOX
INTERVAL HPI/OVERNIGHT EVENTS:    SUBJECTIVE:  Overnight, pt was aspirating blood more than usual as per RT.  Cuff was reinflated and CXR was performed which was unchanged from baseline CXR.  Pt's tongue continues to stick out and be bitten on.  Multiple times, tongue has been pushed back by team overnight, although much more easily pushed back in than before.      MEDICATIONS  (STANDING):  ALBUTerol    0.083% 2.5 milliGRAM(s) Nebulizer every 6 hours  ascorbic acid 500 milliGRAM(s) Oral daily  bromocriptine Tablet 10 milliGRAM(s) Oral <User Schedule>  chlorhexidine 0.12% Liquid 15 milliLiter(s) Swish and Spit two times a day  enoxaparin Injectable 40 milliGRAM(s) SubCutaneous daily  folic acid 1 milliGRAM(s) Oral daily  levETIRAcetam  Solution 1000 milliGRAM(s) Oral two times a day  melatonin 5 milliGRAM(s) Oral at bedtime  modafinil 100 milliGRAM(s) Oral <User Schedule>  phytonadione   Solution 5 milliGRAM(s) Oral daily  propranolol 20 milliGRAM(s) Oral every 6 hours  senna Syrup 10 milliLiter(s) Oral daily  spironolactone 25 milliGRAM(s) Oral daily  thiamine 100 milliGRAM(s) Oral daily    MEDICATIONS  (PRN):  acetaminophen    Suspension .. 650 milliGRAM(s) Oral every 6 hours PRN Temp greater or equal to 38.5C (101.3F)  magnesium hydroxide Suspension 30 milliLiter(s) Oral daily PRN if no BM in the last 24 hours  polyethylene glycol 3350 17 Gram(s) Oral two times a day PRN if not BM in the last 24 hours      Vital Signs Last 24 Hrs  T(C): 37.1 (25 Nov 2018 07:00), Max: 38 (24 Nov 2018 16:03)  T(F): 98.8 (25 Nov 2018 07:00), Max: 100.4 (24 Nov 2018 16:03)  HR: 119 (25 Nov 2018 07:00) (92 - 119)  BP: 116/76 (25 Nov 2018 07:00) (99/52 - 116/76)  BP(mean): --  RR: 19 (25 Nov 2018 00:15) (18 - 19)  SpO2: 97% (25 Nov 2018 07:00) (95% - 98%)    PE  Gen: NAD  HEENT: helmet intact, trach intact with thick secretions, tongue protruded easily reducible  CV: RRR  Resp: No respiratory distress  Abd: soft, nt, nd, no guarding or rebound. PEG intact no erythema or drainage from side  Ext: no swelling, cyanosis, or clubbing; decerebrate positioning now is beginning to be present on right arm      I&O's Detail    24 Nov 2018 07:01  -  25 Nov 2018 07:00  --------------------------------------------------------  IN:    Enteral Tube Flush: 300 mL    Pivot: 660 mL  Total IN: 960 mL    OUT:    Incontinent per Condom Catheter: 400 mL  Total OUT: 400 mL    Total NET: 560 mL          LABS:                        8.8    4.1   )-----------( 115      ( 23 Nov 2018 16:16 )             27.2     11-23    135  |  96<L>  |  27.0<H>  ----------------------------<  114  4.2   |  26.0  |  0.42<L>    Ca    10.2      23 Nov 2018 16:16            RADIOLOGY & ADDITIONAL STUDIES:

## 2018-11-26 PROCEDURE — 99233 SBSQ HOSP IP/OBS HIGH 50: CPT | Mod: GC

## 2018-11-26 RX ORDER — AMANTADINE HCL 100 MG
100 CAPSULE ORAL
Qty: 0 | Refills: 0 | Status: DISCONTINUED | OUTPATIENT
Start: 2018-11-26 | End: 2018-12-05

## 2018-11-26 RX ORDER — AMANTADINE HCL 100 MG
100 CAPSULE ORAL
Qty: 0 | Refills: 0 | Status: DISCONTINUED | OUTPATIENT
Start: 2018-11-26 | End: 2018-11-26

## 2018-11-26 RX ADMIN — Medication 1 MILLIGRAM(S): at 12:05

## 2018-11-26 RX ADMIN — BROMOCRIPTINE MESYLATE 10 MILLIGRAM(S): 5 CAPSULE ORAL at 04:45

## 2018-11-26 RX ADMIN — Medication 500 MILLIGRAM(S): at 12:05

## 2018-11-26 RX ADMIN — SENNA PLUS 10 MILLILITER(S): 8.6 TABLET ORAL at 12:04

## 2018-11-26 RX ADMIN — MODAFINIL 100 MILLIGRAM(S): 200 TABLET ORAL at 04:45

## 2018-11-26 RX ADMIN — CHLORHEXIDINE GLUCONATE 15 MILLILITER(S): 213 SOLUTION TOPICAL at 17:17

## 2018-11-26 RX ADMIN — ENOXAPARIN SODIUM 40 MILLIGRAM(S): 100 INJECTION SUBCUTANEOUS at 12:05

## 2018-11-26 RX ADMIN — Medication 100 MILLIGRAM(S): at 12:05

## 2018-11-26 RX ADMIN — ALBUTEROL 2.5 MILLIGRAM(S): 90 AEROSOL, METERED ORAL at 09:20

## 2018-11-26 RX ADMIN — Medication 5 MILLIGRAM(S): at 12:05

## 2018-11-26 RX ADMIN — LEVETIRACETAM 1000 MILLIGRAM(S): 250 TABLET, FILM COATED ORAL at 17:25

## 2018-11-26 RX ADMIN — LEVETIRACETAM 1000 MILLIGRAM(S): 250 TABLET, FILM COATED ORAL at 04:45

## 2018-11-26 RX ADMIN — ALBUTEROL 2.5 MILLIGRAM(S): 90 AEROSOL, METERED ORAL at 20:59

## 2018-11-26 RX ADMIN — ALBUTEROL 2.5 MILLIGRAM(S): 90 AEROSOL, METERED ORAL at 03:55

## 2018-11-26 RX ADMIN — ALBUTEROL 2.5 MILLIGRAM(S): 90 AEROSOL, METERED ORAL at 15:51

## 2018-11-26 RX ADMIN — CHLORHEXIDINE GLUCONATE 15 MILLILITER(S): 213 SOLUTION TOPICAL at 04:44

## 2018-11-26 RX ADMIN — Medication 5 MILLIGRAM(S): at 22:18

## 2018-11-26 RX ADMIN — SPIRONOLACTONE 25 MILLIGRAM(S): 25 TABLET, FILM COATED ORAL at 04:45

## 2018-11-26 RX ADMIN — MODAFINIL 100 MILLIGRAM(S): 200 TABLET ORAL at 12:06

## 2018-11-26 NOTE — PROGRESS NOTE ADULT - SUBJECTIVE AND OBJECTIVE BOX
INTERVAL HPI/OVERNIGHT EVENTS: No acute events overnight. Bite blocks with gauze wrapped around tongue depressors were placed into patient's molars b/l to prevent the jaw from clamping onto his tongue    SUBJECTIVE: Feeling well this AM. No fevers/chills, no N/V.       MEDICATIONS  (STANDING):  ALBUTerol    0.083% 2.5 milliGRAM(s) Nebulizer every 6 hours  ascorbic acid 500 milliGRAM(s) Oral daily  bromocriptine Tablet 10 milliGRAM(s) Oral <User Schedule>  chlorhexidine 0.12% Liquid 15 milliLiter(s) Swish and Spit two times a day  enoxaparin Injectable 40 milliGRAM(s) SubCutaneous daily  folic acid 1 milliGRAM(s) Oral daily  levETIRAcetam  Solution 1000 milliGRAM(s) Oral two times a day  melatonin 5 milliGRAM(s) Oral at bedtime  modafinil 100 milliGRAM(s) Oral <User Schedule>  phytonadione   Solution 5 milliGRAM(s) Oral daily  propranolol 20 milliGRAM(s) Oral every 6 hours  senna Syrup 10 milliLiter(s) Oral daily  spironolactone 25 milliGRAM(s) Oral daily  thiamine 100 milliGRAM(s) Oral daily    MEDICATIONS  (PRN):  acetaminophen    Suspension .. 650 milliGRAM(s) Oral every 6 hours PRN Temp greater or equal to 38.5C (101.3F)  magnesium hydroxide Suspension 30 milliLiter(s) Oral daily PRN if no BM in the last 24 hours  polyethylene glycol 3350 17 Gram(s) Oral two times a day PRN if not BM in the last 24 hours      Vital Signs Last 24 Hrs  T(C): 37.2 (26 Nov 2018 08:09), Max: 37.5 (25 Nov 2018 16:57)  T(F): 98.9 (26 Nov 2018 08:09), Max: 99.5 (25 Nov 2018 16:57)  HR: 90 (26 Nov 2018 09:46) (82 - 100)  BP: 106/64 (26 Nov 2018 08:09) (90/59 - 110/65)  BP(mean): --  RR: 18 (26 Nov 2018 08:09) (18 - 18)  SpO2: 95% (26 Nov 2018 08:09) (94% - 98%)    PE  Gen: NAD  HEENT: helmet intact, trach intact with thick secretions, tongue protruded easily reducible  CV: RRR  Resp: No respiratory distress  Abd: soft, nt, nd, no guarding or rebound. PEG intact no erythema or drainage from side  Ext: no swelling, cyanosis, or clubbing; decerebrate positioning now is beginning to be present on right arm    I&O's Detail    25 Nov 2018 07:01  -  26 Nov 2018 07:00  --------------------------------------------------------  IN:    Pivot: 780 mL  Total IN: 780 mL    OUT:  Total OUT: 0 mL    Total NET: 780 mL          LABS:                RADIOLOGY & ADDITIONAL STUDIES:

## 2018-11-26 NOTE — PROGRESS NOTE ADULT - SUBJECTIVE AND OBJECTIVE BOX
SUBJECTIVE  Patient seen and examined. Reports of increased bloody secretions from tongue over the weekend. Able to open eyes to tactile stimuli (sternal rub) but not sustained. No visual fixation. Will change neurostimulants to potentially help arousal.    TODAY'S REVIEW OF SYMPTOMS  Unable to obtain    VITALS  T(C): 37.2 (11-26-18 @ 08:09)  T(F): 98.9 (11-26-18 @ 08:09), Max: 99.5 (11-25-18 @ 16:57)  HR: 90 (11-26-18 @ 09:46) (82 - 100)  BP: 106/64 (11-26-18 @ 08:09) (90/59 - 110/65)  RR:  (18 - 18)  SpO2:  (94% - 98%)  Wt(kg): --    RECENT LABS/IMAGING  ---------    MEDICATIONS   MEDICATIONS  (STANDING):  ALBUTerol    0.083% 2.5 milliGRAM(s) Nebulizer every 6 hours  amantadine 100 milliGRAM(s) Oral <User Schedule>  ascorbic acid 500 milliGRAM(s) Oral daily  chlorhexidine 0.12% Liquid 15 milliLiter(s) Swish and Spit two times a day  enoxaparin Injectable 40 milliGRAM(s) SubCutaneous daily  folic acid 1 milliGRAM(s) Oral daily  levETIRAcetam  Solution 1000 milliGRAM(s) Oral two times a day  melatonin 5 milliGRAM(s) Oral at bedtime  modafinil 100 milliGRAM(s) Oral <User Schedule>  phytonadione   Solution 5 milliGRAM(s) Oral daily  propranolol 20 milliGRAM(s) Oral every 6 hours  senna Syrup 10 milliLiter(s) Oral daily  spironolactone 25 milliGRAM(s) Oral daily  thiamine 100 milliGRAM(s) Oral daily    MEDICATIONS  (PRN):  acetaminophen    Suspension .. 650 milliGRAM(s) Oral every 6 hours PRN Temp greater or equal to 38.5C (101.3F)  magnesium hydroxide Suspension 30 milliLiter(s) Oral daily PRN if no BM in the last 24 hours  polyethylene glycol 3350 17 Gram(s) Oral two times a day PRN if not BM in the last 24 hours    PHYSICAL EXAM  HEENT - Cranial incisions with mild reduction in swelling on left side - helmet in place, (+) Tongue swelling with two blocks in place, Eye opening to tactile stimuli, (+) Auditory startle, (+) Visual startle  Cardiovascular - All extremities warm, Diffuse edema  Pulm - (+) Trach collar   Extremities - Diffuse swelling, Extensor posturing to pain response primarily in upper extremities  Reflexes - +Babinski and Meza's bilaterally, DTR's - Patella 3+/4, Felipe's 2+/4, No clonus appreciated in bilateral lower extremities    Coma Recovery Scale - Revised  AUDITORY FUNCTION SCALE  1 - Auditory startle  VISUAL FUNCTION SCALE  1 - Visual Startle   MOTOR FUNCTION SCALE  1 - Abnormal Posturing (extensor posturing)  OROMOTOR/VERBAL FUNCTION SCALE  1 - Oral reflexive movements  COMMUNICATION SCALE  0 - None  AROUSAL SCALE  1 - Eye opening with stimulation    TOTAL SCORE = 5 = Vegetative state    ASSESSMENT/PLAN  32y Male unknown PMH found unresponsive on sidewalk found with bilateral SDH and SAH now s/p bilateral hemicraniectomy and EVD placement s/p removal. Coma x 12 days, Vegetative State at Day 13 (11/3)     Severe TBI - Continues to be in vegetative state. Will adjust neurostimulants - discontinue Bromocriptine (11/26) and start Amantadine 100mg Q6AM/Q12PM (11/26)  Sleep/Wake Cycles - Modafinil 100mg Q6AM/Q12PM (increased 11/14), Melatonin 5mg HS (10/31), Discontinue Bromocriptine (11/26) and start Amantadine 100mg Q6AM/Q12PM (11/26)  Hepatic encephalopathy - Vit K, Spironolactone  Possible seizure activity - Keppra   Dysautonomia - Propranolol, Tylenol PRN  Oral care - Aggressive oral care, Chlorhexidine, Suctioning PRN  DVT PPX - SCDs, Lovenox  Precautions - Aspiration  Rehab - COMA STIM by all services. Will continue to follow

## 2018-11-27 PROCEDURE — 99233 SBSQ HOSP IP/OBS HIGH 50: CPT | Mod: GC

## 2018-11-27 RX ADMIN — Medication 100 MILLIGRAM(S): at 12:17

## 2018-11-27 RX ADMIN — MODAFINIL 100 MILLIGRAM(S): 200 TABLET ORAL at 06:02

## 2018-11-27 RX ADMIN — Medication 5 MILLIGRAM(S): at 23:01

## 2018-11-27 RX ADMIN — ALBUTEROL 2.5 MILLIGRAM(S): 90 AEROSOL, METERED ORAL at 03:17

## 2018-11-27 RX ADMIN — Medication 1 MILLIGRAM(S): at 12:16

## 2018-11-27 RX ADMIN — ALBUTEROL 2.5 MILLIGRAM(S): 90 AEROSOL, METERED ORAL at 21:45

## 2018-11-27 RX ADMIN — CHLORHEXIDINE GLUCONATE 15 MILLILITER(S): 213 SOLUTION TOPICAL at 17:58

## 2018-11-27 RX ADMIN — MODAFINIL 100 MILLIGRAM(S): 200 TABLET ORAL at 12:16

## 2018-11-27 RX ADMIN — Medication 100 MILLIGRAM(S): at 06:02

## 2018-11-27 RX ADMIN — SENNA PLUS 10 MILLILITER(S): 8.6 TABLET ORAL at 12:15

## 2018-11-27 RX ADMIN — CHLORHEXIDINE GLUCONATE 15 MILLILITER(S): 213 SOLUTION TOPICAL at 06:03

## 2018-11-27 RX ADMIN — ENOXAPARIN SODIUM 40 MILLIGRAM(S): 100 INJECTION SUBCUTANEOUS at 12:16

## 2018-11-27 RX ADMIN — LEVETIRACETAM 1000 MILLIGRAM(S): 250 TABLET, FILM COATED ORAL at 17:58

## 2018-11-27 RX ADMIN — Medication 100 MILLIGRAM(S): at 12:16

## 2018-11-27 RX ADMIN — SPIRONOLACTONE 25 MILLIGRAM(S): 25 TABLET, FILM COATED ORAL at 06:02

## 2018-11-27 RX ADMIN — Medication 5 MILLIGRAM(S): at 12:15

## 2018-11-27 RX ADMIN — ALBUTEROL 2.5 MILLIGRAM(S): 90 AEROSOL, METERED ORAL at 09:13

## 2018-11-27 RX ADMIN — LEVETIRACETAM 1000 MILLIGRAM(S): 250 TABLET, FILM COATED ORAL at 06:02

## 2018-11-27 RX ADMIN — ALBUTEROL 2.5 MILLIGRAM(S): 90 AEROSOL, METERED ORAL at 15:17

## 2018-11-27 RX ADMIN — Medication 500 MILLIGRAM(S): at 12:16

## 2018-11-27 NOTE — PROGRESS NOTE ADULT - SUBJECTIVE AND OBJECTIVE BOX
INTERVAL HPI/OVERNIGHT EVENTS:    SUBJECTIVE: Feeling well this AM. No fevers/chills, no N/V.       MEDICATIONS  (STANDING):  ALBUTerol    0.083% 2.5 milliGRAM(s) Nebulizer every 6 hours  amantadine 100 milliGRAM(s) Oral <User Schedule>  ascorbic acid 500 milliGRAM(s) Oral daily  chlorhexidine 0.12% Liquid 15 milliLiter(s) Swish and Spit two times a day  enoxaparin Injectable 40 milliGRAM(s) SubCutaneous daily  folic acid 1 milliGRAM(s) Oral daily  levETIRAcetam  Solution 1000 milliGRAM(s) Oral two times a day  melatonin 5 milliGRAM(s) Oral at bedtime  modafinil 100 milliGRAM(s) Oral <User Schedule>  phytonadione   Solution 5 milliGRAM(s) Oral daily  propranolol 20 milliGRAM(s) Oral every 6 hours  senna Syrup 10 milliLiter(s) Oral daily  spironolactone 25 milliGRAM(s) Oral daily  thiamine 100 milliGRAM(s) Oral daily    MEDICATIONS  (PRN):  acetaminophen    Suspension .. 650 milliGRAM(s) Oral every 6 hours PRN Temp greater or equal to 38.5C (101.3F)  magnesium hydroxide Suspension 30 milliLiter(s) Oral daily PRN if no BM in the last 24 hours  polyethylene glycol 3350 17 Gram(s) Oral two times a day PRN if not BM in the last 24 hours      Vital Signs Last 24 Hrs  T(C): 36.8 (2018 08:00), Max: 36.8 (2018 08:00)  T(F): 98.3 (2018 08:00), Max: 98.3 (2018 08:00)  HR: 74 (2018 08:00) (74 - 95)  BP: 109/69 (2018 08:00) (96/60 - 109/69)  BP(mean): --  RR: 18 (2018 08:00) (18 - 18)  SpO2: 100% (2018 08:00) (95% - 100%)    PE  Gen: NAD  HEENT: helmet intact, trach intact with thick secretions, tongue protruded easily reducible  CV: RRR  Resp: No respiratory distress  Abd: soft, nt, nd, no guarding or rebound. PEG intact no erythema or drainage from side  Ext: no swelling, cyanosis, or clubbing; decerebrate positioning now is beginning to be present on right arm    I&O's Detail    2018 07:01  -  2018 07:00  --------------------------------------------------------  IN:    Enteral Tube Flush: 150 mL    Pivot: 1140 mL  Total IN: 1290 mL    OUT:    Incontinent per Collection Ba mL  Total OUT: 1 mL    Total NET: 1289 mL          LABS:                RADIOLOGY & ADDITIONAL STUDIES:

## 2018-11-27 NOTE — PROGRESS NOTE ADULT - ATTENDING COMMENTS
Agree with Dr. Vera.   Patient with more eye opening.   Some trace evidence of ?fixation.  Continue amantadine.   Will continue to follow for ongoing assist for improving arousal.

## 2018-11-27 NOTE — PROGRESS NOTE ADULT - SUBJECTIVE AND OBJECTIVE BOX
SUBJECTIVE  Patient seen and examined. Eyes spontaneously open during exam. Continues to have extension response to painful stimuli. No fixation visually.     TODAY'S REVIEW OF SYMPTOMS  Unable to obtain    VITALS  T(C): 36.8 (11-27-18 @ 08:00)  T(F): 98.3 (11-27-18 @ 08:00), Max: 98.3 (11-27-18 @ 08:00)  HR: 88 (11-27-18 @ 09:16) (74 - 95)  BP: 109/69 (11-27-18 @ 08:00) (96/60 - 109/69)  RR:  (18 - 18)  SpO2:  (95% - 100%)  Wt(kg): --    RECENT LABS/IMAGING  --------    MEDICATIONS   MEDICATIONS  (STANDING):  ALBUTerol    0.083% 2.5 milliGRAM(s) Nebulizer every 6 hours  amantadine 100 milliGRAM(s) Oral <User Schedule>  ascorbic acid 500 milliGRAM(s) Oral daily  chlorhexidine 0.12% Liquid 15 milliLiter(s) Swish and Spit two times a day  enoxaparin Injectable 40 milliGRAM(s) SubCutaneous daily  folic acid 1 milliGRAM(s) Oral daily  levETIRAcetam  Solution 1000 milliGRAM(s) Oral two times a day  melatonin 5 milliGRAM(s) Oral at bedtime  phytonadione   Solution 5 milliGRAM(s) Oral daily  propranolol 20 milliGRAM(s) Oral every 6 hours  senna Syrup 10 milliLiter(s) Oral daily  spironolactone 25 milliGRAM(s) Oral daily  thiamine 100 milliGRAM(s) Oral daily    MEDICATIONS  (PRN):  acetaminophen    Suspension .. 650 milliGRAM(s) Oral every 6 hours PRN Temp greater or equal to 38.5C (101.3F)  magnesium hydroxide Suspension 30 milliLiter(s) Oral daily PRN if no BM in the last 24 hours  polyethylene glycol 3350 17 Gram(s) Oral two times a day PRN if not BM in the last 24 hours    PHYSICAL EXAM  HEENT - Cranial incisions with mild reduction in swelling on left side - helmet in place, (+) Tongue swelling with two blocks in place, Eye opening to tactile stimuli, (+) Auditory startle, (+) Visual startle  Cardiovascular - All extremities warm, Diffuse edema  Pulm - (+) Trach collar   Extremities - Diffuse swelling, Extensor posturing to pain response primarily in upper extremities  Reflexes - +Babinski and Meza's bilaterally, DTR's - Patella 3+/4, Felipe's 2+/4, No clonus appreciated in bilateral lower extremities    Coma Recovery Scale - Revised  AUDITORY FUNCTION SCALE  1 - Auditory startle  VISUAL FUNCTION SCALE  1 - Visual Startle   MOTOR FUNCTION SCALE  1 - Abnormal Posturing (extensor posturing)  OROMOTOR/VERBAL FUNCTION SCALE  1 - Oral reflexive movements  COMMUNICATION SCALE  0 - None  AROUSAL SCALE  2 - Eye opening without stimulation    TOTAL SCORE = 6 = Vegetative state    ASSESSMENT/PLAN  32y Male unknown PMH found unresponsive on sidewalk found with bilateral SDH and SAH now s/p bilateral hemicraniectomy and EVD placement s/p removal. Coma x 12 days, Vegetative State at Day 13 (11/3)     Sleep/Wake Cycles - Modafinil 100mg Q6AM/Q12PM (increased 11/14), Melatonin 5mg HS (10/31), Amantadine 100mg Q6AM/Q12PM (started 11/26), Discontinued Bromocriptine (11/26)  Hepatic encephalopathy - Vit K, Spironolactone  Possible seizure activity - Keppra   Dysautonomia - Propranolol, Tylenol PRN  Oral care - Aggressive oral care, Chlorhexidine, Suctioning PRN  DVT PPX - SCDs, Lovenox  Precautions - Aspiration  Rehab - COMA STIM by all services. Will continue to follow

## 2018-11-28 PROCEDURE — 99233 SBSQ HOSP IP/OBS HIGH 50: CPT | Mod: GC

## 2018-11-28 RX ADMIN — Medication 100 MILLIGRAM(S): at 12:19

## 2018-11-28 RX ADMIN — ALBUTEROL 2.5 MILLIGRAM(S): 90 AEROSOL, METERED ORAL at 09:59

## 2018-11-28 RX ADMIN — Medication 100 MILLIGRAM(S): at 12:20

## 2018-11-28 RX ADMIN — ALBUTEROL 2.5 MILLIGRAM(S): 90 AEROSOL, METERED ORAL at 03:45

## 2018-11-28 RX ADMIN — ALBUTEROL 2.5 MILLIGRAM(S): 90 AEROSOL, METERED ORAL at 21:50

## 2018-11-28 RX ADMIN — ALBUTEROL 2.5 MILLIGRAM(S): 90 AEROSOL, METERED ORAL at 14:30

## 2018-11-28 RX ADMIN — CHLORHEXIDINE GLUCONATE 15 MILLILITER(S): 213 SOLUTION TOPICAL at 17:28

## 2018-11-28 RX ADMIN — SENNA PLUS 10 MILLILITER(S): 8.6 TABLET ORAL at 12:18

## 2018-11-28 RX ADMIN — Medication 500 MILLIGRAM(S): at 12:19

## 2018-11-28 RX ADMIN — Medication 1 MILLIGRAM(S): at 12:19

## 2018-11-28 RX ADMIN — Medication 100 MILLIGRAM(S): at 05:04

## 2018-11-28 RX ADMIN — SPIRONOLACTONE 25 MILLIGRAM(S): 25 TABLET, FILM COATED ORAL at 05:04

## 2018-11-28 RX ADMIN — LEVETIRACETAM 1000 MILLIGRAM(S): 250 TABLET, FILM COATED ORAL at 05:04

## 2018-11-28 RX ADMIN — Medication 5 MILLIGRAM(S): at 17:28

## 2018-11-28 RX ADMIN — LEVETIRACETAM 1000 MILLIGRAM(S): 250 TABLET, FILM COATED ORAL at 17:28

## 2018-11-28 RX ADMIN — ENOXAPARIN SODIUM 40 MILLIGRAM(S): 100 INJECTION SUBCUTANEOUS at 12:18

## 2018-11-28 RX ADMIN — Medication 20 MILLIGRAM(S): at 17:28

## 2018-11-28 RX ADMIN — CHLORHEXIDINE GLUCONATE 15 MILLILITER(S): 213 SOLUTION TOPICAL at 05:04

## 2018-11-28 RX ADMIN — Medication 5 MILLIGRAM(S): at 22:45

## 2018-11-28 RX ADMIN — Medication 20 MILLIGRAM(S): at 12:18

## 2018-11-28 NOTE — PROGRESS NOTE ADULT - ASSESSMENT
32 year old male found down with TBI, now s/p trach/PEG currently made DNR/DNI with no escalation of care if clinical condition deteriorates, pending complicated disposition for citizenship     Plan:   - pain control as needed, monitor for tachycardia and hypertensive  - bite block as needed  - Continue current course - trach collar, PEG both in place (TFs continue)  - f/u SW for citizenship  - TBI guidelines  - weekly labs qFriday

## 2018-11-28 NOTE — PROGRESS NOTE ADULT - SUBJECTIVE AND OBJECTIVE BOX
SUBJECTIVE  Patient seen and examined. Had eyes open minimally during exam this morning. Not tracking or following any simple commands. Tolerating trach collar.     TODAY'S REVIEW OF SYMPTOMS  Unable to obtain    VITALS  T(C): 37.5 (11-28-18 @ 08:00)  T(F): 99.5 (11-28-18 @ 08:00), Max: 99.5 (11-28-18 @ 08:00)  HR: 99 (11-28-18 @ 08:00) (70 - 99)  BP: 95/62 (11-28-18 @ 08:00) (91/67 - 100/60)  RR:  (18 - 19)  SpO2:  (95% - 100%)  Wt(kg): --    RECENT LABS/IMAGING  --------    MEDICATIONS   MEDICATIONS  (STANDING):  ALBUTerol    0.083% 2.5 milliGRAM(s) Nebulizer every 6 hours  amantadine 100 milliGRAM(s) Oral <User Schedule>  ascorbic acid 500 milliGRAM(s) Oral daily  chlorhexidine 0.12% Liquid 15 milliLiter(s) Swish and Spit two times a day  enoxaparin Injectable 40 milliGRAM(s) SubCutaneous daily  folic acid 1 milliGRAM(s) Oral daily  levETIRAcetam  Solution 1000 milliGRAM(s) Oral two times a day  melatonin 5 milliGRAM(s) Oral at bedtime  phytonadione   Solution 5 milliGRAM(s) Oral daily  propranolol 20 milliGRAM(s) Oral every 6 hours  senna Syrup 10 milliLiter(s) Oral daily  spironolactone 25 milliGRAM(s) Oral daily  thiamine 100 milliGRAM(s) Oral daily    MEDICATIONS  (PRN):  acetaminophen    Suspension .. 650 milliGRAM(s) Oral every 6 hours PRN Temp greater or equal to 38.5C (101.3F)  magnesium hydroxide Suspension 30 milliLiter(s) Oral daily PRN if no BM in the last 24 hours  polyethylene glycol 3350 17 Gram(s) Oral two times a day PRN if not BM in the last 24 hours    PHYSICAL EXAM  HEENT - Cranial incisions with reduction in swelling bilaterally, (+) Tongue swelling with two blocks in place, Eye opening, (+) Auditory startle, (+) Visual startle  Cardiovascular - All extremities warm, Diffuse edema  Pulm - (+) Trach collar   GI - Soft, (+) PEG  Extremities - Diffuse swelling, Extensor posturing to pain response primarily in upper extremities  Neuro - Eye opening, No fixation or tracking of eyes, No simple command following, (+) Auditory startle, (+) Visual startle     Coma Recovery Scale - Revised  AUDITORY FUNCTION SCALE  1 - Auditory startle  VISUAL FUNCTION SCALE  1 - Visual Startle   MOTOR FUNCTION SCALE  1 - Abnormal Posturing (extensor posturing)  OROMOTOR/VERBAL FUNCTION SCALE  1 - Oral reflexive movements  COMMUNICATION SCALE  0 - None  AROUSAL SCALE  2 - Eye opening without stimulation    TOTAL SCORE = 6 = Vegetative state    ASSESSMENT/PLAN  32y Male unknown PMH found unresponsive on sidewalk found with bilateral SDH and SAH now s/p bilateral hemicraniectomy and EVD placement s/p removal. Coma x 12 days, Vegetative State at Day 13 (11/3)     Sleep/Wake Cycles - Modafinil 100mg Q6AM/Q12PM (increased 11/14), Melatonin 5mg HS (10/31), Amantadine 100mg Q6AM/Q12PM (started 11/26), Discontinued Bromocriptine (11/26)  Hepatic encephalopathy - Vit K, Spironolactone  Possible seizure activity - Keppra   Dysautonomia - Propranolol, Tylenol PRN  Oral care - Aggressive oral care, Chlorhexidine, Suctioning PRN  DVT PPX - SCDs, Lovenox  Precautions - Aspiration  Rehab - COMA STIM by all services. Will continue to follow

## 2018-11-28 NOTE — CHART NOTE - NSCHARTNOTEFT_GEN_A_CORE
Source: Patient [ ]  Family [ ]   other [ ]  ID rounds    Current Diet:   Diet, NPO with Tube Feed:   Tube Feeding Modality: Gastrostomy  Pivot 1.5 Ryan  Total Volume for 24 Hours (mL): 1440  Continuous  Starting Tube Feed Rate {mL per Hour}: 60  Until Goal Tube Feed Rate (mL per Hour): 60  Tube Feed Duration (in Hours): 24  Tube Feed Start Time: 16:25  Supplement Feeding Modality:  Gastrostomy  Prostat Cans or Servings Per Day:  2       Frequency:  Two Times a day (11-24-18 @ 10:33)    Patient reports [ ] nausea  [ ] vomiting [ ] diarrhea [ ] constipation  [X]chewing problems [X] swallowing issues  [ ] other:     Enteral /Parenteral Nutrition: Current diet order provides Pivot @ 50mL/hr (x 20 hrs) 1000mL daily (1500cal, 94g protein), tolerated well.    Current Weight:   (11/11) 83.2kg  (11/2)   87kg   (11/1)   82 kg   (10/27) 82kg     % Weight Change: No recent weight documented     Pertinent Medications: MEDICATIONS  (STANDING):  ALBUTerol    0.083% 2.5 milliGRAM(s) Nebulizer every 6 hours  amantadine 100 milliGRAM(s) Oral <User Schedule>  ascorbic acid 500 milliGRAM(s) Oral daily  chlorhexidine 0.12% Liquid 15 milliLiter(s) Swish and Spit two times a day  enoxaparin Injectable 40 milliGRAM(s) SubCutaneous daily  folic acid 1 milliGRAM(s) Oral daily  levETIRAcetam  Solution 1000 milliGRAM(s) Oral two times a day  melatonin 5 milliGRAM(s) Oral at bedtime  phytonadione   Solution 5 milliGRAM(s) Oral daily  propranolol 20 milliGRAM(s) Oral every 6 hours  senna Syrup 10 milliLiter(s) Oral daily  spironolactone 25 milliGRAM(s) Oral daily  thiamine 100 milliGRAM(s) Oral daily    MEDICATIONS  (PRN):  acetaminophen    Suspension .. 650 milliGRAM(s) Oral every 6 hours PRN Temp greater or equal to 38.5C (101.3F)  magnesium hydroxide Suspension 30 milliLiter(s) Oral daily PRN if no BM in the last 24 hours  polyethylene glycol 3350 17 Gram(s) Oral two times a day PRN if not BM in the last 24 hours    Pertinent Labs: No recent nutrition-related labs    Skin: Stage II coccyx; wound site below trach collar; surgical incision B/L head; skin lesion to scrotum; IAD    Nutrition focused physical exam conducted - found signs of malnutrition [X]absent [ ]present    Subcutaneous fat loss: [ ] Orbital fat pads region, [ ]Buccal fat region, [ ]Triceps region,  [ ]Ribs region    Muscle wasting: [ ]Temples region, [ ]Clavicle region, [ ]Shoulder region, [ ]Scapula region, [ ]Interosseous region,  [ ]thigh region, [ ]Calf region    Estimated Needs:   [ ] no change since previous assessment  [ ] recalculated:     Current Nutrition Diagnosis: Pt remains at high nutrition risk secondary to increased nutrient needs related to increased physiologic demand for healing as evidenced by s/p TBI, with multicompartment ICH and brain compression from unknown mechanism, VAP, s/p trach and PEG. Pt now present with Stage II to coccyx, requiring increased protein/micronutrient needs. TF monitor reports 1042mL Pivot x 24 hours to provide (1563cal, 98g protein), not meeting protein-energy needs at this time.    Recommendations:   1) When medically feasible, consider increase TF rate to 60ml/hr (x 20 hours) to provide 1200ml/day (1800kcal, 116gm protein) to meet increased needs  2) Add MVI and Vit C 500mg daily   3) Prostat 1 pkt BID to provide 200kcal and 30g PRO via PEG    Monitoring and Evaluation:   [ ] PO intake [X] Tolerance to diet prescription [X] Weights  [X] Follow up per protocol [X] Labs:

## 2018-11-28 NOTE — PROGRESS NOTE ADULT - ATTENDING COMMENTS
Agree with Dr. Vera.   Patient with more persistent eye opening.   No discernable fixation with music or light.  Have requested OT involvement in bathing as patient's hygiene is poor.  Continue current medications   Continue COMA STIM.

## 2018-11-28 NOTE — PROGRESS NOTE ADULT - SUBJECTIVE AND OBJECTIVE BOX
INTERVAL HPI/OVERNIGHT EVENTS: Patient continues to be on trach collar, VSS remain stable.  Rehab is continuing coma stimulation.          MEDICATIONS  (STANDING):  ALBUTerol    0.083% 2.5 milliGRAM(s) Nebulizer every 6 hours  amantadine 100 milliGRAM(s) Oral <User Schedule>  ascorbic acid 500 milliGRAM(s) Oral daily  chlorhexidine 0.12% Liquid 15 milliLiter(s) Swish and Spit two times a day  enoxaparin Injectable 40 milliGRAM(s) SubCutaneous daily  folic acid 1 milliGRAM(s) Oral daily  levETIRAcetam  Solution 1000 milliGRAM(s) Oral two times a day  melatonin 5 milliGRAM(s) Oral at bedtime  phytonadione   Solution 5 milliGRAM(s) Oral daily  propranolol 20 milliGRAM(s) Oral every 6 hours  senna Syrup 10 milliLiter(s) Oral daily  spironolactone 25 milliGRAM(s) Oral daily  thiamine 100 milliGRAM(s) Oral daily    MEDICATIONS  (PRN):  acetaminophen    Suspension .. 650 milliGRAM(s) Oral every 6 hours PRN Temp greater or equal to 38.5C (101.3F)  magnesium hydroxide Suspension 30 milliLiter(s) Oral daily PRN if no BM in the last 24 hours  polyethylene glycol 3350 17 Gram(s) Oral two times a day PRN if not BM in the last 24 hours      Vital Signs Last 24 Hrs  T(C): 37.5 (2018 08:00), Max: 37.5 (2018 08:00)  T(F): 99.5 (2018 08:00), Max: 99.5 (2018 08:00)  HR: 100 (2018 10:00) (70 - 104)  BP: 95/62 (2018 08:00) (91/67 - 100/60)  BP(mean): --  RR: 19 (2018 08:00) (18 - 19)  SpO2: 94% (2018 10:00) (94% - 100%)    Physical Exam:  Gen: NAD  HEENT: helmet intact, trach intact with thick secretions, coughing it up, tongue protruded easily reducible  CV: RRR  Resp: No respiratory distress, nonlabored breathing on trach collar  Abd: soft, nt, nd, no guarding or rebound. PEG intact no erythema or drainage from side  Ext: no swelling, cyanosis, or clubbing; decerebrate positioning >RUE      I&O's Detail    2018 07:01  -  2018 07:00  --------------------------------------------------------  IN:    Enteral Tube Flush: 200 mL    Pivot: 480 mL  Total IN: 680 mL    OUT:    Incontinent per Collection Ba mL  Total OUT: 2 mL    Total NET: 678 mL          LABS:                RADIOLOGY & ADDITIONAL STUDIES:

## 2018-11-29 PROCEDURE — 99232 SBSQ HOSP IP/OBS MODERATE 35: CPT | Mod: GC

## 2018-11-29 RX ORDER — MODAFINIL 200 MG/1
100 TABLET ORAL
Qty: 0 | Refills: 0 | Status: DISCONTINUED | OUTPATIENT
Start: 2018-11-29 | End: 2018-12-05

## 2018-11-29 RX ADMIN — ALBUTEROL 2.5 MILLIGRAM(S): 90 AEROSOL, METERED ORAL at 03:15

## 2018-11-29 RX ADMIN — Medication 1 MILLIGRAM(S): at 12:44

## 2018-11-29 RX ADMIN — Medication 500 MILLIGRAM(S): at 12:44

## 2018-11-29 RX ADMIN — Medication 5 MILLIGRAM(S): at 22:36

## 2018-11-29 RX ADMIN — Medication 100 MILLIGRAM(S): at 12:44

## 2018-11-29 RX ADMIN — MODAFINIL 100 MILLIGRAM(S): 200 TABLET ORAL at 12:46

## 2018-11-29 RX ADMIN — ENOXAPARIN SODIUM 40 MILLIGRAM(S): 100 INJECTION SUBCUTANEOUS at 12:43

## 2018-11-29 RX ADMIN — LEVETIRACETAM 1000 MILLIGRAM(S): 250 TABLET, FILM COATED ORAL at 17:36

## 2018-11-29 RX ADMIN — SPIRONOLACTONE 25 MILLIGRAM(S): 25 TABLET, FILM COATED ORAL at 05:31

## 2018-11-29 RX ADMIN — CHLORHEXIDINE GLUCONATE 15 MILLILITER(S): 213 SOLUTION TOPICAL at 05:31

## 2018-11-29 RX ADMIN — Medication 100 MILLIGRAM(S): at 05:31

## 2018-11-29 RX ADMIN — ALBUTEROL 2.5 MILLIGRAM(S): 90 AEROSOL, METERED ORAL at 09:21

## 2018-11-29 RX ADMIN — ALBUTEROL 2.5 MILLIGRAM(S): 90 AEROSOL, METERED ORAL at 14:40

## 2018-11-29 RX ADMIN — ALBUTEROL 2.5 MILLIGRAM(S): 90 AEROSOL, METERED ORAL at 21:16

## 2018-11-29 RX ADMIN — Medication 5 MILLIGRAM(S): at 12:44

## 2018-11-29 RX ADMIN — Medication 20 MILLIGRAM(S): at 12:44

## 2018-11-29 RX ADMIN — CHLORHEXIDINE GLUCONATE 15 MILLILITER(S): 213 SOLUTION TOPICAL at 17:37

## 2018-11-29 RX ADMIN — LEVETIRACETAM 1000 MILLIGRAM(S): 250 TABLET, FILM COATED ORAL at 05:31

## 2018-11-29 RX ADMIN — Medication 20 MILLIGRAM(S): at 17:36

## 2018-11-29 NOTE — PROGRESS NOTE ADULT - ATTENDING COMMENTS
Agree with Dr. Vera.   Appreciate OT for providing hygiene care.  Continue current medications.  ?fixation.  Will continue to follow.

## 2018-11-29 NOTE — PROGRESS NOTE ADULT - SUBJECTIVE AND OBJECTIVE BOX
INTERVAL HPI/OVERNIGHT EVENTS: No acute events overnight    SUBJECTIVE: Lying in bed, bite block in place. No acute distress      MEDICATIONS  (STANDING):  ALBUTerol    0.083% 2.5 milliGRAM(s) Nebulizer every 6 hours  amantadine 100 milliGRAM(s) Oral <User Schedule>  ascorbic acid 500 milliGRAM(s) Oral daily  chlorhexidine 0.12% Liquid 15 milliLiter(s) Swish and Spit two times a day  enoxaparin Injectable 40 milliGRAM(s) SubCutaneous daily  folic acid 1 milliGRAM(s) Oral daily  levETIRAcetam  Solution 1000 milliGRAM(s) Oral two times a day  melatonin 5 milliGRAM(s) Oral at bedtime  phytonadione   Solution 5 milliGRAM(s) Oral daily  propranolol 20 milliGRAM(s) Oral every 6 hours  senna Syrup 10 milliLiter(s) Oral daily  spironolactone 25 milliGRAM(s) Oral daily  thiamine 100 milliGRAM(s) Oral daily    MEDICATIONS  (PRN):  acetaminophen    Suspension .. 650 milliGRAM(s) Oral every 6 hours PRN Temp greater or equal to 38.5C (101.3F)  magnesium hydroxide Suspension 30 milliLiter(s) Oral daily PRN if no BM in the last 24 hours  polyethylene glycol 3350 17 Gram(s) Oral two times a day PRN if not BM in the last 24 hours      Vital Signs Last 24 Hrs  T(C): 36.4 (28 Nov 2018 23:11), Max: 37.5 (28 Nov 2018 08:00)  T(F): 97.6 (28 Nov 2018 23:11), Max: 99.5 (28 Nov 2018 08:00)  HR: 84 (29 Nov 2018 03:16) (81 - 104)  BP: 108/68 (28 Nov 2018 23:11) (95/62 - 108/68)  BP(mean): --  RR: 17 (28 Nov 2018 23:11) (17 - 19)  SpO2: 98% (29 Nov 2018 03:16) (94% - 98%)    Physical Exam:  Gen: NAD  HEENT: helmet intact, trach intact with thick secretions, coughing it up, tongue protruded easily reducible  CV: RRR  Resp: No respiratory distress, nonlabored breathing on trach collar  Abd: soft, nt, nd, no guarding or rebound. PEG intact no erythema or drainage from side  Ext: no swelling, cyanosis, or clubbing; decerebrate positioning >RUE      I&O's Detail      LABS:                RADIOLOGY & ADDITIONAL STUDIES:

## 2018-11-29 NOTE — PROGRESS NOTE ADULT - SUBJECTIVE AND OBJECTIVE BOX
SUBJECTIVE  Patient seen and examined. Ask therapy staff to wash patient. Has eyes open during exam. Extensor posturing to painful stimuli. Has auditory and visual startle    TODAY'S REVIEW OF SYMPTOMS  Unable to obtain    VITALS  T(C): 36.4 (11-28-18 @ 23:11)  T(F): 97.6 (11-28-18 @ 23:11), Max: 98.3 (11-28-18 @ 16:08)  HR: 84 (11-29-18 @ 03:16) (81 - 104)  BP: 108/68 (11-28-18 @ 23:11) (100/66 - 108/68)  RR:  (17 - 18)  SpO2:  (94% - 98%)  Wt(kg): --    RECENT LABS/IMAGING  ---------    MEDICATIONS   MEDICATIONS  (STANDING):  ALBUTerol    0.083% 2.5 milliGRAM(s) Nebulizer every 6 hours  amantadine 100 milliGRAM(s) Oral <User Schedule>  ascorbic acid 500 milliGRAM(s) Oral daily  chlorhexidine 0.12% Liquid 15 milliLiter(s) Swish and Spit two times a day  enoxaparin Injectable 40 milliGRAM(s) SubCutaneous daily  folic acid 1 milliGRAM(s) Oral daily  levETIRAcetam  Solution 1000 milliGRAM(s) Oral two times a day  melatonin 5 milliGRAM(s) Oral at bedtime  phytonadione   Solution 5 milliGRAM(s) Oral daily  propranolol 20 milliGRAM(s) Oral every 6 hours  senna Syrup 10 milliLiter(s) Oral daily  spironolactone 25 milliGRAM(s) Oral daily  thiamine 100 milliGRAM(s) Oral daily    MEDICATIONS  (PRN):  acetaminophen    Suspension .. 650 milliGRAM(s) Oral every 6 hours PRN Temp greater or equal to 38.5C (101.3F)  magnesium hydroxide Suspension 30 milliLiter(s) Oral daily PRN if no BM in the last 24 hours  polyethylene glycol 3350 17 Gram(s) Oral two times a day PRN if not BM in the last 24 hours    PHYSICAL EXAM SUBJECTIVE  Patient seen and examined. Ask therapy staff to wash patient. Has eyes open during exam. Extensor posturing to painful stimuli. Has auditory and visual startle. Plans for bath today with therapists.    TODAY'S REVIEW OF SYMPTOMS  Unable to obtain    VITALS  T(C): 36.4 (11-28-18 @ 23:11)  T(F): 97.6 (11-28-18 @ 23:11), Max: 98.3 (11-28-18 @ 16:08)  HR: 84 (11-29-18 @ 03:16) (81 - 104)  BP: 108/68 (11-28-18 @ 23:11) (100/66 - 108/68)  RR:  (17 - 18)  SpO2:  (94% - 98%)  Wt(kg): --    RECENT LABS/IMAGING  ---------    MEDICATIONS   MEDICATIONS  (STANDING):  ALBUTerol    0.083% 2.5 milliGRAM(s) Nebulizer every 6 hours  amantadine 100 milliGRAM(s) Oral <User Schedule>  ascorbic acid 500 milliGRAM(s) Oral daily  chlorhexidine 0.12% Liquid 15 milliLiter(s) Swish and Spit two times a day  enoxaparin Injectable 40 milliGRAM(s) SubCutaneous daily  folic acid 1 milliGRAM(s) Oral daily  levETIRAcetam  Solution 1000 milliGRAM(s) Oral two times a day  melatonin 5 milliGRAM(s) Oral at bedtime  modafinil 100 milliGRAM(s) Oral <User Schedule>  phytonadione   Solution 5 milliGRAM(s) Oral daily  propranolol 20 milliGRAM(s) Oral every 6 hours  senna Syrup 10 milliLiter(s) Oral daily  spironolactone 25 milliGRAM(s) Oral daily  thiamine 100 milliGRAM(s) Oral daily    MEDICATIONS  (PRN):  acetaminophen    Suspension .. 650 milliGRAM(s) Oral every 6 hours PRN Temp greater or equal to 38.5C (101.3F)  magnesium hydroxide Suspension 30 milliLiter(s) Oral daily PRN if no BM in the last 24 hours  polyethylene glycol 3350 17 Gram(s) Oral two times a day PRN if not BM in the last 24 hours    PHYSICAL EXAM  HEENT - Cranial incisions with reduction in swelling bilaterally (right > left), (+) Tongue swelling with two blocks in place, Eye opening spontaneously  Cardiovascular - All extremities warm, Diffuse edema  Pulm - (+) Trach collar with wound on inferior side  GI - Soft, (+) PEG  Extremities - Diffuse swelling, Extensor posturing to pain response primarily in upper extremities  Neuro - Eye opening spontaneously - no fixation or tracking of eyes, No simple command following, (+) Auditory startle, (+) Visual startle     Coma Recovery Scale - Revised  AUDITORY FUNCTION SCALE  1 - Auditory startle  VISUAL FUNCTION SCALE  1 - Visual Startle   MOTOR FUNCTION SCALE  1 - Abnormal Posturing (extensor posturing)  OROMOTOR/VERBAL FUNCTION SCALE  1 - Oral reflexive movements  COMMUNICATION SCALE  0 - None  AROUSAL SCALE  2 - Eye opening without stimulation    TOTAL SCORE = 6 = Vegetative state    ASSESSMENT/PLAN  32y Male unknown PMH found unresponsive on sidewalk found with bilateral SDH and SAH now s/p bilateral hemicraniectomy and EVD placement s/p removal. Coma x 12 days, Vegetative State at Day 13 (11/3)     Sleep/Wake Cycles - Modafinil 100mg Q6AM/Q12PM (increased 11/14), Melatonin 5mg HS (10/31), Amantadine 100mg Q6AM/Q12PM (started 11/26), Discontinued Bromocriptine (11/26)  Hepatic encephalopathy - Vit K, Spironolactone  Possible seizure activity - Keppra   Dysautonomia - Recommend decreasing dose of Propranolol as patient missing many doses secondary to parameters, Tylenol PRN  Skin - Aggressive oral care, Chlorhexidine, Suctioning PRN, To have bath with OT today  DVT PPX - SCDs, Lovenox  Precautions - Aspiration  Rehab - COMA STIM by all services. Will continue to follow

## 2018-11-30 LAB
ANION GAP SERPL CALC-SCNC: 12 MMOL/L — SIGNIFICANT CHANGE UP (ref 5–17)
BASOPHILS # BLD AUTO: 0 K/UL — SIGNIFICANT CHANGE UP (ref 0–0.2)
BASOPHILS NFR BLD AUTO: 0.2 % — SIGNIFICANT CHANGE UP (ref 0–2)
BUN SERPL-MCNC: 35 MG/DL — HIGH (ref 8–20)
CALCIUM SERPL-MCNC: 10.3 MG/DL — HIGH (ref 8.6–10.2)
CHLORIDE SERPL-SCNC: 101 MMOL/L — SIGNIFICANT CHANGE UP (ref 98–107)
CO2 SERPL-SCNC: 28 MMOL/L — SIGNIFICANT CHANGE UP (ref 22–29)
CREAT SERPL-MCNC: 0.44 MG/DL — LOW (ref 0.5–1.3)
EOSINOPHIL # BLD AUTO: 0.2 K/UL — SIGNIFICANT CHANGE UP (ref 0–0.5)
EOSINOPHIL NFR BLD AUTO: 3.7 % — SIGNIFICANT CHANGE UP (ref 0–5)
GLUCOSE SERPL-MCNC: 118 MG/DL — HIGH (ref 70–115)
HCT VFR BLD CALC: 26 % — LOW (ref 42–52)
HGB BLD-MCNC: 8.3 G/DL — LOW (ref 14–18)
LYMPHOCYTES # BLD AUTO: 1.1 K/UL — SIGNIFICANT CHANGE UP (ref 1–4.8)
LYMPHOCYTES # BLD AUTO: 27.1 % — SIGNIFICANT CHANGE UP (ref 20–55)
MAGNESIUM SERPL-MCNC: 1.5 MG/DL — LOW (ref 1.6–2.6)
MCHC RBC-ENTMCNC: 28.6 PG — SIGNIFICANT CHANGE UP (ref 27–31)
MCHC RBC-ENTMCNC: 31.9 G/DL — LOW (ref 32–36)
MCV RBC AUTO: 89.7 FL — SIGNIFICANT CHANGE UP (ref 80–94)
MONOCYTES # BLD AUTO: 0.3 K/UL — SIGNIFICANT CHANGE UP (ref 0–0.8)
MONOCYTES NFR BLD AUTO: 7.3 % — SIGNIFICANT CHANGE UP (ref 3–10)
NEUTROPHILS # BLD AUTO: 2.5 K/UL — SIGNIFICANT CHANGE UP (ref 1.8–8)
NEUTROPHILS NFR BLD AUTO: 61.7 % — SIGNIFICANT CHANGE UP (ref 37–73)
PHOSPHATE SERPL-MCNC: 4.8 MG/DL — HIGH (ref 2.4–4.7)
PLATELET # BLD AUTO: 141 K/UL — LOW (ref 150–400)
POTASSIUM SERPL-MCNC: 3.9 MMOL/L — SIGNIFICANT CHANGE UP (ref 3.5–5.3)
POTASSIUM SERPL-SCNC: 3.9 MMOL/L — SIGNIFICANT CHANGE UP (ref 3.5–5.3)
RBC # BLD: 2.9 M/UL — LOW (ref 4.6–6.2)
RBC # FLD: 17.3 % — HIGH (ref 11–15.6)
SODIUM SERPL-SCNC: 141 MMOL/L — SIGNIFICANT CHANGE UP (ref 135–145)
WBC # BLD: 4.1 K/UL — LOW (ref 4.8–10.8)
WBC # FLD AUTO: 4.1 K/UL — LOW (ref 4.8–10.8)

## 2018-11-30 PROCEDURE — 99233 SBSQ HOSP IP/OBS HIGH 50: CPT | Mod: GC

## 2018-11-30 RX ORDER — PROPRANOLOL HCL 160 MG
10 CAPSULE, EXTENDED RELEASE 24HR ORAL EVERY 12 HOURS
Qty: 0 | Refills: 0 | Status: DISCONTINUED | OUTPATIENT
Start: 2018-11-30 | End: 2018-12-21

## 2018-11-30 RX ORDER — MAGNESIUM SULFATE 500 MG/ML
2 VIAL (ML) INJECTION
Qty: 0 | Refills: 0 | Status: COMPLETED | OUTPATIENT
Start: 2018-11-30 | End: 2018-11-30

## 2018-11-30 RX ADMIN — Medication 5 MILLIGRAM(S): at 13:18

## 2018-11-30 RX ADMIN — CHLORHEXIDINE GLUCONATE 15 MILLILITER(S): 213 SOLUTION TOPICAL at 17:26

## 2018-11-30 RX ADMIN — ALBUTEROL 2.5 MILLIGRAM(S): 90 AEROSOL, METERED ORAL at 20:59

## 2018-11-30 RX ADMIN — CHLORHEXIDINE GLUCONATE 15 MILLILITER(S): 213 SOLUTION TOPICAL at 05:02

## 2018-11-30 RX ADMIN — MODAFINIL 100 MILLIGRAM(S): 200 TABLET ORAL at 05:03

## 2018-11-30 RX ADMIN — Medication 100 MILLIGRAM(S): at 12:54

## 2018-11-30 RX ADMIN — ENOXAPARIN SODIUM 40 MILLIGRAM(S): 100 INJECTION SUBCUTANEOUS at 12:54

## 2018-11-30 RX ADMIN — Medication 50 GRAM(S): at 13:54

## 2018-11-30 RX ADMIN — ALBUTEROL 2.5 MILLIGRAM(S): 90 AEROSOL, METERED ORAL at 15:30

## 2018-11-30 RX ADMIN — LEVETIRACETAM 1000 MILLIGRAM(S): 250 TABLET, FILM COATED ORAL at 17:27

## 2018-11-30 RX ADMIN — MODAFINIL 100 MILLIGRAM(S): 200 TABLET ORAL at 12:56

## 2018-11-30 RX ADMIN — Medication 1 MILLIGRAM(S): at 12:54

## 2018-11-30 RX ADMIN — LEVETIRACETAM 1000 MILLIGRAM(S): 250 TABLET, FILM COATED ORAL at 05:03

## 2018-11-30 RX ADMIN — SENNA PLUS 10 MILLILITER(S): 8.6 TABLET ORAL at 12:56

## 2018-11-30 RX ADMIN — ALBUTEROL 2.5 MILLIGRAM(S): 90 AEROSOL, METERED ORAL at 03:46

## 2018-11-30 RX ADMIN — ALBUTEROL 2.5 MILLIGRAM(S): 90 AEROSOL, METERED ORAL at 09:59

## 2018-11-30 RX ADMIN — Medication 100 MILLIGRAM(S): at 05:02

## 2018-11-30 RX ADMIN — Medication 500 MILLIGRAM(S): at 12:54

## 2018-11-30 RX ADMIN — Medication 50 GRAM(S): at 13:18

## 2018-11-30 RX ADMIN — SPIRONOLACTONE 25 MILLIGRAM(S): 25 TABLET, FILM COATED ORAL at 05:03

## 2018-11-30 NOTE — PROGRESS NOTE ADULT - SUBJECTIVE AND OBJECTIVE BOX
SUBJECTIVE  Patient seen and examined. Minimal eye opening this morning. No change in neurological exam. Had bathe yesterday with therapy staff - dependent x 2    TODAY'S REVIEW OF SYMPTOMS  Unable to obtain    CURRENT FUNCTIONAL STATUS  Bathing - Dependent x 2    VITALS  T(C): 37.5 (11-29-18 @ 22:35)  T(F): 99.5 (11-29-18 @ 22:35), Max: 100.2 (11-29-18 @ 16:00)  HR: 91 (11-30-18 @ 04:56) (76 - 91)  BP: 105/65 (11-30-18 @ 04:56) (94/52 - 107/62)  RR:  (18 - 18)  SpO2:  (95% - 100%)  Wt(kg): --    RECENT LABS/IMAGING  Pending labs today    MEDICATIONS   MEDICATIONS  (STANDING):  ALBUTerol    0.083% 2.5 milliGRAM(s) Nebulizer every 6 hours  amantadine 100 milliGRAM(s) Oral <User Schedule>  ascorbic acid 500 milliGRAM(s) Oral daily  chlorhexidine 0.12% Liquid 15 milliLiter(s) Swish and Spit two times a day  enoxaparin Injectable 40 milliGRAM(s) SubCutaneous daily  folic acid 1 milliGRAM(s) Oral daily  levETIRAcetam  Solution 1000 milliGRAM(s) Oral two times a day  melatonin 5 milliGRAM(s) Oral at bedtime  modafinil 100 milliGRAM(s) Oral <User Schedule>  phytonadione   Solution 5 milliGRAM(s) Oral daily  propranolol 20 milliGRAM(s) Oral every 6 hours  senna Syrup 10 milliLiter(s) Oral daily  spironolactone 25 milliGRAM(s) Oral daily  thiamine 100 milliGRAM(s) Oral daily    MEDICATIONS  (PRN):  acetaminophen    Suspension .. 650 milliGRAM(s) Oral every 6 hours PRN Temp greater or equal to 38.5C (101.3F)  magnesium hydroxide Suspension 30 milliLiter(s) Oral daily PRN if no BM in the last 24 hours  polyethylene glycol 3350 17 Gram(s) Oral two times a day PRN if not BM in the last 24 hours    PHYSICAL EXAM  HEENT - Cranial incisions with reduction in swelling bilaterally (right > left), (+) Tongue swelling - improving, Minimal eye opening to tactile stimuli  Cardiovascular - All extremities warm, Diffuse edema  Pulm - (+) Trach collar with wound on inferior side  GI - Soft, (+) PEG  Extremities - Diffuse swelling, Extensor posturing to pain response primarily in upper extremities  Neuro - Minimal eye opening to tactile stimuli - ?fixation but no tracking, No simple command following, (+) Auditory startle, (+) Visual startle     Coma Recovery Scale - Revised  AUDITORY FUNCTION SCALE  1 - Auditory startle  VISUAL FUNCTION SCALE  1 - Visual Startle   MOTOR FUNCTION SCALE  1 - Abnormal Posturing (extensor posturing)  OROMOTOR/VERBAL FUNCTION SCALE  1 - Oral reflexive movements  COMMUNICATION SCALE  0 - None  AROUSAL SCALE  1 - Eye opening with stimulation    TOTAL SCORE = 5 = Vegetative state    ASSESSMENT/PLAN  32y Male unknown PMH found unresponsive on sidewalk found with bilateral SDH and SAH now s/p bilateral hemicraniectomy and EVD placement s/p removal. Coma x 12 days, Vegetative State at Day 13 (11/3)     Sleep/Wake Cycles - Modafinil 100mg Q6AM/Q12PM (increased 11/14), Melatonin 5mg HS (10/31), Amantadine 100mg Q6AM/Q12PM (started 11/26), Discontinued Bromocriptine (11/26)  Hepatic encephalopathy - Vit K, Spironolactone  Possible seizure activity - Keppra   Dysautonomia - Recommend decreasing dose of Propranolol as patient missing many doses secondary to parameters, Tylenol PRN  Skin - Aggressive oral care, Chlorhexidine, Suctioning PRN  DVT PPX - SCDs, Lovenox  Precautions - Aspiration  Rehab - COMA STIM by all services. Will continue to follow SUBJECTIVE  Patient seen and examined. Minimal eye opening this morning. No change in neurological exam. Had bathe yesterday with therapy staff - dependent x 2    TODAY'S REVIEW OF SYMPTOMS  Unable to obtain    CURRENT FUNCTIONAL STATUS  Bathing - Dependent x 2    VITALS  T(C): 37.5 (11-29-18 @ 22:35)  T(F): 99.5 (11-29-18 @ 22:35), Max: 100.2 (11-29-18 @ 16:00)  HR: 91 (11-30-18 @ 04:56) (76 - 91)  BP: 105/65 (11-30-18 @ 04:56) (94/52 - 107/62)  RR:  (18 - 18)  SpO2:  (95% - 100%)  Wt(kg): --    RECENT LABS/IMAGING  Pending labs today    MEDICATIONS   MEDICATIONS  (STANDING):  ALBUTerol    0.083% 2.5 milliGRAM(s) Nebulizer every 6 hours  amantadine 100 milliGRAM(s) Oral <User Schedule>  ascorbic acid 500 milliGRAM(s) Oral daily  chlorhexidine 0.12% Liquid 15 milliLiter(s) Swish and Spit two times a day  enoxaparin Injectable 40 milliGRAM(s) SubCutaneous daily  folic acid 1 milliGRAM(s) Oral daily  levETIRAcetam  Solution 1000 milliGRAM(s) Oral two times a day  melatonin 5 milliGRAM(s) Oral at bedtime  modafinil 100 milliGRAM(s) Oral <User Schedule>  phytonadione   Solution 5 milliGRAM(s) Oral daily  propranolol 20 milliGRAM(s) Oral every 6 hours  senna Syrup 10 milliLiter(s) Oral daily  spironolactone 25 milliGRAM(s) Oral daily  thiamine 100 milliGRAM(s) Oral daily    MEDICATIONS  (PRN):  acetaminophen    Suspension .. 650 milliGRAM(s) Oral every 6 hours PRN Temp greater or equal to 38.5C (101.3F)  magnesium hydroxide Suspension 30 milliLiter(s) Oral daily PRN if no BM in the last 24 hours  polyethylene glycol 3350 17 Gram(s) Oral two times a day PRN if not BM in the last 24 hours    PHYSICAL EXAM  HEENT - Right cranial incision sunken on the right  	Tongue swelling - improving, Minimal eye opening to tactile stimuli  Cardiovascular - All extremities warm, Diffuse edema  Pulm - (+) Trach collar with wound on inferior side  GI - Soft, (+) PEG  Extremities - Diffuse swelling, Extensor posturing to pain response primarily in upper extremities  Neuro - Minimal eye opening to tactile stimuli - ?fixation but no tracking, No simple command following, (+) Auditory startle, (+) Visual startle     Coma Recovery Scale - Revised  AUDITORY FUNCTION SCALE  1 - Auditory startle  VISUAL FUNCTION SCALE  1 - Visual Startle   MOTOR FUNCTION SCALE  1 - Abnormal Posturing (extensor posturing)  OROMOTOR/VERBAL FUNCTION SCALE  1 - Oral reflexive movements  COMMUNICATION SCALE  0 - None  AROUSAL SCALE  1 - Eye opening with stimulation    TOTAL SCORE = 5 = Vegetative state    ASSESSMENT/PLAN  32y Male unknown PMH found unresponsive on sidewalk found with bilateral SDH and SAH now s/p bilateral hemicraniectomy and EVD placement s/p removal. Coma x 12 days, Vegetative State at Day 13 (11/3)     Sleep/Wake Cycles - Modafinil 100mg Q6AM/Q12PM (increased from 100mg daily 11/14), Melatonin 5mg (10/31), Amantadine 100mg Q6AM/Q12PM (11/26), DC Bromocriptine (11/26)  Hepatic encephalopathy - Vit K, Spironolactone  Seizures - Keppra   Dysautonomia - Propranolol 10mg Q12 (decreased from 20mg Q6 11/30)  Pain - Tylenol PRN  Skin - Aggressive oral care, Chlorhexidine, Suctioning PRN  DVT PPX - SCDs, Lovenox  Precautions - Aspiration  Rehab - COMA STIM by all services. Will continue to follow.

## 2018-11-30 NOTE — PROGRESS NOTE ADULT - SUBJECTIVE AND OBJECTIVE BOX
INTERVAL HPI/OVERNIGHT EVENTS:    SUBJECTIVE: No acute events overnight.  B/L bite blocks in place to keep patient from biting on tongue.  No change in status.  Pending weekly labs today      MEDICATIONS  (STANDING):  ALBUTerol    0.083% 2.5 milliGRAM(s) Nebulizer every 6 hours  amantadine 100 milliGRAM(s) Oral <User Schedule>  ascorbic acid 500 milliGRAM(s) Oral daily  chlorhexidine 0.12% Liquid 15 milliLiter(s) Swish and Spit two times a day  enoxaparin Injectable 40 milliGRAM(s) SubCutaneous daily  folic acid 1 milliGRAM(s) Oral daily  levETIRAcetam  Solution 1000 milliGRAM(s) Oral two times a day  melatonin 5 milliGRAM(s) Oral at bedtime  modafinil 100 milliGRAM(s) Oral <User Schedule>  phytonadione   Solution 5 milliGRAM(s) Oral daily  propranolol 20 milliGRAM(s) Oral every 6 hours  senna Syrup 10 milliLiter(s) Oral daily  spironolactone 25 milliGRAM(s) Oral daily  thiamine 100 milliGRAM(s) Oral daily    MEDICATIONS  (PRN):  acetaminophen    Suspension .. 650 milliGRAM(s) Oral every 6 hours PRN Temp greater or equal to 38.5C (101.3F)  magnesium hydroxide Suspension 30 milliLiter(s) Oral daily PRN if no BM in the last 24 hours  polyethylene glycol 3350 17 Gram(s) Oral two times a day PRN if not BM in the last 24 hours      Vital Signs Last 24 Hrs  T(C): 37.5 (29 Nov 2018 22:35), Max: 37.9 (29 Nov 2018 16:00)  T(F): 99.5 (29 Nov 2018 22:35), Max: 100.2 (29 Nov 2018 16:00)  HR: 91 (30 Nov 2018 04:56) (76 - 91)  BP: 105/65 (30 Nov 2018 04:56) (94/52 - 107/62)  BP(mean): --  RR: 18 (29 Nov 2018 22:35) (18 - 18)  SpO2: 97% (30 Nov 2018 04:00) (95% - 100%)    PE  Gen: NAD  HEENT: helmet intact, trach intact with thick secretions, coughing it up, tongue protruded easily reducible  CV: RRR  Resp: No respiratory distress, nonlabored breathing on trach collar  Abd: soft, nt, nd, no guarding or rebound. PEG intact no erythema or drainage from side  Ext: no swelling, cyanosis, or clubbing; decerebrate positioning >RUE      I&O's Detail    29 Nov 2018 07:01  -  30 Nov 2018 07:00  --------------------------------------------------------  IN:    Pivot: 1140 mL  Total IN: 1140 mL    OUT:  Total OUT: 0 mL    Total NET: 1140 mL          LABS:                RADIOLOGY & ADDITIONAL STUDIES:

## 2018-11-30 NOTE — PROGRESS NOTE ADULT - ASSESSMENT
33yo M found down with TBI, now s/p trach/PEG currently made DNR/DNI with no escalation of care if clinical condition deteriorates, pending complicated disposition for citizenship, and pending labs today    Plan:   - pain control as needed, monitor for tachycardia and hypertensive  - bite block as needed  - Continue current course - trach collar, PEG both in place (TFs continue)  - f/u SW for citizenship  - TBI guidelines  - weekly labs qFriday

## 2018-11-30 NOTE — PROGRESS NOTE ADULT - ATTENDING COMMENTS
Agree with Dr. Vera.   Patient with low BP, have decreased propranolol.  Continue current medications, will continue to follow.  Neurosurgery contacted to assess readiness for cranioplasty.

## 2018-11-30 NOTE — PROGRESS NOTE ADULT - SUBJECTIVE AND OBJECTIVE BOX
NSGY Attg:    Patient seen.  No acute events.    Exam:  eyes open  does not track  does not attend to examiner  trace extremity movement to noxious  patient does not localize or follow commands  right flap sunken, left flap full but soft     From a neurosurgical perspective, could consider right cranioplasty. Given exam findings.  However, would have to discuss risk (particularly risk of infection) versus benefit given patient's persistent vegetative state at this time.

## 2018-12-01 RX ADMIN — ALBUTEROL 2.5 MILLIGRAM(S): 90 AEROSOL, METERED ORAL at 08:02

## 2018-12-01 RX ADMIN — Medication 500 MILLIGRAM(S): at 11:10

## 2018-12-01 RX ADMIN — CHLORHEXIDINE GLUCONATE 15 MILLILITER(S): 213 SOLUTION TOPICAL at 17:28

## 2018-12-01 RX ADMIN — Medication 100 MILLIGRAM(S): at 11:10

## 2018-12-01 RX ADMIN — Medication 100 MILLIGRAM(S): at 05:03

## 2018-12-01 RX ADMIN — Medication 5 MILLIGRAM(S): at 00:00

## 2018-12-01 RX ADMIN — MODAFINIL 100 MILLIGRAM(S): 200 TABLET ORAL at 11:10

## 2018-12-01 RX ADMIN — MODAFINIL 100 MILLIGRAM(S): 200 TABLET ORAL at 05:03

## 2018-12-01 RX ADMIN — Medication 1 MILLIGRAM(S): at 11:10

## 2018-12-01 RX ADMIN — SPIRONOLACTONE 25 MILLIGRAM(S): 25 TABLET, FILM COATED ORAL at 05:03

## 2018-12-01 RX ADMIN — ALBUTEROL 2.5 MILLIGRAM(S): 90 AEROSOL, METERED ORAL at 15:34

## 2018-12-01 RX ADMIN — Medication 5 MILLIGRAM(S): at 21:48

## 2018-12-01 RX ADMIN — ENOXAPARIN SODIUM 40 MILLIGRAM(S): 100 INJECTION SUBCUTANEOUS at 11:13

## 2018-12-01 RX ADMIN — SENNA PLUS 10 MILLILITER(S): 8.6 TABLET ORAL at 11:14

## 2018-12-01 RX ADMIN — LEVETIRACETAM 1000 MILLIGRAM(S): 250 TABLET, FILM COATED ORAL at 05:03

## 2018-12-01 RX ADMIN — Medication 5 MILLIGRAM(S): at 11:14

## 2018-12-01 RX ADMIN — Medication 10 MILLIGRAM(S): at 17:28

## 2018-12-01 RX ADMIN — ALBUTEROL 2.5 MILLIGRAM(S): 90 AEROSOL, METERED ORAL at 22:22

## 2018-12-01 RX ADMIN — LEVETIRACETAM 1000 MILLIGRAM(S): 250 TABLET, FILM COATED ORAL at 17:27

## 2018-12-01 RX ADMIN — ALBUTEROL 2.5 MILLIGRAM(S): 90 AEROSOL, METERED ORAL at 03:41

## 2018-12-01 RX ADMIN — CHLORHEXIDINE GLUCONATE 15 MILLILITER(S): 213 SOLUTION TOPICAL at 05:04

## 2018-12-01 NOTE — PROGRESS NOTE ADULT - SUBJECTIVE AND OBJECTIVE BOX
HPI/OVERNIGHT EVENTS: Patient seen and examined at bedside this AM. No acute events overnight. Nurses concerned about blister formation on patient's back. Subjective info limited by patient's mental status.     Vital Signs Last 24 Hrs  T(C): 37.8 (30 Nov 2018 15:25), Max: 37.8 (30 Nov 2018 08:00)  T(F): 100 (30 Nov 2018 15:25), Max: 100 (30 Nov 2018 08:00)  HR: 98 (01 Dec 2018 05:00) (90 - 107)  BP: 104/52 (01 Dec 2018 05:00) (104/52 - 115/68)  BP(mean): --  RR: 18 (01 Dec 2018 00:00) (16 - 18)  SpO2: 96% (01 Dec 2018 04:00) (96% - 98%)    I&O's Detail    30 Nov 2018 07:01  -  01 Dec 2018 07:00  --------------------------------------------------------  IN:    Enteral Tube Flush: 500 mL    Pivot: 1380 mL  Total IN: 1880 mL    OUT:  Total OUT: 0 mL    Total NET: 1880 mL    Constitutional: patient resting comfortably in bed, in no acute distress  HEENT: EOMI / PERRL b/l, bite block in place   Neck: No JVD, full ROM, protective adhesive band over pressure sore near trach site   Respiratory: CTAB with respirations are unlabored, no accessory muscle use, no conversational dyspnea  Cardiovascular: regular rate & rhythm  Gastrointestinal: Abdomen soft, non-tender, non-distended, no rebound tenderness / guarding  Psychiatric: Unable to assess   Musculoskeletal: No joint pain, swelling or deformity    LABS:                        8.3    4.1   )-----------( 141      ( 30 Nov 2018 09:02 )             26.0     11-30    141  |  101  |  35.0<H>  ----------------------------<  118<H>  3.9   |  28.0  |  0.44<L>    Ca    10.3<H>      30 Nov 2018 09:02  Phos  4.8     11-30  Mg     1.5     11-30    MEDICATIONS  (STANDING):  ALBUTerol    0.083% 2.5 milliGRAM(s) Nebulizer every 6 hours  amantadine 100 milliGRAM(s) Oral <User Schedule>  ascorbic acid 500 milliGRAM(s) Oral daily  chlorhexidine 0.12% Liquid 15 milliLiter(s) Swish and Spit two times a day  enoxaparin Injectable 40 milliGRAM(s) SubCutaneous daily  folic acid 1 milliGRAM(s) Oral daily  levETIRAcetam  Solution 1000 milliGRAM(s) Oral two times a day  melatonin 5 milliGRAM(s) Oral at bedtime  modafinil 100 milliGRAM(s) Oral <User Schedule>  phytonadione   Solution 5 milliGRAM(s) Oral daily  propranolol 10 milliGRAM(s) Oral every 12 hours  senna Syrup 10 milliLiter(s) Oral daily  spironolactone 25 milliGRAM(s) Oral daily  thiamine 100 milliGRAM(s) Oral daily    MEDICATIONS  (PRN):  acetaminophen    Suspension .. 650 milliGRAM(s) Oral every 6 hours PRN Temp greater or equal to 38.5C (101.3F)  magnesium hydroxide Suspension 30 milliLiter(s) Oral daily PRN if no BM in the last 24 hours  polyethylene glycol 3350 17 Gram(s) Oral two times a day PRN if not BM in the last 24 hours    MICRO:      STUDIES:

## 2018-12-01 NOTE — PROGRESS NOTE ADULT - ASSESSMENT
32 year old male found down with TBI, now s/p trach/PEG currently made DNR/DNI with no escalation of care if clinical condition deteriorates. Continues to require bite block. Follow up wound care consult for pressure sore near trach site. Requires regular turning to avoid pressure ulcer formation. No changes 12/1/18.

## 2018-12-02 RX ORDER — MAGNESIUM SULFATE 500 MG/ML
1 VIAL (ML) INJECTION DAILY
Qty: 0 | Refills: 0 | Status: DISCONTINUED | OUTPATIENT
Start: 2018-12-02 | End: 2018-12-09

## 2018-12-02 RX ADMIN — ENOXAPARIN SODIUM 40 MILLIGRAM(S): 100 INJECTION SUBCUTANEOUS at 11:35

## 2018-12-02 RX ADMIN — ALBUTEROL 2.5 MILLIGRAM(S): 90 AEROSOL, METERED ORAL at 02:45

## 2018-12-02 RX ADMIN — Medication 1 MILLIGRAM(S): at 11:35

## 2018-12-02 RX ADMIN — ALBUTEROL 2.5 MILLIGRAM(S): 90 AEROSOL, METERED ORAL at 15:58

## 2018-12-02 RX ADMIN — MODAFINIL 100 MILLIGRAM(S): 200 TABLET ORAL at 06:11

## 2018-12-02 RX ADMIN — SENNA PLUS 10 MILLILITER(S): 8.6 TABLET ORAL at 11:35

## 2018-12-02 RX ADMIN — LEVETIRACETAM 1000 MILLIGRAM(S): 250 TABLET, FILM COATED ORAL at 06:11

## 2018-12-02 RX ADMIN — Medication 100 MILLIGRAM(S): at 06:11

## 2018-12-02 RX ADMIN — Medication 100 GRAM(S): at 16:45

## 2018-12-02 RX ADMIN — LEVETIRACETAM 1000 MILLIGRAM(S): 250 TABLET, FILM COATED ORAL at 17:07

## 2018-12-02 RX ADMIN — Medication 10 MILLIGRAM(S): at 17:08

## 2018-12-02 RX ADMIN — ALBUTEROL 2.5 MILLIGRAM(S): 90 AEROSOL, METERED ORAL at 09:27

## 2018-12-02 RX ADMIN — ALBUTEROL 2.5 MILLIGRAM(S): 90 AEROSOL, METERED ORAL at 20:24

## 2018-12-02 RX ADMIN — Medication 10 MILLIGRAM(S): at 06:11

## 2018-12-02 RX ADMIN — CHLORHEXIDINE GLUCONATE 15 MILLILITER(S): 213 SOLUTION TOPICAL at 17:06

## 2018-12-02 RX ADMIN — Medication 100 MILLIGRAM(S): at 11:35

## 2018-12-02 RX ADMIN — MODAFINIL 100 MILLIGRAM(S): 200 TABLET ORAL at 11:56

## 2018-12-02 RX ADMIN — Medication 5 MILLIGRAM(S): at 21:28

## 2018-12-02 RX ADMIN — Medication 500 MILLIGRAM(S): at 11:35

## 2018-12-02 RX ADMIN — Medication 100 MILLIGRAM(S): at 11:57

## 2018-12-02 RX ADMIN — SPIRONOLACTONE 25 MILLIGRAM(S): 25 TABLET, FILM COATED ORAL at 06:11

## 2018-12-02 RX ADMIN — CHLORHEXIDINE GLUCONATE 15 MILLILITER(S): 213 SOLUTION TOPICAL at 06:11

## 2018-12-02 RX ADMIN — Medication 5 MILLIGRAM(S): at 17:07

## 2018-12-02 NOTE — PROGRESS NOTE ADULT - SUBJECTIVE AND OBJECTIVE BOX
Patient seen and examined at bedside this AM. No acute events overnight. Nurses concerned about blister formation on patient's back. Subjective info limited by patient's mental status.     MEDICATIONS  (STANDING):  ALBUTerol    0.083% 2.5 milliGRAM(s) Nebulizer every 6 hours  amantadine 100 milliGRAM(s) Oral <User Schedule>  ascorbic acid 500 milliGRAM(s) Oral daily  chlorhexidine 0.12% Liquid 15 milliLiter(s) Swish and Spit two times a day  enoxaparin Injectable 40 milliGRAM(s) SubCutaneous daily  folic acid 1 milliGRAM(s) Oral daily  levETIRAcetam  Solution 1000 milliGRAM(s) Oral two times a day  melatonin 5 milliGRAM(s) Oral at bedtime  modafinil 100 milliGRAM(s) Oral <User Schedule>  phytonadione   Solution 5 milliGRAM(s) Oral daily  propranolol 10 milliGRAM(s) Oral every 12 hours  senna Syrup 10 milliLiter(s) Oral daily  spironolactone 25 milliGRAM(s) Oral daily  thiamine 100 milliGRAM(s) Oral daily    MEDICATIONS  (PRN):  acetaminophen    Suspension .. 650 milliGRAM(s) Oral every 6 hours PRN Temp greater or equal to 38.5C (101.3F)  magnesium hydroxide Suspension 30 milliLiter(s) Oral daily PRN if no BM in the last 24 hours  polyethylene glycol 3350 17 Gram(s) Oral two times a day PRN if not BM in the last 24 hours      Vital Signs Last 24 Hrs  T(C): 37.1 (01 Dec 2018 23:42), Max: 37.1 (01 Dec 2018 23:42)  T(F): 98.7 (01 Dec 2018 23:42), Max: 98.7 (01 Dec 2018 23:42)  HR: 95 (01 Dec 2018 23:42) (95 - 111)  BP: 110/75 (01 Dec 2018 23:42) (100/75 - 110/75)  BP(mean): --  RR: 18 (01 Dec 2018 23:42) (18 - 18)  SpO2: 98% (01 Dec 2018 23:42) (98% - 100%)    PE  Constitutional: patient resting comfortably in bed, in no acute distress  HEENT: EOMI / PERRL b/l, bite block in place   Neck: No JVD, full ROM, protective adhesive band over pressure sore near trach site   Respiratory: CTAB with respirations are unlabored, no accessory muscle use, no conversational dyspnea  Cardiovascular: regular rate & rhythm  Gastrointestinal: Abdomen soft, non-tender, non-distended, no rebound tenderness / guarding  Psychiatric: Unable to assess   Musculoskeletal: No joint pain, swelling or deformity          I&O's Detail      LABS:                        8.3    4.1   )-----------( 141      ( 30 Nov 2018 09:02 )             26.0     11-30    141  |  101  |  35.0<H>  ----------------------------<  118<H>  3.9   |  28.0  |  0.44<L>    Ca    10.3<H>      30 Nov 2018 09:02  Phos  4.8     11-30  Mg     1.5     11-30            RADIOLOGY & ADDITIONAL STUDIES:

## 2018-12-02 NOTE — PROGRESS NOTE ADULT - ASSESSMENT
32 year old male found down with TBI, now s/p trach/PEG currently made DNR/DNI with no escalation of care if clinical condition deteriorates. Continues to require bite block. Follow up wound care consult for pressure sore near trach site. Requires regular turning to avoid pressure ulcer formation

## 2018-12-03 PROCEDURE — 99233 SBSQ HOSP IP/OBS HIGH 50: CPT

## 2018-12-03 RX ORDER — FLUOXETINE HCL 10 MG
20 CAPSULE ORAL DAILY
Qty: 0 | Refills: 0 | Status: DISCONTINUED | OUTPATIENT
Start: 2018-12-03 | End: 2018-12-07

## 2018-12-03 RX ADMIN — Medication 100 MILLIGRAM(S): at 06:14

## 2018-12-03 RX ADMIN — Medication 100 GRAM(S): at 21:39

## 2018-12-03 RX ADMIN — Medication 100 MILLIGRAM(S): at 11:55

## 2018-12-03 RX ADMIN — ENOXAPARIN SODIUM 40 MILLIGRAM(S): 100 INJECTION SUBCUTANEOUS at 11:55

## 2018-12-03 RX ADMIN — ALBUTEROL 2.5 MILLIGRAM(S): 90 AEROSOL, METERED ORAL at 02:53

## 2018-12-03 RX ADMIN — Medication 10 MILLIGRAM(S): at 06:14

## 2018-12-03 RX ADMIN — ALBUTEROL 2.5 MILLIGRAM(S): 90 AEROSOL, METERED ORAL at 14:57

## 2018-12-03 RX ADMIN — Medication 1 MILLIGRAM(S): at 11:55

## 2018-12-03 RX ADMIN — ALBUTEROL 2.5 MILLIGRAM(S): 90 AEROSOL, METERED ORAL at 20:22

## 2018-12-03 RX ADMIN — LEVETIRACETAM 1000 MILLIGRAM(S): 250 TABLET, FILM COATED ORAL at 06:14

## 2018-12-03 RX ADMIN — MODAFINIL 100 MILLIGRAM(S): 200 TABLET ORAL at 06:14

## 2018-12-03 RX ADMIN — MODAFINIL 100 MILLIGRAM(S): 200 TABLET ORAL at 11:55

## 2018-12-03 RX ADMIN — Medication 5 MILLIGRAM(S): at 18:27

## 2018-12-03 RX ADMIN — Medication 20 MILLIGRAM(S): at 18:27

## 2018-12-03 RX ADMIN — SPIRONOLACTONE 25 MILLIGRAM(S): 25 TABLET, FILM COATED ORAL at 06:14

## 2018-12-03 RX ADMIN — Medication 10 MILLIGRAM(S): at 18:28

## 2018-12-03 RX ADMIN — Medication 5 MILLIGRAM(S): at 21:39

## 2018-12-03 RX ADMIN — Medication 500 MILLIGRAM(S): at 11:55

## 2018-12-03 RX ADMIN — ALBUTEROL 2.5 MILLIGRAM(S): 90 AEROSOL, METERED ORAL at 08:44

## 2018-12-03 RX ADMIN — SENNA PLUS 10 MILLILITER(S): 8.6 TABLET ORAL at 11:55

## 2018-12-03 RX ADMIN — CHLORHEXIDINE GLUCONATE 15 MILLILITER(S): 213 SOLUTION TOPICAL at 06:14

## 2018-12-03 RX ADMIN — LEVETIRACETAM 1000 MILLIGRAM(S): 250 TABLET, FILM COATED ORAL at 18:27

## 2018-12-03 NOTE — PROGRESS NOTE ADULT - SUBJECTIVE AND OBJECTIVE BOX
INTERVAL HPI/OVERNIGHT EVENTS:     NAOE  Patient started on daily magnesium dosing for daily hypomagnesemia    SUBJECTIVE: Given vegetative state patient unable to provide subjective.      MEDICATIONS  (STANDING):  ALBUTerol    0.083% 2.5 milliGRAM(s) Nebulizer every 6 hours  amantadine 100 milliGRAM(s) Oral <User Schedule>  ascorbic acid 500 milliGRAM(s) Oral daily  chlorhexidine 0.12% Liquid 15 milliLiter(s) Swish and Spit two times a day  enoxaparin Injectable 40 milliGRAM(s) SubCutaneous daily  folic acid 1 milliGRAM(s) Oral daily  levETIRAcetam  Solution 1000 milliGRAM(s) Oral two times a day  magnesium sulfate  IVPB 1 Gram(s) IV Intermittent daily  melatonin 5 milliGRAM(s) Oral at bedtime  modafinil 100 milliGRAM(s) Oral <User Schedule>  phytonadione   Solution 5 milliGRAM(s) Oral daily  propranolol 10 milliGRAM(s) Oral every 12 hours  senna Syrup 10 milliLiter(s) Oral daily  spironolactone 25 milliGRAM(s) Oral daily  thiamine 100 milliGRAM(s) Oral daily    MEDICATIONS  (PRN):  acetaminophen    Suspension .. 650 milliGRAM(s) Oral every 6 hours PRN Temp greater or equal to 38.5C (101.3F)  magnesium hydroxide Suspension 30 milliLiter(s) Oral daily PRN if no BM in the last 24 hours  polyethylene glycol 3350 17 Gram(s) Oral two times a day PRN if not BM in the last 24 hours      Vital Signs Last 24 Hrs  T(C): 37.2 (02 Dec 2018 23:33), Max: 37.6 (02 Dec 2018 09:02)  T(F): 98.9 (02 Dec 2018 23:33), Max: 99.6 (02 Dec 2018 09:02)  HR: 100 (02 Dec 2018 23:33) (97 - 110)  BP: 120/80 (02 Dec 2018 23:33) (115/79 - 123/83)  BP(mean): --  RR: 18 (02 Dec 2018 23:33) (18 - 19)  SpO2: 99% (02 Dec 2018 23:33) (93% - 99%)    Physical Exam  Constitutional: WN AA male, non-responsive to verbal stimuli resting comfortably in bed, in no acute distress  HEENT: EOMI / PERRL b/l, bite block in place   Neck: No JVD, full ROM, protective adhesive band over pressure sore near trach site   Respiratory: CTAB with respirations are unlabored, no accessory muscle use, no conversational dyspnea  Cardiovascular: regular rate & rhythm  Gastrointestinal: Abdomen soft, non-tender, non-distended, no rebound tenderness / guarding  Psychiatric: Unable to assess   Musculoskeletal: No joint pain, swelling or deformity    I&O's Detail    02 Dec 2018 07:01  -  03 Dec 2018 00:58  --------------------------------------------------------  IN:    Enteral Tube Flush: 250 mL    Pivot: 720 mL  Total IN: 970 mL    OUT:    Incontinent per Condom Catheter: 1000 mL  Total OUT: 1000 mL    Total NET: -30 mL

## 2018-12-03 NOTE — PROGRESS NOTE ADULT - ASSESSMENT
32 year old male found down with TBI, now s/p trach/PEG currently made DNR/DNI with no escalation of care if clinical condition deteriorates. Continues to require bite block. Follow up wound care consult for pressure sore near trach site. Requires regular turning to avoid pressure ulcer formation.    -Wound care has not yet evaluated patient - their service was called and re-consulted again yesterday  - will follow up recs today  -Weekly labs on Fridays only

## 2018-12-03 NOTE — PROGRESS NOTE ADULT - SUBJECTIVE AND OBJECTIVE BOX
Patient in bed, eyes opened with repositioning and maintained for a few minutes.  No tracking noted.   Exam unchanged.     FUNCTIONAL PROGRESS  Total A    REVIEW OF SYSTEMS  Constitutional - No fever,  +fatigue  Neurological - +loss of strength    VITALS  T(C): 37.6 (12-03-18 @ 08:00), Max: 37.6 (12-03-18 @ 08:00)  HR: 86 (12-03-18 @ 08:45) (86 - 110)  BP: 115/74 (12-03-18 @ 08:00) (115/74 - 120/80)  RR: 16 (12-03-18 @ 08:00) (16 - 18)  SpO2: 96% (12-03-18 @ 08:45) (93% - 99%)  Wt(kg): --    MEDICATIONS   acetaminophen    Suspension .. 650 milliGRAM(s) every 6 hours PRN  ALBUTerol    0.083% 2.5 milliGRAM(s) every 6 hours  amantadine 100 milliGRAM(s) <User Schedule>  ascorbic acid 500 milliGRAM(s) daily  chlorhexidine 0.12% Liquid 15 milliLiter(s) two times a day  enoxaparin Injectable 40 milliGRAM(s) daily  folic acid 1 milliGRAM(s) daily  levETIRAcetam  Solution 1000 milliGRAM(s) two times a day  magnesium hydroxide Suspension 30 milliLiter(s) daily PRN  magnesium sulfate  IVPB 1 Gram(s) daily  melatonin 5 milliGRAM(s) at bedtime  modafinil 100 milliGRAM(s) <User Schedule>  phytonadione   Solution 5 milliGRAM(s) daily  polyethylene glycol 3350 17 Gram(s) two times a day PRN  propranolol 10 milliGRAM(s) every 12 hours  senna Syrup 10 milliLiter(s) daily  spironolactone 25 milliGRAM(s) daily  thiamine 100 milliGRAM(s) daily      RECENT LABS/IMAGING        PHYSICAL EXAM    Coma Recovery Scale - Revised  AUDITORY FUNCTION SCALE  1 - Auditory Startle  VISUAL FUNCTION SCALE  1 - Visual Startle  MOTOR FUNCTION SCALE  1 - Posturing  OROMOTOR/VERBAL FUNCTION SCALE  1 - Oral Reflexive Movement  COMMUNICATION SCALE  0 - None  AROUSAL SCALE  1 - Eye Opening with Stimulation    TOTAL SCORE = 6     ASSESSMENT/PLAN  32y Male unknown PMH found unresponsive on sidewalk found with bilateral SDH and SAH now s/p bilateral hemicraniectomy and EVD placement s/p removal. Coma x 12 days, Vegetative State at Day 13 (11/3)   DVT PPX - SCDs, Lovenox  Oral - Peridex, Aggressive care  Dysautonomia - Propranolol 10mg Q12 (decreased from 20mg Q6 11/30)  Arousal - Modafinil 100mg Q6AM/Q12PM (increased from 100mg daily 11/14), Amantadine 100mg Q6AM/Q12PM (11/26), Prozac 20mg (12/3)  Sleep - Melatonin 5mg (10/31)  Hepatic encephalopathy/Nutritional deficiencies - Vit K, Spironolactone, MgSO, Vit C, Folate, Thiamine  Seizures - Keppra   Pain - Tylenol PRN  Rehab - COMA STIM by all services. Will continue to follow.

## 2018-12-04 LAB
ALBUMIN SERPL ELPH-MCNC: 3.2 G/DL — LOW (ref 3.3–5.2)
ALP SERPL-CCNC: 265 U/L — HIGH (ref 40–120)
ALT FLD-CCNC: 54 U/L — HIGH
ANION GAP SERPL CALC-SCNC: 15 MMOL/L — SIGNIFICANT CHANGE UP (ref 5–17)
APPEARANCE UR: ABNORMAL
AST SERPL-CCNC: 86 U/L — HIGH
BACTERIA # UR AUTO: ABNORMAL
BILIRUB SERPL-MCNC: 1.4 MG/DL — SIGNIFICANT CHANGE UP (ref 0.4–2)
BILIRUB UR-MCNC: NEGATIVE — SIGNIFICANT CHANGE UP
BUN SERPL-MCNC: 38 MG/DL — HIGH (ref 8–20)
CALCIUM SERPL-MCNC: 10.4 MG/DL — HIGH (ref 8.6–10.2)
CHLORIDE SERPL-SCNC: 95 MMOL/L — LOW (ref 98–107)
CO2 SERPL-SCNC: 21 MMOL/L — LOW (ref 22–29)
COLOR SPEC: YELLOW — SIGNIFICANT CHANGE UP
COMMENT - URINE: SIGNIFICANT CHANGE UP
CREAT SERPL-MCNC: 0.63 MG/DL — SIGNIFICANT CHANGE UP (ref 0.5–1.3)
DIFF PNL FLD: ABNORMAL
EPI CELLS # UR: SIGNIFICANT CHANGE UP
GLUCOSE SERPL-MCNC: 137 MG/DL — HIGH (ref 70–115)
GLUCOSE UR QL: NEGATIVE MG/DL — SIGNIFICANT CHANGE UP
HCT VFR BLD CALC: 27.1 % — LOW (ref 42–52)
HGB BLD-MCNC: 9 G/DL — LOW (ref 14–18)
KETONES UR-MCNC: NEGATIVE — SIGNIFICANT CHANGE UP
LEUKOCYTE ESTERASE UR-ACNC: ABNORMAL
MCHC RBC-ENTMCNC: 28.8 PG — SIGNIFICANT CHANGE UP (ref 27–31)
MCHC RBC-ENTMCNC: 33.2 G/DL — SIGNIFICANT CHANGE UP (ref 32–36)
MCV RBC AUTO: 86.9 FL — SIGNIFICANT CHANGE UP (ref 80–94)
NITRITE UR-MCNC: NEGATIVE — SIGNIFICANT CHANGE UP
PH UR: 7 — SIGNIFICANT CHANGE UP (ref 5–8)
PLATELET # BLD AUTO: 114 K/UL — LOW (ref 150–400)
POTASSIUM SERPL-MCNC: 3.8 MMOL/L — SIGNIFICANT CHANGE UP (ref 3.5–5.3)
POTASSIUM SERPL-SCNC: 3.8 MMOL/L — SIGNIFICANT CHANGE UP (ref 3.5–5.3)
PROT SERPL-MCNC: 10 G/DL — HIGH (ref 6.6–8.7)
PROT UR-MCNC: 100 MG/DL
RBC # BLD: 3.12 M/UL — LOW (ref 4.6–6.2)
RBC # FLD: 17 % — HIGH (ref 11–15.6)
RBC CASTS # UR COMP ASSIST: ABNORMAL /HPF (ref 0–4)
SODIUM SERPL-SCNC: 131 MMOL/L — LOW (ref 135–145)
SP GR SPEC: 1.01 — SIGNIFICANT CHANGE UP (ref 1.01–1.02)
UROBILINOGEN FLD QL: 8 MG/DL
WBC # BLD: 6.5 K/UL — SIGNIFICANT CHANGE UP (ref 4.8–10.8)
WBC # FLD AUTO: 6.5 K/UL — SIGNIFICANT CHANGE UP (ref 4.8–10.8)
WBC UR QL: SIGNIFICANT CHANGE UP

## 2018-12-04 PROCEDURE — 71045 X-RAY EXAM CHEST 1 VIEW: CPT | Mod: 26

## 2018-12-04 PROCEDURE — 99233 SBSQ HOSP IP/OBS HIGH 50: CPT | Mod: GC

## 2018-12-04 RX ORDER — ACETAMINOPHEN 500 MG
1000 TABLET ORAL ONCE
Qty: 0 | Refills: 0 | Status: COMPLETED | OUTPATIENT
Start: 2018-12-04 | End: 2018-12-04

## 2018-12-04 RX ADMIN — Medication 100 MILLIGRAM(S): at 12:20

## 2018-12-04 RX ADMIN — Medication 5 MILLIGRAM(S): at 21:53

## 2018-12-04 RX ADMIN — Medication 10 MILLIGRAM(S): at 17:17

## 2018-12-04 RX ADMIN — SENNA PLUS 10 MILLILITER(S): 8.6 TABLET ORAL at 12:33

## 2018-12-04 RX ADMIN — Medication 650 MILLIGRAM(S): at 15:57

## 2018-12-04 RX ADMIN — Medication 650 MILLIGRAM(S): at 21:02

## 2018-12-04 RX ADMIN — MODAFINIL 100 MILLIGRAM(S): 200 TABLET ORAL at 12:20

## 2018-12-04 RX ADMIN — CHLORHEXIDINE GLUCONATE 15 MILLILITER(S): 213 SOLUTION TOPICAL at 05:13

## 2018-12-04 RX ADMIN — Medication 650 MILLIGRAM(S): at 09:35

## 2018-12-04 RX ADMIN — Medication 650 MILLIGRAM(S): at 22:00

## 2018-12-04 RX ADMIN — MODAFINIL 100 MILLIGRAM(S): 200 TABLET ORAL at 05:13

## 2018-12-04 RX ADMIN — Medication 10 MILLIGRAM(S): at 05:15

## 2018-12-04 RX ADMIN — Medication 100 GRAM(S): at 12:21

## 2018-12-04 RX ADMIN — ALBUTEROL 2.5 MILLIGRAM(S): 90 AEROSOL, METERED ORAL at 21:10

## 2018-12-04 RX ADMIN — Medication 500 MILLIGRAM(S): at 12:20

## 2018-12-04 RX ADMIN — LEVETIRACETAM 1000 MILLIGRAM(S): 250 TABLET, FILM COATED ORAL at 05:13

## 2018-12-04 RX ADMIN — Medication 400 MILLIGRAM(S): at 22:16

## 2018-12-04 RX ADMIN — CHLORHEXIDINE GLUCONATE 15 MILLILITER(S): 213 SOLUTION TOPICAL at 17:17

## 2018-12-04 RX ADMIN — ALBUTEROL 2.5 MILLIGRAM(S): 90 AEROSOL, METERED ORAL at 09:33

## 2018-12-04 RX ADMIN — ALBUTEROL 2.5 MILLIGRAM(S): 90 AEROSOL, METERED ORAL at 02:39

## 2018-12-04 RX ADMIN — ENOXAPARIN SODIUM 40 MILLIGRAM(S): 100 INJECTION SUBCUTANEOUS at 12:20

## 2018-12-04 RX ADMIN — LEVETIRACETAM 1000 MILLIGRAM(S): 250 TABLET, FILM COATED ORAL at 17:17

## 2018-12-04 RX ADMIN — Medication 5 MILLIGRAM(S): at 12:20

## 2018-12-04 RX ADMIN — Medication 100 MILLIGRAM(S): at 05:13

## 2018-12-04 RX ADMIN — SPIRONOLACTONE 25 MILLIGRAM(S): 25 TABLET, FILM COATED ORAL at 05:13

## 2018-12-04 RX ADMIN — Medication 20 MILLIGRAM(S): at 12:20

## 2018-12-04 RX ADMIN — ALBUTEROL 2.5 MILLIGRAM(S): 90 AEROSOL, METERED ORAL at 15:28

## 2018-12-04 RX ADMIN — Medication 1 MILLIGRAM(S): at 12:20

## 2018-12-04 NOTE — PROGRESS NOTE ADULT - SUBJECTIVE AND OBJECTIVE BOX
INTERVAL HPI/OVERNIGHT EVENTS: No acute events    SUBJECTIVE: Unable to provide      MEDICATIONS  (STANDING):  ALBUTerol    0.083% 2.5 milliGRAM(s) Nebulizer every 6 hours  amantadine 100 milliGRAM(s) Oral <User Schedule>  ascorbic acid 500 milliGRAM(s) Oral daily  chlorhexidine 0.12% Liquid 15 milliLiter(s) Swish and Spit two times a day  enoxaparin Injectable 40 milliGRAM(s) SubCutaneous daily  FLUoxetine Solution 20 milliGRAM(s) Oral daily  folic acid 1 milliGRAM(s) Oral daily  levETIRAcetam  Solution 1000 milliGRAM(s) Oral two times a day  magnesium sulfate  IVPB 1 Gram(s) IV Intermittent daily  melatonin 5 milliGRAM(s) Oral at bedtime  modafinil 100 milliGRAM(s) Oral <User Schedule>  phytonadione   Solution 5 milliGRAM(s) Oral daily  propranolol 10 milliGRAM(s) Oral every 12 hours  senna Syrup 10 milliLiter(s) Oral daily  spironolactone 25 milliGRAM(s) Oral daily  thiamine 100 milliGRAM(s) Oral daily    MEDICATIONS  (PRN):  acetaminophen    Suspension .. 650 milliGRAM(s) Oral every 6 hours PRN Temp greater or equal to 38.5C (101.3F)  polyethylene glycol 3350 17 Gram(s) Oral two times a day PRN if not BM in the last 24 hours      Vital Signs Last 24 Hrs  T(C): 36.9 (03 Dec 2018 23:10), Max: 37.6 (03 Dec 2018 08:00)  T(F): 98.4 (03 Dec 2018 23:10), Max: 99.6 (03 Dec 2018 08:00)  HR: 101 (04 Dec 2018 02:49) (86 - 102)  BP: 120/85 (03 Dec 2018 23:10) (115/74 - 120/85)  BP(mean): --  RR: 18 (03 Dec 2018 23:10) (16 - 18)  SpO2: 98% (03 Dec 2018 23:10) (95% - 98%)    Physical Exam  Constitutional: WN AA male, non-responsive to verbal stimuli resting comfortably in bed, in no acute distress  HEENT: EOMI / PERRL b/l, bite block in place, allevyn underneath trach  Neck: No JVD, full ROM, protective adhesive band over pressure sore near trach site   Respiratory: CTAB with respirations are unlabored, no accessory muscle use, no conversational dyspnea  Cardiovascular: regular rate & rhythm  Gastrointestinal: Abdomen soft, non-tender, non-distended, no rebound tenderness / guarding  Psychiatric: Unable to assess   Musculoskeletal: No joint pain, swelling or deformity      I&O's Detail    02 Dec 2018 07:01  -  03 Dec 2018 07:00  --------------------------------------------------------  IN:    Enteral Tube Flush: 250 mL    Pivot: 720 mL  Total IN: 970 mL    OUT:    Incontinent per Condom Catheter: 1550 mL  Total OUT: 1550 mL    Total NET: -580 mL      03 Dec 2018 07:01  -  04 Dec 2018 04:32  --------------------------------------------------------  IN:    Pivot: 1020 mL  Total IN: 1020 mL    OUT:  Total OUT: 0 mL    Total NET: 1020 mL          LABS:                RADIOLOGY & ADDITIONAL STUDIES:

## 2018-12-04 NOTE — CHART NOTE - NSCHARTNOTEFT_GEN_A_CORE
Source: Patient [ ]  Family [ ]   other [ ]  ID rounds    Current Diet:   Diet, NPO with Tube Feed:   Tube Feeding Modality: Gastrostomy  Pivot 1.5 Ryan  Total Volume for 24 Hours (mL): 1440  Continuous  Starting Tube Feed Rate {mL per Hour}: 60  Until Goal Tube Feed Rate (mL per Hour): 60  Tube Feed Duration (in Hours): 24  Tube Feed Start Time: 16:25  Supplement Feeding Modality:  Gastrostomy  Prostat Cans or Servings Per Day:  2       Frequency:  Two Times a day (11-24-18 @ 10:33)    Patient reports [ ] nausea  [ ] vomiting [ ] diarrhea [ ] constipation  [X]chewing problems [X] swallowing issues  [ ] other:     Enteral /Parenteral Nutrition: Current diet order provides Pivot @ 50mL/hr (x 20 hrs) 1000mL daily (1500cal, 94g protein), tolerated well.    Current Weight:   (11/11) 83.2kg  (11/2)   87kg   (11/1)   82 kg   (10/27) 82kg     % Weight Change: No recent weight documented    Pertinent Medications: MEDICATIONS  (STANDING):  ALBUTerol    0.083% 2.5 milliGRAM(s) Nebulizer every 6 hours  amantadine 100 milliGRAM(s) Oral <User Schedule>  ascorbic acid 500 milliGRAM(s) Oral daily  chlorhexidine 0.12% Liquid 15 milliLiter(s) Swish and Spit two times a day  enoxaparin Injectable 40 milliGRAM(s) SubCutaneous daily  FLUoxetine Solution 20 milliGRAM(s) Oral daily  folic acid 1 milliGRAM(s) Oral daily  levETIRAcetam  Solution 1000 milliGRAM(s) Oral two times a day  magnesium sulfate  IVPB 1 Gram(s) IV Intermittent daily  melatonin 5 milliGRAM(s) Oral at bedtime  modafinil 100 milliGRAM(s) Oral <User Schedule>  phytonadione   Solution 5 milliGRAM(s) Oral daily  propranolol 10 milliGRAM(s) Oral every 12 hours  senna Syrup 10 milliLiter(s) Oral daily  spironolactone 25 milliGRAM(s) Oral daily  thiamine 100 milliGRAM(s) Oral daily    MEDICATIONS  (PRN):  acetaminophen    Suspension .. 650 milliGRAM(s) Oral every 6 hours PRN Temp greater or equal to 38.5C (101.3F)  polyethylene glycol 3350 17 Gram(s) Oral two times a day PRN if not BM in the last 24 hours    Pertinent Labs: No recent nutrition-related labs    Skin: Stage II sacrum; Site to L. shoulder; wound site below trach collar; surgical incision B/L head; skin lesion to scrotum; IAD    Nutrition focused physical exam NOT conducted - found signs of malnutrition [ ]absent [ ]present    Subcutaneous fat loss: [ ] Orbital fat pads region, [ ]Buccal fat region, [ ]Triceps region,  [ ]Ribs region    Muscle wasting: [ ]Temples region, [ ]Clavicle region, [ ]Shoulder region, [ ]Scapula region, [ ]Interosseous region,  [ ]thigh region, [ ]Calf region    Estimated Needs:   [X] no change since previous assessment  [ ] recalculated:     Current Nutrition Diagnosis: Pt remains at high nutrition risk secondary to increased nutrient needs related to increased physiologic demand for healing as evidenced by s/p TBI, with multicompartment ICH and brain compression from unknown mechanism, VAP, s/p trach and PEG. Pt now with site (blisters) to L. shoulder/flank, requiring increased protein/micronutrient needs. TF monitor reports 1058mL Pivot x 24 hours to provide (1587kcal, 99g protein), not meeting protein-energy needs at this time.    Recommendations:   1) When medically feasible, consider increase TF rate to 60ml/hr (x 20 hours) to provide 1200ml/day (1800kcal, 116gm protein) to meet increased needs  2) Add MVI and Vit C 500mg daily   3) Prostat 1 pkt BID to provide 200kcal and 30g PRO via PEG    Monitoring and Evaluation:   [ ] PO intake [X] Tolerance to diet prescription [X] Weights  [X] Follow up per protocol [X] Labs:

## 2018-12-04 NOTE — CHART NOTE - NSCHARTNOTEFT_GEN_A_CORE
Patient was seen at bedside, found to be febrile to 102.9 F, tachypneic 34, and tachycardic 120 BPM with normal /76 as reported by nursing staff. Patient had been febrile during the day and CXR, CBC as well as U/A were collected. CXR was negative for pleural effusions nor consolidations. U/A was negative for UTI and CBC did not demonstrate leukocytosis. Patient is MOLST DNR/DNI. Blood cultures were ordered and IV Tylenol for fever control. ACS/trauma team will continue to monitor.

## 2018-12-04 NOTE — PROGRESS NOTE ADULT - ATTENDING COMMENTS
Patient continues to be in VS.  Unchanged clinical exam.  Discussed with Dr. Fraser regarding prospects of cranioplasty last week.  In agreement that cranioplasty would not provide significant functional change.     Continue ongoing COMA STIM.

## 2018-12-04 NOTE — PROGRESS NOTE ADULT - SUBJECTIVE AND OBJECTIVE BOX
SUBJECTIVE  Patient seen and examined. Continues to have swollen tongue with mild secretions. No spontaneous eye opening noted this morning. No fixation and tracking noted. Exam unchanged.    TODAY'S REVIEW OF SYMPTOMS  Unable to obtain    VITALS  T(C): 36.9 (12-03-18 @ 23:10)  T(F): 98.4 (12-03-18 @ 23:10), Max: 98.4 (12-03-18 @ 15:32)  HR: 100 (12-04-18 @ 05:00) (86 - 102)  BP: 118/86 (12-04-18 @ 05:00) (117/81 - 120/85)  RR:  (18 - 18)  SpO2:  (95% - 98%)  Wt(kg): --    RECENT LABS/IMAGING  --------------    MEDICATIONS   MEDICATIONS  (STANDING):  ALBUTerol    0.083% 2.5 milliGRAM(s) Nebulizer every 6 hours  amantadine 100 milliGRAM(s) Oral <User Schedule>  ascorbic acid 500 milliGRAM(s) Oral daily  chlorhexidine 0.12% Liquid 15 milliLiter(s) Swish and Spit two times a day  enoxaparin Injectable 40 milliGRAM(s) SubCutaneous daily  FLUoxetine Solution 20 milliGRAM(s) Oral daily  folic acid 1 milliGRAM(s) Oral daily  levETIRAcetam  Solution 1000 milliGRAM(s) Oral two times a day  magnesium sulfate  IVPB 1 Gram(s) IV Intermittent daily  melatonin 5 milliGRAM(s) Oral at bedtime  modafinil 100 milliGRAM(s) Oral <User Schedule>  phytonadione   Solution 5 milliGRAM(s) Oral daily  propranolol 10 milliGRAM(s) Oral every 12 hours  senna Syrup 10 milliLiter(s) Oral daily  spironolactone 25 milliGRAM(s) Oral daily  thiamine 100 milliGRAM(s) Oral daily    MEDICATIONS  (PRN):  acetaminophen    Suspension .. 650 milliGRAM(s) Oral every 6 hours PRN Temp greater or equal to 38.5C (101.3F)  polyethylene glycol 3350 17 Gram(s) Oral two times a day PRN if not BM in the last 24 hours    PHYSICAL EXAM  General - NAD  HEENT - Bilateral cranial incision - sunken on the right, Tongue swelling with mild secretions, No spontaneous eye opening noted today, No fixation or tracking  Cardiovascular - All extremities warm, Diffuse edema  Pulm - (+) Trach collar with wound on inferior side  GI - Soft, (+) PEG  Extremities - Diffuse swelling, Extensor posturing to pain response primarily in upper extremities, Decreased muscle muscle in bilateral thighs  Neuro - No eye opening noted this morning, No tracking and fixation appreciated, No simple command following    Coma Recovery Scale - Revised    AUDITORY FUNCTION SCALE  1 - Auditory Startle  VISUAL FUNCTION SCALE  0 - Visual Startle  MOTOR FUNCTION SCALE  1 - Abnormal Posturing  OROMOTOR/VERBAL FUNCTION SCALE  1 - Oral Reflexive Movement  COMMUNICATION SCALE  0 - None  AROUSAL SCALE  0 - Unarousable    TOTAL SCORE = 3 = vegetative state    ASSESSMENT/PLAN  32y Male unknown PMH found unresponsive on sidewalk found with bilateral SDH and SAH now s/p bilateral hemicraniectomy and EVD placement s/p removal. Coma x 12 days, Vegetative State at Day 13 (11/3)     DVT PPX - SCDs, Lovenox  Oral - Peridex, Aggressive care  Dysautonomia - Propranolol 10mg Q12 (decreased from 20mg Q6 11/30)  Arousal - Modafinil 100mg Q6AM/Q12PM (increased from 100mg daily 11/14), Amantadine 100mg Q6AM/Q12PM (11/26), Prozac 20mg (12/3)  Sleep - Melatonin 5mg (10/31)  Hepatic encephalopathy/Nutritional deficiencies - Vit K, Spironolactone, Vit C, Folate, Thiamine  Seizures - Keppra   Pain - Tylenol PRN  Rehab - COMA STIM by all services. Will continue to follow.

## 2018-12-04 NOTE — PROGRESS NOTE ADULT - ASSESSMENT
32 year old male found down with TBI, now s/p trach/PEG currently made DNR/DNI with no escalation of care if clinical condition deteriorates. Continues to require bite block. Follow up wound care consult for pressure sore near trach site. Requires regular turning to avoid pressure ulcer formation.      -Weekly labs on Fridays only

## 2018-12-05 DIAGNOSIS — A41.9 SEPSIS, UNSPECIFIED ORGANISM: ICD-10-CM

## 2018-12-05 LAB
-  K. PNEUMONIAE GROUP: SIGNIFICANT CHANGE UP
ALBUMIN SERPL ELPH-MCNC: 2.8 G/DL — LOW (ref 3.3–5.2)
ALP SERPL-CCNC: 266 U/L — HIGH (ref 40–120)
ALT FLD-CCNC: 204 U/L — HIGH
ANION GAP SERPL CALC-SCNC: 13 MMOL/L — SIGNIFICANT CHANGE UP (ref 5–17)
ANION GAP SERPL CALC-SCNC: 14 MMOL/L — SIGNIFICANT CHANGE UP (ref 5–17)
APTT BLD: 32.8 SEC — SIGNIFICANT CHANGE UP (ref 27.5–36.3)
AST SERPL-CCNC: 349 U/L — HIGH
BASE EXCESS BLDA CALC-SCNC: 0.3 MMOL/L — SIGNIFICANT CHANGE UP (ref -2–2)
BILIRUB SERPL-MCNC: 1.6 MG/DL — SIGNIFICANT CHANGE UP (ref 0.4–2)
BLOOD GAS COMMENTS ARTERIAL: SIGNIFICANT CHANGE UP
BUN SERPL-MCNC: 41 MG/DL — HIGH (ref 8–20)
BUN SERPL-MCNC: 41 MG/DL — HIGH (ref 8–20)
CALCIUM SERPL-MCNC: 10 MG/DL — SIGNIFICANT CHANGE UP (ref 8.6–10.2)
CALCIUM SERPL-MCNC: 9.8 MG/DL — SIGNIFICANT CHANGE UP (ref 8.6–10.2)
CHLORIDE SERPL-SCNC: 96 MMOL/L — LOW (ref 98–107)
CHLORIDE SERPL-SCNC: 96 MMOL/L — LOW (ref 98–107)
CO2 SERPL-SCNC: 21 MMOL/L — LOW (ref 22–29)
CO2 SERPL-SCNC: 21 MMOL/L — LOW (ref 22–29)
CREAT SERPL-MCNC: 0.67 MG/DL — SIGNIFICANT CHANGE UP (ref 0.5–1.3)
CREAT SERPL-MCNC: 0.71 MG/DL — SIGNIFICANT CHANGE UP (ref 0.5–1.3)
EOSINOPHIL # BLD AUTO: 0 K/UL — SIGNIFICANT CHANGE UP (ref 0–0.5)
EOSINOPHIL NFR BLD AUTO: 0 % — SIGNIFICANT CHANGE UP (ref 0–5)
GAS PNL BLDA: SIGNIFICANT CHANGE UP
GLUCOSE BLDC GLUCOMTR-MCNC: 159 MG/DL — HIGH (ref 70–99)
GLUCOSE SERPL-MCNC: 134 MG/DL — HIGH (ref 70–115)
GLUCOSE SERPL-MCNC: 136 MG/DL — HIGH (ref 70–115)
GRAM STN FLD: SIGNIFICANT CHANGE UP
HCO3 BLDA-SCNC: 25 MMOL/L — SIGNIFICANT CHANGE UP (ref 20–26)
HCT VFR BLD CALC: 24.2 % — LOW (ref 42–52)
HGB BLD-MCNC: 8 G/DL — LOW (ref 14–18)
HOROWITZ INDEX BLDA+IHG-RTO: 40 — SIGNIFICANT CHANGE UP
INR BLD: 1.68 RATIO — HIGH (ref 0.88–1.16)
LACTATE SERPL-SCNC: 3.3 MMOL/L — HIGH (ref 0.5–2)
LYMPHOCYTES # BLD AUTO: 0.2 K/UL — LOW (ref 1–4.8)
LYMPHOCYTES # BLD AUTO: 5 % — LOW (ref 20–55)
MAGNESIUM SERPL-MCNC: 1.4 MG/DL — LOW (ref 1.6–2.6)
MCHC RBC-ENTMCNC: 28.7 PG — SIGNIFICANT CHANGE UP (ref 27–31)
MCHC RBC-ENTMCNC: 33.1 G/DL — SIGNIFICANT CHANGE UP (ref 32–36)
MCV RBC AUTO: 86.7 FL — SIGNIFICANT CHANGE UP (ref 80–94)
METHOD TYPE: SIGNIFICANT CHANGE UP
MONOCYTES # BLD AUTO: 0.1 K/UL — SIGNIFICANT CHANGE UP (ref 0–0.8)
MONOCYTES NFR BLD AUTO: 3 % — SIGNIFICANT CHANGE UP (ref 3–10)
NEUTROPHILS # BLD AUTO: 4.3 K/UL — SIGNIFICANT CHANGE UP (ref 1.8–8)
NEUTROPHILS NFR BLD AUTO: 88 % — HIGH (ref 37–73)
NEUTS BAND # BLD: 4 % — SIGNIFICANT CHANGE UP (ref 0–8)
PCO2 BLDA: 26 MMHG — LOW (ref 35–45)
PH BLDA: 7.54 — HIGH (ref 7.35–7.45)
PHOSPHATE SERPL-MCNC: 3.7 MG/DL — SIGNIFICANT CHANGE UP (ref 2.4–4.7)
PLAT MORPH BLD: NORMAL — SIGNIFICANT CHANGE UP
PLATELET # BLD AUTO: 78 K/UL — LOW (ref 150–400)
PO2 BLDA: 52 MMHG — LOW (ref 83–108)
POTASSIUM SERPL-MCNC: 3.8 MMOL/L — SIGNIFICANT CHANGE UP (ref 3.5–5.3)
POTASSIUM SERPL-MCNC: 3.8 MMOL/L — SIGNIFICANT CHANGE UP (ref 3.5–5.3)
POTASSIUM SERPL-SCNC: 3.8 MMOL/L — SIGNIFICANT CHANGE UP (ref 3.5–5.3)
POTASSIUM SERPL-SCNC: 3.8 MMOL/L — SIGNIFICANT CHANGE UP (ref 3.5–5.3)
PROT SERPL-MCNC: 8.6 G/DL — SIGNIFICANT CHANGE UP (ref 6.6–8.7)
PROTHROM AB SERPL-ACNC: 19.6 SEC — HIGH (ref 10–12.9)
RBC # BLD: 2.79 M/UL — LOW (ref 4.6–6.2)
RBC # FLD: 17.2 % — HIGH (ref 11–15.6)
RBC BLD AUTO: NORMAL — SIGNIFICANT CHANGE UP
SAO2 % BLDA: 89 % — LOW (ref 95–99)
SODIUM SERPL-SCNC: 130 MMOL/L — LOW (ref 135–145)
SODIUM SERPL-SCNC: 131 MMOL/L — LOW (ref 135–145)
SPECIMEN SOURCE: SIGNIFICANT CHANGE UP
WBC # BLD: 4.6 K/UL — LOW (ref 4.8–10.8)
WBC # FLD AUTO: 4.6 K/UL — LOW (ref 4.8–10.8)

## 2018-12-05 PROCEDURE — 99233 SBSQ HOSP IP/OBS HIGH 50: CPT | Mod: GC

## 2018-12-05 PROCEDURE — 93010 ELECTROCARDIOGRAM REPORT: CPT

## 2018-12-05 PROCEDURE — 93970 EXTREMITY STUDY: CPT | Mod: 26

## 2018-12-05 PROCEDURE — 99232 SBSQ HOSP IP/OBS MODERATE 35: CPT

## 2018-12-05 RX ORDER — ENOXAPARIN SODIUM 100 MG/ML
80 INJECTION SUBCUTANEOUS EVERY 12 HOURS
Qty: 0 | Refills: 0 | Status: DISCONTINUED | OUTPATIENT
Start: 2018-12-05 | End: 2018-12-05

## 2018-12-05 RX ORDER — SODIUM CHLORIDE 9 MG/ML
2000 INJECTION, SOLUTION INTRAVENOUS ONCE
Qty: 0 | Refills: 0 | Status: COMPLETED | OUTPATIENT
Start: 2018-12-05 | End: 2018-12-05

## 2018-12-05 RX ORDER — PIPERACILLIN AND TAZOBACTAM 4; .5 G/20ML; G/20ML
3.38 INJECTION, POWDER, LYOPHILIZED, FOR SOLUTION INTRAVENOUS EVERY 8 HOURS
Qty: 0 | Refills: 0 | Status: DISCONTINUED | OUTPATIENT
Start: 2018-12-05 | End: 2018-12-05

## 2018-12-05 RX ORDER — VANCOMYCIN HCL 1 G
1000 VIAL (EA) INTRAVENOUS EVERY 12 HOURS
Qty: 0 | Refills: 0 | Status: DISCONTINUED | OUTPATIENT
Start: 2018-12-05 | End: 2018-12-07

## 2018-12-05 RX ORDER — ENOXAPARIN SODIUM 100 MG/ML
40 INJECTION SUBCUTANEOUS DAILY
Qty: 0 | Refills: 0 | Status: DISCONTINUED | OUTPATIENT
Start: 2018-12-05 | End: 2018-12-24

## 2018-12-05 RX ORDER — VANCOMYCIN HCL 1 G
1000 VIAL (EA) INTRAVENOUS ONCE
Qty: 0 | Refills: 0 | Status: COMPLETED | OUTPATIENT
Start: 2018-12-05 | End: 2018-12-05

## 2018-12-05 RX ORDER — MEROPENEM 1 G/30ML
1000 INJECTION INTRAVENOUS ONCE
Qty: 0 | Refills: 0 | Status: COMPLETED | OUTPATIENT
Start: 2018-12-05 | End: 2018-12-05

## 2018-12-05 RX ORDER — SODIUM CHLORIDE 9 MG/ML
1000 INJECTION, SOLUTION INTRAVENOUS
Qty: 0 | Refills: 0 | Status: DISCONTINUED | OUTPATIENT
Start: 2018-12-05 | End: 2018-12-07

## 2018-12-05 RX ORDER — SODIUM CHLORIDE 9 MG/ML
1000 INJECTION, SOLUTION INTRAVENOUS
Qty: 0 | Refills: 0 | Status: DISCONTINUED | OUTPATIENT
Start: 2018-12-05 | End: 2018-12-05

## 2018-12-05 RX ORDER — SODIUM CHLORIDE 9 MG/ML
1000 INJECTION, SOLUTION INTRAVENOUS ONCE
Qty: 0 | Refills: 0 | Status: COMPLETED | OUTPATIENT
Start: 2018-12-05 | End: 2018-12-05

## 2018-12-05 RX ORDER — MEROPENEM 1 G/30ML
1000 INJECTION INTRAVENOUS EVERY 8 HOURS
Qty: 0 | Refills: 0 | Status: COMPLETED | OUTPATIENT
Start: 2018-12-05 | End: 2018-12-19

## 2018-12-05 RX ORDER — VANCOMYCIN HCL 1 G
VIAL (EA) INTRAVENOUS
Qty: 0 | Refills: 0 | Status: DISCONTINUED | OUTPATIENT
Start: 2018-12-05 | End: 2018-12-07

## 2018-12-05 RX ORDER — MEROPENEM 1 G/30ML
INJECTION INTRAVENOUS
Qty: 0 | Refills: 0 | Status: COMPLETED | OUTPATIENT
Start: 2018-12-05 | End: 2018-12-19

## 2018-12-05 RX ORDER — MODAFINIL 200 MG/1
100 TABLET ORAL
Qty: 0 | Refills: 0 | Status: DISCONTINUED | OUTPATIENT
Start: 2018-12-05 | End: 2018-12-07

## 2018-12-05 RX ORDER — ACETAMINOPHEN 500 MG
650 TABLET ORAL ONCE
Qty: 0 | Refills: 0 | Status: COMPLETED | OUTPATIENT
Start: 2018-12-05 | End: 2018-12-05

## 2018-12-05 RX ADMIN — MEROPENEM 100 MILLIGRAM(S): 1 INJECTION INTRAVENOUS at 02:45

## 2018-12-05 RX ADMIN — Medication 250 MILLIGRAM(S): at 17:01

## 2018-12-05 RX ADMIN — Medication 100 MILLIGRAM(S): at 14:01

## 2018-12-05 RX ADMIN — MEROPENEM 100 MILLIGRAM(S): 1 INJECTION INTRAVENOUS at 18:45

## 2018-12-05 RX ADMIN — Medication 10 MILLIGRAM(S): at 17:01

## 2018-12-05 RX ADMIN — SODIUM CHLORIDE 1000 MILLILITER(S): 9 INJECTION, SOLUTION INTRAVENOUS at 05:22

## 2018-12-05 RX ADMIN — Medication 250 MILLIGRAM(S): at 03:26

## 2018-12-05 RX ADMIN — ALBUTEROL 2.5 MILLIGRAM(S): 90 AEROSOL, METERED ORAL at 20:59

## 2018-12-05 RX ADMIN — SODIUM CHLORIDE 150 MILLILITER(S): 9 INJECTION, SOLUTION INTRAVENOUS at 06:10

## 2018-12-05 RX ADMIN — Medication 1 MILLIGRAM(S): at 14:01

## 2018-12-05 RX ADMIN — LEVETIRACETAM 1000 MILLIGRAM(S): 250 TABLET, FILM COATED ORAL at 06:09

## 2018-12-05 RX ADMIN — Medication 650 MILLIGRAM(S): at 05:00

## 2018-12-05 RX ADMIN — MODAFINIL 100 MILLIGRAM(S): 200 TABLET ORAL at 17:01

## 2018-12-05 RX ADMIN — MODAFINIL 100 MILLIGRAM(S): 200 TABLET ORAL at 06:09

## 2018-12-05 RX ADMIN — ENOXAPARIN SODIUM 40 MILLIGRAM(S): 100 INJECTION SUBCUTANEOUS at 14:01

## 2018-12-05 RX ADMIN — SODIUM CHLORIDE 1000 MILLILITER(S): 9 INJECTION, SOLUTION INTRAVENOUS at 00:41

## 2018-12-05 RX ADMIN — Medication 650 MILLIGRAM(S): at 14:18

## 2018-12-05 RX ADMIN — ALBUTEROL 2.5 MILLIGRAM(S): 90 AEROSOL, METERED ORAL at 02:54

## 2018-12-05 RX ADMIN — SENNA PLUS 10 MILLILITER(S): 8.6 TABLET ORAL at 16:48

## 2018-12-05 RX ADMIN — Medication 5 MILLIGRAM(S): at 21:43

## 2018-12-05 RX ADMIN — Medication 5 MILLIGRAM(S): at 16:47

## 2018-12-05 RX ADMIN — MEROPENEM 100 MILLIGRAM(S): 1 INJECTION INTRAVENOUS at 13:51

## 2018-12-05 RX ADMIN — ALBUTEROL 2.5 MILLIGRAM(S): 90 AEROSOL, METERED ORAL at 15:01

## 2018-12-05 RX ADMIN — Medication 20 MILLIGRAM(S): at 14:00

## 2018-12-05 RX ADMIN — SPIRONOLACTONE 25 MILLIGRAM(S): 25 TABLET, FILM COATED ORAL at 06:08

## 2018-12-05 RX ADMIN — Medication 100 GRAM(S): at 16:46

## 2018-12-05 RX ADMIN — CHLORHEXIDINE GLUCONATE 15 MILLILITER(S): 213 SOLUTION TOPICAL at 17:01

## 2018-12-05 RX ADMIN — LEVETIRACETAM 1000 MILLIGRAM(S): 250 TABLET, FILM COATED ORAL at 17:01

## 2018-12-05 RX ADMIN — Medication 100 MILLIGRAM(S): at 06:08

## 2018-12-05 RX ADMIN — Medication 500 MILLIGRAM(S): at 14:01

## 2018-12-05 RX ADMIN — Medication 650 MILLIGRAM(S): at 14:00

## 2018-12-05 RX ADMIN — CHLORHEXIDINE GLUCONATE 15 MILLILITER(S): 213 SOLUTION TOPICAL at 06:09

## 2018-12-05 RX ADMIN — ALBUTEROL 2.5 MILLIGRAM(S): 90 AEROSOL, METERED ORAL at 09:27

## 2018-12-05 RX ADMIN — SODIUM CHLORIDE 150 MILLILITER(S): 9 INJECTION, SOLUTION INTRAVENOUS at 13:50

## 2018-12-05 NOTE — PROGRESS NOTE ADULT - ATTENDING COMMENTS
Patient seen at 12:30 and HR was 135 with diaphoresis related to fever.   Now on Meropenem and Vancomycin.  Source being determined.  DC Amantadine. Monitor use of Provigil.

## 2018-12-05 NOTE — CHART NOTE - NSCHARTNOTEFT_GEN_A_CORE
Rapid response was called for patient. Surgery team at bedside. Mgmt as per surgery team. Assisted team as needed.

## 2018-12-05 NOTE — PROGRESS NOTE ADULT - PROBLEM SELECTOR PLAN 4
Met surgery team and family (uncle/next of kin and aunt in law) for family meeting today at bedside. Family acknowledge pt with overall poor prognosis and unlikely to recover neurologically to a meaningful baseline. They are in agreement of continuing current level of care with time limited trial of IVF and antibiotics to monitor for improvement and no further escalation of care including transfer to ICU. Primary team to update uncle in the event pt should decompensate to review further plan of care. Pt remains DNR/DNI. Emotional support provided to family. All questions answered.

## 2018-12-05 NOTE — PROGRESS NOTE ADULT - ASSESSMENT
Mr. Aneudy Clemente is a 45 M found to have severe TBI with subdural hematoma s/p surgical intervention, s/p trach and peg and course complicated by sepsis, acute anemia, hepatic encephalopathy and ?cholecystitis. Family meeting held on 11/12 with SICU team over phone with Uncle - next of kin, pt was made DNR/DNI. Uncle acknowledge understanding of pt's underlying medical conditions, hospital course and would like to continue all current medical management - would like to be informed if he should encounter any acute events to make decision moving forward. SW following and working on PRUCAL as pt is an undocumented citizen.     Reconsulted 12/5 for assistance with go.   Code sepsis called this AM. Sepsis workup ongoing.  Family meeting held by surgical team with family (uncle and aunt in law).

## 2018-12-05 NOTE — PROGRESS NOTE ADULT - ASSESSMENT
A/P: 32 year old male found down with TBI, now s/p trach/PEG currently made DNR/DNI with no escalation of care if clinical condition deteriorates. Code sepsis for hypotension, fevers, tachycardia and desaturation.    -Palliative consult  -follow up AM labs  -continue IV antibiotics  -Lovenox  -remove central line  -culture central line

## 2018-12-05 NOTE — PROGRESS NOTE ADULT - SUBJECTIVE AND OBJECTIVE BOX
HISTORY OF PRESENT ILLNESS  Patient seen and examined. Patient was noted to be febrile with tachycardia and desaturation yesterday. Sepsis work-up was initiated. CXR and U/A negative. Pending blood cultures.     This AM no eye opening appreciated. Continues to have extensor posturing especially in bilateral upper extremities. Continues to be febrile and tachycardic this AM.    TODAY'S REVIEW OF SYMPTOMS  Unable to obtain    VITALS  T(C): 39.1 (18 @ 08:00)  T(F): 102.4 (18 @ 08:00), Max: 103.6 (18 @ 04:15)  HR: 118 (18 @ 09:28) (110 - 140)  BP: 136/78 (18 @ 08:00) (92/61 - 144/86)  RR:  (18 - 36)  SpO2:  (86% - 100%)  Wt(kg): --    RECENT LABS/IMAGING             8.0    4.6   )-----------( 78       ( 05 Dec 2018 05:02 )             24.2     130<L>  |  96<L>  |  41.0<H>  ----------------------------<  134<H>  3.8   |  21.0<L>  |  0.71    Ca    9.8      05 Dec 2018 05:04  Phos  3.7     12-  Mg     1.4     12-    TPro  8.6  /  Alb  2.8<L>  /  TBili  1.6  /  DBili  x   /  AST  349<H>  /  ALT  204<H>  /  AlkPhos  266<H>  12-05    PT/INR - ( 05 Dec 2018 05:03 )   PT: 19.6 sec;   INR: 1.68 ratio    PTT - ( 05 Dec 2018 05:03 )  PTT:32.8 sec    Urinalysis Basic - ( 04 Dec 2018 16:48 )  Color: Yellow / Appearance: Slightly Turbid / S.015 / pH: x  Gluc: x / Ketone: Negative  / Bili: Negative / Urobili: 8 mg/dL   Blood: x / Protein: 100 mg/dL / Nitrite: Negative   Leuk Esterase: Trace / RBC: 25-50 /HPF / WBC 3-5   Sq Epi: x / Non Sq Epi: Few / Bacteria: Few    MEDICATIONS   MEDICATIONS  (STANDING):  ALBUTerol    0.083% 2.5 milliGRAM(s) Nebulizer every 6 hours  amantadine 100 milliGRAM(s) Oral <User Schedule>  ascorbic acid 500 milliGRAM(s) Oral daily  chlorhexidine 0.12% Liquid 15 milliLiter(s) Swish and Spit two times a day  enoxaparin Injectable 80 milliGRAM(s) SubCutaneous every 12 hours  FLUoxetine Solution 20 milliGRAM(s) Oral daily  folic acid 1 milliGRAM(s) Oral daily  lactated ringers. 1000 milliLiter(s) (150 mL/Hr) IV Continuous <Continuous>  levETIRAcetam  Solution 1000 milliGRAM(s) Oral two times a day  magnesium sulfate  IVPB 1 Gram(s) IV Intermittent daily  melatonin 5 milliGRAM(s) Oral at bedtime   meropenem  IVPB 1000 milliGRAM(s) IV Intermittent every 8 hours  modafinil 100 milliGRAM(s) Oral <User Schedule>  phytonadione   Solution 5 milliGRAM(s) Oral daily  propranolol 10 milliGRAM(s) Oral every 12 hours  senna Syrup 10 milliLiter(s) Oral daily  spironolactone 25 milliGRAM(s) Oral daily  thiamine 100 milliGRAM(s) Oral daily  vancomycin  IVPB 1000 milliGRAM(s) IV Intermittent every 12 hours    MEDICATIONS  (PRN):  acetaminophen    Suspension .. 650 milliGRAM(s) Oral every 6 hours PRN Temp greater or equal to 38.5C (101.3F)  polyethylene glycol 3350 17 Gram(s) Oral two times a day PRN if not BM in the last 24 hours    PHYSICAL EXAM  General - NAD  HEENT - Bilateral cranial incision, Tongue swelling, No spontaneous eye opening noted today, No fixation or tracking  Cardiovascular - Tachycardic, All extremities warm  Pulm - (+) Trach collar with wound on inferior side  GI - Soft, (+) PEG  Extremities - Diffuse swelling, Extensor posturing to pain response primarily in upper extremities, Decreased muscle mass in bilateral thighs  Neuro - No eye opening noted this morning, No tracking and fixation appreciated, No simple command following    Coma Recovery Scale - Revised    AUDITORY FUNCTION SCALE  1 - Auditory Startle  VISUAL FUNCTION SCALE  0 - Visual Startle  MOTOR FUNCTION SCALE  1 - Abnormal Posturing  OROMOTOR/VERBAL FUNCTION SCALE  1 - Oral Reflexive Movement  COMMUNICATION SCALE  0 - None  AROUSAL SCALE  0 - Unarousable    TOTAL SCORE = 3 = vegetative state    ASSESSMENT/PLAN  32y Male unknown PMH found unresponsive on sidewalk found with bilateral SDH and SAH now s/p bilateral hemicraniectomy and EVD placement s/p removal. Coma x 12 days, Vegetative State at Day 13 (11/3)     Dysautonomia - Propranolol 10mg Q12 (decreased from 20mg Q6 )  Arousal - Modafinil 100mg Q6AM/Q12PM (increased from 100mg daily ), Amantadine 100mg Q6AM/Q12PM (), Prozac 20mg (12/3)  Sleep - Melatonin 5mg (10/31)  Hepatic encephalopathy/Nutritional deficiencies - Vit K, Spironolactone, Vit C, Folate, Thiamine  ID - Meropenem and Vancomycin  Seizures - Keppra   Pain - Tylenol PRN  DVT PPX - SCDs, Lovenox  Oral - Peridex, Aggressive care  Rehab - COMA STIM by all services. Will continue to follow. HISTORY OF PRESENT ILLNESS  Patient seen and examined. Patient was noted to be febrile with tachycardia and desaturation yesterday. Sepsis work-up was initiated. CXR and U/A negative. Pending blood cultures.     This AM no eye opening appreciated. Continues to have extensor posturing especially in bilateral upper extremities. Continues to be febrile and tachycardic this AM.    TODAY'S REVIEW OF SYMPTOMS  Unable to obtain    VITALS  T(C): 39.1 (18 @ 08:00)  T(F): 102.4 (18 @ 08:00), Max: 103.6 (18 @ 04:15)  HR: 118 (18 @ 09:28) (110 - 140)  BP: 136/78 (18 @ 08:00) (92/61 - 144/86)  RR:  (18 - 36)  SpO2:  (86% - 100%)  Wt(kg): --    RECENT LABS/IMAGING             8.0    4.6   )-----------( 78       ( 05 Dec 2018 05:02 )             24.2     130<L>  |  96<L>  |  41.0<H>  ----------------------------<  134<H>  3.8   |  21.0<L>  |  0.71    Ca    9.8      05 Dec 2018 05:04  Phos  3.7     12-  Mg     1.4     12-    TPro  8.6  /  Alb  2.8<L>  /  TBili  1.6  /  DBili  x   /  AST  349<H>  /  ALT  204<H>  /  AlkPhos  266<H>  12-05    PT/INR - ( 05 Dec 2018 05:03 )   PT: 19.6 sec;   INR: 1.68 ratio    PTT - ( 05 Dec 2018 05:03 )  PTT:32.8 sec    Urinalysis Basic - ( 04 Dec 2018 16:48 )  Color: Yellow / Appearance: Slightly Turbid / S.015 / pH: x  Gluc: x / Ketone: Negative  / Bili: Negative / Urobili: 8 mg/dL   Blood: x / Protein: 100 mg/dL / Nitrite: Negative   Leuk Esterase: Trace / RBC: 25-50 /HPF / WBC 3-5   Sq Epi: x / Non Sq Epi: Few / Bacteria: Few    MEDICATIONS   MEDICATIONS  (STANDING):  ALBUTerol    0.083% 2.5 milliGRAM(s) Nebulizer every 6 hours  ascorbic acid 500 milliGRAM(s) Oral daily  chlorhexidine 0.12% Liquid 15 milliLiter(s) Swish and Spit two times a day  enoxaparin Injectable 80 milliGRAM(s) SubCutaneous every 12 hours  FLUoxetine Solution 20 milliGRAM(s) Oral daily  folic acid 1 milliGRAM(s) Oral daily  lactated ringers. 1000 milliLiter(s) (150 mL/Hr) IV Continuous <Continuous>  levETIRAcetam  Solution 1000 milliGRAM(s) Oral two times a day  magnesium sulfate  IVPB 1 Gram(s) IV Intermittent daily  melatonin 5 milliGRAM(s) Oral at bedtime   meropenem  IVPB 1000 milliGRAM(s) IV Intermittent every 8 hours  modafinil 100 milliGRAM(s) Oral <User Schedule>  phytonadione   Solution 5 milliGRAM(s) Oral daily  propranolol 10 milliGRAM(s) Oral every 12 hours  senna Syrup 10 milliLiter(s) Oral daily  spironolactone 25 milliGRAM(s) Oral daily  thiamine 100 milliGRAM(s) Oral daily  vancomycin  IVPB 1000 milliGRAM(s) IV Intermittent every 12 hours    MEDICATIONS  (PRN):  acetaminophen    Suspension .. 650 milliGRAM(s) Oral every 6 hours PRN Temp greater or equal to 38.5C (101.3F)  polyethylene glycol 3350 17 Gram(s) Oral two times a day PRN if not BM in the last 24 hours    PHYSICAL EXAM  General - NAD  HEENT - Bilateral cranial incision, Tongue swelling, No spontaneous eye opening noted today, No fixation or tracking  Cardiovascular - Tachycardic, All extremities warm  Pulm - (+) Trach collar with wound on inferior side  GI - Soft, (+) PEG  Extremities - Diffuse swelling, Extensor posturing to pain response primarily in upper extremities, Decreased muscle mass in bilateral thighs  Neuro - No eye opening noted this morning, No tracking and fixation appreciated, No simple command following    Coma Recovery Scale - Revised    AUDITORY FUNCTION SCALE  1 - Auditory Startle  VISUAL FUNCTION SCALE  0 - Visual Startle  MOTOR FUNCTION SCALE  1 - Abnormal Posturing  OROMOTOR/VERBAL FUNCTION SCALE  1 - Oral Reflexive Movement  COMMUNICATION SCALE  0 - None  AROUSAL SCALE  0 - Unarousable    TOTAL SCORE = 3 = vegetative state    ASSESSMENT/PLAN  32y Male unknown PMH found unresponsive on sidewalk found with bilateral SDH and SAH now s/p bilateral hemicraniectomy and EVD placement s/p removal. Coma x 12 days, Vegetative State at Day 13 (11/3)     Dysautonomia - Propranolol 10mg Q12 (decreased from 20mg Q6 )  Arousal - Monitor Modafinil use given tachycardia, Discontinued Amantadine (started ) secondary to new fevers with tachycardia  Motor recovery - Prozac 20mg (12/3)  Sleep - Melatonin 5mg (10/31)  Hepatic encephalopathy/Nutritional deficiencies - Vit K, Spironolactone, Vit C, Folate, Thiamine  ID - Meropenem and Vancomycin  Seizures - Keppra   Pain - Tylenol PRN  DVT PPX - SCDs, Lovenox  Oral - Peridex, Aggressive care  Rehab - COMA STIM by all services. Will continue to follow. HISTORY OF PRESENT ILLNESS  Patient seen and examined. Patient was noted to be febrile with tachycardia and desaturation yesterday. Sepsis work-up was initiated. CXR and U/A negative. Pending blood cultures.     This AM no eye opening appreciated. Continues to have extensor posturing especially in bilateral upper extremities. Continues to be febrile and tachycardic this AM.    TODAY'S REVIEW OF SYMPTOMS  Unable to obtain    VITALS  T(C): 39.1 (18 @ 08:00)  T(F): 102.4 (18 @ 08:00), Max: 103.6 (18 @ 04:15)  HR: 118 (18 @ 09:28) (110 - 140)  BP: 136/78 (18 @ 08:00) (92/61 - 144/86)  RR:  (18 - 36)  SpO2:  (86% - 100%)  Wt(kg): --    RECENT LABS/IMAGING             8.0    4.6   )-----------( 78       ( 05 Dec 2018 05:02 )             24.2     130<L>  |  96<L>  |  41.0<H>  ----------------------------<  134<H>  3.8   |  21.0<L>  |  0.71    Ca    9.8      05 Dec 2018 05:04  Phos  3.7     12-  Mg     1.4     12-    TPro  8.6  /  Alb  2.8<L>  /  TBili  1.6  /  DBili  x   /  AST  349<H>  /  ALT  204<H>  /  AlkPhos  266<H>  12-05    PT/INR - ( 05 Dec 2018 05:03 )   PT: 19.6 sec;   INR: 1.68 ratio    PTT - ( 05 Dec 2018 05:03 )  PTT:32.8 sec    Urinalysis Basic - ( 04 Dec 2018 16:48 )  Color: Yellow / Appearance: Slightly Turbid / S.015 / pH: x  Gluc: x / Ketone: Negative  / Bili: Negative / Urobili: 8 mg/dL   Blood: x / Protein: 100 mg/dL / Nitrite: Negative   Leuk Esterase: Trace / RBC: 25-50 /HPF / WBC 3-5   Sq Epi: x / Non Sq Epi: Few / Bacteria: Few    MEDICATIONS   MEDICATIONS  (STANDING):  ALBUTerol    0.083% 2.5 milliGRAM(s) Nebulizer every 6 hours  ascorbic acid 500 milliGRAM(s) Oral daily  chlorhexidine 0.12% Liquid 15 milliLiter(s) Swish and Spit two times a day  enoxaparin Injectable 80 milliGRAM(s) SubCutaneous every 12 hours  FLUoxetine Solution 20 milliGRAM(s) Oral daily  folic acid 1 milliGRAM(s) Oral daily  lactated ringers. 1000 milliLiter(s) (150 mL/Hr) IV Continuous <Continuous>  levETIRAcetam  Solution 1000 milliGRAM(s) Oral two times a day  magnesium sulfate  IVPB 1 Gram(s) IV Intermittent daily  melatonin 5 milliGRAM(s) Oral at bedtime   meropenem  IVPB 1000 milliGRAM(s) IV Intermittent every 8 hours  modafinil 100 milliGRAM(s) Oral <User Schedule>  phytonadione   Solution 5 milliGRAM(s) Oral daily  propranolol 10 milliGRAM(s) Oral every 12 hours  senna Syrup 10 milliLiter(s) Oral daily  spironolactone 25 milliGRAM(s) Oral daily  thiamine 100 milliGRAM(s) Oral daily  vancomycin  IVPB 1000 milliGRAM(s) IV Intermittent every 12 hours    MEDICATIONS  (PRN):  acetaminophen    Suspension .. 650 milliGRAM(s) Oral every 6 hours PRN Temp greater or equal to 38.5C (101.3F)  polyethylene glycol 3350 17 Gram(s) Oral two times a day PRN if not BM in the last 24 hours    PHYSICAL EXAM  General - NAD  HEENT - Bilateral cranial incision, Tongue swelling, No spontaneous eye opening noted today, No fixation or tracking  Cardiovascular - Tachycardic, All extremities warm  Pulm - (+) Trach collar with wound on inferior side  GI - Soft, (+) PEG  Extremities - Diffuse swelling, Extensor posturing to pain response primarily in upper extremities, Decreased muscle mass in bilateral thighs  Neuro - No eye opening noted this morning, No tracking and fixation appreciated, No simple command following    Coma Recovery Scale - Revised    AUDITORY FUNCTION SCALE  1 - Auditory Startle  VISUAL FUNCTION SCALE  0 - Visual Startle  MOTOR FUNCTION SCALE  1 - Abnormal Posturing  OROMOTOR/VERBAL FUNCTION SCALE  1 - Oral Reflexive Movement  COMMUNICATION SCALE  0 - None  AROUSAL SCALE  0 - Unarousable    TOTAL SCORE = 3 = vegetative state    ASSESSMENT/PLAN  32y Male unknown PMH found unresponsive on sidewalk found with bilateral SDH and SAH now s/p bilateral hemicraniectomy and EVD placement s/p removal. Coma x 12 days, Vegetative State at Day 13 (11/3)     Dysautonomia - Propranolol 10mg Q12 (decreased from 20mg Q6 )  Arousal - Monitor Modafinil use given tachycardia - added hold parameters for HR > 120, Discontinued Amantadine (started ) secondary to new fevers with tachycardia  Motor recovery - Prozac 20mg (12/3)  Sleep - Melatonin 5mg (10/31)  Hepatic encephalopathy/Nutritional deficiencies - Vit K, Spironolactone, Vit C, Folate, Thiamine  ID - Meropenem and Vancomycin  Seizures - Keppra   Pain - Tylenol PRN  DVT PPX - SCDs, Lovenox  Oral - Peridex, Aggressive care  Rehab - COMA STIM by all services. Will continue to follow. HISTORY OF PRESENT ILLNESS  Patient seen and examined. Patient was noted to be febrile with tachycardia and desaturation yesterday. Sepsis work-up was initiated. CXR and U/A negative. Pending blood cultures.     This AM no eye opening appreciated. Continues to have extensor posturing especially in bilateral upper extremities. Continues to be febrile and tachycardic this AM.    TODAY'S REVIEW OF SYMPTOMS  Unable to obtain    VITALS  T(C): 39.1 (18 @ 08:00)  T(F): 102.4 (18 @ 08:00), Max: 103.6 (18 @ 04:15)  HR: 118 (18 @ 09:28) (110 - 140)  BP: 136/78 (18 @ 08:00) (92/61 - 144/86)  RR:  (18 - 36)  SpO2:  (86% - 100%)  Wt(kg): --    RECENT LABS/IMAGING             8.0    4.6   )-----------( 78       ( 05 Dec 2018 05:02 )             24.2     130<L>  |  96<L>  |  41.0<H>  ----------------------------<  134<H>  3.8   |  21.0<L>  |  0.71    Ca    9.8      05 Dec 2018 05:04  Phos  3.7     12-  Mg     1.4     12-    TPro  8.6  /  Alb  2.8<L>  /  TBili  1.6  /  DBili  x   /  AST  349<H>  /  ALT  204<H>  /  AlkPhos  266<H>  12-05    PT/INR - ( 05 Dec 2018 05:03 )   PT: 19.6 sec;   INR: 1.68 ratio    PTT - ( 05 Dec 2018 05:03 )  PTT:32.8 sec    Urinalysis Basic - ( 04 Dec 2018 16:48 )  Color: Yellow / Appearance: Slightly Turbid / S.015 / pH: x  Gluc: x / Ketone: Negative  / Bili: Negative / Urobili: 8 mg/dL   Blood: x / Protein: 100 mg/dL / Nitrite: Negative   Leuk Esterase: Trace / RBC: 25-50 /HPF / WBC 3-5   Sq Epi: x / Non Sq Epi: Few / Bacteria: Few    MEDICATIONS   MEDICATIONS  (STANDING):  ALBUTerol    0.083% 2.5 milliGRAM(s) Nebulizer every 6 hours  ascorbic acid 500 milliGRAM(s) Oral daily  chlorhexidine 0.12% Liquid 15 milliLiter(s) Swish and Spit two times a day  enoxaparin Injectable 80 milliGRAM(s) SubCutaneous every 12 hours  FLUoxetine Solution 20 milliGRAM(s) Oral daily  folic acid 1 milliGRAM(s) Oral daily  lactated ringers. 1000 milliLiter(s) (150 mL/Hr) IV Continuous <Continuous>  levETIRAcetam  Solution 1000 milliGRAM(s) Oral two times a day  magnesium sulfate  IVPB 1 Gram(s) IV Intermittent daily  melatonin 5 milliGRAM(s) Oral at bedtime   meropenem  IVPB 1000 milliGRAM(s) IV Intermittent every 8 hours  modafinil 100 milliGRAM(s) Oral <User Schedule>  phytonadione   Solution 5 milliGRAM(s) Oral daily  propranolol 10 milliGRAM(s) Oral every 12 hours  senna Syrup 10 milliLiter(s) Oral daily  spironolactone 25 milliGRAM(s) Oral daily  thiamine 100 milliGRAM(s) Oral daily  vancomycin  IVPB 1000 milliGRAM(s) IV Intermittent every 12 hours    MEDICATIONS  (PRN):  acetaminophen    Suspension .. 650 milliGRAM(s) Oral every 6 hours PRN Temp greater or equal to 38.5C (101.3F)  polyethylene glycol 3350 17 Gram(s) Oral two times a day PRN if not BM in the last 24 hours    PHYSICAL EXAM  General - NAD  HEENT - Bilateral cranial incision, Tongue swelling, No spontaneous eye opening noted today, No fixation or tracking  Cardiovascular - Tachycardic, All extremities warm  Pulm - (+) Trach collar with wound on inferior side  GI - Soft, (+) PEG  Extremities - Diffuse swelling, Extensor posturing to pain response primarily in upper extremities, Decreased muscle mass in bilateral thighs  Neuro - No eye opening noted this morning, No tracking and fixation appreciated, No simple command following    Coma Recovery Scale - Revised    AUDITORY FUNCTION SCALE  1 - Auditory Startle  VISUAL FUNCTION SCALE  0 - Visual Startle  MOTOR FUNCTION SCALE  1 - Abnormal Posturing  OROMOTOR/VERBAL FUNCTION SCALE  1 - Oral Reflexive Movement  COMMUNICATION SCALE  0 - None  AROUSAL SCALE  0 - Unarousable    TOTAL SCORE = 3 = vegetative state    ASSESSMENT/PLAN  32y Male unknown PMH found unresponsive on sidewalk found with bilateral SDH and SAH now s/p bilateral hemicraniectomy and EVD placement s/p removal. Coma x 12 days, Vegetative State at Day 13 (11/3)     Dysautonomia - Propranolol 10mg Q12 (decreased from 20mg Q6 )  Arousal - Modafinil 100mg Q6AM/Q12PM (increased from 100mg daily  - HOLD for HR>120), DC Amantadine ()   Motor recovery - Prozac 20mg (12/3)  Sleep - Melatonin 5mg (10/31)  Hepatic encephalopathy/Nutritional deficiencies - Vit K, Spironolactone, Vit C, Folate, Thiamine  Fevers - Meropenem and Vancomycin  Seizures - Keppra   Pain - Tylenol PRN  DVT PPX - SCDs, Lovenox  Oral - Peridex, Aggressive care  Rehab - COMA STIM by all services. Will continue to follow.

## 2018-12-05 NOTE — PROGRESS NOTE ADULT - ATTENDING COMMENTS
Extensive conversation with family/health care surrogate at bed side with video language interpretation service.      Discussed current bout of sepsis.  Likely pulmonary given hypoxia, tachypnea, copious foul smelling tracheal secretions.  Consider CNS given prior instrumentation.  Have deferred further workup for CNS infection given the likely treatment for such an infection in this case would include a surgical approach and will not accept further surgery.  Given negative duplex, and overall clinical picture most consistent with infectious etiology do not feel empiric anticoagulation for potential PE warranted given risk/benefit ratio.  Will stop.      Pt will remain DNR.  Family would like to try a 5-7 day course of abx and other minimally invasive therapy for his sepsis which includes IV fluids but will not accept further invasive lines, mechanical ventilation, pressors or other procedures/therapies that would cause undo discomfort.  They understand and accept the dismal prognosis from the TBI.  Family concerned that pt may be in pain/discomfort.  Have agreed to shift focus of therapies toward pt comfort.  Plan for fever, dyspnea and pain control.  Fluid resuscitation as needed with broad spectrum abx.  Will begin to minimize blood draws.

## 2018-12-05 NOTE — PROGRESS NOTE ADULT - SUBJECTIVE AND OBJECTIVE BOX
INTERVAL HPI/OVERNIGHT EVENTS:    Patient was febrile for the greater portion of the day yesterday. Sepsis protocol workup was initiated in late afternoon yesterday without calling a code sepsis. No leukocytosis evident, CXR did not demonstrate pleural effusions nor consolidations and U/A was negative. Patient given 2 L of LR and tylenol. Blood and sputum cultures were sent. Vancomycin and Meropenem were started.  At 4AM code sepsis was called since patient was febrile to 103.6F, tachy to 135 and desaturating to low 80s and hypotensive 92/61. Trauma team responded and initiated appropriate workup within the scope of patient's MOLST directive. An additional 2 L of LR were given, suppository of Tylenol administered, Respiratory increased trach O2 to 100% improving his SpO2 to 92%. [place] was sinus tachy. ABG, lactate, CBC and CMP were drawn.     At 4:45AM patient's uncle was contacted to inform of the events, he stated that he was going to come to the hospital around 10AM. Patient's uncle agreed with the current plan and treatement. Patient was rolled to exam for wounds and potential sources. Central line which has been in place for 30 day needs to removed and cultured. additional IV access pending.         MEDICATIONS  (STANDING):  ALBUTerol    0.083% 2.5 milliGRAM(s) Nebulizer every 6 hours  amantadine 100 milliGRAM(s) Oral <User Schedule>  ascorbic acid 500 milliGRAM(s) Oral daily  chlorhexidine 0.12% Liquid 15 milliLiter(s) Swish and Spit two times a day  enoxaparin Injectable 80 milliGRAM(s) SubCutaneous every 12 hours  enoxaparin Injectable 40 milliGRAM(s) SubCutaneous daily  FLUoxetine Solution 20 milliGRAM(s) Oral daily  folic acid 1 milliGRAM(s) Oral daily  lactated ringers. 1000 milliLiter(s) (150 mL/Hr) IV Continuous <Continuous>  levETIRAcetam  Solution 1000 milliGRAM(s) Oral two times a day  magnesium sulfate  IVPB 1 Gram(s) IV Intermittent daily  melatonin 5 milliGRAM(s) Oral at bedtime  meropenem  IVPB      meropenem  IVPB 1000 milliGRAM(s) IV Intermittent every 8 hours  modafinil 100 milliGRAM(s) Oral <User Schedule>  phytonadione   Solution 5 milliGRAM(s) Oral daily  propranolol 10 milliGRAM(s) Oral every 12 hours  senna Syrup 10 milliLiter(s) Oral daily  spironolactone 25 milliGRAM(s) Oral daily  thiamine 100 milliGRAM(s) Oral daily  vancomycin  IVPB 1000 milliGRAM(s) IV Intermittent every 12 hours  vancomycin  IVPB        MEDICATIONS  (PRN):  acetaminophen    Suspension .. 650 milliGRAM(s) Oral every 6 hours PRN Temp greater or equal to 38.5C (101.3F)  polyethylene glycol 3350 17 Gram(s) Oral two times a day PRN if not BM in the last 24 hours      Vital Signs Last 24 Hrs  T(C): 39.2 (05 Dec 2018 06:31), Max: 39.8 (05 Dec 2018 04:15)  T(F): 102.5 (05 Dec 2018 06:31), Max: 103.6 (05 Dec 2018 04:15)  HR: 126 (05 Dec 2018 06:) (98 - 140)  BP: 122/74 (05 Dec 2018 06:) (92/61 - 144/86)  BP(mean): --  RR: 24 (05 Dec 2018 06:31) (18 - 36)  SpO2: 100% (05 Dec 2018 06:31) (86% - 100%)    PE  Pulm: Labored breathing, tachypneic    CV: sinus tachycardia   Abd: soft, non-distended, non-tender  Psychiatric: Unable to assess   Musculoskeletal: No joint pain, swelling or deformity    I&O's Detail    03 Dec 2018 07:01  -  04 Dec 2018 07:00  --------------------------------------------------------  IN:    Pivot: 1140 mL  Total IN: 1140 mL    OUT:  Total OUT: 0 mL    Total NET: 1140 mL      04 Dec 2018 07:01  -  05 Dec 2018 06:35  --------------------------------------------------------  IN:    Enteral Tube Flush: 300 mL    Lactated Ringers IV Bolus: 3000 mL    lactated ringers.: 450 mL    Pivot: 1260 mL  Total IN: 5010 mL    OUT:  Total OUT: 0 mL    Total NET: 5010 mL          LABS:                        8.0    4.6   )-----------( 78       ( 05 Dec 2018 05:02 )             24.2     12-05    130<L>  |  96<L>  |  41.0<H>  ----------------------------<  134<H>  3.8   |  21.0<L>  |  0.71    Ca    9.8      05 Dec 2018 05:04  Phos  3.7     12-05  Mg     1.4     12-    TPro  8.6  /  Alb  2.8<L>  /  TBili  1.6  /  DBili  x   /  AST  349<H>  /  ALT  204<H>  /  AlkPhos  266<H>  12-05    PT/INR - ( 05 Dec 2018 05:03 )   PT: 19.6 sec;   INR: 1.68 ratio         PTT - ( 05 Dec 2018 05:03 )  PTT:32.8 sec  Urinalysis Basic - ( 04 Dec 2018 16:48 )    Color: Yellow / Appearance: Slightly Turbid / S.015 / pH: x  Gluc: x / Ketone: Negative  / Bili: Negative / Urobili: 8 mg/dL   Blood: x / Protein: 100 mg/dL / Nitrite: Negative   Leuk Esterase: Trace / RBC: 25-50 /HPF / WBC 3-5   Sq Epi: x / Non Sq Epi: Few / Bacteria: Few        RADIOLOGY & ADDITIONAL STUDIES:

## 2018-12-06 LAB — VANCOMYCIN TROUGH SERPL-MCNC: 4.2 UG/ML — LOW (ref 10–20)

## 2018-12-06 PROCEDURE — 99233 SBSQ HOSP IP/OBS HIGH 50: CPT | Mod: GC

## 2018-12-06 PROCEDURE — 99232 SBSQ HOSP IP/OBS MODERATE 35: CPT

## 2018-12-06 RX ADMIN — MEROPENEM 100 MILLIGRAM(S): 1 INJECTION INTRAVENOUS at 03:10

## 2018-12-06 RX ADMIN — ENOXAPARIN SODIUM 40 MILLIGRAM(S): 100 INJECTION SUBCUTANEOUS at 11:26

## 2018-12-06 RX ADMIN — SPIRONOLACTONE 25 MILLIGRAM(S): 25 TABLET, FILM COATED ORAL at 05:12

## 2018-12-06 RX ADMIN — SODIUM CHLORIDE 150 MILLILITER(S): 9 INJECTION, SOLUTION INTRAVENOUS at 12:49

## 2018-12-06 RX ADMIN — CHLORHEXIDINE GLUCONATE 15 MILLILITER(S): 213 SOLUTION TOPICAL at 17:06

## 2018-12-06 RX ADMIN — Medication 20 MILLIGRAM(S): at 11:28

## 2018-12-06 RX ADMIN — MODAFINIL 100 MILLIGRAM(S): 200 TABLET ORAL at 12:45

## 2018-12-06 RX ADMIN — Medication 1 MILLIGRAM(S): at 11:26

## 2018-12-06 RX ADMIN — ALBUTEROL 2.5 MILLIGRAM(S): 90 AEROSOL, METERED ORAL at 10:07

## 2018-12-06 RX ADMIN — Medication 100 MILLIGRAM(S): at 11:26

## 2018-12-06 RX ADMIN — Medication 10 MILLIGRAM(S): at 05:12

## 2018-12-06 RX ADMIN — Medication 650 MILLIGRAM(S): at 16:48

## 2018-12-06 RX ADMIN — ALBUTEROL 2.5 MILLIGRAM(S): 90 AEROSOL, METERED ORAL at 17:55

## 2018-12-06 RX ADMIN — CHLORHEXIDINE GLUCONATE 15 MILLILITER(S): 213 SOLUTION TOPICAL at 05:12

## 2018-12-06 RX ADMIN — Medication 100 GRAM(S): at 12:45

## 2018-12-06 RX ADMIN — MEROPENEM 100 MILLIGRAM(S): 1 INJECTION INTRAVENOUS at 19:05

## 2018-12-06 RX ADMIN — Medication 5 MILLIGRAM(S): at 11:28

## 2018-12-06 RX ADMIN — LEVETIRACETAM 1000 MILLIGRAM(S): 250 TABLET, FILM COATED ORAL at 05:12

## 2018-12-06 RX ADMIN — Medication 250 MILLIGRAM(S): at 05:12

## 2018-12-06 RX ADMIN — Medication 250 MILLIGRAM(S): at 18:00

## 2018-12-06 RX ADMIN — Medication 500 MILLIGRAM(S): at 11:26

## 2018-12-06 RX ADMIN — Medication 5 MILLIGRAM(S): at 22:29

## 2018-12-06 RX ADMIN — ALBUTEROL 2.5 MILLIGRAM(S): 90 AEROSOL, METERED ORAL at 03:47

## 2018-12-06 RX ADMIN — LEVETIRACETAM 1000 MILLIGRAM(S): 250 TABLET, FILM COATED ORAL at 17:06

## 2018-12-06 RX ADMIN — MODAFINIL 100 MILLIGRAM(S): 200 TABLET ORAL at 05:12

## 2018-12-06 RX ADMIN — MEROPENEM 100 MILLIGRAM(S): 1 INJECTION INTRAVENOUS at 11:26

## 2018-12-06 RX ADMIN — Medication 10 MILLIGRAM(S): at 17:05

## 2018-12-06 RX ADMIN — Medication 650 MILLIGRAM(S): at 16:18

## 2018-12-06 RX ADMIN — SENNA PLUS 10 MILLILITER(S): 8.6 TABLET ORAL at 11:28

## 2018-12-06 RX ADMIN — ALBUTEROL 2.5 MILLIGRAM(S): 90 AEROSOL, METERED ORAL at 21:20

## 2018-12-06 NOTE — PROGRESS NOTE ADULT - SUBJECTIVE AND OBJECTIVE BOX
CC: Follow up     BRIEF HOSPITAL COURSE:  This is a 45 year old male admitted on 10/24, found unresponsive and with severe severe traumatic brain injury, CT showing bilateral subdural hematoma with compression s/p emergent bilateral hemicraniectomy, decompression and EVD placement on 10/24. S/P trach and peg placement. Course complicated by seizures, possible hepatic encephalopathy, sepsis due to ventilator associated pneumonia, severe anemia s/p transfusion, ?cholecystitis s/p diagnostic paracentesis  to rule SBP. Subsequently downgraded to medical floor. Code sepsis on  for fever, tachycardia, oxygen desaturation. Family meeting held by surgical team with Uncle Hung (next of kin), decision made for no further escalation of care, continue current level of medical management with time limited trial of antibiotics and IVF next 5 days to monitor for improvement.    INTERVAL HPI/OVERNIGHT EVENTS:  No acute events overnight per nurse. Improvement in hemodynamics. Tolerating trach collar.   Preliminary blood cx positive for klebsiella.   Fever spike this AM to 101.7F   No family present during my visit.     Limited ROS due to unresponsiveness.     MEDICATIONS  (STANDING):  ALBUTerol    0.083% 2.5 milliGRAM(s) Nebulizer every 6 hours  ascorbic acid 500 milliGRAM(s) Oral daily  chlorhexidine 0.12% Liquid 15 milliLiter(s) Swish and Spit two times a day  enoxaparin Injectable 40 milliGRAM(s) SubCutaneous daily  FLUoxetine Solution 20 milliGRAM(s) Oral daily  folic acid 1 milliGRAM(s) Oral daily  lactated ringers. 1000 milliLiter(s) (150 mL/Hr) IV Continuous <Continuous>  levETIRAcetam  Solution 1000 milliGRAM(s) Oral two times a day  magnesium sulfate  IVPB 1 Gram(s) IV Intermittent daily  melatonin 5 milliGRAM(s) Oral at bedtime  meropenem  IVPB      meropenem  IVPB 1000 milliGRAM(s) IV Intermittent every 8 hours  modafinil 100 milliGRAM(s) Oral <User Schedule>  phytonadione   Solution 5 milliGRAM(s) Oral daily  propranolol 10 milliGRAM(s) Oral every 12 hours  senna Syrup 10 milliLiter(s) Oral daily  spironolactone 25 milliGRAM(s) Oral daily  thiamine 100 milliGRAM(s) Oral daily  vancomycin  IVPB 1000 milliGRAM(s) IV Intermittent every 12 hours  vancomycin  IVPB        MEDICATIONS  (PRN):  acetaminophen    Suspension .. 650 milliGRAM(s) Oral every 6 hours PRN Temp greater or equal to 38.5C (101.3F)  polyethylene glycol 3350 17 Gram(s) Oral two times a day PRN if not BM in the last 24 hours      PHYSICAL EXAM:    Vital Signs Last 24 Hrs  T(C): 38.7 (06 Dec 2018 08:00), Max: 38.8 (05 Dec 2018 17:04)  T(F): 101.7 (06 Dec 2018 08:00), Max: 101.8 (05 Dec 2018 17:04)  HR: 99 (06 Dec 2018 10:07) (95 - 124)  BP: 149/81 (06 Dec 2018 08:00) (115/72 - 149/81)  BP(mean): --  RR: 20 (06 Dec 2018 08:00) (18 - 20)  SpO2: 100% (06 Dec 2018 10:07) (98% - 100%)    General: nonverbal and unresponsive. Resting comfortably. No acute distress.   HEENT: mucous membrane moist. Tongue edema  Lungs: coarse breath sounds. +trach   CV: +s1/s2. regular rate and rhythm  GI: +bowel sound. abdomen soft, non-tender, obese. +PEG.  MSK: No cyanosis. +edema.  Neuro: unresponsive and nonverbal.   Skin: warm and dry.     LABS:                          8.0    4.6   )-----------( 78       ( 05 Dec 2018 05:02 )             24.2     12-05    130<L>  |  96<L>  |  41.0<H>  ----------------------------<  134<H>  3.8   |  21.0<L>  |  0.71    Ca    9.8      05 Dec 2018 05:04  Phos  3.7     12-05  Mg     1.4     12-05    TPro  8.6  /  Alb  2.8<L>  /  TBili  1.6  /  DBili  x   /  AST  349<H>  /  ALT  204<H>  /  AlkPhos  266<H>  12-05    PT/INR - ( 05 Dec 2018 05:03 )   PT: 19.6 sec;   INR: 1.68 ratio         PTT - ( 05 Dec 2018 05:03 )  PTT:32.8 sec  Urinalysis Basic - ( 04 Dec 2018 16:48 )    Color: Yellow / Appearance: Slightly Turbid / S.015 / pH: x  Gluc: x / Ketone: Negative  / Bili: Negative / Urobili: 8 mg/dL   Blood: x / Protein: 100 mg/dL / Nitrite: Negative   Leuk Esterase: Trace / RBC: 25-50 /HPF / WBC 3-5   Sq Epi: x / Non Sq Epi: Few / Bacteria: Few      I&O's Summary    05 Dec 2018 07:  -  06 Dec 2018 07:00  --------------------------------------------------------  IN: 4430 mL / OUT: 0 mL / NET: 4430 mL    06 Dec 2018 07:  -  06 Dec 2018 13:43  --------------------------------------------------------  IN: 1410 mL / OUT: 0 mL / NET: 1410 mL        RADIOLOGY & ADDITIONAL STUDIES:      EXAM:  US DPLX LWR EXT VEINS COMPL BI                          PROCEDURE DATE:  2018          INTERPRETATION:  Clinical indication:  Respiratory distress.    Technique:  Grayscale, color Doppler and spectral Doppler ultrasound was   utilized to evaluate bilateral lower extremity deep venous system.      Comparison: None.    Findings: There is no thrombosis in bilateral common femoral veins,   femoral veins or popliteal veins. Visualized calf veins are patent.    Impression:     No evidence of deep vein thrombosis in either lower extremity.      ADVANCE DIRECTIVES:   DNR YES NO  Completed on:                     MOLST  YES NO   Completed on:  Living Will  YES NO   Completed on:

## 2018-12-06 NOTE — PROGRESS NOTE ADULT - SUBJECTIVE AND OBJECTIVE BOX
SUBJECTIVE  Patient seen and examined. This AM noted to have rightward gaze preference with minimally reactive pupils. Patient with minimal eye opening. Continues to have fever with tachycardia. Family meeting held at bedside with palliative care team yesterday.    TODAY'S REVIEW OF SYMPTOMS  Unable to obtain    CURRENT FUNCTIONAL STATUS  Total A    VITALS  T(C): 38.7 (18 @ 08:00)  T(F): 101.7 (18 @ 08:00), Max: 101.8 (18 @ 17:04)  HR: 95 (18 @ 08:00) (95 - 124)  BP: 149/81 (18 @ 08:00) (115/72 - 149/81)  RR:  (18 - 20)  SpO2:  (98% - 100%)  Wt(kg): --    RECENT LABS/IMAGING              8.0    4.6   )-----------( 78       ( 05 Dec 2018 05:02 )             24.2     130<L>  |  96<L>  |  41.0<H>  ----------------------------<  134<H>  3.8   |  21.0<L>  |  0.71    Ca    9.8      05 Dec 2018 05:04  Phos  3.7     12-05  Mg     1.4     12-05    TPro  8.6  /  Alb  2.8<L>  /  TBili  1.6  /  DBili  x   /  AST  349<H>  /  ALT  204<H>  /  AlkPhos  266<H>  12-05    PT/INR - ( 05 Dec 2018 05:03 )   PT: 19.6 sec;   INR: 1.68 ratio    PTT - ( 05 Dec 2018 05:03 )  PTT:32.8 sec    Urinalysis Basic - ( 04 Dec 2018 16:48 )  Color: Yellow / Appearance: Slightly Turbid / S.015 / pH: x  Gluc: x / Ketone: Negative  / Bili: Negative / Urobili: 8 mg/dL   Blood: x / Protein: 100 mg/dL / Nitrite: Negative   Leuk Esterase: Trace / RBC: 25-50 /HPF / WBC 3-5   Sq Epi: x / Non Sq Epi: Few / Bacteria: Few    Blood cx - Gram negative rods    Bilateral lower extremity dopplers negative    MEDICATIONS   MEDICATIONS  (STANDING):  ALBUTerol    0.083% 2.5 milliGRAM(s) Nebulizer every 6 hours  ascorbic acid 500 milliGRAM(s) Oral daily  chlorhexidine 0.12% Liquid 15 milliLiter(s) Swish and Spit two times a day  enoxaparin Injectable 40 milliGRAM(s) SubCutaneous daily  FLUoxetine Solution 20 milliGRAM(s) Oral daily  folic acid 1 milliGRAM(s) Oral daily  lactated ringers. 1000 milliLiter(s) (150 mL/Hr) IV Continuous <Continuous>  levETIRAcetam  Solution 1000 milliGRAM(s) Oral two times a day  magnesium sulfate  IVPB 1 Gram(s) IV Intermittent daily  melatonin 5 milliGRAM(s) Oral at bedtime  meropenem  IVPB 1000 milliGRAM(s) IV Intermittent every 8 hours  modafinil 100 milliGRAM(s) Oral <User Schedule>  phytonadione   Solution 5 milliGRAM(s) Oral daily  propranolol 10 milliGRAM(s) Oral every 12 hours  senna Syrup 10 milliLiter(s) Oral daily  spironolactone 25 milliGRAM(s) Oral daily  thiamine 100 milliGRAM(s) Oral daily  vancomycin  IVPB 1000 milliGRAM(s) IV Intermittent every 12 hours    MEDICATIONS  (PRN):  acetaminophen    Suspension .. 650 milliGRAM(s) Oral every 6 hours PRN Temp greater or equal to 38.5C (101.3F)  polyethylene glycol 3350 17 Gram(s) Oral two times a day PRN if not BM in the last 24 hours    PHYSICAL EXAM  General - Diaphoretic  HEENT - Right gaze preference, Bilateral cranial incision with swelling, Tongue swelling with oral reflexive movements, Minimal eye opening with stimulation  Cardiovascular - Tachycardic, All extremities warm  Pulm - (+) Trach collar with wound on inferior side  GI - Soft, (+) PEG  Extremities - Diffuse swelling, Extensor posturing to pain response primarily in upper extremities, Decreased muscle mass in bilateral thighs  Neuro - Minimal eye opening with stimulation, Rightward gaze preference, No tracking and fixation appreciated    Coma Recovery Scale - Revised    AUDITORY FUNCTION SCALE  0 - Auditory Startle  VISUAL FUNCTION SCALE  0 - Visual Startle  MOTOR FUNCTION SCALE  1 - Abnormal Posturing  OROMOTOR/VERBAL FUNCTION SCALE  1 - Oral Reflexive Movement  COMMUNICATION SCALE  0 - None  AROUSAL SCALE  1 - Eye opening with stimulation    TOTAL SCORE = 3 = vegetative state    ASSESSMENT/PLAN  32y Male unknown PMH found unresponsive on sidewalk found with bilateral SDH and SAH now s/p bilateral hemicraniectomy and EVD placement s/p removal. Coma x 12 days, Vegetative State at Day 13 (11/3)

## 2018-12-06 NOTE — PROGRESS NOTE ADULT - SUBJECTIVE AND OBJECTIVE BOX
INTERVAL HPI/OVERNIGHT EVENTS:    Patient has been improving on antibiotics steadily. HR normalizing. Afebrile overnight. Oxygen saturations better on trach collar. Midline taken out and 2 PIVs placed.  Blood grew Klebsiella.     SUBJECTIVE:    Patient unable to provide subjective assessment of his progress.     MEDICATIONS  (STANDING):  ALBUTerol    0.083% 2.5 milliGRAM(s) Nebulizer every 6 hours  ascorbic acid 500 milliGRAM(s) Oral daily  chlorhexidine 0.12% Liquid 15 milliLiter(s) Swish and Spit two times a day  enoxaparin Injectable 40 milliGRAM(s) SubCutaneous daily  FLUoxetine Solution 20 milliGRAM(s) Oral daily  folic acid 1 milliGRAM(s) Oral daily  lactated ringers. 1000 milliLiter(s) (150 mL/Hr) IV Continuous <Continuous>  levETIRAcetam  Solution 1000 milliGRAM(s) Oral two times a day  magnesium sulfate  IVPB 1 Gram(s) IV Intermittent daily  melatonin 5 milliGRAM(s) Oral at bedtime  meropenem  IVPB      meropenem  IVPB 1000 milliGRAM(s) IV Intermittent every 8 hours  modafinil 100 milliGRAM(s) Oral <User Schedule>  phytonadione   Solution 5 milliGRAM(s) Oral daily  propranolol 10 milliGRAM(s) Oral every 12 hours  senna Syrup 10 milliLiter(s) Oral daily  spironolactone 25 milliGRAM(s) Oral daily  thiamine 100 milliGRAM(s) Oral daily  vancomycin  IVPB 1000 milliGRAM(s) IV Intermittent every 12 hours  vancomycin  IVPB        MEDICATIONS  (PRN):  acetaminophen    Suspension .. 650 milliGRAM(s) Oral every 6 hours PRN Temp greater or equal to 38.5C (101.3F)  polyethylene glycol 3350 17 Gram(s) Oral two times a day PRN if not BM in the last 24 hours      Vital Signs Last 24 Hrs  T(C): 36.7 (05 Dec 2018 23:40), Max: 39.1 (05 Dec 2018 08:00)  T(F): 98 (05 Dec 2018 23:40), Max: 102.4 (05 Dec 2018 08:00)  HR: 102 (06 Dec 2018 05:00) (95 - 126)  BP: 122/68 (06 Dec 2018 05:00) (115/72 - 136/78)  BP(mean): --  RR: 18 (05 Dec 2018 23:40) (18 - 24)  SpO2: 99% (06 Dec 2018 03:47) (98% - 100%)    PE  Gen: Young male in vegetative state wearing helmet  Heent: Tongue reduced  Pulm: Breathing now unlabored - 4L trach collar    CV: Regular rate 98  Abd: soft, non-distended, non-tender  Psychiatric: Unable to assess   Musculoskeletal: No joint pain, swelling or deformity    I&O's Detail    05 Dec 2018 07:01  -  06 Dec 2018 07:00  --------------------------------------------------------  IN:    lactated ringers.: 3150 mL    Pivot: 1080 mL    Solution: 100 mL    Solution: 100 mL  Total IN: 4430 mL    OUT:  Total OUT: 0 mL    Total NET: 4430 mL          LABS:                        8.0    4.6   )-----------( 78       ( 05 Dec 2018 05:02 )             24.2     12-05    130<L>  |  96<L>  |  41.0<H>  ----------------------------<  134<H>  3.8   |  21.0<L>  |  0.71    Ca    9.8      05 Dec 2018 05:04  Phos  3.7     12-05  Mg     1.4     12-05    TPro  8.6  /  Alb  2.8<L>  /  TBili  1.6  /  DBili  x   /  AST  349<H>  /  ALT  204<H>  /  AlkPhos  266<H>  12-05    PT/INR - ( 05 Dec 2018 05:03 )   PT: 19.6 sec;   INR: 1.68 ratio         PTT - ( 05 Dec 2018 05:03 )  PTT:32.8 sec  Urinalysis Basic - ( 04 Dec 2018 16:48 )    Color: Yellow / Appearance: Slightly Turbid / S.015 / pH: x  Gluc: x / Ketone: Negative  / Bili: Negative / Urobili: 8 mg/dL   Blood: x / Protein: 100 mg/dL / Nitrite: Negative   Leuk Esterase: Trace / RBC: 25-50 /HPF / WBC 3-5   Sq Epi: x / Non Sq Epi: Few / Bacteria: Few

## 2018-12-06 NOTE — PROGRESS NOTE ADULT - ASSESSMENT
A/P: 32 year old male found down with TBI, now s/p trach/PEG currently made DNR/DNI with no escalation of care if clinical condition deteriorates. Code sepsis for hypotension, fevers, tachycardia and desaturation TWO DAYS AGOI.  Now progressing well on Vanc and Neo - no longer febrile or tachycardic. Family agrees with 5 days of abx.    -Palliative consult  -follow up AM labs  -continue IV antibiotics  -Lovenox  -Midline removed  -Blood cx grew Klebsiella

## 2018-12-06 NOTE — PROGRESS NOTE ADULT - ASSESSMENT
Mr. Aneudy Clemente is a 45 M found to have severe TBI with subdural hematoma s/p surgical intervention, s/p trach and peg and course complicated by sepsis, acute anemia, hepatic encephalopathy and ?cholecystitis. Family meeting held on 11/12 with SICU team over phone with Uncle - next of kin, pt was made DNR/DNI. Uncle acknowledge understanding of pt's underlying medical conditions, hospital course and would like to continue all current medical management - would like to be informed if he should encounter any acute events to make decision moving forward. SW following and working on [facility] as pt is an undocumented citizen.     Reconsulted 12/5 for assistance with go.   Code sepsis 12/6 and family meeting held by surgical team with family (uncle and aunt in law): decision made to continue current medical management with time limited trial of IVF and antibiotics for next 5 days. No further escalation of care or transfer to ICU.  Preliminary Bcx positive for klebsiella.

## 2018-12-06 NOTE — PROGRESS NOTE ADULT - ATTENDING COMMENTS
Patient appears to be clinically changed from yesterday. Patient's fixated to the right and noted to have a tongue twitch with diffuse swelling of his craniectomy sites. ACS contacted.    Patient had family meeting with palliative care and now made DNR/DNI.     In agreement, to make patient as comfortable as possible.    Plan will ultimately be through [facility].

## 2018-12-06 NOTE — PROGRESS NOTE ADULT - PROBLEM SELECTOR PLAN 4
C/W current medical management with time limited trials of IVF and antibiotics per family discussion held on 12/6. No further escalation of care or transfer to ICU. If pt should decompensate, uncle to be updated and to readdress plan of care moving forward.

## 2018-12-07 DIAGNOSIS — S06.9X9A UNSPECIFIED INTRACRANIAL INJURY WITH LOSS OF CONSCIOUSNESS OF UNSPECIFIED DURATION, INITIAL ENCOUNTER: ICD-10-CM

## 2018-12-07 LAB
-  AMIKACIN: SIGNIFICANT CHANGE UP
-  AMIKACIN: SIGNIFICANT CHANGE UP
-  AMPICILLIN/SULBACTAM: SIGNIFICANT CHANGE UP
-  AMPICILLIN: SIGNIFICANT CHANGE UP
-  AMPICILLIN: SIGNIFICANT CHANGE UP
-  AZTREONAM: SIGNIFICANT CHANGE UP
-  AZTREONAM: SIGNIFICANT CHANGE UP
-  CEFAZOLIN: SIGNIFICANT CHANGE UP
-  CEFEPIME: SIGNIFICANT CHANGE UP
-  CEFEPIME: SIGNIFICANT CHANGE UP
-  CEFOXITIN: SIGNIFICANT CHANGE UP
-  CEFOXITIN: SIGNIFICANT CHANGE UP
-  CEFTRIAXONE: SIGNIFICANT CHANGE UP
-  CEFTRIAXONE: SIGNIFICANT CHANGE UP
-  CIPROFLOXACIN: SIGNIFICANT CHANGE UP
-  CIPROFLOXACIN: SIGNIFICANT CHANGE UP
-  CLINDAMYCIN: SIGNIFICANT CHANGE UP
-  ERTAPENEM: SIGNIFICANT CHANGE UP
-  ERTAPENEM: SIGNIFICANT CHANGE UP
-  ERYTHROMYCIN: SIGNIFICANT CHANGE UP
-  GENTAMICIN: SIGNIFICANT CHANGE UP
-  IMIPENEM: SIGNIFICANT CHANGE UP
-  IMIPENEM: SIGNIFICANT CHANGE UP
-  LEVOFLOXACIN: SIGNIFICANT CHANGE UP
-  LEVOFLOXACIN: SIGNIFICANT CHANGE UP
-  MEROPENEM: SIGNIFICANT CHANGE UP
-  MEROPENEM: SIGNIFICANT CHANGE UP
-  OXACILLIN: SIGNIFICANT CHANGE UP
-  PENICILLIN: SIGNIFICANT CHANGE UP
-  PIPERACILLIN/TAZOBACTAM: SIGNIFICANT CHANGE UP
-  PIPERACILLIN/TAZOBACTAM: SIGNIFICANT CHANGE UP
-  RIFAMPIN: SIGNIFICANT CHANGE UP
-  TETRACYCLINE: SIGNIFICANT CHANGE UP
-  TOBRAMYCIN: SIGNIFICANT CHANGE UP
-  TOBRAMYCIN: SIGNIFICANT CHANGE UP
-  TRIMETHOPRIM/SULFAMETHOXAZOLE: SIGNIFICANT CHANGE UP
-  VANCOMYCIN: SIGNIFICANT CHANGE UP
ANION GAP SERPL CALC-SCNC: 8 MMOL/L — SIGNIFICANT CHANGE UP (ref 5–17)
ANION GAP SERPL CALC-SCNC: 9 MMOL/L — SIGNIFICANT CHANGE UP (ref 5–17)
ANION GAP SERPL CALC-SCNC: 9 MMOL/L — SIGNIFICANT CHANGE UP (ref 5–17)
BUN SERPL-MCNC: 10 MG/DL — SIGNIFICANT CHANGE UP (ref 8–20)
BUN SERPL-MCNC: 11 MG/DL — SIGNIFICANT CHANGE UP (ref 8–20)
BUN SERPL-MCNC: 9 MG/DL — SIGNIFICANT CHANGE UP (ref 8–20)
CALCIUM SERPL-MCNC: 7.8 MG/DL — LOW (ref 8.6–10.2)
CALCIUM SERPL-MCNC: 7.9 MG/DL — LOW (ref 8.6–10.2)
CALCIUM SERPL-MCNC: 7.9 MG/DL — LOW (ref 8.6–10.2)
CHLORIDE SERPL-SCNC: 81 MMOL/L — LOW (ref 98–107)
CHLORIDE SERPL-SCNC: 84 MMOL/L — LOW (ref 98–107)
CHLORIDE SERPL-SCNC: 84 MMOL/L — LOW (ref 98–107)
CO2 SERPL-SCNC: 23 MMOL/L — SIGNIFICANT CHANGE UP (ref 22–29)
CO2 SERPL-SCNC: 25 MMOL/L — SIGNIFICANT CHANGE UP (ref 22–29)
CO2 SERPL-SCNC: 25 MMOL/L — SIGNIFICANT CHANGE UP (ref 22–29)
CREAT ?TM UR-MCNC: 25 MG/DL — SIGNIFICANT CHANGE UP
CREAT SERPL-MCNC: 0.23 MG/DL — LOW (ref 0.5–1.3)
CREAT SERPL-MCNC: <0.2 MG/DL — LOW (ref 0.5–1.3)
CREAT SERPL-MCNC: <0.2 MG/DL — LOW (ref 0.5–1.3)
CULTURE RESULTS: SIGNIFICANT CHANGE UP
CULTURE RESULTS: SIGNIFICANT CHANGE UP
EOSINOPHIL # BLD AUTO: 0 K/UL — SIGNIFICANT CHANGE UP (ref 0–0.5)
EOSINOPHIL NFR BLD AUTO: 1.4 % — SIGNIFICANT CHANGE UP (ref 0–5)
GLUCOSE SERPL-MCNC: 118 MG/DL — HIGH (ref 70–115)
GLUCOSE SERPL-MCNC: 126 MG/DL — HIGH (ref 70–115)
GLUCOSE SERPL-MCNC: 127 MG/DL — HIGH (ref 70–115)
HCT VFR BLD CALC: 21.3 % — LOW (ref 42–52)
HGB BLD-MCNC: 7.4 G/DL — LOW (ref 14–18)
LYMPHOCYTES # BLD AUTO: 1 K/UL — SIGNIFICANT CHANGE UP (ref 1–4.8)
LYMPHOCYTES # BLD AUTO: 28 % — SIGNIFICANT CHANGE UP (ref 20–55)
MAGNESIUM SERPL-MCNC: 1 MG/DL — CRITICAL LOW (ref 1.6–2.6)
MAGNESIUM SERPL-MCNC: 1.6 MG/DL — SIGNIFICANT CHANGE UP (ref 1.6–2.6)
MAGNESIUM SERPL-MCNC: 2.2 MG/DL — SIGNIFICANT CHANGE UP (ref 1.8–2.6)
MCHC RBC-ENTMCNC: 28.6 PG — SIGNIFICANT CHANGE UP (ref 27–31)
MCHC RBC-ENTMCNC: 34.7 G/DL — SIGNIFICANT CHANGE UP (ref 32–36)
MCV RBC AUTO: 82.2 FL — SIGNIFICANT CHANGE UP (ref 80–94)
METHOD TYPE: SIGNIFICANT CHANGE UP
MONOCYTES # BLD AUTO: 0.6 K/UL — SIGNIFICANT CHANGE UP (ref 0–0.8)
MONOCYTES NFR BLD AUTO: 15.5 % — HIGH (ref 3–10)
NEUTROPHILS # BLD AUTO: 2 K/UL — SIGNIFICANT CHANGE UP (ref 1.8–8)
NEUTROPHILS NFR BLD AUTO: 54.8 % — SIGNIFICANT CHANGE UP (ref 37–73)
ORGANISM # SPEC MICROSCOPIC CNT: SIGNIFICANT CHANGE UP
OSMOLALITY SERPL: 269 MOSM/KG — LOW (ref 280–300)
OSMOLALITY UR: 430 MOSM/KG — SIGNIFICANT CHANGE UP (ref 300–1000)
PHOSPHATE SERPL-MCNC: 1.8 MG/DL — LOW (ref 2.4–4.7)
PHOSPHATE SERPL-MCNC: 1.9 MG/DL — LOW (ref 2.4–4.7)
PHOSPHATE SERPL-MCNC: 2.1 MG/DL — LOW (ref 2.4–4.7)
PLATELET # BLD AUTO: 66 K/UL — LOW (ref 150–400)
POTASSIUM SERPL-MCNC: 3.2 MMOL/L — LOW (ref 3.5–5.3)
POTASSIUM SERPL-MCNC: 3.2 MMOL/L — LOW (ref 3.5–5.3)
POTASSIUM SERPL-MCNC: 4.2 MMOL/L — SIGNIFICANT CHANGE UP (ref 3.5–5.3)
POTASSIUM SERPL-SCNC: 3.2 MMOL/L — LOW (ref 3.5–5.3)
POTASSIUM SERPL-SCNC: 3.2 MMOL/L — LOW (ref 3.5–5.3)
POTASSIUM SERPL-SCNC: 4.2 MMOL/L — SIGNIFICANT CHANGE UP (ref 3.5–5.3)
RBC # BLD: 2.59 M/UL — LOW (ref 4.6–6.2)
RBC # FLD: 15.8 % — HIGH (ref 11–15.6)
SODIUM SERPL-SCNC: 115 MMOL/L — CRITICAL LOW (ref 135–145)
SODIUM SERPL-SCNC: 115 MMOL/L — CRITICAL LOW (ref 135–145)
SODIUM SERPL-SCNC: 118 MMOL/L — CRITICAL LOW (ref 135–145)
SODIUM UR-SCNC: 104 MMOL/L — SIGNIFICANT CHANGE UP
SPECIMEN SOURCE: SIGNIFICANT CHANGE UP
SPECIMEN SOURCE: SIGNIFICANT CHANGE UP
WBC # BLD: 3.6 K/UL — LOW (ref 4.8–10.8)
WBC # FLD AUTO: 3.6 K/UL — LOW (ref 4.8–10.8)

## 2018-12-07 PROCEDURE — 99233 SBSQ HOSP IP/OBS HIGH 50: CPT | Mod: GC

## 2018-12-07 RX ORDER — MAGNESIUM SULFATE 500 MG/ML
2 VIAL (ML) INJECTION ONCE
Qty: 0 | Refills: 0 | Status: COMPLETED | OUTPATIENT
Start: 2018-12-07 | End: 2018-12-07

## 2018-12-07 RX ORDER — SODIUM CHLORIDE 9 MG/ML
1000 INJECTION INTRAMUSCULAR; INTRAVENOUS; SUBCUTANEOUS ONCE
Qty: 0 | Refills: 0 | Status: COMPLETED | OUTPATIENT
Start: 2018-12-07 | End: 2018-12-07

## 2018-12-07 RX ORDER — SODIUM CHLORIDE 9 MG/ML
1000 INJECTION INTRAMUSCULAR; INTRAVENOUS; SUBCUTANEOUS
Qty: 0 | Refills: 0 | Status: DISCONTINUED | OUTPATIENT
Start: 2018-12-07 | End: 2018-12-07

## 2018-12-07 RX ORDER — MAGNESIUM SULFATE 500 MG/ML
2 VIAL (ML) INJECTION
Qty: 0 | Refills: 0 | Status: COMPLETED | OUTPATIENT
Start: 2018-12-07 | End: 2018-12-07

## 2018-12-07 RX ORDER — POTASSIUM CHLORIDE 20 MEQ
40 PACKET (EA) ORAL EVERY 4 HOURS
Qty: 0 | Refills: 0 | Status: COMPLETED | OUTPATIENT
Start: 2018-12-07 | End: 2018-12-07

## 2018-12-07 RX ORDER — VANCOMYCIN HCL 1 G
1000 VIAL (EA) INTRAVENOUS EVERY 8 HOURS
Qty: 0 | Refills: 0 | Status: DISCONTINUED | OUTPATIENT
Start: 2018-12-07 | End: 2018-12-07

## 2018-12-07 RX ORDER — ACETAMINOPHEN 500 MG
1000 TABLET ORAL ONCE
Qty: 0 | Refills: 0 | Status: COMPLETED | OUTPATIENT
Start: 2018-12-07 | End: 2019-01-07

## 2018-12-07 RX ADMIN — SODIUM CHLORIDE 4000 MILLILITER(S): 9 INJECTION INTRAMUSCULAR; INTRAVENOUS; SUBCUTANEOUS at 08:59

## 2018-12-07 RX ADMIN — MODAFINIL 100 MILLIGRAM(S): 200 TABLET ORAL at 12:10

## 2018-12-07 RX ADMIN — Medication 100 GRAM(S): at 12:10

## 2018-12-07 RX ADMIN — Medication 10 MILLIGRAM(S): at 17:50

## 2018-12-07 RX ADMIN — SPIRONOLACTONE 25 MILLIGRAM(S): 25 TABLET, FILM COATED ORAL at 05:31

## 2018-12-07 RX ADMIN — ALBUTEROL 2.5 MILLIGRAM(S): 90 AEROSOL, METERED ORAL at 02:13

## 2018-12-07 RX ADMIN — Medication 100 MILLIGRAM(S): at 12:07

## 2018-12-07 RX ADMIN — ALBUTEROL 2.5 MILLIGRAM(S): 90 AEROSOL, METERED ORAL at 21:25

## 2018-12-07 RX ADMIN — Medication 50 GRAM(S): at 14:50

## 2018-12-07 RX ADMIN — Medication 250 MILLIGRAM(S): at 05:31

## 2018-12-07 RX ADMIN — SODIUM CHLORIDE 150 MILLILITER(S): 9 INJECTION, SOLUTION INTRAVENOUS at 15:52

## 2018-12-07 RX ADMIN — Medication 50 GRAM(S): at 08:58

## 2018-12-07 RX ADMIN — Medication 40 MILLIEQUIVALENT(S): at 17:49

## 2018-12-07 RX ADMIN — ALBUTEROL 2.5 MILLIGRAM(S): 90 AEROSOL, METERED ORAL at 15:45

## 2018-12-07 RX ADMIN — Medication 250 MILLIGRAM(S): at 13:13

## 2018-12-07 RX ADMIN — Medication 1 MILLIGRAM(S): at 12:07

## 2018-12-07 RX ADMIN — MEROPENEM 100 MILLIGRAM(S): 1 INJECTION INTRAVENOUS at 12:11

## 2018-12-07 RX ADMIN — ENOXAPARIN SODIUM 40 MILLIGRAM(S): 100 INJECTION SUBCUTANEOUS at 12:06

## 2018-12-07 RX ADMIN — Medication 10 MILLIGRAM(S): at 05:31

## 2018-12-07 RX ADMIN — MODAFINIL 100 MILLIGRAM(S): 200 TABLET ORAL at 05:31

## 2018-12-07 RX ADMIN — ALBUTEROL 2.5 MILLIGRAM(S): 90 AEROSOL, METERED ORAL at 10:02

## 2018-12-07 RX ADMIN — Medication 40 MILLIEQUIVALENT(S): at 14:50

## 2018-12-07 RX ADMIN — Medication 20 MILLIGRAM(S): at 12:07

## 2018-12-07 RX ADMIN — LEVETIRACETAM 1000 MILLIGRAM(S): 250 TABLET, FILM COATED ORAL at 17:49

## 2018-12-07 RX ADMIN — CHLORHEXIDINE GLUCONATE 15 MILLILITER(S): 213 SOLUTION TOPICAL at 05:31

## 2018-12-07 RX ADMIN — MEROPENEM 100 MILLIGRAM(S): 1 INJECTION INTRAVENOUS at 03:05

## 2018-12-07 RX ADMIN — LEVETIRACETAM 1000 MILLIGRAM(S): 250 TABLET, FILM COATED ORAL at 05:31

## 2018-12-07 RX ADMIN — CHLORHEXIDINE GLUCONATE 15 MILLILITER(S): 213 SOLUTION TOPICAL at 17:49

## 2018-12-07 RX ADMIN — SENNA PLUS 10 MILLILITER(S): 8.6 TABLET ORAL at 12:08

## 2018-12-07 RX ADMIN — Medication 500 MILLIGRAM(S): at 12:07

## 2018-12-07 RX ADMIN — MEROPENEM 100 MILLIGRAM(S): 1 INJECTION INTRAVENOUS at 21:57

## 2018-12-07 RX ADMIN — Medication 50 GRAM(S): at 15:51

## 2018-12-07 RX ADMIN — Medication 5 MILLIGRAM(S): at 12:08

## 2018-12-07 NOTE — PHARMACOTHERAPY INTERVENTION NOTE - COMMENTS
Culture results came back positive for MSSA and therefore vancomycin is not needed. Recommended discontinuation of vancomycin.
Vanco trough on 10/29 was 7.2 after 2 doses of 1600mg. Will reschedule for 1500mg every 8 hours and recheck trough level on 10/31 prior to the first morning dose. Will adjust dose based on level.
Vancomycin increased to 1g every 8 hours due to SUBTHERAPEUTIC trough 4.2.   Next trough level ordered for 12/8 @5am (1 hour before giving 6am dose)
Vancomycin trough ordered for 12/6 @5pm (draw 1 hour before giving 6pm dose). 1g every 12h gives projected trough of ~6.3  If trough is subtherapeutic, consider increasing to 1g every 8 hours for goal of 15-20.

## 2018-12-07 NOTE — PROGRESS NOTE ADULT - SUBJECTIVE AND OBJECTIVE BOX
HPI/OVERNIGHT EVENTS: Patient seen and examined at bedside this AM. Tmax noted to be 103.4 12/6 late afternoon/early evening. Tmax 102.9 overnight. Patient currently on meropenem and vancomycin. Subjective information limited by patient's mental status.     Vital Signs Last 24 Hrs  T(C): 38.8 (07 Dec 2018 00:41), Max: 39.7 (06 Dec 2018 16:06)  T(F): 101.9 (07 Dec 2018 00:41), Max: 103.4 (06 Dec 2018 16:06)  HR: 89 (07 Dec 2018 02:14) (80 - 113)  BP: 146/92 (07 Dec 2018 00:41) (135/77 - 146/92)  BP(mean): --  RR: 18 (07 Dec 2018 00:41) (18 - 18)  SpO2: 100% (07 Dec 2018 02:14) (96% - 100%)    I&O's Detail    06 Dec 2018 07:01  -  07 Dec 2018 07:00  --------------------------------------------------------  IN:    Enteral Tube Flush: 350 mL    lactated ringers.: 3300 mL    Pivot: 1320 mL  Total IN: 4970 mL    OUT:  Total OUT: 0 mL    Total NET: 4970 mL    Constitutional: patient resting comfortably in bed, in no acute distress  HEENT: EOMI / PERRL b/l, bite block in place   Neck: Trach in place, protective adhesive band over pressure sore near trach site   Respiratory: CTAB with respirations are unlabored or accessory muscle use  Cardiovascular: regular rate & rhythm  Gastrointestinal: PEG present, abdomen soft, non-tender, non-distended, no rebound tenderness / guarding  Psychiatric: Unable to assess   Musculoskeletal: No joint pain, swelling or deformity    LABS:                        7.4    3.6   )-----------( 66       ( 07 Dec 2018 08:01 )             21.3     MEDICATIONS  (STANDING):  ALBUTerol    0.083% 2.5 milliGRAM(s) Nebulizer every 6 hours  ascorbic acid 500 milliGRAM(s) Oral daily  chlorhexidine 0.12% Liquid 15 milliLiter(s) Swish and Spit two times a day  enoxaparin Injectable 40 milliGRAM(s) SubCutaneous daily  FLUoxetine Solution 20 milliGRAM(s) Oral daily  folic acid 1 milliGRAM(s) Oral daily  lactated ringers. 1000 milliLiter(s) (150 mL/Hr) IV Continuous <Continuous>  levETIRAcetam  Solution 1000 milliGRAM(s) Oral two times a day  magnesium sulfate  IVPB 1 Gram(s) IV Intermittent daily  melatonin 5 milliGRAM(s) Oral at bedtime  meropenem  IVPB      meropenem  IVPB 1000 milliGRAM(s) IV Intermittent every 8 hours  modafinil 100 milliGRAM(s) Oral <User Schedule>  phytonadione   Solution 5 milliGRAM(s) Oral daily  propranolol 10 milliGRAM(s) Oral every 12 hours  senna Syrup 10 milliLiter(s) Oral daily  spironolactone 25 milliGRAM(s) Oral daily  thiamine 100 milliGRAM(s) Oral daily  vancomycin  IVPB 1000 milliGRAM(s) IV Intermittent every 8 hours    MEDICATIONS  (PRN):  acetaminophen    Suspension .. 650 milliGRAM(s) Oral every 6 hours PRN Temp greater or equal to 38.5C (101.3F)  polyethylene glycol 3350 17 Gram(s) Oral two times a day PRN if not BM in the last 24 hours    MICRO:     STUDIES:   RI

## 2018-12-07 NOTE — PROGRESS NOTE ADULT - SUBJECTIVE AND OBJECTIVE BOX
SUBJECTIVE  Patient seen and examined. Continues to have increased swelling around cranial incisions particularly on right side. Continues to have fevers. Noted to have electrolyte abnormalities this AM.    TODAY'S REVIEW OF SYMPTOMS  Unable to obtain    VITALS  T(C): 38.9 (12-07-18 @ 07:00)  T(F): 102.1 (12-07-18 @ 07:00), Max: 103.4 (12-06-18 @ 16:06)  HR: 88 (12-07-18 @ 10:02) (80 - 113)  BP: 137/81 (12-07-18 @ 07:00) (135/77 - 146/92)  RR:  (18 - 18)  SpO2:  (96% - 100%)  Wt(kg): --    RECENT LABS/IMAGING             7.4    3.6   )-----------( 66       ( 07 Dec 2018 08:01 )             21.3     118  |  84<L>  |  11.0  ----------------------------<  127<H>  3.2<L>   |  25.0  |  <0.20<L>    Ca    7.9<L>      07 Dec 2018 08:01  Phos  1.9     12-07  Mg     1.0     12-07    MEDICATIONS   MEDICATIONS  (STANDING):  acetaminophen  IVPB .. 1000 milliGRAM(s) IV Intermittent once  ALBUTerol    0.083% 2.5 milliGRAM(s) Nebulizer every 6 hours  ascorbic acid 500 milliGRAM(s) Oral daily  chlorhexidine 0.12% Liquid 15 milliLiter(s) Swish and Spit two times a day  enoxaparin Injectable 40 milliGRAM(s) SubCutaneous daily  FLUoxetine Solution 20 milliGRAM(s) Oral daily  folic acid 1 milliGRAM(s) Oral daily  lactated ringers. 1000 milliLiter(s) (150 mL/Hr) IV Continuous <Continuous>  levETIRAcetam  Solution 1000 milliGRAM(s) Oral two times a day  magnesium sulfate  IVPB 1 Gram(s) IV Intermittent daily  melatonin 5 milliGRAM(s) Oral at bedtime  meropenem  IVPB 1000 milliGRAM(s) IV Intermittent every 8 hours  modafinil 100 milliGRAM(s) Oral <User Schedule>  phytonadione   Solution 5 milliGRAM(s) Oral daily  propranolol 10 milliGRAM(s) Oral every 12 hours  senna Syrup 10 milliLiter(s) Oral daily  spironolactone 25 milliGRAM(s) Oral daily  thiamine 100 milliGRAM(s) Oral daily  vancomycin  IVPB 1000 milliGRAM(s) IV Intermittent every 8 hours    MEDICATIONS  (PRN):  acetaminophen    Suspension .. 650 milliGRAM(s) Oral every 6 hours PRN Temp greater or equal to 38.5C (101.3F)  polyethylene glycol 3350 17 Gram(s) Oral two times a day PRN if not BM in the last 24 hours    PHYSICAL EXAM  HEENT - Bilateral cranial incision with swelling (right > left), Tongue swelling with oral reflexive movements - bite blocks placed, Minimal eye opening with stimulation  Cardiovascular - Diffuse swelling  Pulm - (+) Trach collar with wound on inferior side  GI - Soft, (+) PEG  Extremities - Diffuse swelling, Extensor posturing to pain response primarily in upper extremities, Decreased muscle mass in bilateral thighs  Neuro - Minimal eye opening with stimulation, No tracking and fixation appreciated    Coma Recovery Scale - Revised    AUDITORY FUNCTION SCALE  0 - Auditory Startle  VISUAL FUNCTION SCALE  0 - Visual Startle  MOTOR FUNCTION SCALE  1 - Abnormal Posturing  OROMOTOR/VERBAL FUNCTION SCALE  1 - Oral Reflexive Movement  COMMUNICATION SCALE  0 - None  AROUSAL SCALE  1 - Eye opening with stimulation    TOTAL SCORE = 3 = vegetative state    ASSESSMENT/PLAN  32y Male unknown PMH found unresponsive on sidewalk found with bilateral SDH and SAH now s/p bilateral hemicraniectomy and EVD placement s/p removal. Coma x 12 days, Vegetative State at Day 13 (11/3).    Severe TBI - Patient's family in talks about goals of care. Given patient's severity of injury and complicated medical course, prognosis is grim.   Dysautonomia - Propranolol 10mg Q12 (decreased from 20mg Q6 11/30)  Arousal - Modafinil 100mg Q6AM/Q12PM (increased from 100mg daily 11/14 - HOLD for HR>120)  Motor recovery - Prozac 20mg (12/3)  Sleep - Melatonin 5mg (10/31)  Hepatic encephalopathy/Nutritional deficiencies - Vit K, Spironolactone, Vit C, Folate, Thiamine  ID - Meropenem and Vancomycin  Pancytopenia - Consider discontinuing Lovenox given patient's downtrending platelets and hemoglobin  Electrolyte abnormalities - Recommend increasing Magnesium to 2gm IV  Seizures - Keppra   Pain - Tylenol PRN  Oral - Peridex, Aggressive care  Rehab - COMA STIM by all services. Will continue to follow. SUBJECTIVE  Patient seen and examined. Continues to have increased swelling around cranial incisions particularly on right side. Continues to have fevers. Noted to have electrolyte abnormalities this AM.    TODAY'S REVIEW OF SYMPTOMS  Unable to obtain    VITALS  T(C): 38.9 (12-07-18 @ 07:00)  T(F): 102.1 (12-07-18 @ 07:00), Max: 103.4 (12-06-18 @ 16:06)  HR: 88 (12-07-18 @ 10:02) (80 - 113)  BP: 137/81 (12-07-18 @ 07:00) (135/77 - 146/92)  RR:  (18 - 18)  SpO2:  (96% - 100%)  Wt(kg): --    RECENT LABS/IMAGING             7.4    3.6   )-----------( 66       ( 07 Dec 2018 08:01 )             21.3     118  |  84<L>  |  11.0  ----------------------------<  127<H>  3.2<L>   |  25.0  |  <0.20<L>    Ca    7.9<L>      07 Dec 2018 08:01  Phos  1.9     12-07  Mg     1.0     12-07    MEDICATIONS   MEDICATIONS  (STANDING):  acetaminophen  IVPB .. 1000 milliGRAM(s) IV Intermittent once  ALBUTerol    0.083% 2.5 milliGRAM(s) Nebulizer every 6 hours  ascorbic acid 500 milliGRAM(s) Oral daily  chlorhexidine 0.12% Liquid 15 milliLiter(s) Swish and Spit two times a day  enoxaparin Injectable 40 milliGRAM(s) SubCutaneous daily  FLUoxetine Solution 20 milliGRAM(s) Oral daily  folic acid 1 milliGRAM(s) Oral daily  lactated ringers. 1000 milliLiter(s) (150 mL/Hr) IV Continuous <Continuous>  levETIRAcetam  Solution 1000 milliGRAM(s) Oral two times a day  magnesium sulfate  IVPB 1 Gram(s) IV Intermittent daily  melatonin 5 milliGRAM(s) Oral at bedtime  meropenem  IVPB 1000 milliGRAM(s) IV Intermittent every 8 hours    modafinil 100 milliGRAM(s) Oral <User Schedule>  phytonadione   Solution 5 milliGRAM(s) Oral daily  propranolol 10 milliGRAM(s) Oral every 12 hours  senna Syrup 10 milliLiter(s) Oral daily  spironolactone 25 milliGRAM(s) Oral daily  thiamine 100 milliGRAM(s) Oral daily  vancomycin  IVPB 1000 milliGRAM(s) IV Intermittent every 8 hours    MEDICATIONS  (PRN):  acetaminophen    Suspension .. 650 milliGRAM(s) Oral every 6 hours PRN Temp greater or equal to 38.5C (101.3F)  polyethylene glycol 3350 17 Gram(s) Oral two times a day PRN if not BM in the last 24 hours    PHYSICAL EXAM  HEENT - Bilateral cranial incision with swelling (right > left), Tongue swelling with oral reflexive movements - bite blocks placed, Minimal eye opening with stimulation  Cardiovascular - Diffuse swelling  Pulm - (+) Trach collar with wound on inferior side  GI - Soft, (+) PEG  Extremities - Diffuse swelling, Extensor posturing to pain response primarily in upper extremities, Decreased muscle mass in bilateral thighs  Neuro - Minimal eye opening with stimulation, No tracking and fixation appreciated    Coma Recovery Scale - Revised    AUDITORY FUNCTION SCALE  0 - Auditory Startle  VISUAL FUNCTION SCALE  0 - Visual Startle  MOTOR FUNCTION SCALE  1 - Abnormal Posturing  OROMOTOR/VERBAL FUNCTION SCALE  1 - Oral Reflexive Movement  COMMUNICATION SCALE  0 - None  AROUSAL SCALE  1 - Eye opening with stimulation    TOTAL SCORE = 3 = vegetative state    ASSESSMENT/PLAN  32y Male unknown PMH found unresponsive on sidewalk found with bilateral SDH and SAH now s/p bilateral hemicraniectomy and EVD placement s/p removal. Coma x 12 days, Vegetative State at Day 13 (11/3).    Severe TBI - Patient's family in talks about goals of care. Given patient's severity of injury and complicated medical course, prognosis is grim.   Dysautonomia - Propranolol 10mg Q12 (decreased from 20mg Q6 11/30)  Arousal - Modafinil 100mg Q6AM/Q12PM (increased from 100mg daily 11/14 - HOLD for HR>120)  Motor recovery - Prozac 20mg (12/3)  Sleep - Melatonin 5mg (10/31)  Hepatic encephalopathy/Nutritional deficiencies - Vit K, Spironolactone, Vit C, Folate, Thiamine  ID - Meropenem and Vancomycin  Pancytopenia - Consider discontinuing Lovenox given patient's downtrending platelets and hemoglobin  Electrolyte abnormalities - Daily IV Magnesium - given additional 2gm today, Given IV NS bolus today  Seizures - Keppra   Pain - Tylenol PRN  Oral - Peridex, Aggressive care  Rehab - COMA STIM by all services. Will continue to follow. SUBJECTIVE  Patient seen and examined. Continues to have increased swelling around cranial incisions particularly on right side. Continues to have fevers. Noted to have electrolyte abnormalities this AM.    TODAY'S REVIEW OF SYMPTOMS  Unable to obtain    VITALS  T(C): 38.9 (12-07-18 @ 07:00)  T(F): 102.1 (12-07-18 @ 07:00), Max: 103.4 (12-06-18 @ 16:06)  HR: 88 (12-07-18 @ 10:02) (80 - 113)  BP: 137/81 (12-07-18 @ 07:00) (135/77 - 146/92)  RR:  (18 - 18)  SpO2:  (96% - 100%)  Wt(kg): --    RECENT LABS/IMAGING             7.4    3.6   )-----------( 66       ( 07 Dec 2018 08:01 )             21.3     118  |  84<L>  |  11.0  ----------------------------<  127<H>  3.2<L>   |  25.0  |  <0.20<L>    Ca    7.9<L>      07 Dec 2018 08:01  Phos  1.9     12-07  Mg     1.0     12-07    MEDICATIONS   MEDICATIONS  (STANDING):  acetaminophen  IVPB .. 1000 milliGRAM(s) IV Intermittent once  ALBUTerol    0.083% 2.5 milliGRAM(s) Nebulizer every 6 hours  ascorbic acid 500 milliGRAM(s) Oral daily  chlorhexidine 0.12% Liquid 15 milliLiter(s) Swish and Spit two times a day  enoxaparin Injectable 40 milliGRAM(s) SubCutaneous daily  FLUoxetine Solution 20 milliGRAM(s) Oral daily  folic acid 1 milliGRAM(s) Oral daily  lactated ringers. 1000 milliLiter(s) (150 mL/Hr) IV Continuous <Continuous>  levETIRAcetam  Solution 1000 milliGRAM(s) Oral two times a day  magnesium sulfate  IVPB 1 Gram(s) IV Intermittent daily  melatonin 5 milliGRAM(s) Oral at bedtime  meropenem  IVPB 1000 milliGRAM(s) IV Intermittent every 8 hours    modafinil 100 milliGRAM(s) Oral <User Schedule>  phytonadione   Solution 5 milliGRAM(s) Oral daily  propranolol 10 milliGRAM(s) Oral every 12 hours  senna Syrup 10 milliLiter(s) Oral daily  spironolactone 25 milliGRAM(s) Oral daily  thiamine 100 milliGRAM(s) Oral daily  vancomycin  IVPB 1000 milliGRAM(s) IV Intermittent every 8 hours    MEDICATIONS  (PRN):  acetaminophen    Suspension .. 650 milliGRAM(s) Oral every 6 hours PRN Temp greater or equal to 38.5C (101.3F)  polyethylene glycol 3350 17 Gram(s) Oral two times a day PRN if not BM in the last 24 hours    PHYSICAL EXAM  HEENT - Bilateral cranial incision with swelling (right > left), Tongue swelling with oral reflexive movements - bite blocks placed, Minimal eye opening with stimulation  Cardiovascular - Diffuse swelling  Pulm - (+) Trach collar with wound on inferior side  GI - Soft, (+) PEG  Extremities - Diffuse swelling, Extensor posturing to pain response primarily in upper extremities, Decreased muscle mass in bilateral thighs  Neuro - Minimal eye opening with stimulation, No tracking and fixation appreciated    Coma Recovery Scale - Revised    AUDITORY FUNCTION SCALE  0 - Auditory Startle  VISUAL FUNCTION SCALE  0 - Visual Startle  MOTOR FUNCTION SCALE  1 - Abnormal Posturing  OROMOTOR/VERBAL FUNCTION SCALE  1 - Oral Reflexive Movement  COMMUNICATION SCALE  0 - None  AROUSAL SCALE  1 - Eye opening with stimulation    TOTAL SCORE = 3 = vegetative state    ASSESSMENT/PLAN  32y Male unknown PMH found unresponsive on sidewalk found with bilateral SDH and SAH now s/p bilateral hemicraniectomy and EVD placement s/p removal. Coma x 12 days, Vegetative State at Day 13 (11/3).    Severe TBI - Patient's family in talks about goals of care. Given patient's severity of injury and complicated medical course, prognosis is grim.   Dysautonomia - Propranolol 10mg Q12 (decreased from 20mg Q6 11/30)  Arousal - Modafinil 100mg Q6AM/Q12PM (increased from 100mg daily 11/14 - HOLD for HR>120)  Motor recovery - Recommend discontinuing Prozac 20mg (started 12/3) as may exacerbate hyponatremia  Sleep - Melatonin 5mg (10/31)  Hepatic encephalopathy/Nutritional deficiencies - Vit K, Spironolactone, Vit C, Folate, Thiamine  ID - Meropenem and Vancomycin  Pancytopenia - Consider discontinuing Lovenox given patient's downtrending platelets and hemoglobin  Electrolyte abnormalities - Daily IV Magnesium - given additional 2gm today, Given IV NS bolus today  Seizures - Keppra   Pain - Tylenol PRN  Oral - Peridex, Aggressive care  Rehab - COMA STIM by all services. Will continue to follow.

## 2018-12-07 NOTE — PROGRESS NOTE ADULT - ATTENDING COMMENTS
Patient seen and examined with fellow.  In agreement to DC all extraneous medications in setting of medical decline - anemic, hypoK+, hypoNa+, hypoMG+.  DC Provigil, Prozac, Melatonin.  Patient has demonstrated consistent decline over that last 2 weeks with demonstrable decline.  Foresee progressive decline and poor outcome in patient.    At this time, will sign off given above. Please reconsider TBI consultation in future if medically improves.

## 2018-12-07 NOTE — PROVIDER CONTACT NOTE (CRITICAL VALUE NOTIFICATION) - ACTION/TREATMENT ORDERED:
Calcium supplement will be order
Pt is being transfused
IV Mag recommended, No new orders at time, will continue to monitor
NNO.

## 2018-12-07 NOTE — PROGRESS NOTE ADULT - PROBLEM SELECTOR PLAN 1
1. Continue ABx and daily BCx draws until BCx negative   2. Tylenol available for fevers   3. Continue turning patient as needed to avoid further progression of skin breakdown sites   4. f/u with SW regarding citizenship and placement

## 2018-12-07 NOTE — PROGRESS NOTE ADULT - ASSESSMENT
32 year old male found down with TBI, now s/p trach/PEG currently made DNR/DNI with no escalation of care if clinical condition deteriorates. Active issues include fever spikes requiring ABx initiation and BCx draws. Source of fever likely pulmonary in origin.

## 2018-12-08 LAB
ANION GAP SERPL CALC-SCNC: 13 MMOL/L — SIGNIFICANT CHANGE UP (ref 5–17)
ANION GAP SERPL CALC-SCNC: 9 MMOL/L — SIGNIFICANT CHANGE UP (ref 5–17)
BLD GP AB SCN SERPL QL: SIGNIFICANT CHANGE UP
BUN SERPL-MCNC: 11 MG/DL — SIGNIFICANT CHANGE UP (ref 8–20)
BUN SERPL-MCNC: 13 MG/DL — SIGNIFICANT CHANGE UP (ref 8–20)
CALCIUM SERPL-MCNC: 8.4 MG/DL — LOW (ref 8.6–10.2)
CALCIUM SERPL-MCNC: 8.8 MG/DL — SIGNIFICANT CHANGE UP (ref 8.6–10.2)
CHLORIDE SERPL-SCNC: 81 MMOL/L — LOW (ref 98–107)
CHLORIDE SERPL-SCNC: 83 MMOL/L — LOW (ref 98–107)
CO2 SERPL-SCNC: 24 MMOL/L — SIGNIFICANT CHANGE UP (ref 22–29)
CO2 SERPL-SCNC: 26 MMOL/L — SIGNIFICANT CHANGE UP (ref 22–29)
CREAT SERPL-MCNC: 0.22 MG/DL — LOW (ref 0.5–1.3)
CREAT SERPL-MCNC: <0.2 MG/DL — LOW (ref 0.5–1.3)
GLUCOSE SERPL-MCNC: 130 MG/DL — HIGH (ref 70–115)
GLUCOSE SERPL-MCNC: 131 MG/DL — HIGH (ref 70–115)
HCT VFR BLD CALC: 25.2 % — LOW (ref 42–52)
HGB BLD-MCNC: 8.7 G/DL — LOW (ref 14–18)
MAGNESIUM SERPL-MCNC: 1.4 MG/DL — LOW (ref 1.6–2.6)
MCHC RBC-ENTMCNC: 28.2 PG — SIGNIFICANT CHANGE UP (ref 27–31)
MCHC RBC-ENTMCNC: 34.5 G/DL — SIGNIFICANT CHANGE UP (ref 32–36)
MCV RBC AUTO: 81.6 FL — SIGNIFICANT CHANGE UP (ref 80–94)
OSMOLALITY SERPL: 274 MOSM/KG — LOW (ref 280–300)
PHOSPHATE SERPL-MCNC: 2 MG/DL — LOW (ref 2.4–4.7)
PLATELET # BLD AUTO: 113 K/UL — LOW (ref 150–400)
POTASSIUM SERPL-MCNC: 4 MMOL/L — SIGNIFICANT CHANGE UP (ref 3.5–5.3)
POTASSIUM SERPL-MCNC: 4.1 MMOL/L — SIGNIFICANT CHANGE UP (ref 3.5–5.3)
POTASSIUM SERPL-SCNC: 4 MMOL/L — SIGNIFICANT CHANGE UP (ref 3.5–5.3)
POTASSIUM SERPL-SCNC: 4.1 MMOL/L — SIGNIFICANT CHANGE UP (ref 3.5–5.3)
RBC # BLD: 3.09 M/UL — LOW (ref 4.6–6.2)
RBC # FLD: 15.7 % — HIGH (ref 11–15.6)
SODIUM SERPL-SCNC: 118 MMOL/L — CRITICAL LOW (ref 135–145)
SODIUM SERPL-SCNC: 118 MMOL/L — CRITICAL LOW (ref 135–145)
TSH SERPL-MCNC: 3.19 UIU/ML — SIGNIFICANT CHANGE UP (ref 0.27–4.2)
TYPE + AB SCN PNL BLD: SIGNIFICANT CHANGE UP
VANCOMYCIN TROUGH SERPL-MCNC: <4 UG/ML — LOW (ref 10–20)
WBC # BLD: 5.3 K/UL — SIGNIFICANT CHANGE UP (ref 4.8–10.8)
WBC # FLD AUTO: 5.3 K/UL — SIGNIFICANT CHANGE UP (ref 4.8–10.8)

## 2018-12-08 RX ORDER — SODIUM CHLORIDE 9 MG/ML
1 INJECTION INTRAMUSCULAR; INTRAVENOUS; SUBCUTANEOUS DAILY
Qty: 0 | Refills: 0 | Status: DISCONTINUED | OUTPATIENT
Start: 2018-12-08 | End: 2018-12-10

## 2018-12-08 RX ADMIN — ENOXAPARIN SODIUM 40 MILLIGRAM(S): 100 INJECTION SUBCUTANEOUS at 12:58

## 2018-12-08 RX ADMIN — Medication 5 MILLIGRAM(S): at 12:57

## 2018-12-08 RX ADMIN — ALBUTEROL 2.5 MILLIGRAM(S): 90 AEROSOL, METERED ORAL at 09:39

## 2018-12-08 RX ADMIN — CHLORHEXIDINE GLUCONATE 15 MILLILITER(S): 213 SOLUTION TOPICAL at 05:39

## 2018-12-08 RX ADMIN — SENNA PLUS 10 MILLILITER(S): 8.6 TABLET ORAL at 12:58

## 2018-12-08 RX ADMIN — Medication 500 MILLIGRAM(S): at 13:01

## 2018-12-08 RX ADMIN — MEROPENEM 100 MILLIGRAM(S): 1 INJECTION INTRAVENOUS at 10:30

## 2018-12-08 RX ADMIN — MEROPENEM 100 MILLIGRAM(S): 1 INJECTION INTRAVENOUS at 18:08

## 2018-12-08 RX ADMIN — Medication 10 MILLIGRAM(S): at 05:39

## 2018-12-08 RX ADMIN — Medication 100 MILLIGRAM(S): at 12:59

## 2018-12-08 RX ADMIN — ALBUTEROL 2.5 MILLIGRAM(S): 90 AEROSOL, METERED ORAL at 03:59

## 2018-12-08 RX ADMIN — SODIUM CHLORIDE 1 GRAM(S): 9 INJECTION INTRAMUSCULAR; INTRAVENOUS; SUBCUTANEOUS at 18:08

## 2018-12-08 RX ADMIN — Medication 100 GRAM(S): at 12:57

## 2018-12-08 RX ADMIN — ALBUTEROL 2.5 MILLIGRAM(S): 90 AEROSOL, METERED ORAL at 20:42

## 2018-12-08 RX ADMIN — Medication 10 MILLIGRAM(S): at 18:09

## 2018-12-08 RX ADMIN — MEROPENEM 100 MILLIGRAM(S): 1 INJECTION INTRAVENOUS at 05:39

## 2018-12-08 RX ADMIN — Medication 1 MILLIGRAM(S): at 12:58

## 2018-12-08 RX ADMIN — CHLORHEXIDINE GLUCONATE 15 MILLILITER(S): 213 SOLUTION TOPICAL at 18:09

## 2018-12-08 RX ADMIN — LEVETIRACETAM 1000 MILLIGRAM(S): 250 TABLET, FILM COATED ORAL at 18:08

## 2018-12-08 RX ADMIN — ALBUTEROL 2.5 MILLIGRAM(S): 90 AEROSOL, METERED ORAL at 15:10

## 2018-12-08 RX ADMIN — LEVETIRACETAM 1000 MILLIGRAM(S): 250 TABLET, FILM COATED ORAL at 05:39

## 2018-12-08 RX ADMIN — CHLORHEXIDINE GLUCONATE 15 MILLILITER(S): 213 SOLUTION TOPICAL at 12:58

## 2018-12-08 NOTE — PROVIDER CONTACT NOTE (CRITICAL VALUE NOTIFICATION) - NAME OF MD/NP/PA/DO NOTIFIED:
MD: Coverly
ACS
ACS trauma team
Dr West
JEFFERY Sr
MD Coverly
MD: Coverly
MD: Coverly
Manfred LOPEZ
Mata Adler SICU MD Resident
Shikha BREWSTER PA
Vic Morales
acs team dr ordaz

## 2018-12-08 NOTE — PROGRESS NOTE ADULT - ASSESSMENT
32 year old male found down with TBI, now s/p trach/PEG currently made DNR/DNI with no escalation of care if clinical condition deteriorates. Active issues include bacteremia 2/2 pulmonary origin, as well as now hyponatremia likely cerebal in origin (?SIADH)    Plan:  -stop IVFs, fluid restrict patient and f/u AM labs  - Continue ABx and daily BCx draws until BCx negative   - Tylenol available for fevers   - Continue turning patient as needed to avoid further progression of skin breakdown sites   - f/u with SW regarding citizenship and placement. 32 year old male found down with TBI, now s/p trach/PEG currently made DNR/DNI with no escalation of care if clinical condition deteriorates. Active issues include bacteremia 2/2 pulmonary origin, as well as now hyponatremia likely cerebal in origin (?SIADH).     Plan:  -stop IVFs, fluid restrict patient and f/u AM labs  -stop spironolactone, f/u AM cortisol and TSH  - Continue ABx and daily BCx draws until BCx negative   - Tylenol available for fevers   - Continue turning patient as needed to avoid further progression of skin breakdown sites   - f/u with SW regarding citizenship and placement.

## 2018-12-08 NOTE — PROVIDER CONTACT NOTE (CRITICAL VALUE NOTIFICATION) - SITUATION
MD made aware of patient sodium level
JEFFERY Sr aware of pt.'s H & H.
MD made aware of patients low sodium level
Mag 1.08 
na cont. to be critically low.

## 2018-12-08 NOTE — PROGRESS NOTE ADULT - SUBJECTIVE AND OBJECTIVE BOX
INTERVAL HPI/OVERNIGHT EVENTS: Patient found to be severely hyponatremic down to 115 today. Workup was done which indicated that patient may benefit from fluid restriction, patients IVFs     SUBJECTIVE: Feeling well this AM. No fevers/chills, no N/V.       MEDICATIONS  (STANDING):  acetaminophen  IVPB .. 1000 milliGRAM(s) IV Intermittent once  ALBUTerol    0.083% 2.5 milliGRAM(s) Nebulizer every 6 hours  ascorbic acid 500 milliGRAM(s) Oral daily  chlorhexidine 0.12% Liquid 15 milliLiter(s) Swish and Spit two times a day  enoxaparin Injectable 40 milliGRAM(s) SubCutaneous daily  folic acid 1 milliGRAM(s) Oral daily  levETIRAcetam  Solution 1000 milliGRAM(s) Oral two times a day  magnesium sulfate  IVPB 1 Gram(s) IV Intermittent daily  meropenem  IVPB 1000 milliGRAM(s) IV Intermittent every 8 hours  meropenem  IVPB      phytonadione   Solution 5 milliGRAM(s) Oral daily  propranolol 10 milliGRAM(s) Oral every 12 hours  senna Syrup 10 milliLiter(s) Oral daily  spironolactone 25 milliGRAM(s) Oral daily  thiamine 100 milliGRAM(s) Oral daily    MEDICATIONS  (PRN):  acetaminophen    Suspension .. 650 milliGRAM(s) Oral every 6 hours PRN Temp greater or equal to 38.5C (101.3F)  polyethylene glycol 3350 17 Gram(s) Oral two times a day PRN if not BM in the last 24 hours      Vital Signs Last 24 Hrs  T(C): 37 (07 Dec 2018 23:50), Max: 38.9 (07 Dec 2018 07:00)  T(F): 98.6 (07 Dec 2018 23:50), Max: 102.1 (07 Dec 2018 07:00)  HR: 75 (07 Dec 2018 23:50) (75 - 94)  BP: 114/69 (07 Dec 2018 23:50) (110/67 - 146/92)  BP(mean): --  RR: 18 (07 Dec 2018 23:50) (18 - 18)  SpO2: 100% (07 Dec 2018 23:50) (98% - 100%)    PE  Gen: NAD  Pulm: No increased WOB  Abd: Soft, NT, ND  Ext:  Vasc:  Neuro:      I&O's Detail    06 Dec 2018 07:01  -  07 Dec 2018 07:00  --------------------------------------------------------  IN:    Enteral Tube Flush: 350 mL    lactated ringers.: 3300 mL    Pivot: 1320 mL  Total IN: 4970 mL    OUT:  Total OUT: 0 mL    Total NET: 4970 mL      07 Dec 2018 07:01  -  08 Dec 2018 00:33  --------------------------------------------------------  IN:    Enteral Tube Flush: 350 mL    lactated ringers.: 1350 mL    Pivot: 660 mL    sodium chloride 0.9%: 300 mL  Total IN: 2660 mL    OUT:    Stool: 1 mL  Total OUT: 1 mL    Total NET: 2659 mL          LABS:                        7.4    3.6   )-----------( 66       ( 07 Dec 2018 08:01 )             21.3     12-07    115<LL>  |  84<L>  |  10.0  ----------------------------<  126<H>  4.2   |  23.0  |  0.23<L>    Ca    7.8<L>      07 Dec 2018 18:39  Phos  1.8     12-07  Mg     2.2     12-07            RADIOLOGY & ADDITIONAL STUDIES: INTERVAL HPI/OVERNIGHT EVENTS: Patient found to be severely hyponatremic down to 115 today. Workup was done which indicated that patient may benefit from fluid restriction, patients IVFs were stopped. Patient's blood and sputum cultures grew klebsiella w/ESBL, sensitive to brian so vanc was stopped.         MEDICATIONS  (STANDING):  acetaminophen  IVPB .. 1000 milliGRAM(s) IV Intermittent once  ALBUTerol    0.083% 2.5 milliGRAM(s) Nebulizer every 6 hours  ascorbic acid 500 milliGRAM(s) Oral daily  chlorhexidine 0.12% Liquid 15 milliLiter(s) Swish and Spit two times a day  enoxaparin Injectable 40 milliGRAM(s) SubCutaneous daily  folic acid 1 milliGRAM(s) Oral daily  levETIRAcetam  Solution 1000 milliGRAM(s) Oral two times a day  magnesium sulfate  IVPB 1 Gram(s) IV Intermittent daily  meropenem  IVPB 1000 milliGRAM(s) IV Intermittent every 8 hours  meropenem  IVPB      phytonadione   Solution 5 milliGRAM(s) Oral daily  propranolol 10 milliGRAM(s) Oral every 12 hours  senna Syrup 10 milliLiter(s) Oral daily  spironolactone 25 milliGRAM(s) Oral daily  thiamine 100 milliGRAM(s) Oral daily    MEDICATIONS  (PRN):  acetaminophen    Suspension .. 650 milliGRAM(s) Oral every 6 hours PRN Temp greater or equal to 38.5C (101.3F)  polyethylene glycol 3350 17 Gram(s) Oral two times a day PRN if not BM in the last 24 hours      Vital Signs Last 24 Hrs  T(C): 37 (07 Dec 2018 23:50), Max: 38.9 (07 Dec 2018 07:00)  T(F): 98.6 (07 Dec 2018 23:50), Max: 102.1 (07 Dec 2018 07:00)  HR: 75 (07 Dec 2018 23:50) (75 - 94)  BP: 114/69 (07 Dec 2018 23:50) (110/67 - 146/92)  BP(mean): --  RR: 18 (07 Dec 2018 23:50) (18 - 18)  SpO2: 100% (07 Dec 2018 23:50) (98% - 100%)    Constitutional: lying in bed in no acute distress  HEENT: swelling of head, no longer concave in lateral areas of skull  Neck: Trach in place, protective adhesive band over pressure sore near trach site, on trach collar  Respiratory: respirations are unlabored or accessory muscle use  Gastrointestinal: PEG present, abdomen soft, non-tender, non-distended      I&O's Detail    06 Dec 2018 07:01  -  07 Dec 2018 07:00  --------------------------------------------------------  IN:    Enteral Tube Flush: 350 mL    lactated ringers.: 3300 mL    Pivot: 1320 mL  Total IN: 4970 mL    OUT:  Total OUT: 0 mL    Total NET: 4970 mL      07 Dec 2018 07:01  -  08 Dec 2018 00:33  --------------------------------------------------------  IN:    Enteral Tube Flush: 350 mL    lactated ringers.: 1350 mL    Pivot: 660 mL    sodium chloride 0.9%: 300 mL  Total IN: 2660 mL    OUT:    Stool: 1 mL  Total OUT: 1 mL    Total NET: 2659 mL          LABS:                        7.4    3.6   )-----------( 66       ( 07 Dec 2018 08:01 )             21.3     12-07    115<LL>  |  84<L>  |  10.0  ----------------------------<  126<H>  4.2   |  23.0  |  0.23<L>    Ca    7.8<L>      07 Dec 2018 18:39  Phos  1.8     12-07  Mg     2.2     12-07            RADIOLOGY & ADDITIONAL STUDIES:

## 2018-12-08 NOTE — PROGRESS NOTE ADULT - ATTENDING COMMENTS
Patient seen and examined.   no change in neuro exam.  continue supportive treatment  family meeting for end of life issues

## 2018-12-08 NOTE — PROVIDER CONTACT NOTE (CRITICAL VALUE NOTIFICATION) - TEST AND RESULT REPORTED:
lab
Calcium 6.5
Hgb 6.6, Hct 20
Mag 1.08 
h/h 6.8/21.0
hgb 6.8
hgb 7.0
lactate 3.3
na 115
potassium 2.7   calcium 5.1
sodium serum: 115
sodium: 118
sputum cultures positive for Klebsiela ESBL

## 2018-12-08 NOTE — PROVIDER CONTACT NOTE (CRITICAL VALUE NOTIFICATION) - PERSON GIVING RESULT:
dandy [facility]
CARYN Bonilla
Deb Reza
Kandi Pedroza from Lab
Kandi Pedroza/Jeison
LAB
Lab
Lab: Rylie Gale
Tracey Ramos
Vic Bonilla
cherise harding
jj calderón from lab
jonna thibodeaux

## 2018-12-09 LAB
ANION GAP SERPL CALC-SCNC: 10 MMOL/L — SIGNIFICANT CHANGE UP (ref 5–17)
BUN SERPL-MCNC: 13 MG/DL — SIGNIFICANT CHANGE UP (ref 8–20)
CALCIUM SERPL-MCNC: 9.2 MG/DL — SIGNIFICANT CHANGE UP (ref 8.6–10.2)
CHLORIDE SERPL-SCNC: 83 MMOL/L — LOW (ref 98–107)
CO2 SERPL-SCNC: 26 MMOL/L — SIGNIFICANT CHANGE UP (ref 22–29)
CORTIS AM PEAK SERPL-MCNC: 9.3 UG/DL — SIGNIFICANT CHANGE UP (ref 6–18.4)
CREAT SERPL-MCNC: <0.2 MG/DL — LOW (ref 0.5–1.3)
GLUCOSE SERPL-MCNC: 133 MG/DL — HIGH (ref 70–115)
MAGNESIUM SERPL-MCNC: 1.3 MG/DL — LOW (ref 1.6–2.6)
POTASSIUM SERPL-MCNC: 3.9 MMOL/L — SIGNIFICANT CHANGE UP (ref 3.5–5.3)
POTASSIUM SERPL-SCNC: 3.9 MMOL/L — SIGNIFICANT CHANGE UP (ref 3.5–5.3)
SODIUM SERPL-SCNC: 119 MMOL/L — CRITICAL LOW (ref 135–145)

## 2018-12-09 RX ORDER — MAGNESIUM OXIDE 400 MG ORAL TABLET 241.3 MG
400 TABLET ORAL
Qty: 0 | Refills: 0 | Status: DISCONTINUED | OUTPATIENT
Start: 2018-12-09 | End: 2018-12-31

## 2018-12-09 RX ADMIN — ALBUTEROL 2.5 MILLIGRAM(S): 90 AEROSOL, METERED ORAL at 15:45

## 2018-12-09 RX ADMIN — Medication 10 MILLIGRAM(S): at 05:47

## 2018-12-09 RX ADMIN — Medication 5 MILLIGRAM(S): at 18:48

## 2018-12-09 RX ADMIN — Medication 100 GRAM(S): at 14:22

## 2018-12-09 RX ADMIN — MEROPENEM 100 MILLIGRAM(S): 1 INJECTION INTRAVENOUS at 03:29

## 2018-12-09 RX ADMIN — MEROPENEM 100 MILLIGRAM(S): 1 INJECTION INTRAVENOUS at 18:49

## 2018-12-09 RX ADMIN — CHLORHEXIDINE GLUCONATE 15 MILLILITER(S): 213 SOLUTION TOPICAL at 05:47

## 2018-12-09 RX ADMIN — LEVETIRACETAM 1000 MILLIGRAM(S): 250 TABLET, FILM COATED ORAL at 05:47

## 2018-12-09 RX ADMIN — SODIUM CHLORIDE 1 GRAM(S): 9 INJECTION INTRAMUSCULAR; INTRAVENOUS; SUBCUTANEOUS at 13:46

## 2018-12-09 RX ADMIN — LEVETIRACETAM 1000 MILLIGRAM(S): 250 TABLET, FILM COATED ORAL at 18:49

## 2018-12-09 RX ADMIN — MEROPENEM 100 MILLIGRAM(S): 1 INJECTION INTRAVENOUS at 11:47

## 2018-12-09 RX ADMIN — MAGNESIUM OXIDE 400 MG ORAL TABLET 400 MILLIGRAM(S): 241.3 TABLET ORAL at 19:52

## 2018-12-09 RX ADMIN — ALBUTEROL 2.5 MILLIGRAM(S): 90 AEROSOL, METERED ORAL at 20:35

## 2018-12-09 RX ADMIN — CHLORHEXIDINE GLUCONATE 15 MILLILITER(S): 213 SOLUTION TOPICAL at 18:48

## 2018-12-09 RX ADMIN — ALBUTEROL 2.5 MILLIGRAM(S): 90 AEROSOL, METERED ORAL at 03:53

## 2018-12-09 RX ADMIN — ALBUTEROL 2.5 MILLIGRAM(S): 90 AEROSOL, METERED ORAL at 09:01

## 2018-12-09 RX ADMIN — Medication 500 MILLIGRAM(S): at 13:46

## 2018-12-09 RX ADMIN — Medication 1 MILLIGRAM(S): at 13:46

## 2018-12-09 RX ADMIN — ENOXAPARIN SODIUM 40 MILLIGRAM(S): 100 INJECTION SUBCUTANEOUS at 12:45

## 2018-12-09 RX ADMIN — SENNA PLUS 10 MILLILITER(S): 8.6 TABLET ORAL at 13:46

## 2018-12-09 RX ADMIN — Medication 10 MILLIGRAM(S): at 18:48

## 2018-12-09 RX ADMIN — Medication 100 MILLIGRAM(S): at 13:46

## 2018-12-09 NOTE — PROGRESS NOTE ADULT - SUBJECTIVE AND OBJECTIVE BOX
INTERVAL HPI/OVERNIGHT EVENTS:    Patient's Na was 118 on most recent draw. Patient's fluid restriction and salt tabs per PEG are in place.  Afebrile overnight.    MEDICATIONS  (STANDING):  acetaminophen  IVPB .. 1000 milliGRAM(s) IV Intermittent once  ALBUTerol    0.083% 2.5 milliGRAM(s) Nebulizer every 6 hours  ascorbic acid 500 milliGRAM(s) Oral daily  chlorhexidine 0.12% Liquid 15 milliLiter(s) Swish and Spit two times a day  enoxaparin Injectable 40 milliGRAM(s) SubCutaneous daily  folic acid 1 milliGRAM(s) Oral daily  levETIRAcetam  Solution 1000 milliGRAM(s) Oral two times a day  magnesium sulfate  IVPB 1 Gram(s) IV Intermittent daily  meropenem  IVPB 1000 milliGRAM(s) IV Intermittent every 8 hours  meropenem  IVPB      phytonadione   Solution 5 milliGRAM(s) Oral daily  propranolol 10 milliGRAM(s) Oral every 12 hours  senna Syrup 10 milliLiter(s) Oral daily  sodium chloride 1 Gram(s) Oral daily  thiamine 100 milliGRAM(s) Oral daily    MEDICATIONS  (PRN):  acetaminophen    Suspension .. 650 milliGRAM(s) Oral every 6 hours PRN Temp greater or equal to 38.5C (101.3F)  polyethylene glycol 3350 17 Gram(s) Oral two times a day PRN if not BM in the last 24 hours      Vital Signs Last 24 Hrs  T(C): 37.6 (09 Dec 2018 08:00), Max: 37.6 (09 Dec 2018 08:00)  T(F): 99.6 (09 Dec 2018 08:00), Max: 99.6 (09 Dec 2018 08:00)  HR: 74 (09 Dec 2018 08:00) (74 - 98)  BP: 110/73 (09 Dec 2018 08:00) (110/73 - 133/93)  BP(mean): --  RR: 94 (09 Dec 2018 08:00) (15 - 94)  SpO2: 100% (09 Dec 2018 01:00) (96% - 100%)    PE  Constitutional: lying in bed in no acute distress, helmet in place, vegetative state  HEENT: swelling of head, no longer concave in lateral areas of skull  Neck: Trach in place, protective adhesive band over pressure sore near trach site, on trach collar  Respiratory: respirations are unlabored or accessory muscle use  Gastrointestinal: PEG present, abdomen soft, non-tender, non-distended    I&O's Detail    08 Dec 2018 07:01  -  09 Dec 2018 07:00  --------------------------------------------------------  IN:    Enteral Tube Flush: 300 mL    Pivot: 1200 mL  Total IN: 1500 mL    OUT:    Incontinent per Collection Ba mL    Incontinent per Condom Catheter: 1300 mL  Total OUT: 3100 mL    Total NET: -1600 mL          LABS:                        8.7    5.3   )-----------( 113      ( 08 Dec 2018 09:38 )             25.2         118<LL>  |  83<L>  |  13.0  ----------------------------<  130<H>  4.1   |  26.0  |  0.22<L>    Ca    8.8      08 Dec 2018 18:35  Phos  2.0       Mg     1.4

## 2018-12-09 NOTE — PROGRESS NOTE ADULT - ASSESSMENT
32 year old male found down with TBI, now s/p trach/PEG currently made DNR/DNI with no escalation of care if clinical condition deteriorates. Active issues include bacteremia 2/2 pulmonary origin, as well as now hyponatremia likely cerebral in origin (?SIADH).     Plan:  -Have stopped IVFs, fluid restrict patient and f/u AM labs; salt tabs via PEG  -Continue supportive care  -No change in Neuro exam  -TSH returned WNL - cortisol pending  - Continue ABx and daily BCx draws until BCx negative   - Tylenol available for fevers   - Continue turning patient as needed to avoid further progression of skin breakdown sites   - f/u with SW regarding citizenship and placement  -Family will need to meet again to discuss Plan of Care and End of Life issues now that the five day trial period is coming to an end      Attending Attestation:   Patient seen and examined.   no change in neuro exam.  continue supportive treatment  family meeting for end of life issues .

## 2018-12-10 LAB
ANION GAP SERPL CALC-SCNC: 14 MMOL/L — SIGNIFICANT CHANGE UP (ref 5–17)
BASOPHILS # BLD AUTO: 0 K/UL — SIGNIFICANT CHANGE UP (ref 0–0.2)
BASOPHILS NFR BLD AUTO: 0.1 % — SIGNIFICANT CHANGE UP (ref 0–2)
BUN SERPL-MCNC: 16 MG/DL — SIGNIFICANT CHANGE UP (ref 8–20)
CALCIUM SERPL-MCNC: 9.5 MG/DL — SIGNIFICANT CHANGE UP (ref 8.6–10.2)
CHLORIDE SERPL-SCNC: 85 MMOL/L — LOW (ref 98–107)
CO2 SERPL-SCNC: 23 MMOL/L — SIGNIFICANT CHANGE UP (ref 22–29)
CREAT SERPL-MCNC: <0.2 MG/DL — LOW (ref 0.5–1.3)
EOSINOPHIL # BLD AUTO: 0.2 K/UL — SIGNIFICANT CHANGE UP (ref 0–0.5)
EOSINOPHIL NFR BLD AUTO: 2.8 % — SIGNIFICANT CHANGE UP (ref 0–5)
GLUCOSE SERPL-MCNC: 117 MG/DL — HIGH (ref 70–115)
HCT VFR BLD CALC: 27.8 % — LOW (ref 42–52)
HGB BLD-MCNC: 9.6 G/DL — LOW (ref 14–18)
LYMPHOCYTES # BLD AUTO: 2.1 K/UL — SIGNIFICANT CHANGE UP (ref 1–4.8)
LYMPHOCYTES # BLD AUTO: 24.7 % — SIGNIFICANT CHANGE UP (ref 20–55)
MAGNESIUM SERPL-MCNC: 1.4 MG/DL — LOW (ref 1.6–2.6)
MCHC RBC-ENTMCNC: 28.7 PG — SIGNIFICANT CHANGE UP (ref 27–31)
MCHC RBC-ENTMCNC: 34.5 G/DL — SIGNIFICANT CHANGE UP (ref 32–36)
MCV RBC AUTO: 83 FL — SIGNIFICANT CHANGE UP (ref 80–94)
MONOCYTES # BLD AUTO: 0.7 K/UL — SIGNIFICANT CHANGE UP (ref 0–0.8)
MONOCYTES NFR BLD AUTO: 8 % — SIGNIFICANT CHANGE UP (ref 3–10)
NEUTROPHILS # BLD AUTO: 5.4 K/UL — SIGNIFICANT CHANGE UP (ref 1.8–8)
NEUTROPHILS NFR BLD AUTO: 64 % — SIGNIFICANT CHANGE UP (ref 37–73)
PHOSPHATE SERPL-MCNC: 3.7 MG/DL — SIGNIFICANT CHANGE UP (ref 2.4–4.7)
PLATELET # BLD AUTO: 230 K/UL — SIGNIFICANT CHANGE UP (ref 150–400)
POTASSIUM SERPL-MCNC: 4.2 MMOL/L — SIGNIFICANT CHANGE UP (ref 3.5–5.3)
POTASSIUM SERPL-SCNC: 4.2 MMOL/L — SIGNIFICANT CHANGE UP (ref 3.5–5.3)
RBC # BLD: 3.35 M/UL — LOW (ref 4.6–6.2)
RBC # FLD: 16.3 % — HIGH (ref 11–15.6)
SODIUM SERPL-SCNC: 122 MMOL/L — LOW (ref 135–145)
WBC # BLD: 8.5 K/UL — SIGNIFICANT CHANGE UP (ref 4.8–10.8)
WBC # FLD AUTO: 8.5 K/UL — SIGNIFICANT CHANGE UP (ref 4.8–10.8)

## 2018-12-10 PROCEDURE — 99232 SBSQ HOSP IP/OBS MODERATE 35: CPT

## 2018-12-10 RX ORDER — SODIUM CHLORIDE 9 MG/ML
1 INJECTION INTRAMUSCULAR; INTRAVENOUS; SUBCUTANEOUS EVERY 12 HOURS
Qty: 0 | Refills: 0 | Status: DISCONTINUED | OUTPATIENT
Start: 2018-12-10 | End: 2018-12-10

## 2018-12-10 RX ORDER — SODIUM CHLORIDE 9 MG/ML
1 INJECTION INTRAMUSCULAR; INTRAVENOUS; SUBCUTANEOUS THREE TIMES A DAY
Qty: 0 | Refills: 0 | Status: DISCONTINUED | OUTPATIENT
Start: 2018-12-10 | End: 2018-12-24

## 2018-12-10 RX ORDER — MAGNESIUM SULFATE 500 MG/ML
2 VIAL (ML) INJECTION ONCE
Qty: 0 | Refills: 0 | Status: COMPLETED | OUTPATIENT
Start: 2018-12-10 | End: 2018-12-10

## 2018-12-10 RX ADMIN — Medication 100 GRAM(S): at 13:02

## 2018-12-10 RX ADMIN — MAGNESIUM OXIDE 400 MG ORAL TABLET 400 MILLIGRAM(S): 241.3 TABLET ORAL at 13:01

## 2018-12-10 RX ADMIN — Medication 10 MILLIGRAM(S): at 05:01

## 2018-12-10 RX ADMIN — MAGNESIUM OXIDE 400 MG ORAL TABLET 400 MILLIGRAM(S): 241.3 TABLET ORAL at 05:01

## 2018-12-10 RX ADMIN — MEROPENEM 100 MILLIGRAM(S): 1 INJECTION INTRAVENOUS at 03:14

## 2018-12-10 RX ADMIN — ALBUTEROL 2.5 MILLIGRAM(S): 90 AEROSOL, METERED ORAL at 15:19

## 2018-12-10 RX ADMIN — MAGNESIUM OXIDE 400 MG ORAL TABLET 400 MILLIGRAM(S): 241.3 TABLET ORAL at 17:02

## 2018-12-10 RX ADMIN — Medication 10 MILLIGRAM(S): at 17:02

## 2018-12-10 RX ADMIN — MEROPENEM 100 MILLIGRAM(S): 1 INJECTION INTRAVENOUS at 13:01

## 2018-12-10 RX ADMIN — SENNA PLUS 10 MILLILITER(S): 8.6 TABLET ORAL at 13:01

## 2018-12-10 RX ADMIN — LEVETIRACETAM 1000 MILLIGRAM(S): 250 TABLET, FILM COATED ORAL at 05:01

## 2018-12-10 RX ADMIN — SODIUM CHLORIDE 1 GRAM(S): 9 INJECTION INTRAMUSCULAR; INTRAVENOUS; SUBCUTANEOUS at 21:12

## 2018-12-10 RX ADMIN — Medication 1 MILLIGRAM(S): at 13:02

## 2018-12-10 RX ADMIN — SODIUM CHLORIDE 1 GRAM(S): 9 INJECTION INTRAMUSCULAR; INTRAVENOUS; SUBCUTANEOUS at 13:01

## 2018-12-10 RX ADMIN — ALBUTEROL 2.5 MILLIGRAM(S): 90 AEROSOL, METERED ORAL at 20:34

## 2018-12-10 RX ADMIN — CHLORHEXIDINE GLUCONATE 15 MILLILITER(S): 213 SOLUTION TOPICAL at 17:02

## 2018-12-10 RX ADMIN — ALBUTEROL 2.5 MILLIGRAM(S): 90 AEROSOL, METERED ORAL at 08:29

## 2018-12-10 RX ADMIN — ALBUTEROL 2.5 MILLIGRAM(S): 90 AEROSOL, METERED ORAL at 03:56

## 2018-12-10 RX ADMIN — MEROPENEM 100 MILLIGRAM(S): 1 INJECTION INTRAVENOUS at 17:08

## 2018-12-10 RX ADMIN — Medication 100 MILLIGRAM(S): at 13:02

## 2018-12-10 RX ADMIN — Medication 500 MILLIGRAM(S): at 13:02

## 2018-12-10 RX ADMIN — Medication 5 MILLIGRAM(S): at 13:02

## 2018-12-10 RX ADMIN — LEVETIRACETAM 1000 MILLIGRAM(S): 250 TABLET, FILM COATED ORAL at 17:02

## 2018-12-10 RX ADMIN — ENOXAPARIN SODIUM 40 MILLIGRAM(S): 100 INJECTION SUBCUTANEOUS at 17:02

## 2018-12-10 RX ADMIN — CHLORHEXIDINE GLUCONATE 15 MILLILITER(S): 213 SOLUTION TOPICAL at 05:01

## 2018-12-10 NOTE — ADVANCED PRACTICE NURSE CONSULT - ASSESSMENT
Obtunded patient, bedbound, incontinent of urine and stool. Skin warm, dry with adequate skin turgor, scattered areas of hyperpigmentation and hypopigmentation, blanchable erythema on bilateral heels.    Patient is noted to have a Mechanical Stage 3 Pressure Injury to the right side of his trach, the wound which measures 1.5 x 3.0 x 0.4 cm and is covered with agranular red tissue, with scant drainage, no tunneling, undermining or odor.  No erythema, warmth or induration noted to periwound.

## 2018-12-10 NOTE — ADVANCED PRACTICE NURSE CONSULT - REASON FOR CONSULT
Patient seen on skin care rounds after wound care referral received for assessment of skin impairment and recommendations of topical management. Chart reviewed:  BMI (34.9). Michele (10), Serum albumin (8.6 g/dL) and Total Protein (2.8 g/dL).  Unable to interview patient. Patient qas BIBEMS by air transport as a transfer from [facility] intubate and with c-collar in place. Trauma A activation, patient was brought for further evaluation by trauma and neurosurgery team after being found unconscious and unresponsive on the sidewalk. Initial CT showed bilateral subdural hemorrhage and SAH.

## 2018-12-10 NOTE — ADVANCED PRACTICE NURSE CONSULT - RECOMMEDATIONS
Follow Nutritional suggestions as per Dietary consult.    Recommendation for Pressure Injury to Trach site:   Cleanse wound and periwound with normal saline, apply Medihoney gel cover with Adaptic and Hydrocolloid; treatment to be done every 72 hours.    Goal of Care:  Promote moist healing and prevent further deterioration of wound    Continue low air loss bed therapy, continue heel elevation with Z-flex fluidized positioning boots, continue to turn & reposition q2h with Z-phuc fluidized positioning device, continue moisture management with barrier creams and single breathable pad, continue measures to decrease friction/shear/pressure.    Plan discussed with patient's primary nurse, Connor López and Dr. Razo.      Please call wound care service line @ [phone], if further assistance is needed.

## 2018-12-10 NOTE — PROGRESS NOTE ADULT - SUBJECTIVE AND OBJECTIVE BOX
No acute events overnight. Salt tabs added for hyponatremia. Continued abx for klebsiella in sputum and bacteremia.    Neuro exam unchanged - swelling of craniotomy site unchanged  Opens eyes spontaneously  Trach in place with pressure ulcer at inferior aspect of trach, moderate secretions  Abdomen is soft, nondistended, clean PEG site  No edema of lower extremities    Na 119    A/P 45M severe TBI s/p bilateral crainectomy, trach/PEG  - discussion was had with family last week regarding goals of care and reassessment of goals of care after 5 days of antibiotics.  Attempting to reach family again to have another family meeting to further delineate goals of care.  Will continue abx at this current time to treat bacteremia and will continue to treat hyponatremia until further discussion with uncle.  But will continue to respect established wishes to not escalate care to SICU, invasive procedures, monitors.

## 2018-12-10 NOTE — PROGRESS NOTE ADULT - SUBJECTIVE AND OBJECTIVE BOX
CC: Follow up, [facility]    BRIEF HOSPITAL COURSE:  This is a 45 year old male admitted on 10/24, found unresponsive and with severe severe traumatic brain injury, CT showing bilateral subdural hematoma with compression s/p emergent bilateral hemicraniectomy, decompression and EVD placement on 10/24. S/P trach and peg placement. Course complicated by seizures, possible hepatic encephalopathy, sepsis due to ventilator associated pneumonia, severe anemia s/p transfusion, ?cholecystitis s/p diagnostic paracentesis 11/5 to rule SBP. Subsequently downgraded to medical floor. Code sepsis on 12/5 for fever, tachycardia, oxygen desaturation. Family meeting held by surgical team with Uncle Hung (next of kin), decision made for no further escalation of care, continue current level of medical management with time limited trial of antibiotics and IVF next 5 days to monitor for improvement.    INTERVAL HPI/OVERNIGHT EVENTS:  Continues to have hyponatremia, NaCl tabs added today per primary team.   No fever spike for last 24-48 hrs.   No family present.     Limited ROS due to unresponsiveness.     MEDICATIONS  (STANDING):  acetaminophen  IVPB .. 1000 milliGRAM(s) IV Intermittent once  ALBUTerol    0.083% 2.5 milliGRAM(s) Nebulizer every 6 hours  ascorbic acid 500 milliGRAM(s) Oral daily  chlorhexidine 0.12% Liquid 15 milliLiter(s) Swish and Spit two times a day  enoxaparin Injectable 40 milliGRAM(s) SubCutaneous daily  folic acid 1 milliGRAM(s) Oral daily  levETIRAcetam  Solution 1000 milliGRAM(s) Oral two times a day  magnesium oxide 400 milliGRAM(s) Oral three times a day with meals  meropenem  IVPB 1000 milliGRAM(s) IV Intermittent every 8 hours  meropenem  IVPB      phytonadione   Solution 5 milliGRAM(s) Oral daily  propranolol 10 milliGRAM(s) Oral every 12 hours  senna Syrup 10 milliLiter(s) Oral daily  sodium chloride 1 Gram(s) Oral three times a day  thiamine 100 milliGRAM(s) Oral daily    MEDICATIONS  (PRN):  acetaminophen    Suspension .. 650 milliGRAM(s) Oral every 6 hours PRN Temp greater or equal to 38.5C (101.3F)  polyethylene glycol 3350 17 Gram(s) Oral two times a day PRN if not BM in the last 24 hours      PHYSICAL EXAM:    Vital Signs Last 24 Hrs  T(C): 36.3 (10 Dec 2018 16:03), Max: 37.3 (10 Dec 2018 08:00)  T(F): 97.4 (10 Dec 2018 16:03), Max: 99.2 (10 Dec 2018 08:00)  HR: 77 (10 Dec 2018 16:03) (70 - 92)  BP: 113/80 (10 Dec 2018 16:03) (112/73 - 123/85)  BP(mean): --  RR: 18 (10 Dec 2018 16:03) (18 - 18)  SpO2: 98% (10 Dec 2018 16:03) (94% - 100%)    General: nonverbal and unresponsive. Resting comfortably. No acute distress.   HEENT: +edema at craniotomy site.  mucous membrane moist. Tongue edema  Lungs: coarse breath sounds. +trach   CV: +s1/s2. regular rate and rhythm  GI: +bowel sound. abdomen soft, non-tender, obese. +PEG.  MSK: No cyanosis. +edema.  Neuro: unresponsive and nonverbal.   Skin: warm and dry.     LABS:                          9.6    8.5   )-----------( 230      ( 10 Dec 2018 09:32 )             27.8   12-10      122<L>  |  85<L>  |  16.0  ----------------------------<  117<H>  4.2   |  23.0  |  <0.20<L>    Ca    9.5      10 Dec 2018 09:32  Phos  3.7     12-10  Mg     1.4     12-10      I&O's Summary    09 Dec 2018 07:01  -  10 Dec 2018 07:00  --------------------------------------------------------  IN: 1980 mL / OUT: 500 mL / NET: 1480 mL      RADIOLOGY & ADDITIONAL STUDIES: Reviewed and no new recent studies      ADVANCE DIRECTIVES:   DNR YES NO  Completed on:                     MOLST  YES NO   Completed on:  Living Will  YES NO   Completed on:

## 2018-12-10 NOTE — PROGRESS NOTE ADULT - PROBLEM SELECTOR PLAN 4
Family meeting schedule via telephone with Uncle Hung and  on 12/11 at 12:30 to update pt status and readdress GOC. Surgical team made aware to assist in conversation from surgical/medical standpoint.     Hung Amado: [phone]

## 2018-12-10 NOTE — PROGRESS NOTE ADULT - PROBLEM SELECTOR PLAN 1
BCx + klebsiella. unclear etiology. [facility] and antibiotics [facility]

## 2018-12-10 NOTE — PROGRESS NOTE ADULT - ASSESSMENT
Mr. Aneudy Clemente is a 45 M found to have severe TBI with subdural hematoma s/p surgical intervention, s/p trach and peg and course complicated by sepsis, acute anemia, hepatic encephalopathy and ?cholecystitis. Family meeting held on 11/12 with SICU team over phone with Uncle - next of kin, pt was made DNR/DNI. Uncle acknowledge understanding of pt's underlying medical conditions, hospital course and would like to continue all current medical management - would like to be informed if he should encounter any acute events to make decision moving forward. SW following and working on PRUCAL as pt is an undocumented citizen.     Reconsulted 12/5 for assistance with go.   Code sepsis 12/6 and family meeting held by surgical team with family (uncle and aunt in law): decision made to continue current medical management with time limited trial of IVF and antibiotics for next 5 days. No further escalation of care or transfer to ICU.  Preliminary Bcx positive for klebsiella.  Family meeting schedule with uncle via phone with  on 12/11 as pt unable to come in till late evening due to work schedule.

## 2018-12-11 LAB
ANION GAP SERPL CALC-SCNC: 11 MMOL/L — SIGNIFICANT CHANGE UP (ref 5–17)
BASOPHILS # BLD AUTO: 0 K/UL — SIGNIFICANT CHANGE UP (ref 0–0.2)
BASOPHILS NFR BLD AUTO: 0.1 % — SIGNIFICANT CHANGE UP (ref 0–2)
BUN SERPL-MCNC: 17 MG/DL — SIGNIFICANT CHANGE UP (ref 8–20)
CALCIUM SERPL-MCNC: 9.4 MG/DL — SIGNIFICANT CHANGE UP (ref 8.6–10.2)
CHLORIDE SERPL-SCNC: 89 MMOL/L — LOW (ref 98–107)
CO2 SERPL-SCNC: 25 MMOL/L — SIGNIFICANT CHANGE UP (ref 22–29)
CREAT SERPL-MCNC: 0.22 MG/DL — LOW (ref 0.5–1.3)
EOSINOPHIL # BLD AUTO: 0.3 K/UL — SIGNIFICANT CHANGE UP (ref 0–0.5)
EOSINOPHIL NFR BLD AUTO: 3.9 % — SIGNIFICANT CHANGE UP (ref 0–5)
GLUCOSE SERPL-MCNC: 130 MG/DL — HIGH (ref 70–115)
HCT VFR BLD CALC: 29.3 % — LOW (ref 42–52)
HGB BLD-MCNC: 10.2 G/DL — LOW (ref 14–18)
LYMPHOCYTES # BLD AUTO: 1.9 K/UL — SIGNIFICANT CHANGE UP (ref 1–4.8)
LYMPHOCYTES # BLD AUTO: 23.1 % — SIGNIFICANT CHANGE UP (ref 20–55)
MAGNESIUM SERPL-MCNC: 1.4 MG/DL — LOW (ref 1.6–2.6)
MCHC RBC-ENTMCNC: 29.3 PG — SIGNIFICANT CHANGE UP (ref 27–31)
MCHC RBC-ENTMCNC: 34.8 G/DL — SIGNIFICANT CHANGE UP (ref 32–36)
MCV RBC AUTO: 84.2 FL — SIGNIFICANT CHANGE UP (ref 80–94)
MONOCYTES # BLD AUTO: 0.6 K/UL — SIGNIFICANT CHANGE UP (ref 0–0.8)
MONOCYTES NFR BLD AUTO: 7.2 % — SIGNIFICANT CHANGE UP (ref 3–10)
NEUTROPHILS # BLD AUTO: 5.4 K/UL — SIGNIFICANT CHANGE UP (ref 1.8–8)
NEUTROPHILS NFR BLD AUTO: 65.3 % — SIGNIFICANT CHANGE UP (ref 37–73)
PHOSPHATE SERPL-MCNC: 3.5 MG/DL — SIGNIFICANT CHANGE UP (ref 2.4–4.7)
PLATELET # BLD AUTO: 259 K/UL — SIGNIFICANT CHANGE UP (ref 150–400)
POTASSIUM SERPL-MCNC: 4 MMOL/L — SIGNIFICANT CHANGE UP (ref 3.5–5.3)
POTASSIUM SERPL-SCNC: 4 MMOL/L — SIGNIFICANT CHANGE UP (ref 3.5–5.3)
RBC # BLD: 3.48 M/UL — LOW (ref 4.6–6.2)
RBC # FLD: 16.5 % — HIGH (ref 11–15.6)
SODIUM SERPL-SCNC: 125 MMOL/L — LOW (ref 135–145)
WBC # BLD: 8.3 K/UL — SIGNIFICANT CHANGE UP (ref 4.8–10.8)
WBC # FLD AUTO: 8.3 K/UL — SIGNIFICANT CHANGE UP (ref 4.8–10.8)

## 2018-12-11 PROCEDURE — 99356: CPT

## 2018-12-11 PROCEDURE — 99232 SBSQ HOSP IP/OBS MODERATE 35: CPT

## 2018-12-11 RX ORDER — MAGNESIUM SULFATE 500 MG/ML
2 VIAL (ML) INJECTION
Qty: 0 | Refills: 0 | Status: COMPLETED | OUTPATIENT
Start: 2018-12-11 | End: 2018-12-11

## 2018-12-11 RX ADMIN — ALBUTEROL 2.5 MILLIGRAM(S): 90 AEROSOL, METERED ORAL at 03:18

## 2018-12-11 RX ADMIN — Medication 100 MILLIGRAM(S): at 11:45

## 2018-12-11 RX ADMIN — CHLORHEXIDINE GLUCONATE 15 MILLILITER(S): 213 SOLUTION TOPICAL at 17:39

## 2018-12-11 RX ADMIN — SODIUM CHLORIDE 1 GRAM(S): 9 INJECTION INTRAMUSCULAR; INTRAVENOUS; SUBCUTANEOUS at 05:33

## 2018-12-11 RX ADMIN — Medication 50 GRAM(S): at 17:37

## 2018-12-11 RX ADMIN — LEVETIRACETAM 1000 MILLIGRAM(S): 250 TABLET, FILM COATED ORAL at 17:38

## 2018-12-11 RX ADMIN — MEROPENEM 100 MILLIGRAM(S): 1 INJECTION INTRAVENOUS at 11:45

## 2018-12-11 RX ADMIN — ALBUTEROL 2.5 MILLIGRAM(S): 90 AEROSOL, METERED ORAL at 15:35

## 2018-12-11 RX ADMIN — LEVETIRACETAM 1000 MILLIGRAM(S): 250 TABLET, FILM COATED ORAL at 05:33

## 2018-12-11 RX ADMIN — Medication 500 MILLIGRAM(S): at 11:45

## 2018-12-11 RX ADMIN — SODIUM CHLORIDE 1 GRAM(S): 9 INJECTION INTRAMUSCULAR; INTRAVENOUS; SUBCUTANEOUS at 13:59

## 2018-12-11 RX ADMIN — MEROPENEM 100 MILLIGRAM(S): 1 INJECTION INTRAVENOUS at 21:42

## 2018-12-11 RX ADMIN — SODIUM CHLORIDE 1 GRAM(S): 9 INJECTION INTRAMUSCULAR; INTRAVENOUS; SUBCUTANEOUS at 21:42

## 2018-12-11 RX ADMIN — ALBUTEROL 2.5 MILLIGRAM(S): 90 AEROSOL, METERED ORAL at 20:44

## 2018-12-11 RX ADMIN — MAGNESIUM OXIDE 400 MG ORAL TABLET 400 MILLIGRAM(S): 241.3 TABLET ORAL at 13:59

## 2018-12-11 RX ADMIN — MAGNESIUM OXIDE 400 MG ORAL TABLET 400 MILLIGRAM(S): 241.3 TABLET ORAL at 17:39

## 2018-12-11 RX ADMIN — ENOXAPARIN SODIUM 40 MILLIGRAM(S): 100 INJECTION SUBCUTANEOUS at 11:44

## 2018-12-11 RX ADMIN — SENNA PLUS 10 MILLILITER(S): 8.6 TABLET ORAL at 11:44

## 2018-12-11 RX ADMIN — CHLORHEXIDINE GLUCONATE 15 MILLILITER(S): 213 SOLUTION TOPICAL at 05:33

## 2018-12-11 RX ADMIN — Medication 1 MILLIGRAM(S): at 11:45

## 2018-12-11 RX ADMIN — Medication 10 MILLIGRAM(S): at 17:39

## 2018-12-11 RX ADMIN — ALBUTEROL 2.5 MILLIGRAM(S): 90 AEROSOL, METERED ORAL at 08:59

## 2018-12-11 RX ADMIN — MEROPENEM 100 MILLIGRAM(S): 1 INJECTION INTRAVENOUS at 02:25

## 2018-12-11 RX ADMIN — Medication 5 MILLIGRAM(S): at 14:15

## 2018-12-11 RX ADMIN — MAGNESIUM OXIDE 400 MG ORAL TABLET 400 MILLIGRAM(S): 241.3 TABLET ORAL at 10:38

## 2018-12-11 RX ADMIN — Medication 50 GRAM(S): at 14:00

## 2018-12-11 RX ADMIN — Medication 50 GRAM(S): at 14:15

## 2018-12-11 RX ADMIN — Medication 10 MILLIGRAM(S): at 05:33

## 2018-12-11 NOTE — PROGRESS NOTE ADULT - ASSESSMENT
A/P 45M severe TBI s/p bilateral crainectomy, trach/PEG  - Plan for today to have goals of care discussion with uncle on the phone today  Will continue abx at this current time to treat bacteremia and will continue to treat hyponatremia until further discussion with uncle.  But will continue to respect established wishes to not escalate care to SICU, invasive procedures, monitors.

## 2018-12-11 NOTE — PROGRESS NOTE ADULT - SUBJECTIVE AND OBJECTIVE BOX
CC: Follow up, [facility]    This is a 45 year old male admitted on 10/24, found unresponsive and with severe severe traumatic brain injury, CT showing bilateral subdural hematoma with compression s/p emergent bilateral hemicraniectomy, decompression and EVD placement on 10/24. S/P trach and peg placement. Course complicated by seizures, possible hepatic encephalopathy, sepsis due to ventilator associated pneumonia, severe anemia s/p transfusion, ?cholecystitis s/p diagnostic paracentesis 11/5 to rule SBP. Subsequently downgraded to medical floor. Code sepsis on 12/5 for fever, tachycardia, oxygen desaturation. Family meeting held by surgical team with Uncle Hung (next of kin), decision made for no further escalation of care, continue current level of medical management with time limited trial of antibiotics and IVF next 5 days to monitor for improvement.    INTERVAL HPI/OVERNIGHT EVENTS:  No acute events overnight. Pt resting comfortably.    Limited ROS due to underlying TBI    MEDICATIONS  (STANDING):  acetaminophen  IVPB .. 1000 milliGRAM(s) IV Intermittent once  ALBUTerol    0.083% 2.5 milliGRAM(s) Nebulizer every 6 hours  ascorbic acid 500 milliGRAM(s) Oral daily  chlorhexidine 0.12% Liquid 15 milliLiter(s) Swish and Spit two times a day  enoxaparin Injectable 40 milliGRAM(s) SubCutaneous daily  folic acid 1 milliGRAM(s) Oral daily  levETIRAcetam  Solution 1000 milliGRAM(s) Oral two times a day  magnesium oxide 400 milliGRAM(s) Oral three times a day with meals  magnesium sulfate  IVPB 2 Gram(s) IV Intermittent every 2 hours  meropenem  IVPB 1000 milliGRAM(s) IV Intermittent every 8 hours  meropenem  IVPB      phytonadione   Solution 5 milliGRAM(s) Oral daily  propranolol 10 milliGRAM(s) Oral every 12 hours  senna Syrup 10 milliLiter(s) Oral daily  sodium chloride 1 Gram(s) Oral three times a day  thiamine 100 milliGRAM(s) Oral daily    MEDICATIONS  (PRN):  acetaminophen    Suspension .. 650 milliGRAM(s) Oral every 6 hours PRN Temp greater or equal to 38.5C (101.3F)  polyethylene glycol 3350 17 Gram(s) Oral two times a day PRN if not BM in the last 24 hours      PHYSICAL EXAM:    Vital Signs Last 24 Hrs  T(C): 36.9 (11 Dec 2018 12:29), Max: 37.6 (11 Dec 2018 09:00)  T(F): 98.4 (11 Dec 2018 12:29), Max: 99.7 (11 Dec 2018 09:00)  HR: 96 (11 Dec 2018 10:24) (77 - 100)  BP: 108/73 (11 Dec 2018 09:00) (108/73 - 129/76)  BP(mean): --  RR: 18 (11 Dec 2018 09:00) (18 - 18)  SpO2: 98% (11 Dec 2018 10:24) (96% - 99%)    General: nonverbal and unresponsive. Resting comfortably. No acute distress.   HEENT: +edema at craniotomy site.  mucous membrane moist. Tongue edema  Lungs: coarse breath sounds. +trach   CV: +s1/s2. regular rate and rhythm  GI: +bowel sound. abdomen soft, non-tender, obese. +PEG.  MSK: No cyanosis. +edema.  Neuro: unresponsive and nonverbal.   Skin: warm and dry.     LABS:                          10.2   8.3   )-----------( 259      ( 11 Dec 2018 10:17 )             29.3     12-11    125<L>  |  89<L>  |  17.0  ----------------------------<  130<H>  4.0   |  25.0  |  0.22<L>    Ca    9.4      11 Dec 2018 10:17  Phos  3.5     12-11  Mg     1.4     12-11          I&O's Summary    10 Dec 2018 07:01  -  11 Dec 2018 07:00  --------------------------------------------------------  IN: 720 mL / OUT: 1200 mL / NET: -480 mL        RADIOLOGY & ADDITIONAL STUDIES: Reviewed, no new recent studies    ADVANCE DIRECTIVES:   DNR YES NO  Completed on:                     MOLST  YES NO   Completed on:  Living Will  YES NO   Completed on:

## 2018-12-11 NOTE — CHART NOTE - NSCHARTNOTEFT_GEN_A_CORE
Source: Patient [ ]  Family [ ]   other [ ] ID rounds    Current Diet:   Diet, NPO with Tube Feed:   Tube Feeding Modality: Gastrostomy  Pivot 1.5 Ryan  Total Volume for 24 Hours (mL): 1440  Continuous  Starting Tube Feed Rate {mL per Hour}: 60  Until Goal Tube Feed Rate (mL per Hour): 60  Tube Feed Duration (in Hours): 24  Tube Feed Start Time: 16:25  Supplement Feeding Modality:  Gastrostomy  Prostat Cans or Servings Per Day:  2       Frequency:  Two Times a day (18 @ 10:33)    Patient reports [ ] nausea  [ ] vomiting [ ] diarrhea [ ] constipation  [X]chewing problems [X] swallowing issues  [ ] other:     Enteral /Parenteral Nutrition: Current diet order provides Pivot @ 50mL/hr (x 20 hrs) 1000mL daily (1500cal, 94g protein), tolerated well.    Current Weight:   () 57kg  () 83.2kg  ()   87kg   ()   82 kg   (10/27) 82kg     % Weight Change: Question accuracy of weights, though significant weight loss likely    Pertinent Medications: MEDICATIONS  (STANDING):  acetaminophen  IVPB .. 1000 milliGRAM(s) IV Intermittent once  ALBUTerol    0.083% 2.5 milliGRAM(s) Nebulizer every 6 hours  ascorbic acid 500 milliGRAM(s) Oral daily  chlorhexidine 0.12% Liquid 15 milliLiter(s) Swish and Spit two times a day  enoxaparin Injectable 40 milliGRAM(s) SubCutaneous daily  folic acid 1 milliGRAM(s) Oral daily  levETIRAcetam  Solution 1000 milliGRAM(s) Oral two times a day  magnesium oxide 400 milliGRAM(s) Oral three times a day with meals  meropenem  IVPB 1000 milliGRAM(s) IV Intermittent every 8 hours  meropenem  IVPB      phytonadione   Solution 5 milliGRAM(s) Oral daily  propranolol 10 milliGRAM(s) Oral every 12 hours  senna Syrup 10 milliLiter(s) Oral daily  sodium chloride 1 Gram(s) Oral three times a day  thiamine 100 milliGRAM(s) Oral daily    MEDICATIONS  (PRN):  acetaminophen    Suspension .. 650 milliGRAM(s) Oral every 6 hours PRN Temp greater or equal to 38.5C (101.3F)  polyethylene glycol 3350 17 Gram(s) Oral two times a day PRN if not BM in the last 24 hours    Pertinent Labs: CBC Full  -  ( 11 Dec 2018 10:17 )  WBC Count : 8.3 K/uL  Hemoglobin : 10.2 g/dL  Hematocrit : 29.3 %  Platelet Count - Automated : 259 K/uL  Mean Cell Volume : 84.2 fl  Mean Cell Hemoglobin : 29.3 pg  Mean Cell Hemoglobin Concentration : 34.8 g/dL  Auto Neutrophil # : 5.4 K/uL  Auto Lymphocyte # : 1.9 K/uL  Auto Monocyte # : 0.6 K/uL  Auto Eosinophil # : 0.3 K/uL  Auto Basophil # : 0.0 K/uL  Auto Neutrophil % : 65.3 %  Auto Lymphocyte % : 23.1 %  Auto Monocyte % : 7.2 %  Auto Eosinophil % : 3.9 %  Auto Basophil % : 0.1 %  12-11 Na125 mmol/L<L> Glu 130 mg/dL<H> K+ 4.0 mmol/L Cr  0.22 mg/dL<L> BUN 17.0 mg/dL Phos 3.5 mg/dL Alb n/a   PAB n/a       Skin: Stage II coccyx, Stage II L. upper flank; Suspected DTI R. scapula; Wound beneath trach collar; Surgical incision B/L head; IAD    Nutrition focused physical exam conducted - found signs of malnutrition [ ]absent [X]present    Subcutaneous fat loss: [ ] Orbital fat pads region, [ ]Buccal fat region, [ ]Triceps region,  [ ]Ribs region    Muscle wasting: [ ]Temples region, [X]Clavicle region, [ ]Shoulder region, [ ]Scapula region, [ ]Interosseous region,  [ ]thigh region, [ ]Calf region    Estimated Needs:   [ ] no change since previous assessment  [X] recalculated:   Using recent weight 57k-35kcal/kg      1710-kcal     Current Nutrition Diagnosis: Pt now with moderate acute malnutrition related to increased needs for healing 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 Pt with multiple pressure injuries/wound sites requiring increased protein-energy needs. TF hx x24 hrs provided 955mL (1432cal, 90g protein) insufficient to meet needs.     Recommendations:    1) Daily weights for trend  2) When medically feasible, consider increase TF rate to 60ml/hr (x 20 hours) to provide 1200ml/day (1800kcal, 113gm protein) to meet increased needs. c/w Prostat BID  3) Add MVI daily     Monitoring and Evaluation:   [ ] PO intake [X] Tolerance to diet prescription [X] Weights  [X] Follow up per protocol [X] Labs: Source: Patient [ ]  Family [ ]   other [ ] ID rounds    Current Diet:   Diet, NPO with Tube Feed:   Tube Feeding Modality: Gastrostomy  Pivot 1.5 Ryan  Total Volume for 24 Hours (mL): 1440  Continuous  Starting Tube Feed Rate {mL per Hour}: 60  Until Goal Tube Feed Rate (mL per Hour): 60  Tube Feed Duration (in Hours): 24  Tube Feed Start Time: 16:25  Supplement Feeding Modality:  Gastrostomy  Prostat Cans or Servings Per Day:  2       Frequency:  Two Times a day (18 @ 10:33)    Patient reports [ ] nausea  [ ] vomiting [ ] diarrhea [ ] constipation  [X]chewing problems [X] swallowing issues  [ ] other:     Enteral /Parenteral Nutrition: Current diet order provides Pivot @ 60mL/hr (x 20 hrs) 1200mL daily (1800cal, 113g protein), tolerated well.    Current Weight:   () 57kg  () 83.2kg  ()   87kg   ()   82 kg   (10/27) 82kg     % Weight Change: Question accuracy of weights, though significant weight loss likely    Pertinent Medications: MEDICATIONS  (STANDING):  acetaminophen  IVPB .. 1000 milliGRAM(s) IV Intermittent once  ALBUTerol    0.083% 2.5 milliGRAM(s) Nebulizer every 6 hours  ascorbic acid 500 milliGRAM(s) Oral daily  chlorhexidine 0.12% Liquid 15 milliLiter(s) Swish and Spit two times a day  enoxaparin Injectable 40 milliGRAM(s) SubCutaneous daily  folic acid 1 milliGRAM(s) Oral daily  levETIRAcetam  Solution 1000 milliGRAM(s) Oral two times a day  magnesium oxide 400 milliGRAM(s) Oral three times a day with meals  meropenem  IVPB 1000 milliGRAM(s) IV Intermittent every 8 hours  meropenem  IVPB      phytonadione   Solution 5 milliGRAM(s) Oral daily  propranolol 10 milliGRAM(s) Oral every 12 hours  senna Syrup 10 milliLiter(s) Oral daily  sodium chloride 1 Gram(s) Oral three times a day  thiamine 100 milliGRAM(s) Oral daily    MEDICATIONS  (PRN):  acetaminophen    Suspension .. 650 milliGRAM(s) Oral every 6 hours PRN Temp greater or equal to 38.5C (101.3F)  polyethylene glycol 3350 17 Gram(s) Oral two times a day PRN if not BM in the last 24 hours    Pertinent Labs: CBC Full  -  ( 11 Dec 2018 10:17 )  WBC Count : 8.3 K/uL  Hemoglobin : 10.2 g/dL  Hematocrit : 29.3 %  Platelet Count - Automated : 259 K/uL  Mean Cell Volume : 84.2 fl  Mean Cell Hemoglobin : 29.3 pg  Mean Cell Hemoglobin Concentration : 34.8 g/dL  Auto Neutrophil # : 5.4 K/uL  Auto Lymphocyte # : 1.9 K/uL  Auto Monocyte # : 0.6 K/uL  Auto Eosinophil # : 0.3 K/uL  Auto Basophil # : 0.0 K/uL  Auto Neutrophil % : 65.3 %  Auto Lymphocyte % : 23.1 %  Auto Monocyte % : 7.2 %  Auto Eosinophil % : 3.9 %  Auto Basophil % : 0.1 %  12-11 Na125 mmol/L<L> Glu 130 mg/dL<H> K+ 4.0 mmol/L Cr  0.22 mg/dL<L> BUN 17.0 mg/dL Phos 3.5 mg/dL Alb n/a   PAB n/a       Skin: Stage II coccyx, Stage II L. upper flank; Suspected DTI R. scapula; Wound beneath trach collar; Surgical incision B/L head; IAD    Nutrition focused physical exam conducted - found signs of malnutrition [ ]absent [X]present    Subcutaneous fat loss: [ ] Orbital fat pads region, [ ]Buccal fat region, [ ]Triceps region,  [ ]Ribs region    Muscle wasting: [ ]Temples region, [X]Clavicle region, [ ]Shoulder region, [ ]Scapula region, [ ]Interosseous region,  [ ]thigh region, [ ]Calf region    Estimated Needs:   [ ] no change since previous assessment  [X] recalculated:   Using recent weight 57k-35kcal/kg      1710-kcal     Current Nutrition Diagnosis: Pt now with moderate acute malnutrition related to increased needs for healing 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 Pt with multiple pressure injuries/wound sites requiring increased protein-energy needs. TF hx x24 hrs provided 955mL (1432cal, 90g protein) insufficient to meet needs.     Recommendations:    1) Daily weights for trend  2) When medically feasible, consider increase TF rate to 65ml/hr (x 20 hours) to provide 1300ml/day (1950kcal, 122gm protein) to meet increased needs. c/w Prostat BID  3) Add MVI daily     Monitoring and Evaluation:   [ ] PO intake [X] Tolerance to diet prescription [X] Weights  [X] Follow up per protocol [X] Labs:

## 2018-12-11 NOTE — PROGRESS NOTE ADULT - ATTENDING COMMENTS
No acute events overnight.  Afebrile  Cranial swelling unchanged  neuro exam unchanged  Trach in place  abdomen is soft, ND, NT, PEG in place    Hyponatremia improving    A/P severe TBI s/p craniectomy with bacteremia, PNA and hyponatremia  - had lengthy discussion with uncle Hung with  services along with palliative care regarding ultimate goals of care.  Uncle is clear that patient would not want to be in his current state because quality of life is so signficantly impacted and not in line with patient's known values.  hence, he set limitations at last family meeting regarding no further invasive procedures, ICU care, ventilation, surgeries, or aggressive intervention. However, it was agreed upon to do five days of antibiotics for his PNA and bacteremia.  patient has shown some improvement and hemodynamically improved.  Discussed the cranial swelling and hyponatremia which is being managed medically at this current time though we have concerns for underlying problems but no imaging or intervention pursued as it would be an escalation of  care.  Discussed current clinical situation and uncle is amenable to completing course of antibiotics for bacetermia/PNA. To continue medical management of hyponatremia. However, if patient clinically declines, has evidence of new infection, or worsening hyponatremia/electrolyte imbalances, other condition that needs more management than he is receiving currently, he would like to transition to comfort care measures only.    Palliative care and  present for phone conversation with .

## 2018-12-11 NOTE — PROGRESS NOTE ADULT - PROBLEM SELECTOR PLAN 4
Family meeting via phone from 1:00 PM to 1:45 PM  Participants: Uncle Hung Amado  Medical staff: Surgery - Dr. Iqbal and palliative medicine - Dr. Bryant and LOY Albright Family meeting via phone from 1:00 PM to 1:45 PM with assistance of [employer]   Participants: Uncle Hung Aneudy  Medical staff: Surgery - Dr. Iqbal and palliative medicine - Dr. Bryant and LOY Albright    We reviewed and provided update on pt's progress since last family meeting with surgical team last week - at that time decision for no further escalation of care except for IVF and antibiotics for time limited trial of 5 days. We again reviewed pt's goals of care moving forward. Pt has improved hemodynamically but continues to have no improvement neurologically, edema at surgical site and persistent hyponatremia. Treatment options were reviewed and decision by uncle to proceed and complete the full 14 day course of IV antibiotics for bacteremia. We shared that pt is at high risk of reinfection given his underlying medical conditions. We asked Uncle in knowing pt best, would pt want to continue to be treated for future infections or acute events knowing he is unlikely to have a meaningful recovery to his previous baseline: Pt would not have wanted to continue to live if he would not have any quality of life. Decision made by Uncle if pt should clinically decompensate or develop any subsequent infection, he would want to transition to full comfort measures at that time. Emotional support provided. All questions answered.

## 2018-12-11 NOTE — CHART NOTE - NSCHARTNOTEFT_GEN_A_CORE
Upon Nutritional Assessment by the Registered Dietitian your patient was determined to meet criteria / has evidence of the following diagnosis/diagnoses:          [X]  Moderate Acute Protein Calorie Malnutrition      Findings as based on:  •  Comprehensive nutrition assessment and consultation  •  Calorie counts (nutrient intake analysis)  •  Food acceptance and intake status from observations by staff  •  Follow up  •  Patient education  •  Intervention secondary to interdisciplinary rounds  •   concerns      Pt now with moderate acute malnutrition related to increased needs for healing s/p TBI with multicompartment ICH and brain compression from unknown mechanism as evidenced by significant, unintentional weight loss, meeting <75% of estimated protein-energy needs >7 days, 1+ generalized edema, 2+ R/L hand edema, mild muscle wasting in clavicle. Pt with multiple pressure injuries/wound sites requiring increased protein-energy needs. TF hx x24 hrs provided 955mL (1432cal, 90g protein) insufficient to meet needs.     Treatment:    The following diet has been recommended:  1) Daily weights for trend  2) When medically feasible, consider increase TF rate to 60ml/hr (x 20 hours) to provide 1200ml/day (1800kcal, 113gm protein) to meet increased needs. c/w Prostat BID  3) Add MVI daily     PROVIDER Section:     By signing this assessment you are acknowledging and agree with the diagnosis/diagnoses assigned by the Registered Dietitian    Comments: Upon Nutritional Assessment by the Registered Dietitian your patient was determined to meet criteria / has evidence of the following diagnosis/diagnoses:          [X]  Moderate Acute Protein Calorie Malnutrition      Findings as based on:  •  Comprehensive nutrition assessment and consultation  •  Calorie counts (nutrient intake analysis)  •  Food acceptance and intake status from observations by staff  •  Follow up  •  Patient education  •  Intervention secondary to interdisciplinary rounds  •   concerns      Pt now with moderate acute malnutrition related to increased needs for healing s/p TBI with multicompartment ICH and brain compression from unknown mechanism as evidenced by significant, unintentional weight loss, meeting <75% of estimated protein-energy needs >7 days, 1+ generalized edema, 2+ R/L hand edema, mild muscle wasting in clavicle. Pt with multiple pressure injuries/wound sites requiring increased protein-energy needs. TF hx x24 hrs provided 955mL (1432cal, 90g protein) insufficient to meet needs.     Treatment:    The following diet has been recommended:  1) Daily weights for trend  2) When medically feasible, consider increase TF rate to 65ml/hr (x 20 hours) to provide 1300ml/day (1950kcal, 122gm protein) to meet increased needs. c/w Prostat BID  3) Add MVI daily     PROVIDER Section:     By signing this assessment you are acknowledging and agree with the diagnosis/diagnoses assigned by the Registered Dietitian    Comments:

## 2018-12-11 NOTE — PROGRESS NOTE ADULT - ASSESSMENT
Mr. Aneudy Clemente is a 45 M found to have severe TBI with subdural hematoma s/p surgical intervention, s/p trach and peg and course complicated by sepsis, acute anemia, hepatic encephalopathy and ?cholecystitis. Family meeting held on 11/12 with SICU team over phone with Uncle - next of kin, pt was made DNR/DNI. Uncle acknowledge understanding of pt's underlying medical conditions, hospital course and would like to continue all current medical management - would like to be informed if he should encounter any acute events to make decision moving forward. SW following and working on PRUC[employer] as pt is an undocumented citizen.     Reconsulted 12/5 for assistance with go.   Code sepsis 12/6 and family meeting held by surgical team with family (uncle and aunt in law): decision made to continue current medical management with time limited trial of IVF and antibiotics for next 5 days. No further escalation of care or transfer to ICU.  Preliminary Bcx positive for klebsiella.  Family meeting today via phone with Uncle Hung.

## 2018-12-11 NOTE — PROGRESS NOTE ADULT - SUBJECTIVE AND OBJECTIVE BOX
INTERVAL HPI/OVERNIGHT EVENTS: No acute events overnight. Na has been increasing. Patient still on meropenem.   .       MEDICATIONS  (STANDING):  acetaminophen  IVPB .. 1000 milliGRAM(s) IV Intermittent once  ALBUTerol    0.083% 2.5 milliGRAM(s) Nebulizer every 6 hours  ascorbic acid 500 milliGRAM(s) Oral daily  chlorhexidine 0.12% Liquid 15 milliLiter(s) Swish and Spit two times a day  enoxaparin Injectable 40 milliGRAM(s) SubCutaneous daily  folic acid 1 milliGRAM(s) Oral daily  levETIRAcetam  Solution 1000 milliGRAM(s) Oral two times a day  magnesium oxide 400 milliGRAM(s) Oral three times a day with meals  meropenem  IVPB 1000 milliGRAM(s) IV Intermittent every 8 hours  meropenem  IVPB      phytonadione   Solution 5 milliGRAM(s) Oral daily  propranolol 10 milliGRAM(s) Oral every 12 hours  senna Syrup 10 milliLiter(s) Oral daily  sodium chloride 1 Gram(s) Oral three times a day  thiamine 100 milliGRAM(s) Oral daily    MEDICATIONS  (PRN):  acetaminophen    Suspension .. 650 milliGRAM(s) Oral every 6 hours PRN Temp greater or equal to 38.5C (101.3F)  polyethylene glycol 3350 17 Gram(s) Oral two times a day PRN if not BM in the last 24 hours      Vital Signs Last 24 Hrs  T(C): 36.6 (10 Dec 2018 23:36), Max: 37.3 (10 Dec 2018 08:00)  T(F): 97.9 (10 Dec 2018 23:36), Max: 99.2 (10 Dec 2018 08:00)  HR: 100 (11 Dec 2018 04:51) (77 - 100)  BP: 129/76 (11 Dec 2018 04:51) (112/73 - 129/76)  BP(mean): --  RR: 18 (10 Dec 2018 23:36) (18 - 18)  SpO2: 98% (11 Dec 2018 04:00) (94% - 99%)    PE  Gen: NAD  Pulm: No increased WOB  Abd: Soft, NT, ND      I&O's Detail    10 Dec 2018 07:01  -  11 Dec 2018 07:00  --------------------------------------------------------  IN:    Pivot: 720 mL  Total IN: 720 mL    OUT:    Incontinent per Condom Catheter: 800 mL    Other: 400 mL  Total OUT: 1200 mL    Total NET: -480 mL          LABS:                        9.6    8.5   )-----------( 230      ( 10 Dec 2018 09:32 )             27.8     12-10    122<L>  |  85<L>  |  16.0  ----------------------------<  117<H>  4.2   |  23.0  |  <0.20<L>    Ca    9.5      10 Dec 2018 09:32  Phos  3.7     12-10  Mg     1.4     12-10            RADIOLOGY & ADDITIONAL STUDIES:

## 2018-12-11 NOTE — PROGRESS NOTE ADULT - PROBLEM SELECTOR PROBLEM 4
Palliative care encounter
Respiratory failure after trauma
Hyponatremia
Respiratory failure after trauma
Subdural hematoma
Subdural hematoma
Palliative care encounter

## 2018-12-11 NOTE — CHART NOTE - NSCHARTNOTEFT_GEN_A_CORE
Palliative care social work note.    Meeting held with Palliative physician DR Bryant , Palliative SW and DR Iqbal with pts uncle via phone with interpetor. Goals of care disucssed with progression of patients medical issues and infections. decision that IVAB would continue at this point but if pt decompensates then focus would turn to full comfort measures understanding that with comfort natural progression of illnesses will take over eventually leading to death. pts uncle understands and is appreciative of medical care and support provided.

## 2018-12-12 LAB
ANION GAP SERPL CALC-SCNC: 13 MMOL/L — SIGNIFICANT CHANGE UP (ref 5–17)
BUN SERPL-MCNC: 17 MG/DL — SIGNIFICANT CHANGE UP (ref 8–20)
CALCIUM SERPL-MCNC: 9.6 MG/DL — SIGNIFICANT CHANGE UP (ref 8.6–10.2)
CHLORIDE SERPL-SCNC: 90 MMOL/L — LOW (ref 98–107)
CO2 SERPL-SCNC: 27 MMOL/L — SIGNIFICANT CHANGE UP (ref 22–29)
CREAT SERPL-MCNC: <0.2 MG/DL — LOW (ref 0.5–1.3)
CULTURE RESULTS: SIGNIFICANT CHANGE UP
CULTURE RESULTS: SIGNIFICANT CHANGE UP
GLUCOSE SERPL-MCNC: 138 MG/DL — HIGH (ref 70–115)
HCT VFR BLD CALC: 30.8 % — LOW (ref 42–52)
HGB BLD-MCNC: 10.3 G/DL — LOW (ref 14–18)
MAGNESIUM SERPL-MCNC: 1.5 MG/DL — LOW (ref 1.6–2.6)
MCHC RBC-ENTMCNC: 28.6 PG — SIGNIFICANT CHANGE UP (ref 27–31)
MCHC RBC-ENTMCNC: 33.4 G/DL — SIGNIFICANT CHANGE UP (ref 32–36)
MCV RBC AUTO: 85.6 FL — SIGNIFICANT CHANGE UP (ref 80–94)
PHOSPHATE SERPL-MCNC: 3.9 MG/DL — SIGNIFICANT CHANGE UP (ref 2.4–4.7)
PLATELET # BLD AUTO: 267 K/UL — SIGNIFICANT CHANGE UP (ref 150–400)
POTASSIUM SERPL-MCNC: 4 MMOL/L — SIGNIFICANT CHANGE UP (ref 3.5–5.3)
POTASSIUM SERPL-SCNC: 4 MMOL/L — SIGNIFICANT CHANGE UP (ref 3.5–5.3)
RBC # BLD: 3.6 M/UL — LOW (ref 4.6–6.2)
RBC # FLD: 16.6 % — HIGH (ref 11–15.6)
SODIUM SERPL-SCNC: 130 MMOL/L — LOW (ref 135–145)
SPECIMEN SOURCE: SIGNIFICANT CHANGE UP
SPECIMEN SOURCE: SIGNIFICANT CHANGE UP
WBC # BLD: 6.1 K/UL — SIGNIFICANT CHANGE UP (ref 4.8–10.8)
WBC # FLD AUTO: 6.1 K/UL — SIGNIFICANT CHANGE UP (ref 4.8–10.8)

## 2018-12-12 RX ORDER — MAGNESIUM SULFATE 500 MG/ML
2 VIAL (ML) INJECTION ONCE
Qty: 0 | Refills: 0 | Status: COMPLETED | OUTPATIENT
Start: 2018-12-12 | End: 2018-12-12

## 2018-12-12 RX ADMIN — MEROPENEM 100 MILLIGRAM(S): 1 INJECTION INTRAVENOUS at 17:12

## 2018-12-12 RX ADMIN — ENOXAPARIN SODIUM 40 MILLIGRAM(S): 100 INJECTION SUBCUTANEOUS at 12:27

## 2018-12-12 RX ADMIN — Medication 5 MILLIGRAM(S): at 14:13

## 2018-12-12 RX ADMIN — SODIUM CHLORIDE 1 GRAM(S): 9 INJECTION INTRAMUSCULAR; INTRAVENOUS; SUBCUTANEOUS at 14:13

## 2018-12-12 RX ADMIN — MAGNESIUM OXIDE 400 MG ORAL TABLET 400 MILLIGRAM(S): 241.3 TABLET ORAL at 07:39

## 2018-12-12 RX ADMIN — ALBUTEROL 2.5 MILLIGRAM(S): 90 AEROSOL, METERED ORAL at 08:41

## 2018-12-12 RX ADMIN — Medication 50 GRAM(S): at 10:13

## 2018-12-12 RX ADMIN — MAGNESIUM OXIDE 400 MG ORAL TABLET 400 MILLIGRAM(S): 241.3 TABLET ORAL at 12:27

## 2018-12-12 RX ADMIN — Medication 500 MILLIGRAM(S): at 12:26

## 2018-12-12 RX ADMIN — MEROPENEM 100 MILLIGRAM(S): 1 INJECTION INTRAVENOUS at 10:14

## 2018-12-12 RX ADMIN — CHLORHEXIDINE GLUCONATE 15 MILLILITER(S): 213 SOLUTION TOPICAL at 05:20

## 2018-12-12 RX ADMIN — SENNA PLUS 10 MILLILITER(S): 8.6 TABLET ORAL at 12:27

## 2018-12-12 RX ADMIN — CHLORHEXIDINE GLUCONATE 15 MILLILITER(S): 213 SOLUTION TOPICAL at 17:12

## 2018-12-12 RX ADMIN — ALBUTEROL 2.5 MILLIGRAM(S): 90 AEROSOL, METERED ORAL at 03:36

## 2018-12-12 RX ADMIN — LEVETIRACETAM 1000 MILLIGRAM(S): 250 TABLET, FILM COATED ORAL at 17:12

## 2018-12-12 RX ADMIN — SODIUM CHLORIDE 1 GRAM(S): 9 INJECTION INTRAMUSCULAR; INTRAVENOUS; SUBCUTANEOUS at 21:09

## 2018-12-12 RX ADMIN — ALBUTEROL 2.5 MILLIGRAM(S): 90 AEROSOL, METERED ORAL at 21:16

## 2018-12-12 RX ADMIN — Medication 100 MILLIGRAM(S): at 12:27

## 2018-12-12 RX ADMIN — ALBUTEROL 2.5 MILLIGRAM(S): 90 AEROSOL, METERED ORAL at 15:57

## 2018-12-12 RX ADMIN — LEVETIRACETAM 1000 MILLIGRAM(S): 250 TABLET, FILM COATED ORAL at 05:20

## 2018-12-12 RX ADMIN — Medication 10 MILLIGRAM(S): at 05:19

## 2018-12-12 RX ADMIN — Medication 10 MILLIGRAM(S): at 17:12

## 2018-12-12 RX ADMIN — MEROPENEM 100 MILLIGRAM(S): 1 INJECTION INTRAVENOUS at 03:30

## 2018-12-12 RX ADMIN — Medication 1 MILLIGRAM(S): at 12:27

## 2018-12-12 RX ADMIN — MAGNESIUM OXIDE 400 MG ORAL TABLET 400 MILLIGRAM(S): 241.3 TABLET ORAL at 17:12

## 2018-12-12 RX ADMIN — SODIUM CHLORIDE 1 GRAM(S): 9 INJECTION INTRAMUSCULAR; INTRAVENOUS; SUBCUTANEOUS at 05:20

## 2018-12-12 NOTE — PROGRESS NOTE ADULT - ASSESSMENT
32 year old male found down with TBI, now s/p trach/PEG currently made DNR/DNI with no escalation of care if clinical condition deteriorates. Active issues include bacteremia 2/2 pulmonary origin, as well as now hyponatremia likely cerebral in origin (?SIADH) resolving w/fluid restriction and salt tablets     Plan:  -continue salt tabs via PEG  -Continue supportive care, complete 14 day course of Meropenem until 12/19, if patient falls ill again, comfort care only per family meeting  - Continue turning patient as needed to avoid further progression of skin breakdown sites   - Guardianship date: 1/4

## 2018-12-12 NOTE — PROGRESS NOTE ADULT - SUBJECTIVE AND OBJECTIVE BOX
INTERVAL HPI/OVERNIGHT EVENTS: Family meeting was help with uncle over the phone, decided upon completion of 14 day course of abx, and comfort care if falls ill again. Sodium increasing. Patient has remained afebrile. Still on IV meropenem for positive blood cultures. Evaluated by wound care for pressure ulcer by trach collar and daily wound care is done by nurses.       MEDICATIONS  (STANDING):  acetaminophen  IVPB .. 1000 milliGRAM(s) IV Intermittent once  ALBUTerol    0.083% 2.5 milliGRAM(s) Nebulizer every 6 hours  ascorbic acid 500 milliGRAM(s) Oral daily  chlorhexidine 0.12% Liquid 15 milliLiter(s) Swish and Spit two times a day  enoxaparin Injectable 40 milliGRAM(s) SubCutaneous daily  folic acid 1 milliGRAM(s) Oral daily  levETIRAcetam  Solution 1000 milliGRAM(s) Oral two times a day  magnesium oxide 400 milliGRAM(s) Oral three times a day with meals  meropenem  IVPB 1000 milliGRAM(s) IV Intermittent every 8 hours  meropenem  IVPB      phytonadione   Solution 5 milliGRAM(s) Oral daily  propranolol 10 milliGRAM(s) Oral every 12 hours  senna Syrup 10 milliLiter(s) Oral daily  sodium chloride 1 Gram(s) Oral three times a day  thiamine 100 milliGRAM(s) Oral daily    MEDICATIONS  (PRN):  acetaminophen    Suspension .. 650 milliGRAM(s) Oral every 6 hours PRN Temp greater or equal to 38.5C (101.3F)  polyethylene glycol 3350 17 Gram(s) Oral two times a day PRN if not BM in the last 24 hours      Vital Signs Last 24 Hrs  T(C): 37.2 (11 Dec 2018 23:17), Max: 37.6 (11 Dec 2018 09:00)  T(F): 98.9 (11 Dec 2018 23:17), Max: 99.7 (11 Dec 2018 09:00)  HR: 88 (11 Dec 2018 23:17) (88 - 100)  BP: 113/73 (11 Dec 2018 23:17) (108/73 - 129/76)  BP(mean): --  RR: 19 (11 Dec 2018 23:17) (18 - 19)  SpO2: 100% (11 Dec 2018 20:19) (98% - 100%)    PE  Constitutional: lying in bed in no acute distress  HEENT: continues to have swelling of head, no longer concave in lateral areas of skull. Continues to stick out, always reducible with jaw thrust, no blood  Neck: Trach in place, protective adhesive band over pressure sore near trach site, on trach collar  Respiratory: respirations are unlabored or accessory muscle use  Gastrointestinal: PEG present, abdomen soft, non-tender, non-distended      I&O's Detail    10 Dec 2018 07:01  -  11 Dec 2018 07:00  --------------------------------------------------------  IN:    Pivot: 720 mL  Total IN: 720 mL    OUT:    Incontinent per Condom Catheter: 800 mL    Other: 400 mL  Total OUT: 1200 mL    Total NET: -480 mL      11 Dec 2018 07:01  -  12 Dec 2018 03:29  --------------------------------------------------------  IN:    Pivot: 420 mL  Total IN: 420 mL    OUT:    Incontinent per Condom Catheter: 500 mL  Total OUT: 500 mL    Total NET: -80 mL          LABS:                        10.2   8.3   )-----------( 259      ( 11 Dec 2018 10:17 )             29.3     12-11    125<L>  |  89<L>  |  17.0  ----------------------------<  130<H>  4.0   |  25.0  |  0.22<L>    Ca    9.4      11 Dec 2018 10:17  Phos  3.5     12-11  Mg     1.4     12-11            RADIOLOGY & ADDITIONAL STUDIES:

## 2018-12-13 LAB
ANION GAP SERPL CALC-SCNC: 11 MMOL/L — SIGNIFICANT CHANGE UP (ref 5–17)
BUN SERPL-MCNC: 18 MG/DL — SIGNIFICANT CHANGE UP (ref 8–20)
CALCIUM SERPL-MCNC: 9.8 MG/DL — SIGNIFICANT CHANGE UP (ref 8.6–10.2)
CHLORIDE SERPL-SCNC: 94 MMOL/L — LOW (ref 98–107)
CO2 SERPL-SCNC: 26 MMOL/L — SIGNIFICANT CHANGE UP (ref 22–29)
CREAT SERPL-MCNC: 0.21 MG/DL — LOW (ref 0.5–1.3)
GLUCOSE SERPL-MCNC: 131 MG/DL — HIGH (ref 70–115)
MAGNESIUM SERPL-MCNC: 1.3 MG/DL — LOW (ref 1.6–2.6)
POTASSIUM SERPL-MCNC: 4.3 MMOL/L — SIGNIFICANT CHANGE UP (ref 3.5–5.3)
POTASSIUM SERPL-SCNC: 4.3 MMOL/L — SIGNIFICANT CHANGE UP (ref 3.5–5.3)
SODIUM SERPL-SCNC: 131 MMOL/L — LOW (ref 135–145)

## 2018-12-13 RX ORDER — MAGNESIUM SULFATE 500 MG/ML
4 VIAL (ML) INJECTION ONCE
Qty: 0 | Refills: 0 | Status: COMPLETED | OUTPATIENT
Start: 2018-12-13 | End: 2018-12-13

## 2018-12-13 RX ADMIN — SENNA PLUS 10 MILLILITER(S): 8.6 TABLET ORAL at 12:31

## 2018-12-13 RX ADMIN — SODIUM CHLORIDE 1 GRAM(S): 9 INJECTION INTRAMUSCULAR; INTRAVENOUS; SUBCUTANEOUS at 14:05

## 2018-12-13 RX ADMIN — Medication 10 MILLIGRAM(S): at 17:48

## 2018-12-13 RX ADMIN — Medication 500 MILLIGRAM(S): at 12:31

## 2018-12-13 RX ADMIN — Medication 100 GRAM(S): at 14:05

## 2018-12-13 RX ADMIN — LEVETIRACETAM 1000 MILLIGRAM(S): 250 TABLET, FILM COATED ORAL at 17:48

## 2018-12-13 RX ADMIN — MEROPENEM 100 MILLIGRAM(S): 1 INJECTION INTRAVENOUS at 12:31

## 2018-12-13 RX ADMIN — ALBUTEROL 2.5 MILLIGRAM(S): 90 AEROSOL, METERED ORAL at 21:05

## 2018-12-13 RX ADMIN — MAGNESIUM OXIDE 400 MG ORAL TABLET 400 MILLIGRAM(S): 241.3 TABLET ORAL at 07:39

## 2018-12-13 RX ADMIN — MEROPENEM 100 MILLIGRAM(S): 1 INJECTION INTRAVENOUS at 21:01

## 2018-12-13 RX ADMIN — CHLORHEXIDINE GLUCONATE 15 MILLILITER(S): 213 SOLUTION TOPICAL at 17:48

## 2018-12-13 RX ADMIN — Medication 5 MILLIGRAM(S): at 14:05

## 2018-12-13 RX ADMIN — SODIUM CHLORIDE 1 GRAM(S): 9 INJECTION INTRAMUSCULAR; INTRAVENOUS; SUBCUTANEOUS at 05:24

## 2018-12-13 RX ADMIN — CHLORHEXIDINE GLUCONATE 15 MILLILITER(S): 213 SOLUTION TOPICAL at 05:23

## 2018-12-13 RX ADMIN — MAGNESIUM OXIDE 400 MG ORAL TABLET 400 MILLIGRAM(S): 241.3 TABLET ORAL at 12:31

## 2018-12-13 RX ADMIN — Medication 100 MILLIGRAM(S): at 12:31

## 2018-12-13 RX ADMIN — Medication 10 MILLIGRAM(S): at 05:24

## 2018-12-13 RX ADMIN — SODIUM CHLORIDE 1 GRAM(S): 9 INJECTION INTRAMUSCULAR; INTRAVENOUS; SUBCUTANEOUS at 21:01

## 2018-12-13 RX ADMIN — ALBUTEROL 2.5 MILLIGRAM(S): 90 AEROSOL, METERED ORAL at 03:57

## 2018-12-13 RX ADMIN — ENOXAPARIN SODIUM 40 MILLIGRAM(S): 100 INJECTION SUBCUTANEOUS at 12:31

## 2018-12-13 RX ADMIN — Medication 1 MILLIGRAM(S): at 12:31

## 2018-12-13 RX ADMIN — MEROPENEM 100 MILLIGRAM(S): 1 INJECTION INTRAVENOUS at 04:00

## 2018-12-13 RX ADMIN — MAGNESIUM OXIDE 400 MG ORAL TABLET 400 MILLIGRAM(S): 241.3 TABLET ORAL at 17:48

## 2018-12-13 RX ADMIN — LEVETIRACETAM 1000 MILLIGRAM(S): 250 TABLET, FILM COATED ORAL at 05:24

## 2018-12-13 RX ADMIN — ALBUTEROL 2.5 MILLIGRAM(S): 90 AEROSOL, METERED ORAL at 16:46

## 2018-12-13 RX ADMIN — ALBUTEROL 2.5 MILLIGRAM(S): 90 AEROSOL, METERED ORAL at 11:07

## 2018-12-13 NOTE — PROGRESS NOTE ADULT - ASSESSMENT
32 year old male found down with TBI, now s/p trach/PEG currently made DNR/DNI with no escalation of care if clinical condition deteriorates. Active issues include bacteremia 2/2 pulmonary origin, as well as now hyponatremia likely cerebral in origin (?SIADH) resolving w/fluid restriction and salt tablets.    Plan:  -continue salt tabs via PEG  -Continue supportive care, complete 14 day course of Meropenem until 12/19, if patient falls ill again, comfort care only per family meeting  - Continue turning patient as needed to avoid further progression of skin breakdown sites   - Guardianship date: 1/4

## 2018-12-13 NOTE — PROGRESS NOTE ADULT - SUBJECTIVE AND OBJECTIVE BOX
INTERVAL HPI/OVERNIGHT EVENTS: No acute events overnight per nursing reports. Family meeting was help with uncle over the phone, decided upon completion of 14 day course of abx, and comfort care if falls ill again. Sodium increasing. Patient has remained afebrile. Still on IV meropenem for positive blood cultures.       Vital Signs Last 24 Hrs  T(C): 37.3 (12 Dec 2018 23:30), Max: 37.3 (12 Dec 2018 23:30)  T(F): 99.2 (12 Dec 2018 23:30), Max: 99.2 (12 Dec 2018 23:30)  HR: 101 (13 Dec 2018 03:58) (69 - 108)  BP: 129/79 (12 Dec 2018 23:30) (127/91 - 129/79)  BP(mean): --  RR: 18 (12 Dec 2018 23:30) (18 - 20)  SpO2: 96% (13 Dec 2018 03:58) (96% - 100%)    I&O's Detail    12 Dec 2018 07:01  -  13 Dec 2018 07:00  --------------------------------------------------------  IN:    Pivot: 960 mL    Solution: 50 mL    Solution: 100 mL  Total IN: 1110 mL    OUT:    Incontinent per Condom Catheter: 1500 mL  Total OUT: 1500 mL    Total NET: -390 mL          PE  Constitutional: lying in bed in no acute distress  HEENT: continues to have swelling of head, no longer concave in lateral areas of skull. Continues to stick out, always reducible with jaw thrust, no blood  Neck: Trach in place, protective adhesive band over pressure sore near trach site, on trach collar  Respiratory: respirations are unlabored or accessory muscle use  Gastrointestinal: PEG present, abdomen soft, non-tender, non-distended    LABS:                        10.3   6.1   )-----------( 267      ( 12 Dec 2018 08:24 )             30.8     12-12    130<L>  |  90<L>  |  17.0  ----------------------------<  138<H>  4.0   |  27.0  |  <0.20<L>    Ca    9.6      12 Dec 2018 08:24  Phos  3.9     12-12  Mg     1.5     12-12            MEDICATIONS  (STANDING):  acetaminophen  IVPB .. 1000 milliGRAM(s) IV Intermittent once  ALBUTerol    0.083% 2.5 milliGRAM(s) Nebulizer every 6 hours  ascorbic acid 500 milliGRAM(s) Oral daily  chlorhexidine 0.12% Liquid 15 milliLiter(s) Swish and Spit two times a day  enoxaparin Injectable 40 milliGRAM(s) SubCutaneous daily  folic acid 1 milliGRAM(s) Oral daily  levETIRAcetam  Solution 1000 milliGRAM(s) Oral two times a day  magnesium oxide 400 milliGRAM(s) Oral three times a day with meals  meropenem  IVPB 1000 milliGRAM(s) IV Intermittent every 8 hours  meropenem  IVPB      phytonadione   Solution 5 milliGRAM(s) Oral daily  propranolol 10 milliGRAM(s) Oral every 12 hours  senna Syrup 10 milliLiter(s) Oral daily  sodium chloride 1 Gram(s) Oral three times a day  thiamine 100 milliGRAM(s) Oral daily    MEDICATIONS  (PRN):  acetaminophen    Suspension .. 650 milliGRAM(s) Oral every 6 hours PRN Temp greater or equal to 38.5C (101.3F)  polyethylene glycol 3350 17 Gram(s) Oral two times a day PRN if not BM in the last 24 hours

## 2018-12-13 NOTE — CHART NOTE - NSCHARTNOTEFT_GEN_A_CORE
received patient on 40% trach collar, pt has #8 Allieley trach, routine trach care done, suctioned patient for large amount of lose secretion PRN, SPO2 96

## 2018-12-13 NOTE — CHART NOTE - NSCHARTNOTEFT_GEN_A_CORE
Source: Patient [ ]  Family [ ]   other [ ]   ID rounds    Current Diet:   Diet, NPO with Tube Feed:   Tube Feeding Modality: Gastrostomy  Pivot 1.5 Ryan  Total Volume for 24 Hours (mL): 1440  Continuous  Starting Tube Feed Rate {mL per Hour}: 60  Until Goal Tube Feed Rate (mL per Hour): 60  Tube Feed Duration (in Hours): 24  Tube Feed Start Time: 16:25  Supplement Feeding Modality:  Gastrostomy  Prostat Cans or Servings Per Day:  2       Frequency:  Two Times a day (11-24-18 @ 10:33)    Patient reports [ ] nausea  [ ] vomiting [ ] diarrhea [ ] constipation  [X]chewing problems [X] swallowing issues  [ ] other:     Enteral /Parenteral Nutrition: Current diet order provides Pivot @ 60mL/hr (x20 hrs) provides 1300mL/day (1800cal, 113g protein).     Current Weight:   (12/12)    64kg  (12/11)    57kg  (11/11)    83.2kg  (11/2)      87kg   (11/1)      82 kg   (10/27)    82kg       % Weight Change: Question accuracy of weights though significant unintentional weight loss likely     Pertinent Medications: MEDICATIONS  (STANDING):  acetaminophen  IVPB .. 1000 milliGRAM(s) IV Intermittent once  ALBUTerol    0.083% 2.5 milliGRAM(s) Nebulizer every 6 hours  ascorbic acid 500 milliGRAM(s) Oral daily  chlorhexidine 0.12% Liquid 15 milliLiter(s) Swish and Spit two times a day  enoxaparin Injectable 40 milliGRAM(s) SubCutaneous daily  folic acid 1 milliGRAM(s) Oral daily  levETIRAcetam  Solution 1000 milliGRAM(s) Oral two times a day  magnesium oxide 400 milliGRAM(s) Oral three times a day with meals  meropenem  IVPB 1000 milliGRAM(s) IV Intermittent every 8 hours  meropenem  IVPB      phytonadione   Solution 5 milliGRAM(s) Oral daily  propranolol 10 milliGRAM(s) Oral every 12 hours  senna Syrup 10 milliLiter(s) Oral daily  sodium chloride 1 Gram(s) Oral three times a day  thiamine 100 milliGRAM(s) Oral daily    MEDICATIONS  (PRN):  acetaminophen    Suspension .. 650 milliGRAM(s) Oral every 6 hours PRN Temp greater or equal to 38.5C (101.3F)  polyethylene glycol 3350 17 Gram(s) Oral two times a day PRN if not BM in the last 24 hours    Pertinent Labs: CBC Full  -  ( 12 Dec 2018 08:24 )  WBC Count : 6.1 K/uL  Hemoglobin : 10.3 g/dL  Hematocrit : 30.8 %  Platelet Count - Automated : 267 K/uL  Mean Cell Volume : 85.6 fl  Mean Cell Hemoglobin : 28.6 pg  Mean Cell Hemoglobin Concentration : 33.4 g/dL  12/12:   Hgb 10.3 <L>   Hct 30.8 <L>   Na 130 <L>    creat <0.20 <L>   glu 138 <H>    Mg 1.5 <L>    Skin: IAD; wound beneath trach collar; Stage II coccyx, Stage II L. upper flank; suspected DTI R. scapula    Nutrition focused physical exam conducted - found signs of malnutrition [ ]absent [X]present    Subcutaneous fat loss: [ ] Orbital fat pads region, [ ]Buccal fat region, [ ]Triceps region,  [ ]Ribs region    Muscle wasting: [ ]Temples region, [X]Clavicle region, [ ]Shoulder region, [ ]Scapula region, [ ]Interosseous region,  [ ]thigh region, [ ]Calf region    Estimated Needs:   [X] no change since previous assessment  [ ] recalculated:     Current Nutrition Diagnosis: Pt now with moderate acute malnutrition related to increased needs for healing 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 Currently no edema noted per documentation. Pt with multiple pressure injuries/wound sites requiring increased protein-energy needs.     Recommendations:   1) When medically feasible, consider increase TF rate to 65ml/hr (x 20 hours) to provide 1300ml/day (1950kcal, 122gm protein) to meet increased needs. c/w Prostat BID  2) Trend daily weights     Monitoring and Evaluation:   [ ] PO intake [X] Tolerance to diet prescription [X] Weights  [X] Follow up per protocol [X] Labs: Source: Patient [ ]  Family [ ]   other [ ]   ID rounds    Current Diet:   Diet, NPO with Tube Feed:   Tube Feeding Modality: Gastrostomy  Pivot 1.5 Ryan  Total Volume for 24 Hours (mL): 1440  Continuous  Starting Tube Feed Rate {mL per Hour}: 60  Until Goal Tube Feed Rate (mL per Hour): 60  Tube Feed Duration (in Hours): 24  Tube Feed Start Time: 16:25  Supplement Feeding Modality:  Gastrostomy  Prostat Cans or Servings Per Day:  2       Frequency:  Two Times a day (11-24-18 @ 10:33)    Patient reports [ ] nausea  [ ] vomiting [ ] diarrhea [ ] constipation  [X]chewing problems [X] swallowing issues  [ ] other:     Enteral /Parenteral Nutrition: Current diet order provides Pivot @ 60mL/hr (x20 hrs) provides 1300mL/day (1800cal, 113g protein).     Current Weight:   (12/12)    64kg  (12/11)    57kg  (11/11)    83.2kg  (11/2)      87kg   (11/1)      82 kg   (10/27)    82kg       % Weight Change: Question accuracy of weights though significant unintentional weight loss likely     Pertinent Medications: MEDICATIONS  (STANDING):  acetaminophen  IVPB .. 1000 milliGRAM(s) IV Intermittent once  ALBUTerol    0.083% 2.5 milliGRAM(s) Nebulizer every 6 hours  ascorbic acid 500 milliGRAM(s) Oral daily  chlorhexidine 0.12% Liquid 15 milliLiter(s) Swish and Spit two times a day  enoxaparin Injectable 40 milliGRAM(s) SubCutaneous daily  folic acid 1 milliGRAM(s) Oral daily  levETIRAcetam  Solution 1000 milliGRAM(s) Oral two times a day  magnesium oxide 400 milliGRAM(s) Oral three times a day with meals  meropenem  IVPB 1000 milliGRAM(s) IV Intermittent every 8 hours  meropenem  IVPB      phytonadione   Solution 5 milliGRAM(s) Oral daily  propranolol 10 milliGRAM(s) Oral every 12 hours  senna Syrup 10 milliLiter(s) Oral daily  sodium chloride 1 Gram(s) Oral three times a day  thiamine 100 milliGRAM(s) Oral daily    MEDICATIONS  (PRN):  acetaminophen    Suspension .. 650 milliGRAM(s) Oral every 6 hours PRN Temp greater or equal to 38.5C (101.3F)  polyethylene glycol 3350 17 Gram(s) Oral two times a day PRN if not BM in the last 24 hours    Pertinent Labs: CBC Full  -  ( 12 Dec 2018 08:24 )  WBC Count : 6.1 K/uL  Hemoglobin : 10.3 g/dL  Hematocrit : 30.8 %  Platelet Count - Automated : 267 K/uL  Mean Cell Volume : 85.6 fl  Mean Cell Hemoglobin : 28.6 pg  Mean Cell Hemoglobin Concentration : 33.4 g/dL  12/12:   Hgb 10.3 <L>   Hct 30.8 <L>   Na 130 <L>    creat <0.20 <L>   glu 138 <H>    Mg 1.5 <L>    Skin: IAD; wound beneath trach collar; Stage II coccyx, Stage II L. upper flank; suspected DTI R. scapula    Nutrition focused physical exam conducted - found signs of malnutrition [ ]absent [X]present    Subcutaneous fat loss: [ ] Orbital fat pads region, [ ]Buccal fat region, [ ]Triceps region,  [ ]Ribs region    Muscle wasting: [ ]Temples region, [X]Clavicle region, [ ]Shoulder region, [ ]Scapula region, [ ]Interosseous region,  [ ]thigh region, [ ]Calf region    Estimated Needs:   [X] no change since previous assessment  [ ] recalculated:     Current Nutrition Diagnosis: Pt now with moderate acute malnutrition related to increased needs for healing 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 Currently no edema noted per documentation. Pt with multiple pressure injuries/wound sites requiring increased protein-energy needs.     Recommendations:   1) When medically feasible, consider increase TF rate to 65ml/hr (x 20 hours) to provide 1300ml/day (1950kcal, 122gm protein) to meet increased needs. c/w Prostat BID  2) Trend daily weights   3) Rx MVI daily    Monitoring and Evaluation:   [ ] PO intake [X] Tolerance to diet prescription [X] Weights  [X] Follow up per protocol [X] Labs:

## 2018-12-14 LAB
ANION GAP SERPL CALC-SCNC: 13 MMOL/L — SIGNIFICANT CHANGE UP (ref 5–17)
BUN SERPL-MCNC: 20 MG/DL — SIGNIFICANT CHANGE UP (ref 8–20)
CALCIUM SERPL-MCNC: 9.9 MG/DL — SIGNIFICANT CHANGE UP (ref 8.6–10.2)
CHLORIDE SERPL-SCNC: 97 MMOL/L — LOW (ref 98–107)
CO2 SERPL-SCNC: 24 MMOL/L — SIGNIFICANT CHANGE UP (ref 22–29)
CREAT SERPL-MCNC: 0.24 MG/DL — LOW (ref 0.5–1.3)
GLUCOSE SERPL-MCNC: 130 MG/DL — HIGH (ref 70–115)
MAGNESIUM SERPL-MCNC: 1.4 MG/DL — LOW (ref 1.6–2.6)
POTASSIUM SERPL-MCNC: 4.3 MMOL/L — SIGNIFICANT CHANGE UP (ref 3.5–5.3)
POTASSIUM SERPL-SCNC: 4.3 MMOL/L — SIGNIFICANT CHANGE UP (ref 3.5–5.3)
SODIUM SERPL-SCNC: 134 MMOL/L — LOW (ref 135–145)

## 2018-12-14 RX ADMIN — MEROPENEM 100 MILLIGRAM(S): 1 INJECTION INTRAVENOUS at 21:07

## 2018-12-14 RX ADMIN — CHLORHEXIDINE GLUCONATE 15 MILLILITER(S): 213 SOLUTION TOPICAL at 17:52

## 2018-12-14 RX ADMIN — MEROPENEM 100 MILLIGRAM(S): 1 INJECTION INTRAVENOUS at 04:58

## 2018-12-14 RX ADMIN — Medication 500 MILLIGRAM(S): at 12:07

## 2018-12-14 RX ADMIN — MAGNESIUM OXIDE 400 MG ORAL TABLET 400 MILLIGRAM(S): 241.3 TABLET ORAL at 17:52

## 2018-12-14 RX ADMIN — Medication 100 MILLIGRAM(S): at 12:08

## 2018-12-14 RX ADMIN — LEVETIRACETAM 1000 MILLIGRAM(S): 250 TABLET, FILM COATED ORAL at 17:52

## 2018-12-14 RX ADMIN — Medication 10 MILLIGRAM(S): at 17:53

## 2018-12-14 RX ADMIN — SODIUM CHLORIDE 1 GRAM(S): 9 INJECTION INTRAMUSCULAR; INTRAVENOUS; SUBCUTANEOUS at 05:00

## 2018-12-14 RX ADMIN — ALBUTEROL 2.5 MILLIGRAM(S): 90 AEROSOL, METERED ORAL at 21:45

## 2018-12-14 RX ADMIN — ALBUTEROL 2.5 MILLIGRAM(S): 90 AEROSOL, METERED ORAL at 04:10

## 2018-12-14 RX ADMIN — LEVETIRACETAM 1000 MILLIGRAM(S): 250 TABLET, FILM COATED ORAL at 05:00

## 2018-12-14 RX ADMIN — ENOXAPARIN SODIUM 40 MILLIGRAM(S): 100 INJECTION SUBCUTANEOUS at 12:06

## 2018-12-14 RX ADMIN — Medication 5 MILLIGRAM(S): at 17:52

## 2018-12-14 RX ADMIN — SENNA PLUS 10 MILLILITER(S): 8.6 TABLET ORAL at 22:31

## 2018-12-14 RX ADMIN — CHLORHEXIDINE GLUCONATE 15 MILLILITER(S): 213 SOLUTION TOPICAL at 05:00

## 2018-12-14 RX ADMIN — SODIUM CHLORIDE 1 GRAM(S): 9 INJECTION INTRAMUSCULAR; INTRAVENOUS; SUBCUTANEOUS at 13:31

## 2018-12-14 RX ADMIN — Medication 10 MILLIGRAM(S): at 05:00

## 2018-12-14 RX ADMIN — ALBUTEROL 2.5 MILLIGRAM(S): 90 AEROSOL, METERED ORAL at 15:16

## 2018-12-14 RX ADMIN — MEROPENEM 100 MILLIGRAM(S): 1 INJECTION INTRAVENOUS at 12:48

## 2018-12-14 RX ADMIN — MAGNESIUM OXIDE 400 MG ORAL TABLET 400 MILLIGRAM(S): 241.3 TABLET ORAL at 12:08

## 2018-12-14 RX ADMIN — Medication 1 MILLIGRAM(S): at 12:07

## 2018-12-14 RX ADMIN — SODIUM CHLORIDE 1 GRAM(S): 9 INJECTION INTRAMUSCULAR; INTRAVENOUS; SUBCUTANEOUS at 22:31

## 2018-12-14 RX ADMIN — ALBUTEROL 2.5 MILLIGRAM(S): 90 AEROSOL, METERED ORAL at 09:45

## 2018-12-14 RX ADMIN — MAGNESIUM OXIDE 400 MG ORAL TABLET 400 MILLIGRAM(S): 241.3 TABLET ORAL at 08:06

## 2018-12-14 NOTE — PROGRESS NOTE ADULT - SUBJECTIVE AND OBJECTIVE BOX
INTERVAL HPI/OVERNIGHT EVENTS: No acute events overnight per nursing reports. Family meeting was help with uncle over the phone, decided upon completion of 14 day course of abx, and comfort care if falls ill again. Patient has remained afebrile. Still on IV meropenem for positive blood cultures.     Vital Signs Last 24 Hrs  T(C): 36.9 (14 Dec 2018 07:00), Max: 37.1 (13 Dec 2018 23:30)  T(F): 98.4 (14 Dec 2018 07:00), Max: 98.7 (13 Dec 2018 23:30)  HR: 76 (14 Dec 2018 07:00) (76 - 104)  BP: 115/62 (14 Dec 2018 07:00) (110/70 - 115/62)  BP(mean): --  RR: 18 (14 Dec 2018 07:00) (18 - 18)  SpO2: 94% (14 Dec 2018 07:00) (94% - 100%)    I&O's Detail    13 Dec 2018 07:01  -  14 Dec 2018 07:00  --------------------------------------------------------  IN:    Enteral Tube Flush: 200 mL    Pivot: 1440 mL    Solution: 50 mL    Solution: 50 mL  Total IN: 1740 mL    OUT:    Incontinent per Condom Catheter: 1750 mL  Total OUT: 1750 mL    Total NET: -10 mL        PE  Constitutional: lying in bed in no acute distress  HEENT: continues to have swelling of head, no longer concave in lateral areas of skull. Continues to stick out, always reducible with jaw thrust, no blood  Neck: Trach in place, protective adhesive band over pressure sore near trach site, on trach collar  Respiratory: respirations are unlabored or accessory muscle use  Gastrointestinal: PEG present, abdomen soft, non-tender, non-distended      LABS:    12-14    134<L>  |  97<L>  |  20.0  ----------------------------<  130<H>  4.3   |  24.0  |  0.24<L>    Ca    9.9      14 Dec 2018 06:11  Mg     1.4     12-14            MEDICATIONS  (STANDING):  acetaminophen  IVPB .. 1000 milliGRAM(s) IV Intermittent once  ALBUTerol    0.083% 2.5 milliGRAM(s) Nebulizer every 6 hours  ascorbic acid 500 milliGRAM(s) Oral daily  chlorhexidine 0.12% Liquid 15 milliLiter(s) Swish and Spit two times a day  enoxaparin Injectable 40 milliGRAM(s) SubCutaneous daily  folic acid 1 milliGRAM(s) Oral daily  levETIRAcetam  Solution 1000 milliGRAM(s) Oral two times a day  magnesium oxide 400 milliGRAM(s) Oral three times a day with meals  meropenem  IVPB 1000 milliGRAM(s) IV Intermittent every 8 hours  meropenem  IVPB      phytonadione   Solution 5 milliGRAM(s) Oral daily  propranolol 10 milliGRAM(s) Oral every 12 hours  senna Syrup 10 milliLiter(s) Oral daily  sodium chloride 1 Gram(s) Oral three times a day  thiamine 100 milliGRAM(s) Oral daily    MEDICATIONS  (PRN):  acetaminophen    Suspension .. 650 milliGRAM(s) Oral every 6 hours PRN Temp greater or equal to 38.5C (101.3F)  polyethylene glycol 3350 17 Gram(s) Oral two times a day PRN if not BM in the last 24 hours

## 2018-12-14 NOTE — PROGRESS NOTE ADULT - ATTENDING COMMENTS
No acute events overnight. No change in clinical exam.  Plan to complete course of abx for bacteremia/PNA.  Hyponatremia improving - continue salt tabs    Again, to clarify, will maintain current level care with no escalation. if patient has another event causing decline in clinical status, family prefers to transition to  comfort care.

## 2018-12-14 NOTE — PROGRESS NOTE ADULT - ASSESSMENT
32 year old male found down with TBI, now s/p trach/PEG currently made DNR/DNI with no escalation of care if clinical condition deteriorates. Active issues include bacteremia 2/2 pulmonary origin, as well as now hyponatremia likely cerebral in origin (?SIADH) resolving w/fluid restriction and salt tablets.    Plan:  -continue salt tabs via PEG  -Continue supportive care, complete 14 day course of Meropenem until 12/19, if patient falls ill again, comfort care only per family meeting  - Continue turning patient as needed to avoid further progression of skin breakdown sites

## 2018-12-15 RX ADMIN — CHLORHEXIDINE GLUCONATE 15 MILLILITER(S): 213 SOLUTION TOPICAL at 16:49

## 2018-12-15 RX ADMIN — SODIUM CHLORIDE 1 GRAM(S): 9 INJECTION INTRAMUSCULAR; INTRAVENOUS; SUBCUTANEOUS at 16:47

## 2018-12-15 RX ADMIN — LEVETIRACETAM 1000 MILLIGRAM(S): 250 TABLET, FILM COATED ORAL at 05:53

## 2018-12-15 RX ADMIN — ALBUTEROL 2.5 MILLIGRAM(S): 90 AEROSOL, METERED ORAL at 22:16

## 2018-12-15 RX ADMIN — ENOXAPARIN SODIUM 40 MILLIGRAM(S): 100 INJECTION SUBCUTANEOUS at 12:04

## 2018-12-15 RX ADMIN — CHLORHEXIDINE GLUCONATE 15 MILLILITER(S): 213 SOLUTION TOPICAL at 05:54

## 2018-12-15 RX ADMIN — Medication 500 MILLIGRAM(S): at 12:03

## 2018-12-15 RX ADMIN — Medication 10 MILLIGRAM(S): at 05:54

## 2018-12-15 RX ADMIN — MEROPENEM 100 MILLIGRAM(S): 1 INJECTION INTRAVENOUS at 12:07

## 2018-12-15 RX ADMIN — Medication 1 MILLIGRAM(S): at 12:03

## 2018-12-15 RX ADMIN — MEROPENEM 100 MILLIGRAM(S): 1 INJECTION INTRAVENOUS at 20:33

## 2018-12-15 RX ADMIN — MAGNESIUM OXIDE 400 MG ORAL TABLET 400 MILLIGRAM(S): 241.3 TABLET ORAL at 12:03

## 2018-12-15 RX ADMIN — SODIUM CHLORIDE 1 GRAM(S): 9 INJECTION INTRAMUSCULAR; INTRAVENOUS; SUBCUTANEOUS at 21:15

## 2018-12-15 RX ADMIN — LEVETIRACETAM 1000 MILLIGRAM(S): 250 TABLET, FILM COATED ORAL at 16:49

## 2018-12-15 RX ADMIN — Medication 5 MILLIGRAM(S): at 13:56

## 2018-12-15 RX ADMIN — MEROPENEM 100 MILLIGRAM(S): 1 INJECTION INTRAVENOUS at 05:54

## 2018-12-15 RX ADMIN — ALBUTEROL 2.5 MILLIGRAM(S): 90 AEROSOL, METERED ORAL at 04:03

## 2018-12-15 RX ADMIN — MAGNESIUM OXIDE 400 MG ORAL TABLET 400 MILLIGRAM(S): 241.3 TABLET ORAL at 16:48

## 2018-12-15 RX ADMIN — MAGNESIUM OXIDE 400 MG ORAL TABLET 400 MILLIGRAM(S): 241.3 TABLET ORAL at 05:55

## 2018-12-15 RX ADMIN — ALBUTEROL 2.5 MILLIGRAM(S): 90 AEROSOL, METERED ORAL at 15:51

## 2018-12-15 RX ADMIN — ALBUTEROL 2.5 MILLIGRAM(S): 90 AEROSOL, METERED ORAL at 08:23

## 2018-12-15 RX ADMIN — Medication 100 MILLIGRAM(S): at 12:04

## 2018-12-15 RX ADMIN — SODIUM CHLORIDE 1 GRAM(S): 9 INJECTION INTRAMUSCULAR; INTRAVENOUS; SUBCUTANEOUS at 05:54

## 2018-12-15 NOTE — PROGRESS NOTE ADULT - SUBJECTIVE AND OBJECTIVE BOX
INTERVAL HPI/OVERNIGHT EVENTS: No acute events overnight per nursing reports. Family meeting was help with uncle over the phone, decided upon completion of 14 day course of abx, and comfort care if falls ill again. Patient has remained afebrile. Still on IV meropenem for positive blood cultures.     Vital Signs Last 24 Hrs  T(C): 37.1 (14 Dec 2018 23:30), Max: 37.2 (14 Dec 2018 16:11)  T(F): 98.8 (14 Dec 2018 23:30), Max: 99 (14 Dec 2018 16:11)  HR: 104 (14 Dec 2018 23:30) (76 - 105)  BP: 103/78 (14 Dec 2018 23:30) (103/78 - 115/84)  BP(mean): --  RR: 18 (14 Dec 2018 23:30) (18 - 18)  SpO2: 98% (14 Dec 2018 23:30) (94% - 98%)    I&O's Detail    13 Dec 2018 07:01  -  14 Dec 2018 07:00  --------------------------------------------------------  IN:    Enteral Tube Flush: 200 mL    Pivot: 1440 mL    Solution: 50 mL    Solution: 50 mL  Total IN: 1740 mL    OUT:    Incontinent per Condom Catheter: 1750 mL  Total OUT: 1750 mL    Total NET: -10 mL      14 Dec 2018 07:01  -  15 Dec 2018 01:13  --------------------------------------------------------  IN:    Enteral Tube Flush: 200 mL    Pivot: 660 mL    Solution: 50 mL  Total IN: 910 mL    OUT:    Incontinent per Condom Catheter: 1150 mL  Total OUT: 1150 mL    Total NET: -240 mL        Constitutional: lying in bed in no acute distress  HEENT: continues to have swelling of head, no longer concave in lateral areas of skull. Continues to stick out, always reducible with jaw thrust, no blood  Neck: Trach in place, protective adhesive band over pressure sore near trach site, on trach collar  Respiratory: respirations are unlabored or accessory muscle use  Gastrointestinal: PEG present, abdomen soft, non-tender, non-distended     LABS:    12-14    134<L>  |  97<L>  |  20.0  ----------------------------<  130<H>  4.3   |  24.0  |  0.24<L>    Ca    9.9      14 Dec 2018 06:11  Mg     1.4     12-14            MEDICATIONS  (STANDING):  acetaminophen  IVPB .. 1000 milliGRAM(s) IV Intermittent once  ALBUTerol    0.083% 2.5 milliGRAM(s) Nebulizer every 6 hours  ascorbic acid 500 milliGRAM(s) Oral daily  chlorhexidine 0.12% Liquid 15 milliLiter(s) Swish and Spit two times a day  enoxaparin Injectable 40 milliGRAM(s) SubCutaneous daily  folic acid 1 milliGRAM(s) Oral daily  levETIRAcetam  Solution 1000 milliGRAM(s) Oral two times a day  magnesium oxide 400 milliGRAM(s) Oral three times a day with meals  meropenem  IVPB 1000 milliGRAM(s) IV Intermittent every 8 hours  meropenem  IVPB      phytonadione   Solution 5 milliGRAM(s) Oral daily  propranolol 10 milliGRAM(s) Oral every 12 hours  senna Syrup 10 milliLiter(s) Oral daily  sodium chloride 1 Gram(s) Oral three times a day  thiamine 100 milliGRAM(s) Oral daily    MEDICATIONS  (PRN):  acetaminophen    Suspension .. 650 milliGRAM(s) Oral every 6 hours PRN Temp greater or equal to 38.5C (101.3F)  polyethylene glycol 3350 17 Gram(s) Oral two times a day PRN if not BM in the last 24 hours

## 2018-12-15 NOTE — PROGRESS NOTE ADULT - ATTENDING COMMENTS
The patient was seen and examined  No new problems  Will complete IV antibiotic course  Awaiting custody hearing  Discharge planning

## 2018-12-16 RX ADMIN — SODIUM CHLORIDE 1 GRAM(S): 9 INJECTION INTRAMUSCULAR; INTRAVENOUS; SUBCUTANEOUS at 21:31

## 2018-12-16 RX ADMIN — ALBUTEROL 2.5 MILLIGRAM(S): 90 AEROSOL, METERED ORAL at 04:13

## 2018-12-16 RX ADMIN — ALBUTEROL 2.5 MILLIGRAM(S): 90 AEROSOL, METERED ORAL at 20:50

## 2018-12-16 RX ADMIN — CHLORHEXIDINE GLUCONATE 15 MILLILITER(S): 213 SOLUTION TOPICAL at 17:13

## 2018-12-16 RX ADMIN — ALBUTEROL 2.5 MILLIGRAM(S): 90 AEROSOL, METERED ORAL at 10:04

## 2018-12-16 RX ADMIN — MAGNESIUM OXIDE 400 MG ORAL TABLET 400 MILLIGRAM(S): 241.3 TABLET ORAL at 17:13

## 2018-12-16 RX ADMIN — MAGNESIUM OXIDE 400 MG ORAL TABLET 400 MILLIGRAM(S): 241.3 TABLET ORAL at 10:31

## 2018-12-16 RX ADMIN — Medication 1 MILLIGRAM(S): at 10:26

## 2018-12-16 RX ADMIN — Medication 500 MILLIGRAM(S): at 10:25

## 2018-12-16 RX ADMIN — MEROPENEM 100 MILLIGRAM(S): 1 INJECTION INTRAVENOUS at 10:24

## 2018-12-16 RX ADMIN — LEVETIRACETAM 1000 MILLIGRAM(S): 250 TABLET, FILM COATED ORAL at 05:16

## 2018-12-16 RX ADMIN — MEROPENEM 100 MILLIGRAM(S): 1 INJECTION INTRAVENOUS at 05:16

## 2018-12-16 RX ADMIN — SENNA PLUS 10 MILLILITER(S): 8.6 TABLET ORAL at 10:25

## 2018-12-16 RX ADMIN — SODIUM CHLORIDE 1 GRAM(S): 9 INJECTION INTRAMUSCULAR; INTRAVENOUS; SUBCUTANEOUS at 10:27

## 2018-12-16 RX ADMIN — ALBUTEROL 2.5 MILLIGRAM(S): 90 AEROSOL, METERED ORAL at 16:21

## 2018-12-16 RX ADMIN — SODIUM CHLORIDE 1 GRAM(S): 9 INJECTION INTRAMUSCULAR; INTRAVENOUS; SUBCUTANEOUS at 05:16

## 2018-12-16 RX ADMIN — Medication 100 MILLIGRAM(S): at 10:26

## 2018-12-16 RX ADMIN — CHLORHEXIDINE GLUCONATE 15 MILLILITER(S): 213 SOLUTION TOPICAL at 05:16

## 2018-12-16 RX ADMIN — MAGNESIUM OXIDE 400 MG ORAL TABLET 400 MILLIGRAM(S): 241.3 TABLET ORAL at 10:32

## 2018-12-16 RX ADMIN — Medication 5 MILLIGRAM(S): at 17:14

## 2018-12-16 RX ADMIN — ENOXAPARIN SODIUM 40 MILLIGRAM(S): 100 INJECTION SUBCUTANEOUS at 10:25

## 2018-12-16 RX ADMIN — MEROPENEM 100 MILLIGRAM(S): 1 INJECTION INTRAVENOUS at 17:13

## 2018-12-16 RX ADMIN — LEVETIRACETAM 1000 MILLIGRAM(S): 250 TABLET, FILM COATED ORAL at 17:13

## 2018-12-16 NOTE — PROGRESS NOTE ADULT - SUBJECTIVE AND OBJECTIVE BOX
INTERVAL HPI/OVERNIGHT EVENTS:  Patient was seen and examined at bedside this AM. Patient has remained afebrile. Still on IV meropenem for positive blood cultures. Evaluated by wound care for pressure ulcer by trach collar and daily wound care is done by nurses.     STATUS POST:      POST OPERATIVE DAY #:       MEDICATIONS  (STANDING):  acetaminophen  IVPB .. 1000 milliGRAM(s) IV Intermittent once  ALBUTerol    0.083% 2.5 milliGRAM(s) Nebulizer every 6 hours  ascorbic acid 500 milliGRAM(s) Oral daily  chlorhexidine 0.12% Liquid 15 milliLiter(s) Swish and Spit two times a day  enoxaparin Injectable 40 milliGRAM(s) SubCutaneous daily  folic acid 1 milliGRAM(s) Oral daily  levETIRAcetam  Solution 1000 milliGRAM(s) Oral two times a day  magnesium oxide 400 milliGRAM(s) Oral three times a day with meals  meropenem  IVPB 1000 milliGRAM(s) IV Intermittent every 8 hours  meropenem  IVPB      phytonadione   Solution 5 milliGRAM(s) Oral daily  propranolol 10 milliGRAM(s) Oral every 12 hours  senna Syrup 10 milliLiter(s) Oral daily  sodium chloride 1 Gram(s) Oral three times a day  thiamine 100 milliGRAM(s) Oral daily    MEDICATIONS  (PRN):  acetaminophen    Suspension .. 650 milliGRAM(s) Oral every 6 hours PRN Temp greater or equal to 38.5C (101.3F)  polyethylene glycol 3350 17 Gram(s) Oral two times a day PRN if not BM in the last 24 hours      Vital Signs Last 24 Hrs  T(C): 36.9 (16 Dec 2018 07:46), Max: 37.6 (15 Dec 2018 16:32)  T(F): 98.5 (16 Dec 2018 07:46), Max: 99.6 (15 Dec 2018 16:32)  HR: 91 (16 Dec 2018 10:07) (89 - 103)  BP: 114/85 (16 Dec 2018 07:46) (102/72 - 125/85)  BP(mean): --  RR: 16 (16 Dec 2018 07:46) (16 - 18)  SpO2: 100% (16 Dec 2018 10:07) (97% - 100%)    Physical Exam:  Gen: NAD  HEENT: continues to have swelling of head, no longer concave in lateral areas of skull. Continues to stick out, always reducible with jaw thrust, no blood  Respiratory: Breath Sounds equal & CTA bilaterally, no accessory muscle use  Cardiovascular: normal S1, S2;  Gastrointestinal: PEG present. Soft, non-tender, nondistended  Vascular: Equal and normal pulses: 2+ peripheral pulses throughout  Musculoskeletal: No joint pain, swelling or deformity; no limitation of movement  Skin: No rashes      I&O's Detail    15 Dec 2018 07:01  -  16 Dec 2018 07:00  --------------------------------------------------------  IN:    Enteral Tube Flush: 350 mL    Pivot: 1380 mL    Solution: 200 mL  Total IN: 1930 mL    OUT:    Incontinent per Condom Catheter: 1000 mL  Total OUT: 1000 mL    Total NET: 930 mL          LABS:                RADIOLOGY & ADDITIONAL STUDIES:

## 2018-12-16 NOTE — PROGRESS NOTE ADULT - ASSESSMENT
32 year old male found down with TBI, now s/p trach/PEG currently made DNR/DNI with no escalation of care if clinical condition deteriorates. Active issues include bacteremia 2/2 pulmonary origin, as well as now hyponatremia likely cerebral in origin (?SIADH) resolving w/fluid restriction and salt tablets.    Plan:  -continue salt tabs via PEG  -Continue supportive care, complete 14 day course of Meropenem until 12/19, if patient falls ill again, comfort care only per family meeting  - Continue turning patient as needed to avoid further progression of skin breakdown sites   -Awaiting Custody Hearing

## 2018-12-17 DIAGNOSIS — R78.81 BACTEREMIA: ICD-10-CM

## 2018-12-17 LAB
ANION GAP SERPL CALC-SCNC: 10 MMOL/L — SIGNIFICANT CHANGE UP (ref 5–17)
BASOPHILS # BLD AUTO: 0 K/UL — SIGNIFICANT CHANGE UP (ref 0–0.2)
BASOPHILS NFR BLD AUTO: 0.2 % — SIGNIFICANT CHANGE UP (ref 0–2)
BUN SERPL-MCNC: 31 MG/DL — HIGH (ref 8–20)
CALCIUM SERPL-MCNC: 11 MG/DL — HIGH (ref 8.6–10.2)
CHLORIDE SERPL-SCNC: 97 MMOL/L — LOW (ref 98–107)
CO2 SERPL-SCNC: 29 MMOL/L — SIGNIFICANT CHANGE UP (ref 22–29)
CREAT SERPL-MCNC: 0.34 MG/DL — LOW (ref 0.5–1.3)
EOSINOPHIL # BLD AUTO: 0.1 K/UL — SIGNIFICANT CHANGE UP (ref 0–0.5)
EOSINOPHIL NFR BLD AUTO: 2.3 % — SIGNIFICANT CHANGE UP (ref 0–5)
GLUCOSE SERPL-MCNC: 135 MG/DL — HIGH (ref 70–115)
HCT VFR BLD CALC: 28.7 % — LOW (ref 42–52)
HGB BLD-MCNC: 9.4 G/DL — LOW (ref 14–18)
LYMPHOCYTES # BLD AUTO: 1.2 K/UL — SIGNIFICANT CHANGE UP (ref 1–4.8)
LYMPHOCYTES # BLD AUTO: 20.3 % — SIGNIFICANT CHANGE UP (ref 20–55)
MAGNESIUM SERPL-MCNC: 1.4 MG/DL — LOW (ref 1.8–2.6)
MCHC RBC-ENTMCNC: 28.8 PG — SIGNIFICANT CHANGE UP (ref 27–31)
MCHC RBC-ENTMCNC: 32.8 G/DL — SIGNIFICANT CHANGE UP (ref 32–36)
MCV RBC AUTO: 88 FL — SIGNIFICANT CHANGE UP (ref 80–94)
MONOCYTES # BLD AUTO: 0.6 K/UL — SIGNIFICANT CHANGE UP (ref 0–0.8)
MONOCYTES NFR BLD AUTO: 11.4 % — HIGH (ref 3–10)
NEUTROPHILS # BLD AUTO: 3.8 K/UL — SIGNIFICANT CHANGE UP (ref 1.8–8)
NEUTROPHILS NFR BLD AUTO: 65.6 % — SIGNIFICANT CHANGE UP (ref 37–73)
PHOSPHATE SERPL-MCNC: 4.5 MG/DL — SIGNIFICANT CHANGE UP (ref 2.4–4.7)
PLATELET # BLD AUTO: 209 K/UL — SIGNIFICANT CHANGE UP (ref 150–400)
POTASSIUM SERPL-MCNC: 4.1 MMOL/L — SIGNIFICANT CHANGE UP (ref 3.5–5.3)
POTASSIUM SERPL-SCNC: 4.1 MMOL/L — SIGNIFICANT CHANGE UP (ref 3.5–5.3)
RBC # BLD: 3.26 M/UL — LOW (ref 4.6–6.2)
RBC # FLD: 15.9 % — HIGH (ref 11–15.6)
SODIUM SERPL-SCNC: 136 MMOL/L — SIGNIFICANT CHANGE UP (ref 135–145)
WBC # BLD: 5.7 K/UL — SIGNIFICANT CHANGE UP (ref 4.8–10.8)
WBC # FLD AUTO: 5.7 K/UL — SIGNIFICANT CHANGE UP (ref 4.8–10.8)

## 2018-12-17 PROCEDURE — 99232 SBSQ HOSP IP/OBS MODERATE 35: CPT

## 2018-12-17 RX ORDER — MAGNESIUM SULFATE 500 MG/ML
2 VIAL (ML) INJECTION
Qty: 0 | Refills: 0 | Status: COMPLETED | OUTPATIENT
Start: 2018-12-17 | End: 2018-12-17

## 2018-12-17 RX ADMIN — CHLORHEXIDINE GLUCONATE 15 MILLILITER(S): 213 SOLUTION TOPICAL at 05:26

## 2018-12-17 RX ADMIN — MEROPENEM 100 MILLIGRAM(S): 1 INJECTION INTRAVENOUS at 21:14

## 2018-12-17 RX ADMIN — ALBUTEROL 2.5 MILLIGRAM(S): 90 AEROSOL, METERED ORAL at 15:54

## 2018-12-17 RX ADMIN — Medication 100 MILLIGRAM(S): at 13:24

## 2018-12-17 RX ADMIN — MEROPENEM 100 MILLIGRAM(S): 1 INJECTION INTRAVENOUS at 13:25

## 2018-12-17 RX ADMIN — Medication 10 MILLIGRAM(S): at 17:24

## 2018-12-17 RX ADMIN — Medication 50 GRAM(S): at 14:33

## 2018-12-17 RX ADMIN — SODIUM CHLORIDE 1 GRAM(S): 9 INJECTION INTRAMUSCULAR; INTRAVENOUS; SUBCUTANEOUS at 05:25

## 2018-12-17 RX ADMIN — MAGNESIUM OXIDE 400 MG ORAL TABLET 400 MILLIGRAM(S): 241.3 TABLET ORAL at 17:24

## 2018-12-17 RX ADMIN — LEVETIRACETAM 1000 MILLIGRAM(S): 250 TABLET, FILM COATED ORAL at 17:24

## 2018-12-17 RX ADMIN — SODIUM CHLORIDE 1 GRAM(S): 9 INJECTION INTRAMUSCULAR; INTRAVENOUS; SUBCUTANEOUS at 13:24

## 2018-12-17 RX ADMIN — Medication 1 MILLIGRAM(S): at 13:24

## 2018-12-17 RX ADMIN — MEROPENEM 100 MILLIGRAM(S): 1 INJECTION INTRAVENOUS at 02:29

## 2018-12-17 RX ADMIN — LEVETIRACETAM 1000 MILLIGRAM(S): 250 TABLET, FILM COATED ORAL at 05:25

## 2018-12-17 RX ADMIN — ALBUTEROL 2.5 MILLIGRAM(S): 90 AEROSOL, METERED ORAL at 20:06

## 2018-12-17 RX ADMIN — Medication 500 MILLIGRAM(S): at 13:24

## 2018-12-17 RX ADMIN — Medication 5 MILLIGRAM(S): at 14:34

## 2018-12-17 RX ADMIN — ENOXAPARIN SODIUM 40 MILLIGRAM(S): 100 INJECTION SUBCUTANEOUS at 13:24

## 2018-12-17 RX ADMIN — ALBUTEROL 2.5 MILLIGRAM(S): 90 AEROSOL, METERED ORAL at 03:57

## 2018-12-17 RX ADMIN — Medication 10 MILLIGRAM(S): at 05:25

## 2018-12-17 RX ADMIN — MAGNESIUM OXIDE 400 MG ORAL TABLET 400 MILLIGRAM(S): 241.3 TABLET ORAL at 05:26

## 2018-12-17 RX ADMIN — CHLORHEXIDINE GLUCONATE 15 MILLILITER(S): 213 SOLUTION TOPICAL at 17:24

## 2018-12-17 RX ADMIN — Medication 50 GRAM(S): at 15:49

## 2018-12-17 RX ADMIN — ALBUTEROL 2.5 MILLIGRAM(S): 90 AEROSOL, METERED ORAL at 09:13

## 2018-12-17 RX ADMIN — MAGNESIUM OXIDE 400 MG ORAL TABLET 400 MILLIGRAM(S): 241.3 TABLET ORAL at 13:24

## 2018-12-17 RX ADMIN — SODIUM CHLORIDE 1 GRAM(S): 9 INJECTION INTRAMUSCULAR; INTRAVENOUS; SUBCUTANEOUS at 21:15

## 2018-12-17 RX ADMIN — SENNA PLUS 10 MILLILITER(S): 8.6 TABLET ORAL at 13:41

## 2018-12-17 NOTE — PROGRESS NOTE ADULT - ASSESSMENT
Mr. Aneudy Clemente is a 45 M found to have severe TBI with subdural hematoma s/p surgical intervention, s/p trach and peg and course complicated by sepsis, acute anemia, hepatic encephalopathy and ?cholecystitis. Family meeting held on 11/12 with SICU team over phone with Uncle - next of kin, pt was made DNR/DNI. Uncle acknowledge understanding of pt's underlying medical conditions, hospital course and would like to continue all current medical management - would like to be informed if he should encounter any acute events to make decision moving forward. SW following and working on PRUCAL as pt is an undocumented citizen.     Reconsulted 12/5 for assistance with go.   Code sepsis 12/6 and family meeting held by surgical team with family (uncle and aunt in law): decision made to continue current medical management with time limited trial of IVF and antibiotics for next 5 days. No further escalation of care or transfer to ICU.  Bcx positive for klebsiella.  Family meeting with uncle via phone with  on 12/11 in presence of surgery team. Uncle express desire to continue and complete 14 day course antibiotic for bacteremia and if pt should clinically decompensate or have another reinfection; plan to transition to full comfort measures at that time.

## 2018-12-17 NOTE — PROGRESS NOTE ADULT - ATTENDING COMMENTS
The patient was seen and examined  No new problems  Complete IV antibiotics  Awaiting custody hearing

## 2018-12-17 NOTE — PROGRESS NOTE ADULT - SUBJECTIVE AND OBJECTIVE BOX
INTERVAL HPI/OVERNIGHT EVENTS:  Patient was seen and examined at bedside this AM. Patient has remained afebrile. Still on IV meropenem for positive blood cultures. Evaluated by wound care for pressure ulcer by trach collar and daily wound care is done by nurses.       Vital Signs Last 24 Hrs  T(C): 37.1 (17 Dec 2018 08:37), Max: 37.1 (17 Dec 2018 08:37)  T(F): 98.7 (17 Dec 2018 08:37), Max: 98.7 (17 Dec 2018 08:37)  HR: 82 (17 Dec 2018 08:37) (82 - 115)  BP: 112/72 (17 Dec 2018 08:37) (107/79 - 121/87)  BP(mean): --  RR: 18 (17 Dec 2018 08:37) (18 - 18)  SpO2: 98% (17 Dec 2018 08:37) (96% - 100%)    I&O's Detail    16 Dec 2018 07:01  -  17 Dec 2018 07:00  --------------------------------------------------------  IN:    Enteral Tube Flush: 350 mL    Pivot: 1440 mL    Solution: 50 mL  Total IN: 1840 mL    OUT:  Total OUT: 0 mL    Total NET: 1840 mL            Physical Exam:  Gen: NAD  HEENT: continues to have swelling of head, no longer concave in lateral areas of skull. Continues to stick out, always reducible with jaw thrust, no blood  Respiratory: Breath Sounds equal & CTA bilaterally, no accessory muscle use  Cardiovascular: normal S1, S2;  Gastrointestinal: PEG present. Soft, non-tender, nondistended  Vascular: Equal and normal pulses: 2+ peripheral pulses throughout  Musculoskeletal: No joint pain, swelling or deformity; no limitation of movement  Skin: No rashes    LABS:                        9.4    5.7   )-----------( 209      ( 17 Dec 2018 08:13 )             28.7     12-17    136  |  97<L>  |  31.0<H>  ----------------------------<  135<H>  4.1   |  29.0  |  0.34<L>    Ca    11.0<H>      17 Dec 2018 08:13  Phos  4.5     12-17  Mg     1.4     12-17            MEDICATIONS  (STANDING):  acetaminophen  IVPB .. 1000 milliGRAM(s) IV Intermittent once  ALBUTerol    0.083% 2.5 milliGRAM(s) Nebulizer every 6 hours  ascorbic acid 500 milliGRAM(s) Oral daily  chlorhexidine 0.12% Liquid 15 milliLiter(s) Swish and Spit two times a day  enoxaparin Injectable 40 milliGRAM(s) SubCutaneous daily  folic acid 1 milliGRAM(s) Oral daily  levETIRAcetam  Solution 1000 milliGRAM(s) Oral two times a day  magnesium oxide 400 milliGRAM(s) Oral three times a day with meals  meropenem  IVPB 1000 milliGRAM(s) IV Intermittent every 8 hours  meropenem  IVPB      phytonadione   Solution 5 milliGRAM(s) Oral daily  propranolol 10 milliGRAM(s) Oral every 12 hours  senna Syrup 10 milliLiter(s) Oral daily  sodium chloride 1 Gram(s) Oral three times a day  thiamine 100 milliGRAM(s) Oral daily    MEDICATIONS  (PRN):  acetaminophen    Suspension .. 650 milliGRAM(s) Oral every 6 hours PRN Temp greater or equal to 38.5C (101.3F)  polyethylene glycol 3350 17 Gram(s) Oral two times a day PRN if not BM in the last 24 hours
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
## 2018-12-18 RX ADMIN — MAGNESIUM OXIDE 400 MG ORAL TABLET 400 MILLIGRAM(S): 241.3 TABLET ORAL at 16:53

## 2018-12-18 RX ADMIN — Medication 10 MILLIGRAM(S): at 05:08

## 2018-12-18 RX ADMIN — Medication 1 MILLIGRAM(S): at 11:07

## 2018-12-18 RX ADMIN — MAGNESIUM OXIDE 400 MG ORAL TABLET 400 MILLIGRAM(S): 241.3 TABLET ORAL at 08:13

## 2018-12-18 RX ADMIN — LEVETIRACETAM 1000 MILLIGRAM(S): 250 TABLET, FILM COATED ORAL at 05:07

## 2018-12-18 RX ADMIN — Medication 5 MILLIGRAM(S): at 11:08

## 2018-12-18 RX ADMIN — LEVETIRACETAM 1000 MILLIGRAM(S): 250 TABLET, FILM COATED ORAL at 16:53

## 2018-12-18 RX ADMIN — SODIUM CHLORIDE 1 GRAM(S): 9 INJECTION INTRAMUSCULAR; INTRAVENOUS; SUBCUTANEOUS at 11:07

## 2018-12-18 RX ADMIN — MEROPENEM 100 MILLIGRAM(S): 1 INJECTION INTRAVENOUS at 05:07

## 2018-12-18 RX ADMIN — SODIUM CHLORIDE 1 GRAM(S): 9 INJECTION INTRAMUSCULAR; INTRAVENOUS; SUBCUTANEOUS at 21:02

## 2018-12-18 RX ADMIN — ALBUTEROL 2.5 MILLIGRAM(S): 90 AEROSOL, METERED ORAL at 09:42

## 2018-12-18 RX ADMIN — MEROPENEM 100 MILLIGRAM(S): 1 INJECTION INTRAVENOUS at 11:07

## 2018-12-18 RX ADMIN — MEROPENEM 100 MILLIGRAM(S): 1 INJECTION INTRAVENOUS at 16:53

## 2018-12-18 RX ADMIN — Medication 500 MILLIGRAM(S): at 11:08

## 2018-12-18 RX ADMIN — Medication 10 MILLIGRAM(S): at 16:52

## 2018-12-18 RX ADMIN — ALBUTEROL 2.5 MILLIGRAM(S): 90 AEROSOL, METERED ORAL at 21:11

## 2018-12-18 RX ADMIN — SENNA PLUS 10 MILLILITER(S): 8.6 TABLET ORAL at 11:08

## 2018-12-18 RX ADMIN — SODIUM CHLORIDE 1 GRAM(S): 9 INJECTION INTRAMUSCULAR; INTRAVENOUS; SUBCUTANEOUS at 05:07

## 2018-12-18 RX ADMIN — CHLORHEXIDINE GLUCONATE 15 MILLILITER(S): 213 SOLUTION TOPICAL at 16:17

## 2018-12-18 RX ADMIN — MAGNESIUM OXIDE 400 MG ORAL TABLET 400 MILLIGRAM(S): 241.3 TABLET ORAL at 11:07

## 2018-12-18 RX ADMIN — ALBUTEROL 2.5 MILLIGRAM(S): 90 AEROSOL, METERED ORAL at 16:00

## 2018-12-18 RX ADMIN — ALBUTEROL 2.5 MILLIGRAM(S): 90 AEROSOL, METERED ORAL at 03:57

## 2018-12-18 RX ADMIN — CHLORHEXIDINE GLUCONATE 15 MILLILITER(S): 213 SOLUTION TOPICAL at 05:07

## 2018-12-18 RX ADMIN — Medication 100 MILLIGRAM(S): at 11:08

## 2018-12-18 RX ADMIN — ENOXAPARIN SODIUM 40 MILLIGRAM(S): 100 INJECTION SUBCUTANEOUS at 11:08

## 2018-12-18 NOTE — PROGRESS NOTE ADULT - ATTENDING COMMENTS
The patient was seen and examined  No new problems  Completing antibiotic course  Awaiting guardianship  DVT prophylaxis

## 2018-12-18 NOTE — PROGRESS NOTE ADULT - SUBJECTIVE AND OBJECTIVE BOX
INTERVAL HPI/OVERNIGHT EVENTS:    No acute overnight events    SUBJECTIVE:    Patient unable to provide a subjective history of the present illness given his vegetative non-verbal state.    MEDICATIONS  (STANDING):  acetaminophen  IVPB .. 1000 milliGRAM(s) IV Intermittent once  ALBUTerol    0.083% 2.5 milliGRAM(s) Nebulizer every 6 hours  ascorbic acid 500 milliGRAM(s) Oral daily  chlorhexidine 0.12% Liquid 15 milliLiter(s) Swish and Spit two times a day  enoxaparin Injectable 40 milliGRAM(s) SubCutaneous daily  folic acid 1 milliGRAM(s) Oral daily  levETIRAcetam  Solution 1000 milliGRAM(s) Oral two times a day  magnesium oxide 400 milliGRAM(s) Oral three times a day with meals  meropenem  IVPB 1000 milliGRAM(s) IV Intermittent every 8 hours  meropenem  IVPB      phytonadione   Solution 5 milliGRAM(s) Oral daily  propranolol 10 milliGRAM(s) Oral every 12 hours  senna Syrup 10 milliLiter(s) Oral daily  sodium chloride 1 Gram(s) Oral three times a day  thiamine 100 milliGRAM(s) Oral daily    MEDICATIONS  (PRN):  acetaminophen    Suspension .. 650 milliGRAM(s) Oral every 6 hours PRN Temp greater or equal to 38.5C (101.3F)  polyethylene glycol 3350 17 Gram(s) Oral two times a day PRN if not BM in the last 24 hours      Vital Signs Last 24 Hrs  T(C): 36.8 (18 Dec 2018 08:13), Max: 37.1 (17 Dec 2018 08:37)  T(F): 98.3 (18 Dec 2018 08:13), Max: 98.7 (17 Dec 2018 08:37)  HR: 78 (18 Dec 2018 08:13) (76 - 93)  BP: 118/73 (18 Dec 2018 08:13) (112/70 - 135/91)  BP(mean): --  RR: 18 (18 Dec 2018 08:13) (18 - 18)  SpO2: 100% (18 Dec 2018 08:13) (98% - 100%)    PE  Gen:  Pulm:  CV:  Abd:  :  Ext:  Vasc:  Neuro:      I&O's Detail      LABS:                        9.4    5.7   )-----------( 209      ( 17 Dec 2018 08:13 )             28.7     12-17    136  |  97<L>  |  31.0<H>  ----------------------------<  135<H>  4.1   |  29.0  |  0.34<L>    Ca    11.0<H>      17 Dec 2018 08:13  Phos  4.5     12-17  Mg     1.4     12-17 INTERVAL HPI/OVERNIGHT EVENTS:    No acute overnight events    SUBJECTIVE:    Patient unable to provide a subjective history of the present illness given his vegetative non-verbal state.    MEDICATIONS  (STANDING):  acetaminophen  IVPB .. 1000 milliGRAM(s) IV Intermittent once  ALBUTerol    0.083% 2.5 milliGRAM(s) Nebulizer every 6 hours  ascorbic acid 500 milliGRAM(s) Oral daily  chlorhexidine 0.12% Liquid 15 milliLiter(s) Swish and Spit two times a day  enoxaparin Injectable 40 milliGRAM(s) SubCutaneous daily  folic acid 1 milliGRAM(s) Oral daily  levETIRAcetam  Solution 1000 milliGRAM(s) Oral two times a day  magnesium oxide 400 milliGRAM(s) Oral three times a day with meals  meropenem  IVPB 1000 milliGRAM(s) IV Intermittent every 8 hours  meropenem  IVPB      phytonadione   Solution 5 milliGRAM(s) Oral daily  propranolol 10 milliGRAM(s) Oral every 12 hours  senna Syrup 10 milliLiter(s) Oral daily  sodium chloride 1 Gram(s) Oral three times a day  thiamine 100 milliGRAM(s) Oral daily    MEDICATIONS  (PRN):  acetaminophen    Suspension .. 650 milliGRAM(s) Oral every 6 hours PRN Temp greater or equal to 38.5C (101.3F)  polyethylene glycol 3350 17 Gram(s) Oral two times a day PRN if not BM in the last 24 hours      Vital Signs Last 24 Hrs  T(C): 36.8 (18 Dec 2018 08:13), Max: 37.1 (17 Dec 2018 08:37)  T(F): 98.3 (18 Dec 2018 08:13), Max: 98.7 (17 Dec 2018 08:37)  HR: 78 (18 Dec 2018 08:13) (76 - 93)  BP: 118/73 (18 Dec 2018 08:13) (112/70 - 135/91)  BP(mean): --  RR: 18 (18 Dec 2018 08:13) (18 - 18)  SpO2: 100% (18 Dec 2018 08:13) (98% - 100%)    Physical Exam    Gen: NAD  HEENT: continues to have swelling of head, no longer concave in lateral areas of skull. Continues to stick out, always reducible with jaw thrust, no blood  Respiratory: Breath Sounds equal & CTA bilaterally, no accessory muscle use  Cardiovascular: normal S1, S2;  Gastrointestinal: PEG present. Soft, non-tender, nondistended  Vascular: Equal and normal pulses: 2+ peripheral pulses throughout  Musculoskeletal: No joint pain, swelling or deformity; no limitation of movement  Skin: No rashes    LABS:                        9.4    5.7   )-----------( 209      ( 17 Dec 2018 08:13 )             28.7     12-17    136  |  97<L>  |  31.0<H>  ----------------------------<  135<H>  4.1   |  29.0  |  0.34<L>    Ca    11.0<H>      17 Dec 2018 08:13  Phos  4.5     12-17  Mg     1.4     12-17
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
## 2018-12-19 RX ADMIN — Medication 10 MILLIGRAM(S): at 17:15

## 2018-12-19 RX ADMIN — SODIUM CHLORIDE 1 GRAM(S): 9 INJECTION INTRAMUSCULAR; INTRAVENOUS; SUBCUTANEOUS at 05:02

## 2018-12-19 RX ADMIN — Medication 5 MILLIGRAM(S): at 11:12

## 2018-12-19 RX ADMIN — SENNA PLUS 10 MILLILITER(S): 8.6 TABLET ORAL at 11:12

## 2018-12-19 RX ADMIN — CHLORHEXIDINE GLUCONATE 15 MILLILITER(S): 213 SOLUTION TOPICAL at 17:13

## 2018-12-19 RX ADMIN — ALBUTEROL 2.5 MILLIGRAM(S): 90 AEROSOL, METERED ORAL at 15:42

## 2018-12-19 RX ADMIN — ALBUTEROL 2.5 MILLIGRAM(S): 90 AEROSOL, METERED ORAL at 03:46

## 2018-12-19 RX ADMIN — CHLORHEXIDINE GLUCONATE 15 MILLILITER(S): 213 SOLUTION TOPICAL at 05:02

## 2018-12-19 RX ADMIN — MAGNESIUM OXIDE 400 MG ORAL TABLET 400 MILLIGRAM(S): 241.3 TABLET ORAL at 11:11

## 2018-12-19 RX ADMIN — MEROPENEM 100 MILLIGRAM(S): 1 INJECTION INTRAVENOUS at 02:30

## 2018-12-19 RX ADMIN — ALBUTEROL 2.5 MILLIGRAM(S): 90 AEROSOL, METERED ORAL at 09:13

## 2018-12-19 RX ADMIN — LEVETIRACETAM 1000 MILLIGRAM(S): 250 TABLET, FILM COATED ORAL at 05:02

## 2018-12-19 RX ADMIN — Medication 1 MILLIGRAM(S): at 11:11

## 2018-12-19 RX ADMIN — MEROPENEM 100 MILLIGRAM(S): 1 INJECTION INTRAVENOUS at 11:11

## 2018-12-19 RX ADMIN — LEVETIRACETAM 1000 MILLIGRAM(S): 250 TABLET, FILM COATED ORAL at 17:14

## 2018-12-19 RX ADMIN — Medication 10 MILLIGRAM(S): at 05:02

## 2018-12-19 RX ADMIN — Medication 500 MILLIGRAM(S): at 11:11

## 2018-12-19 RX ADMIN — ALBUTEROL 2.5 MILLIGRAM(S): 90 AEROSOL, METERED ORAL at 20:44

## 2018-12-19 RX ADMIN — MAGNESIUM OXIDE 400 MG ORAL TABLET 400 MILLIGRAM(S): 241.3 TABLET ORAL at 07:30

## 2018-12-19 RX ADMIN — SODIUM CHLORIDE 1 GRAM(S): 9 INJECTION INTRAMUSCULAR; INTRAVENOUS; SUBCUTANEOUS at 11:11

## 2018-12-19 RX ADMIN — MAGNESIUM OXIDE 400 MG ORAL TABLET 400 MILLIGRAM(S): 241.3 TABLET ORAL at 17:15

## 2018-12-19 RX ADMIN — MEROPENEM 100 MILLIGRAM(S): 1 INJECTION INTRAVENOUS at 17:16

## 2018-12-19 RX ADMIN — SODIUM CHLORIDE 1 GRAM(S): 9 INJECTION INTRAMUSCULAR; INTRAVENOUS; SUBCUTANEOUS at 23:51

## 2018-12-19 RX ADMIN — Medication 100 MILLIGRAM(S): at 11:11

## 2018-12-19 RX ADMIN — ENOXAPARIN SODIUM 40 MILLIGRAM(S): 100 INJECTION SUBCUTANEOUS at 11:12

## 2018-12-19 NOTE — PROGRESS NOTE ADULT - ATTENDING COMMENTS
The patient was seen and examined  No new problems  Last day of antibiotics  Guardianship hearing pending
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
## 2018-12-19 NOTE — PROGRESS NOTE ADULT - SUBJECTIVE AND OBJECTIVE BOX
INTERVAL HPI/OVERNIGHT EVENTS: No acute events. Patient's Na on Monday was 136, last day of Merrem today      MEDICATIONS  (STANDING):  acetaminophen  IVPB .. 1000 milliGRAM(s) IV Intermittent once  ALBUTerol    0.083% 2.5 milliGRAM(s) Nebulizer every 6 hours  ascorbic acid 500 milliGRAM(s) Oral daily  chlorhexidine 0.12% Liquid 15 milliLiter(s) Swish and Spit two times a day  enoxaparin Injectable 40 milliGRAM(s) SubCutaneous daily  folic acid 1 milliGRAM(s) Oral daily  levETIRAcetam  Solution 1000 milliGRAM(s) Oral two times a day  magnesium oxide 400 milliGRAM(s) Oral three times a day with meals  meropenem  IVPB 1000 milliGRAM(s) IV Intermittent every 8 hours  meropenem  IVPB      phytonadione   Solution 5 milliGRAM(s) Oral daily  propranolol 10 milliGRAM(s) Oral every 12 hours  senna Syrup 10 milliLiter(s) Oral daily  sodium chloride 1 Gram(s) Oral three times a day  thiamine 100 milliGRAM(s) Oral daily    MEDICATIONS  (PRN):  acetaminophen    Suspension .. 650 milliGRAM(s) Oral every 6 hours PRN Temp greater or equal to 38.5C (101.3F)  polyethylene glycol 3350 17 Gram(s) Oral two times a day PRN if not BM in the last 24 hours      Vital Signs Last 24 Hrs  T(C): 36.3 (19 Dec 2018 00:00), Max: 36.8 (18 Dec 2018 08:13)  T(F): 97.3 (19 Dec 2018 00:00), Max: 98.3 (18 Dec 2018 08:13)  HR: 67 (19 Dec 2018 00:00) (67 - 92)  BP: 124/89 (19 Dec 2018 00:00) (118/73 - 130/88)  BP(mean): --  RR: 18 (19 Dec 2018 00:00) (18 - 18)  SpO2: 100% (19 Dec 2018 00:00) (95% - 100%)    Gen: NAD  HEENT: continues to have swelling of head, no longer concave in lateral areas of skull. Continues to stick out, always reducible with jaw thrust, no blood  Respiratory: Breath Sounds equal & CTA bilaterally, no accessory muscle use  Cardiovascular: normal S1, S2;  Gastrointestinal: PEG present. Soft, non-tender, nondistended  Vascular: Equal and normal pulses: 2+ peripheral pulses throughout  Musculoskeletal: No joint pain, swelling or deformity; no limitation of movement  Skin: No rashes      I&O's Detail      LABS:                        9.4    5.7   )-----------( 209      ( 17 Dec 2018 08:13 )             28.7     12-17    136  |  97<L>  |  31.0<H>  ----------------------------<  135<H>  4.1   |  29.0  |  0.34<L>    Ca    11.0<H>      17 Dec 2018 08:13  Phos  4.5     12-17  Mg     1.4     12-17            RADIOLOGY & ADDITIONAL STUDIES:

## 2018-12-20 RX ORDER — MORPHINE SULFATE 50 MG/1
1 CAPSULE, EXTENDED RELEASE ORAL EVERY 4 HOURS
Qty: 0 | Refills: 0 | Status: DISCONTINUED | OUTPATIENT
Start: 2018-12-20 | End: 2018-12-21

## 2018-12-20 RX ADMIN — LEVETIRACETAM 1000 MILLIGRAM(S): 250 TABLET, FILM COATED ORAL at 05:50

## 2018-12-20 RX ADMIN — CHLORHEXIDINE GLUCONATE 15 MILLILITER(S): 213 SOLUTION TOPICAL at 17:50

## 2018-12-20 RX ADMIN — SODIUM CHLORIDE 1 GRAM(S): 9 INJECTION INTRAMUSCULAR; INTRAVENOUS; SUBCUTANEOUS at 22:55

## 2018-12-20 RX ADMIN — SODIUM CHLORIDE 1 GRAM(S): 9 INJECTION INTRAMUSCULAR; INTRAVENOUS; SUBCUTANEOUS at 14:41

## 2018-12-20 RX ADMIN — MORPHINE SULFATE 1 MILLIGRAM(S): 50 CAPSULE, EXTENDED RELEASE ORAL at 17:52

## 2018-12-20 RX ADMIN — MORPHINE SULFATE 1 MILLIGRAM(S): 50 CAPSULE, EXTENDED RELEASE ORAL at 22:54

## 2018-12-20 RX ADMIN — Medication 10 MILLIGRAM(S): at 17:51

## 2018-12-20 RX ADMIN — CHLORHEXIDINE GLUCONATE 15 MILLILITER(S): 213 SOLUTION TOPICAL at 05:50

## 2018-12-20 RX ADMIN — ENOXAPARIN SODIUM 40 MILLIGRAM(S): 100 INJECTION SUBCUTANEOUS at 11:34

## 2018-12-20 RX ADMIN — MORPHINE SULFATE 1 MILLIGRAM(S): 50 CAPSULE, EXTENDED RELEASE ORAL at 14:41

## 2018-12-20 RX ADMIN — ALBUTEROL 2.5 MILLIGRAM(S): 90 AEROSOL, METERED ORAL at 20:48

## 2018-12-20 RX ADMIN — ALBUTEROL 2.5 MILLIGRAM(S): 90 AEROSOL, METERED ORAL at 15:53

## 2018-12-20 RX ADMIN — SODIUM CHLORIDE 1 GRAM(S): 9 INJECTION INTRAMUSCULAR; INTRAVENOUS; SUBCUTANEOUS at 05:50

## 2018-12-20 RX ADMIN — MORPHINE SULFATE 1 MILLIGRAM(S): 50 CAPSULE, EXTENDED RELEASE ORAL at 23:09

## 2018-12-20 RX ADMIN — ALBUTEROL 2.5 MILLIGRAM(S): 90 AEROSOL, METERED ORAL at 09:32

## 2018-12-20 RX ADMIN — Medication 5 MILLIGRAM(S): at 14:41

## 2018-12-20 RX ADMIN — Medication 100 MILLIGRAM(S): at 11:34

## 2018-12-20 RX ADMIN — MORPHINE SULFATE 1 MILLIGRAM(S): 50 CAPSULE, EXTENDED RELEASE ORAL at 18:07

## 2018-12-20 RX ADMIN — MAGNESIUM OXIDE 400 MG ORAL TABLET 400 MILLIGRAM(S): 241.3 TABLET ORAL at 11:34

## 2018-12-20 RX ADMIN — SENNA PLUS 10 MILLILITER(S): 8.6 TABLET ORAL at 11:34

## 2018-12-20 RX ADMIN — MAGNESIUM OXIDE 400 MG ORAL TABLET 400 MILLIGRAM(S): 241.3 TABLET ORAL at 17:51

## 2018-12-20 RX ADMIN — Medication 500 MILLIGRAM(S): at 11:34

## 2018-12-20 RX ADMIN — ALBUTEROL 2.5 MILLIGRAM(S): 90 AEROSOL, METERED ORAL at 03:16

## 2018-12-20 RX ADMIN — MAGNESIUM OXIDE 400 MG ORAL TABLET 400 MILLIGRAM(S): 241.3 TABLET ORAL at 09:51

## 2018-12-20 RX ADMIN — MORPHINE SULFATE 1 MILLIGRAM(S): 50 CAPSULE, EXTENDED RELEASE ORAL at 14:56

## 2018-12-20 RX ADMIN — Medication 10 MILLIGRAM(S): at 05:50

## 2018-12-20 RX ADMIN — Medication 1 MILLIGRAM(S): at 11:34

## 2018-12-20 RX ADMIN — LEVETIRACETAM 1000 MILLIGRAM(S): 250 TABLET, FILM COATED ORAL at 17:51

## 2018-12-20 NOTE — PROGRESS NOTE ADULT - SUBJECTIVE AND OBJECTIVE BOX
INTERVAL HPI/OVERNIGHT EVENTS:    Pt with no acute events.  Remains off IV antibiotics.  Afebrile with stable vitals.  Tolerating tube feeds    MEDICATIONS  (STANDING):  acetaminophen  IVPB .. 1000 milliGRAM(s) IV Intermittent once  ALBUTerol    0.083% 2.5 milliGRAM(s) Nebulizer every 6 hours  ascorbic acid 500 milliGRAM(s) Oral daily  chlorhexidine 0.12% Liquid 15 milliLiter(s) Swish and Spit two times a day  enoxaparin Injectable 40 milliGRAM(s) SubCutaneous daily  folic acid 1 milliGRAM(s) Oral daily  levETIRAcetam  Solution 1000 milliGRAM(s) Oral two times a day  magnesium oxide 400 milliGRAM(s) Oral three times a day with meals  phytonadione   Solution 5 milliGRAM(s) Oral daily  propranolol 10 milliGRAM(s) Oral every 12 hours  senna Syrup 10 milliLiter(s) Oral daily  sodium chloride 1 Gram(s) Oral three times a day  thiamine 100 milliGRAM(s) Oral daily    MEDICATIONS  (PRN):  acetaminophen    Suspension .. 650 milliGRAM(s) Oral every 6 hours PRN Temp greater or equal to 38.5C (101.3F)  polyethylene glycol 3350 17 Gram(s) Oral two times a day PRN if not BM in the last 24 hours      Vital Signs Last 24 Hrs  T(C): 37.1 (20 Dec 2018 08:29), Max: 37.1 (20 Dec 2018 08:29)  T(F): 98.7 (20 Dec 2018 08:29), Max: 98.7 (20 Dec 2018 08:29)  HR: 110 (20 Dec 2018 09:37) (89 - 113)  BP: 109/80 (20 Dec 2018 08:29) (109/80 - 130/88)  BP(mean): --  RR: 18 (20 Dec 2018 08:29) (18 - 18)  SpO2: 95% (20 Dec 2018 09:37) (95% - 100%)    Physical Exam:    HEENT: continues to have swelling of head, no longer concave in lateral areas of skull. Continues to stick out, always reducible with jaw thrust, no blood    Respiratory: Breath Sounds equal & CTA bilaterally, no accessory muscle use    Cardiovascular: normal S1, S2;    Gastrointestinal: PEG present. Soft, non-tender, nondistended    Vascular: Equal and normal pulses: 2+ peripheral pulses throughout    Musculoskeletal: No joint pain, swelling or deformity; no limitation of movement    Skin: No rashes        I&O's Detail      LABS:                RADIOLOGY & ADDITIONAL STUDIES:

## 2018-12-20 NOTE — CHART NOTE - NSCHARTNOTEFT_GEN_A_CORE
Source: Patient [ ]  Family [ ]   other [ ]  ID rounds    Current Diet:   Diet, NPO with Tube Feed:   Tube Feeding Modality: Gastrostomy  Pivot 1.5 Ryan  Total Volume for 24 Hours (mL): 1440  Continuous  Starting Tube Feed Rate {mL per Hour}: 60  Until Goal Tube Feed Rate (mL per Hour): 60  Tube Feed Duration (in Hours): 24  Tube Feed Start Time: 16:25  Supplement Feeding Modality:  Gastrostomy  Prostat Cans or Servings Per Day:  2       Frequency:  Two Times a day (11-24-18 @ 10:33)    Patient reports [ ] nausea  [ ] vomiting [ ] diarrhea [ ] constipation  [X]chewing problems [X] swallowing issues  [ ] other: due to impaired cognition    Enteral /Parenteral Nutrition:  Current diet order provides Pivot @ 60mL/hr (x20 hrs) provides 1300mL/day (1800cal, 113g protein)    Current Weight:   (12/13)    62.1kg  (12/12)    64kg  (12/11)    57kg  (11/11)    83.2kg  (11/2)      87kg   (11/1)      82 kg   (10/27)    82kg     % Weight Change : Weight continues to trend down. No recent updated weight     Pertinent Medications: MEDICATIONS  (STANDING):  acetaminophen  IVPB .. 1000 milliGRAM(s) IV Intermittent once  ALBUTerol    0.083% 2.5 milliGRAM(s) Nebulizer every 6 hours  ascorbic acid 500 milliGRAM(s) Oral daily  chlorhexidine 0.12% Liquid 15 milliLiter(s) Swish and Spit two times a day  enoxaparin Injectable 40 milliGRAM(s) SubCutaneous daily  folic acid 1 milliGRAM(s) Oral daily  levETIRAcetam  Solution 1000 milliGRAM(s) Oral two times a day  magnesium oxide 400 milliGRAM(s) Oral three times a day with meals  phytonadione   Solution 5 milliGRAM(s) Oral daily  propranolol 10 milliGRAM(s) Oral every 12 hours  senna Syrup 10 milliLiter(s) Oral daily  sodium chloride 1 Gram(s) Oral three times a day  thiamine 100 milliGRAM(s) Oral daily    MEDICATIONS  (PRN):  acetaminophen    Suspension .. 650 milliGRAM(s) Oral every 6 hours PRN Temp greater or equal to 38.5C (101.3F)  polyethylene glycol 3350 17 Gram(s) Oral two times a day PRN if not BM in the last 24 hours    Pertinent Labs:   12/17:   Hgb 9.4 <L>   Hct 28.7 <L>   BUN 31.0 <H>   creat 0.34 <L>   glu 135 <H>   Ca 11.0 <H>      Skin: IAD; wound beneath trach collar; Stage II coccyx, suspected DTI R. scapula  Stage II L. upper flank-healed/blistered    Edema: 1+ generalized edema    Nutrition focused physical exam conducted - found signs of malnutrition [ ]absent [X]present    Subcutaneous fat loss: [ ] Orbital fat pads region, [ ]Buccal fat region, [ ]Triceps region,  [ ]Ribs region    Muscle wasting: [ ]Temples region, [X]Clavicle region, [ ]Shoulder region, [ ]Scapula region, [ ]Interosseous region,  [ ]thigh region, [ ]Calf region    Estimated Needs:   [X] no change since previous assessment  [ ] recalculated:     Current Nutrition Diagnosis: Pt now with moderate acute malnutrition related to increased needs for healing 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 1+ generalized edema at this time. Pt with multiple pressure injuries/wound sites requiring increased protein-energy needs. Pending guardianship, court date scheduled for 1/4/19.     Recommendations:   1) When medically feasible, consider increase TF rate to 65ml/hr (x 20 hours) to provide 1300ml/day (1950kcal, 122gm protein) to meet increased needs. c/w Prostat BID  2) Trend daily weights   3) Rx MVI daily    Monitoring and Evaluation:   [ ] PO intake [X] Tolerance to diet prescription [X] Weights  [X] Follow up per protocol [X] Labs:

## 2018-12-20 NOTE — PROGRESS NOTE ADULT - ATTENDING COMMENTS
The patient was seen and examined  No new problems  Now off antibiotics  Guardianship hearing pending

## 2018-12-20 NOTE — PROGRESS NOTE ADULT - ASSESSMENT
45 M found down after an unwitnessed traumatic event -- he was found to have severe TBI with subdural hematoma s/p surgical intervention, s/p trach and peg and course complicated by sepsis, acute anemia, hepatic encephalopathy, and hyponatremia. Family meeting held on 11/12 with SICU team over phone with Uncle - next of kin, pt was made DNR/DNI. No further escalation of care or transfer to ICU.  A Code Sepsis was called 12/6 and patient was found to have Klebsiella bacteremia; started on a course of IV abx. Currently completing a 14 day abx course. If patient falls ill again per family, the patient will be transitioned to full comfort care - no abx, no SICU admission.    -Off antibiotics.  If patient becomes febrile or tachycardic again, pt will be made CMO   -RN, Nurse's aids are aware that patient must be turned frequently to avoid skin breakdown  -Continue DVT ppx with Lovenox  -Monitor for fever, abnormal vitals  -Patient awaiting court appointment for state-appointed guardian and [place] citizenship - currently scheduled for 1/4/2019

## 2018-12-21 RX ORDER — PROPRANOLOL HCL 160 MG
20 CAPSULE, EXTENDED RELEASE 24HR ORAL EVERY 6 HOURS
Qty: 0 | Refills: 0 | Status: DISCONTINUED | OUTPATIENT
Start: 2018-12-21 | End: 2018-12-31

## 2018-12-21 RX ADMIN — SODIUM CHLORIDE 1 GRAM(S): 9 INJECTION INTRAMUSCULAR; INTRAVENOUS; SUBCUTANEOUS at 05:24

## 2018-12-21 RX ADMIN — ALBUTEROL 2.5 MILLIGRAM(S): 90 AEROSOL, METERED ORAL at 14:37

## 2018-12-21 RX ADMIN — Medication 20 MILLIGRAM(S): at 13:20

## 2018-12-21 RX ADMIN — Medication 5 MILLIGRAM(S): at 15:04

## 2018-12-21 RX ADMIN — Medication 10 MILLIGRAM(S): at 05:24

## 2018-12-21 RX ADMIN — Medication 20 MILLIGRAM(S): at 17:07

## 2018-12-21 RX ADMIN — Medication 500 MILLIGRAM(S): at 13:18

## 2018-12-21 RX ADMIN — Medication 20 MILLIGRAM(S): at 23:24

## 2018-12-21 RX ADMIN — MAGNESIUM OXIDE 400 MG ORAL TABLET 400 MILLIGRAM(S): 241.3 TABLET ORAL at 10:21

## 2018-12-21 RX ADMIN — SENNA PLUS 10 MILLILITER(S): 8.6 TABLET ORAL at 13:20

## 2018-12-21 RX ADMIN — MAGNESIUM OXIDE 400 MG ORAL TABLET 400 MILLIGRAM(S): 241.3 TABLET ORAL at 16:59

## 2018-12-21 RX ADMIN — SODIUM CHLORIDE 1 GRAM(S): 9 INJECTION INTRAMUSCULAR; INTRAVENOUS; SUBCUTANEOUS at 22:58

## 2018-12-21 RX ADMIN — MORPHINE SULFATE 1 MILLIGRAM(S): 50 CAPSULE, EXTENDED RELEASE ORAL at 02:44

## 2018-12-21 RX ADMIN — Medication 100 MILLIGRAM(S): at 13:18

## 2018-12-21 RX ADMIN — SODIUM CHLORIDE 1 GRAM(S): 9 INJECTION INTRAMUSCULAR; INTRAVENOUS; SUBCUTANEOUS at 13:19

## 2018-12-21 RX ADMIN — ALBUTEROL 2.5 MILLIGRAM(S): 90 AEROSOL, METERED ORAL at 21:47

## 2018-12-21 RX ADMIN — CHLORHEXIDINE GLUCONATE 15 MILLILITER(S): 213 SOLUTION TOPICAL at 05:24

## 2018-12-21 RX ADMIN — MORPHINE SULFATE 1 MILLIGRAM(S): 50 CAPSULE, EXTENDED RELEASE ORAL at 02:59

## 2018-12-21 RX ADMIN — MAGNESIUM OXIDE 400 MG ORAL TABLET 400 MILLIGRAM(S): 241.3 TABLET ORAL at 13:17

## 2018-12-21 RX ADMIN — ENOXAPARIN SODIUM 40 MILLIGRAM(S): 100 INJECTION SUBCUTANEOUS at 13:18

## 2018-12-21 RX ADMIN — Medication 1 MILLIGRAM(S): at 13:18

## 2018-12-21 RX ADMIN — CHLORHEXIDINE GLUCONATE 15 MILLILITER(S): 213 SOLUTION TOPICAL at 17:07

## 2018-12-21 RX ADMIN — MORPHINE SULFATE 1 MILLIGRAM(S): 50 CAPSULE, EXTENDED RELEASE ORAL at 06:11

## 2018-12-21 RX ADMIN — LEVETIRACETAM 1000 MILLIGRAM(S): 250 TABLET, FILM COATED ORAL at 05:24

## 2018-12-21 RX ADMIN — LEVETIRACETAM 1000 MILLIGRAM(S): 250 TABLET, FILM COATED ORAL at 17:07

## 2018-12-21 RX ADMIN — ALBUTEROL 2.5 MILLIGRAM(S): 90 AEROSOL, METERED ORAL at 03:28

## 2018-12-21 RX ADMIN — ALBUTEROL 2.5 MILLIGRAM(S): 90 AEROSOL, METERED ORAL at 08:54

## 2018-12-21 NOTE — PROGRESS NOTE ADULT - ASSESSMENT
45 M found down after an unwitnessed traumatic event -- he was found to have severe TBI with subdural hematoma s/p surgical intervention, s/p trach and peg and course complicated by sepsis, acute anemia, hepatic encephalopathy, and hyponatremia. Family meeting held on 11/12 with SICU team over phone with Uncle - next of kin, pt was made DNR/DNI. No further escalation of care or transfer to ICU.  A Code Sepsis was called 12/6 and patient was found to have Klebsiella bacteremia; started on a course of IV abx. Currently completing a 14 day abx course. If patient falls ill again per family, the patient will be transitioned to full comfort care - no abx, no SICU admission.    -Off antibiotics.  If patient becomes febrile or tachycardic again, pt will be made CMO   -RN, Nurse's aids are aware that patient must be turned frequently to avoid skin breakdown

## 2018-12-21 NOTE — PROGRESS NOTE ADULT - SUBJECTIVE AND OBJECTIVE BOX
Pt with no acute events.  Remains off IV antibiotics.  Afebrile with stable vitals.  Tolerating tube feeds    Vital Signs Last 24 Hrs  T(C): 37.1 (21 Dec 2018 08:54), Max: 37.1 (21 Dec 2018 08:54)  T(F): 98.8 (21 Dec 2018 08:54), Max: 98.8 (21 Dec 2018 08:54)  HR: 100 (21 Dec 2018 08:56) (94 - 113)  BP: 114/78 (21 Dec 2018 08:54) (112/79 - 127/94)  BP(mean): --  RR: 18 (21 Dec 2018 08:54) (18 - 18)  SpO2: 100% (21 Dec 2018 08:56) (95% - 100%)    I&O's Detail    20 Dec 2018 07:01  -  21 Dec 2018 07:00  --------------------------------------------------------  IN:    Pivot: 720 mL  Total IN: 720 mL    OUT:  Total OUT: 0 mL    Total NET: 720 mL        Physical Exam:    HEENT: continues to have swelling of head, no longer concave in lateral areas of skull. Continues to stick out, always reducible with jaw thrust, no blood    Respiratory: Breath Sounds equal & CTA bilaterally, no accessory muscle use    Cardiovascular: normal S1, S2;    Gastrointestinal: PEG present. Soft, non-tender, nondistended    Vascular: Equal and normal pulses: 2+ peripheral pulses throughout    Musculoskeletal: No joint pain, swelling or deformity; no limitation of movement    Skin: No rashes    LABS:                MEDICATIONS  (STANDING):  acetaminophen  IVPB .. 1000 milliGRAM(s) IV Intermittent once  ALBUTerol    0.083% 2.5 milliGRAM(s) Nebulizer every 6 hours  ascorbic acid 500 milliGRAM(s) Oral daily  chlorhexidine 0.12% Liquid 15 milliLiter(s) Swish and Spit two times a day  enoxaparin Injectable 40 milliGRAM(s) SubCutaneous daily  folic acid 1 milliGRAM(s) Oral daily  levETIRAcetam  Solution 1000 milliGRAM(s) Oral two times a day  magnesium oxide 400 milliGRAM(s) Oral three times a day with meals  phytonadione   Solution 5 milliGRAM(s) Oral daily  propranolol 20 milliGRAM(s) Oral every 6 hours  senna Syrup 10 milliLiter(s) Oral daily  sodium chloride 1 Gram(s) Oral three times a day  thiamine 100 milliGRAM(s) Oral daily    MEDICATIONS  (PRN):  acetaminophen    Suspension .. 650 milliGRAM(s) Oral every 6 hours PRN Temp greater or equal to 38.5C (101.3F)  polyethylene glycol 3350 17 Gram(s) Oral two times a day PRN if not BM in the last 24 hours

## 2018-12-21 NOTE — CHART NOTE - NSCHARTNOTEFT_GEN_A_CORE
Chart reviewed. Pt has now completed IV antibiotics and remain hemodynamically stable. Plan is to continue current level of medical management. Per previous family meeting, pt's next of kin, Uncle Hung, request for transition to comfort measures in the event pt clinically decompensates or develops reinfection. Gaurdianship/Citizenship hearing on 1/4/2019. Will sign off. Please reconsult if we can be of assistance.

## 2018-12-22 RX ADMIN — ALBUTEROL 2.5 MILLIGRAM(S): 90 AEROSOL, METERED ORAL at 15:53

## 2018-12-22 RX ADMIN — MAGNESIUM OXIDE 400 MG ORAL TABLET 400 MILLIGRAM(S): 241.3 TABLET ORAL at 12:26

## 2018-12-22 RX ADMIN — Medication 500 MILLIGRAM(S): at 12:26

## 2018-12-22 RX ADMIN — ALBUTEROL 2.5 MILLIGRAM(S): 90 AEROSOL, METERED ORAL at 21:04

## 2018-12-22 RX ADMIN — Medication 20 MILLIGRAM(S): at 12:27

## 2018-12-22 RX ADMIN — LEVETIRACETAM 1000 MILLIGRAM(S): 250 TABLET, FILM COATED ORAL at 06:16

## 2018-12-22 RX ADMIN — Medication 20 MILLIGRAM(S): at 16:09

## 2018-12-22 RX ADMIN — CHLORHEXIDINE GLUCONATE 15 MILLILITER(S): 213 SOLUTION TOPICAL at 06:16

## 2018-12-22 RX ADMIN — MAGNESIUM OXIDE 400 MG ORAL TABLET 400 MILLIGRAM(S): 241.3 TABLET ORAL at 06:16

## 2018-12-22 RX ADMIN — Medication 20 MILLIGRAM(S): at 23:39

## 2018-12-22 RX ADMIN — ENOXAPARIN SODIUM 40 MILLIGRAM(S): 100 INJECTION SUBCUTANEOUS at 12:26

## 2018-12-22 RX ADMIN — SODIUM CHLORIDE 1 GRAM(S): 9 INJECTION INTRAMUSCULAR; INTRAVENOUS; SUBCUTANEOUS at 12:30

## 2018-12-22 RX ADMIN — Medication 20 MILLIGRAM(S): at 06:16

## 2018-12-22 RX ADMIN — Medication 100 MILLIGRAM(S): at 12:27

## 2018-12-22 RX ADMIN — Medication 5 MILLIGRAM(S): at 16:08

## 2018-12-22 RX ADMIN — ALBUTEROL 2.5 MILLIGRAM(S): 90 AEROSOL, METERED ORAL at 03:43

## 2018-12-22 RX ADMIN — SODIUM CHLORIDE 1 GRAM(S): 9 INJECTION INTRAMUSCULAR; INTRAVENOUS; SUBCUTANEOUS at 06:16

## 2018-12-22 RX ADMIN — ALBUTEROL 2.5 MILLIGRAM(S): 90 AEROSOL, METERED ORAL at 08:03

## 2018-12-22 RX ADMIN — Medication 1 MILLIGRAM(S): at 12:28

## 2018-12-22 RX ADMIN — MAGNESIUM OXIDE 400 MG ORAL TABLET 400 MILLIGRAM(S): 241.3 TABLET ORAL at 16:09

## 2018-12-22 RX ADMIN — CHLORHEXIDINE GLUCONATE 15 MILLILITER(S): 213 SOLUTION TOPICAL at 16:09

## 2018-12-22 RX ADMIN — LEVETIRACETAM 1000 MILLIGRAM(S): 250 TABLET, FILM COATED ORAL at 16:09

## 2018-12-22 RX ADMIN — SODIUM CHLORIDE 1 GRAM(S): 9 INJECTION INTRAMUSCULAR; INTRAVENOUS; SUBCUTANEOUS at 23:39

## 2018-12-22 NOTE — PROGRESS NOTE ADULT - SUBJECTIVE AND OBJECTIVE BOX
INTERVAL HPI/OVERNIGHT EVENTS:    Pt with no acute events.  Remains off IV antibiotics.  Afebrile with stable vitals - HR now is 80s.  Tolerating tube feeds. No significant neurological improvement.  Patient DNR/DNI.    MEDICATIONS  (STANDING):  acetaminophen  IVPB .. 1000 milliGRAM(s) IV Intermittent once  ALBUTerol    0.083% 2.5 milliGRAM(s) Nebulizer every 6 hours  ascorbic acid 500 milliGRAM(s) Oral daily  chlorhexidine 0.12% Liquid 15 milliLiter(s) Swish and Spit two times a day  enoxaparin Injectable 40 milliGRAM(s) SubCutaneous daily  folic acid 1 milliGRAM(s) Oral daily  levETIRAcetam  Solution 1000 milliGRAM(s) Oral two times a day  magnesium oxide 400 milliGRAM(s) Oral three times a day with meals  phytonadione   Solution 5 milliGRAM(s) Oral daily  propranolol 20 milliGRAM(s) Oral every 6 hours  senna Syrup 10 milliLiter(s) Oral daily  sodium chloride 1 Gram(s) Oral three times a day  thiamine 100 milliGRAM(s) Oral daily    MEDICATIONS  (PRN):  acetaminophen    Suspension .. 650 milliGRAM(s) Oral every 6 hours PRN Temp greater or equal to 38.5C (101.3F)  polyethylene glycol 3350 17 Gram(s) Oral two times a day PRN if not BM in the last 24 hours      Vital Signs Last 24 Hrs  T(C): 36.9 (22 Dec 2018 08:00), Max: 36.9 (22 Dec 2018 08:00)  T(F): 98.4 (22 Dec 2018 08:00), Max: 98.4 (22 Dec 2018 08:00)  HR: 78 (22 Dec 2018 08:07) (78 - 103)  BP: 107/74 (22 Dec 2018 08:00) (101/70 - 118/82)  BP(mean): --  RR: 18 (22 Dec 2018 08:00) (18 - 18)  SpO2: 98% (22 Dec 2018 08:07) (95% - 100%)    PE    HEENT: continues to have swelling of head, no longer concave in lateral areas of skull. Tongue protruding out but reducible with jaw thrust, no blood    Respiratory: Breath Sounds equal & CTA bilaterally, no accessory muscle use; in need of deep suctioning    Cardiovascular: RRR (80s)     Gastrointestinal: PEG present. Soft, non-tender, nondistended    Vascular: Equal and normal pulses: 2+ peripheral pulses throughout    Musculoskeletal: No joint pain, swelling or deformity; no limitation of movement    Skin: No rashes      I&O's Detail    21 Dec 2018 07:01  -  22 Dec 2018 07:00  --------------------------------------------------------  IN:    Enteral Tube Flush: 200 mL    Pivot: 600 mL  Total IN: 800 mL    OUT:    Stool: 1 mL  Total OUT: 1 mL    Total NET: 799 mL          LABS:                RADIOLOGY & ADDITIONAL STUDIES:

## 2018-12-22 NOTE — PROGRESS NOTE ADULT - ASSESSMENT
45 M found down after an unwitnessed traumatic event -- he was found to have severe TBI with subdural hematoma s/p surgical intervention, s/p trach and peg and course complicated by sepsis, acute anemia, hepatic encephalopathy, and hyponatremia. Family meeting held on 11/12 with SICU team over phone with Uncle - next of kin, pt was made DNR/DNI. No further escalation of care or transfer to ICU.  A Code Sepsis was called 12/6 and patient was found to have Klebsiella bacteremia; started on a course of IV abx. Currently completing a 14 day abx course. If patient falls ill again per family, the patient will be transitioned to full comfort care - no abx, no SICU admission.    -Off antibiotics.  If patient becomes febrile or tachycardic again, pt will be made CMO   -RN, Nurse's aids are aware that patient must be turned frequently to avoid skin breakdown  -RNs aware that no Code Sepsis to be called

## 2018-12-23 RX ADMIN — SODIUM CHLORIDE 1 GRAM(S): 9 INJECTION INTRAMUSCULAR; INTRAVENOUS; SUBCUTANEOUS at 15:28

## 2018-12-23 RX ADMIN — Medication 20 MILLIGRAM(S): at 17:40

## 2018-12-23 RX ADMIN — MAGNESIUM OXIDE 400 MG ORAL TABLET 400 MILLIGRAM(S): 241.3 TABLET ORAL at 12:31

## 2018-12-23 RX ADMIN — Medication 20 MILLIGRAM(S): at 06:02

## 2018-12-23 RX ADMIN — LEVETIRACETAM 1000 MILLIGRAM(S): 250 TABLET, FILM COATED ORAL at 17:40

## 2018-12-23 RX ADMIN — Medication 1 MILLIGRAM(S): at 12:31

## 2018-12-23 RX ADMIN — ALBUTEROL 2.5 MILLIGRAM(S): 90 AEROSOL, METERED ORAL at 21:44

## 2018-12-23 RX ADMIN — Medication 100 MILLIGRAM(S): at 12:31

## 2018-12-23 RX ADMIN — LEVETIRACETAM 1000 MILLIGRAM(S): 250 TABLET, FILM COATED ORAL at 06:02

## 2018-12-23 RX ADMIN — SENNA PLUS 10 MILLILITER(S): 8.6 TABLET ORAL at 12:30

## 2018-12-23 RX ADMIN — MAGNESIUM OXIDE 400 MG ORAL TABLET 400 MILLIGRAM(S): 241.3 TABLET ORAL at 08:46

## 2018-12-23 RX ADMIN — Medication 5 MILLIGRAM(S): at 12:31

## 2018-12-23 RX ADMIN — CHLORHEXIDINE GLUCONATE 15 MILLILITER(S): 213 SOLUTION TOPICAL at 17:40

## 2018-12-23 RX ADMIN — ALBUTEROL 2.5 MILLIGRAM(S): 90 AEROSOL, METERED ORAL at 17:04

## 2018-12-23 RX ADMIN — SODIUM CHLORIDE 1 GRAM(S): 9 INJECTION INTRAMUSCULAR; INTRAVENOUS; SUBCUTANEOUS at 23:32

## 2018-12-23 RX ADMIN — ALBUTEROL 2.5 MILLIGRAM(S): 90 AEROSOL, METERED ORAL at 11:09

## 2018-12-23 RX ADMIN — CHLORHEXIDINE GLUCONATE 15 MILLILITER(S): 213 SOLUTION TOPICAL at 06:02

## 2018-12-23 RX ADMIN — Medication 500 MILLIGRAM(S): at 12:31

## 2018-12-23 RX ADMIN — ALBUTEROL 2.5 MILLIGRAM(S): 90 AEROSOL, METERED ORAL at 03:47

## 2018-12-23 RX ADMIN — MAGNESIUM OXIDE 400 MG ORAL TABLET 400 MILLIGRAM(S): 241.3 TABLET ORAL at 17:40

## 2018-12-23 RX ADMIN — Medication 20 MILLIGRAM(S): at 12:31

## 2018-12-23 RX ADMIN — SODIUM CHLORIDE 1 GRAM(S): 9 INJECTION INTRAMUSCULAR; INTRAVENOUS; SUBCUTANEOUS at 06:02

## 2018-12-23 RX ADMIN — ENOXAPARIN SODIUM 40 MILLIGRAM(S): 100 INJECTION SUBCUTANEOUS at 12:31

## 2018-12-23 NOTE — PROGRESS NOTE ADULT - SUBJECTIVE AND OBJECTIVE BOX
HPI/OVERNIGHT EVENTS: Patient seen and examined at bedside this AM. No acute events overnight per nursing reports. Remains off IV antibiotics. Tolerating tube feeds. No significant neurological improvement.  Patient DNR/DNI.    Vital Signs Last 24 Hrs  T(C): 36.7 (23 Dec 2018 08:28), Max: 36.7 (23 Dec 2018 01:25)  T(F): 98 (23 Dec 2018 08:28), Max: 98 (23 Dec 2018 01:25)  HR: 81 (23 Dec 2018 08:28) (72 - 81)  BP: 109/72 (23 Dec 2018 08:28) (103/67 - 126/68)  BP(mean): --  RR: 18 (23 Dec 2018 08:28) (18 - 18)  SpO2: 94% (23 Dec 2018 08:28) (94% - 100%)    I&O's Detail    22 Dec 2018 07:01  -  23 Dec 2018 07:00  --------------------------------------------------------  IN:    Enteral Tube Flush: 400 mL    Pivot: 660 mL  Total IN: 1060 mL    OUT:    Stool: 2 mL  Total OUT: 2 mL    Total NET: 1058 mL        PE    HEENT: continues to have swelling of head, no longer concave in lateral areas of skull. Tongue protruding out but reducible with jaw thrust, no blood    Respiratory: Breath Sounds equal & CTA bilaterally, no accessory muscle use; in need of deep suctioning    Cardiovascular: RRR (80s)     Gastrointestinal: PEG present. Soft, non-tender, nondistended    Vascular: Equal and normal pulses: 2+ peripheral pulses throughout    Musculoskeletal: No joint pain, swelling or deformity; no limitation of movement    Skin: No rashes    LABS:                MEDICATIONS  (STANDING):  acetaminophen  IVPB .. 1000 milliGRAM(s) IV Intermittent once  ALBUTerol    0.083% 2.5 milliGRAM(s) Nebulizer every 6 hours  ascorbic acid 500 milliGRAM(s) Oral daily  chlorhexidine 0.12% Liquid 15 milliLiter(s) Swish and Spit two times a day  enoxaparin Injectable 40 milliGRAM(s) SubCutaneous daily  folic acid 1 milliGRAM(s) Oral daily  levETIRAcetam  Solution 1000 milliGRAM(s) Oral two times a day  magnesium oxide 400 milliGRAM(s) Oral three times a day with meals  phytonadione   Solution 5 milliGRAM(s) Oral daily  propranolol 20 milliGRAM(s) Oral every 6 hours  senna Syrup 10 milliLiter(s) Oral daily  sodium chloride 1 Gram(s) Oral three times a day  thiamine 100 milliGRAM(s) Oral daily    MEDICATIONS  (PRN):  acetaminophen    Suspension .. 650 milliGRAM(s) Oral every 6 hours PRN Temp greater or equal to 38.5C (101.3F)  polyethylene glycol 3350 17 Gram(s) Oral two times a day PRN if not BM in the last 24 hours

## 2018-12-24 LAB
ANION GAP SERPL CALC-SCNC: 11 MMOL/L — SIGNIFICANT CHANGE UP (ref 5–17)
BASOPHILS # BLD AUTO: 0 K/UL — SIGNIFICANT CHANGE UP (ref 0–0.2)
BASOPHILS NFR BLD AUTO: 0.2 % — SIGNIFICANT CHANGE UP (ref 0–2)
BUN SERPL-MCNC: 40 MG/DL — HIGH (ref 8–20)
CALCIUM SERPL-MCNC: 11.6 MG/DL — HIGH (ref 8.6–10.2)
CHLORIDE SERPL-SCNC: 105 MMOL/L — SIGNIFICANT CHANGE UP (ref 98–107)
CO2 SERPL-SCNC: 29 MMOL/L — SIGNIFICANT CHANGE UP (ref 22–29)
CREAT SERPL-MCNC: 0.45 MG/DL — LOW (ref 0.5–1.3)
EOSINOPHIL # BLD AUTO: 0.2 K/UL — SIGNIFICANT CHANGE UP (ref 0–0.5)
EOSINOPHIL NFR BLD AUTO: 4 % — SIGNIFICANT CHANGE UP (ref 0–5)
GLUCOSE SERPL-MCNC: 118 MG/DL — HIGH (ref 70–115)
HCT VFR BLD CALC: 31.3 % — LOW (ref 42–52)
HGB BLD-MCNC: 10.3 G/DL — LOW (ref 14–18)
LYMPHOCYTES # BLD AUTO: 1.4 K/UL — SIGNIFICANT CHANGE UP (ref 1–4.8)
LYMPHOCYTES # BLD AUTO: 31.3 % — SIGNIFICANT CHANGE UP (ref 20–55)
MAGNESIUM SERPL-MCNC: 1.3 MG/DL — LOW (ref 1.6–2.6)
MCHC RBC-ENTMCNC: 29.5 PG — SIGNIFICANT CHANGE UP (ref 27–31)
MCHC RBC-ENTMCNC: 32.9 G/DL — SIGNIFICANT CHANGE UP (ref 32–36)
MCV RBC AUTO: 89.7 FL — SIGNIFICANT CHANGE UP (ref 80–94)
MONOCYTES # BLD AUTO: 0.3 K/UL — SIGNIFICANT CHANGE UP (ref 0–0.8)
MONOCYTES NFR BLD AUTO: 7.4 % — SIGNIFICANT CHANGE UP (ref 3–10)
NEUTROPHILS # BLD AUTO: 2.6 K/UL — SIGNIFICANT CHANGE UP (ref 1.8–8)
NEUTROPHILS NFR BLD AUTO: 57.1 % — SIGNIFICANT CHANGE UP (ref 37–73)
PHOSPHATE SERPL-MCNC: 4.3 MG/DL — SIGNIFICANT CHANGE UP (ref 2.4–4.7)
PLATELET # BLD AUTO: 106 K/UL — LOW (ref 150–400)
POTASSIUM SERPL-MCNC: 3.7 MMOL/L — SIGNIFICANT CHANGE UP (ref 3.5–5.3)
POTASSIUM SERPL-SCNC: 3.7 MMOL/L — SIGNIFICANT CHANGE UP (ref 3.5–5.3)
RBC # BLD: 3.49 M/UL — LOW (ref 4.6–6.2)
RBC # FLD: 15.1 % — SIGNIFICANT CHANGE UP (ref 11–15.6)
SODIUM SERPL-SCNC: 145 MMOL/L — SIGNIFICANT CHANGE UP (ref 135–145)
WBC # BLD: 4.5 K/UL — LOW (ref 4.8–10.8)
WBC # FLD AUTO: 4.5 K/UL — LOW (ref 4.8–10.8)

## 2018-12-24 RX ORDER — POTASSIUM CHLORIDE 20 MEQ
20 PACKET (EA) ORAL
Qty: 0 | Refills: 0 | Status: COMPLETED | OUTPATIENT
Start: 2018-12-24 | End: 2018-12-24

## 2018-12-24 RX ORDER — MAGNESIUM SULFATE 500 MG/ML
2 VIAL (ML) INJECTION ONCE
Qty: 0 | Refills: 0 | Status: COMPLETED | OUTPATIENT
Start: 2018-12-24 | End: 2018-12-24

## 2018-12-24 RX ORDER — SODIUM CHLORIDE 9 MG/ML
1 INJECTION INTRAMUSCULAR; INTRAVENOUS; SUBCUTANEOUS
Qty: 0 | Refills: 0 | Status: DISCONTINUED | OUTPATIENT
Start: 2018-12-24 | End: 2018-12-31

## 2018-12-24 RX ADMIN — ALBUTEROL 2.5 MILLIGRAM(S): 90 AEROSOL, METERED ORAL at 15:31

## 2018-12-24 RX ADMIN — Medication 20 MILLIGRAM(S): at 23:07

## 2018-12-24 RX ADMIN — LEVETIRACETAM 1000 MILLIGRAM(S): 250 TABLET, FILM COATED ORAL at 05:02

## 2018-12-24 RX ADMIN — SODIUM CHLORIDE 1 GRAM(S): 9 INJECTION INTRAMUSCULAR; INTRAVENOUS; SUBCUTANEOUS at 05:02

## 2018-12-24 RX ADMIN — Medication 20 MILLIGRAM(S): at 13:22

## 2018-12-24 RX ADMIN — MAGNESIUM OXIDE 400 MG ORAL TABLET 400 MILLIGRAM(S): 241.3 TABLET ORAL at 18:08

## 2018-12-24 RX ADMIN — Medication 500 MILLIGRAM(S): at 13:21

## 2018-12-24 RX ADMIN — CHLORHEXIDINE GLUCONATE 15 MILLILITER(S): 213 SOLUTION TOPICAL at 05:03

## 2018-12-24 RX ADMIN — MAGNESIUM OXIDE 400 MG ORAL TABLET 400 MILLIGRAM(S): 241.3 TABLET ORAL at 13:28

## 2018-12-24 RX ADMIN — Medication 100 MILLIGRAM(S): at 13:21

## 2018-12-24 RX ADMIN — Medication 20 MILLIGRAM(S): at 05:03

## 2018-12-24 RX ADMIN — Medication 50 MILLIEQUIVALENT(S): at 13:22

## 2018-12-24 RX ADMIN — Medication 20 MILLIGRAM(S): at 01:12

## 2018-12-24 RX ADMIN — ALBUTEROL 2.5 MILLIGRAM(S): 90 AEROSOL, METERED ORAL at 03:25

## 2018-12-24 RX ADMIN — Medication 1 MILLIGRAM(S): at 13:23

## 2018-12-24 RX ADMIN — Medication 20 MILLIGRAM(S): at 18:10

## 2018-12-24 RX ADMIN — MAGNESIUM OXIDE 400 MG ORAL TABLET 400 MILLIGRAM(S): 241.3 TABLET ORAL at 13:21

## 2018-12-24 RX ADMIN — Medication 50 GRAM(S): at 18:09

## 2018-12-24 RX ADMIN — Medication 50 MILLIEQUIVALENT(S): at 19:05

## 2018-12-24 RX ADMIN — SODIUM CHLORIDE 1 GRAM(S): 9 INJECTION INTRAMUSCULAR; INTRAVENOUS; SUBCUTANEOUS at 18:15

## 2018-12-24 RX ADMIN — CHLORHEXIDINE GLUCONATE 15 MILLILITER(S): 213 SOLUTION TOPICAL at 18:08

## 2018-12-24 RX ADMIN — Medication 50 MILLIEQUIVALENT(S): at 15:41

## 2018-12-24 RX ADMIN — ALBUTEROL 2.5 MILLIGRAM(S): 90 AEROSOL, METERED ORAL at 21:18

## 2018-12-24 RX ADMIN — ALBUTEROL 2.5 MILLIGRAM(S): 90 AEROSOL, METERED ORAL at 08:47

## 2018-12-24 RX ADMIN — LEVETIRACETAM 1000 MILLIGRAM(S): 250 TABLET, FILM COATED ORAL at 18:07

## 2018-12-24 RX ADMIN — Medication 5 MILLIGRAM(S): at 18:07

## 2018-12-24 RX ADMIN — SENNA PLUS 10 MILLILITER(S): 8.6 TABLET ORAL at 18:06

## 2018-12-24 RX ADMIN — Medication 50 GRAM(S): at 22:40

## 2018-12-24 NOTE — PROGRESS NOTE ADULT - SUBJECTIVE AND OBJECTIVE BOX
HPI/OVERNIGHT EVENTS: Patient seen and examined at bedside this AM. No acute events overnight per nursing reports. Remains off IV antibiotics. Tolerating tube feeds. No significant neurological improvement.  Patient DNR/DNI.  Vital Signs Last 24 Hrs  T(C): 36.9 (24 Dec 2018 00:00), Max: 36.9 (24 Dec 2018 00:00)  T(F): 98.5 (24 Dec 2018 00:00), Max: 98.5 (24 Dec 2018 00:00)  HR: 74 (24 Dec 2018 03:26) (74 - 91)  BP: 125/88 (24 Dec 2018 00:00) (109/72 - 125/88)  BP(mean): --  RR: 15 (24 Dec 2018 00:00) (15 - 18)  SpO2: 100% (24 Dec 2018 03:26) (94% - 100%)    I&O's Detail    23 Dec 2018 07:01  -  24 Dec 2018 07:00  --------------------------------------------------------  IN:    Pivot: 660 mL  Total IN: 660 mL    OUT:  Total OUT: 0 mL    Total NET: 660 mL        PE    HEENT: continues to have swelling of head, no longer concave in lateral areas of skull. Tongue protruding out but reducible with jaw thrust, no blood    Respiratory: Breath Sounds equal & CTA bilaterally, no accessory muscle use; in need of deep suctioning    Cardiovascular: RRR (80s)     Gastrointestinal: PEG present. Soft, non-tender, nondistended    Vascular: Equal and normal pulses: 2+ peripheral pulses throughout    Musculoskeletal: No joint pain, swelling or deformity; no limitation of movement    Skin: No rashes     LABS:                MEDICATIONS  (STANDING):  acetaminophen  IVPB .. 1000 milliGRAM(s) IV Intermittent once  ALBUTerol    0.083% 2.5 milliGRAM(s) Nebulizer every 6 hours  ascorbic acid 500 milliGRAM(s) Oral daily  chlorhexidine 0.12% Liquid 15 milliLiter(s) Swish and Spit two times a day  enoxaparin Injectable 40 milliGRAM(s) SubCutaneous daily  folic acid 1 milliGRAM(s) Oral daily  levETIRAcetam  Solution 1000 milliGRAM(s) Oral two times a day  magnesium oxide 400 milliGRAM(s) Oral three times a day with meals  phytonadione   Solution 5 milliGRAM(s) Oral daily  propranolol 20 milliGRAM(s) Oral every 6 hours  senna Syrup 10 milliLiter(s) Oral daily  sodium chloride 1 Gram(s) Oral three times a day  thiamine 100 milliGRAM(s) Oral daily    MEDICATIONS  (PRN):  acetaminophen    Suspension .. 650 milliGRAM(s) Oral every 6 hours PRN Temp greater or equal to 38.5C (101.3F)  polyethylene glycol 3350 17 Gram(s) Oral two times a day PRN if not BM in the last 24 hours      MICRO:   Cultures     STUDIES:   EKG, CXR, U/S, CT, MRI

## 2018-12-25 RX ADMIN — SODIUM CHLORIDE 1 GRAM(S): 9 INJECTION INTRAMUSCULAR; INTRAVENOUS; SUBCUTANEOUS at 06:29

## 2018-12-25 RX ADMIN — ALBUTEROL 2.5 MILLIGRAM(S): 90 AEROSOL, METERED ORAL at 15:48

## 2018-12-25 RX ADMIN — ALBUTEROL 2.5 MILLIGRAM(S): 90 AEROSOL, METERED ORAL at 21:06

## 2018-12-25 RX ADMIN — CHLORHEXIDINE GLUCONATE 15 MILLILITER(S): 213 SOLUTION TOPICAL at 15:52

## 2018-12-25 RX ADMIN — LEVETIRACETAM 1000 MILLIGRAM(S): 250 TABLET, FILM COATED ORAL at 06:29

## 2018-12-25 RX ADMIN — MAGNESIUM OXIDE 400 MG ORAL TABLET 400 MILLIGRAM(S): 241.3 TABLET ORAL at 11:41

## 2018-12-25 RX ADMIN — Medication 1 MILLIGRAM(S): at 11:41

## 2018-12-25 RX ADMIN — ALBUTEROL 2.5 MILLIGRAM(S): 90 AEROSOL, METERED ORAL at 03:32

## 2018-12-25 RX ADMIN — SENNA PLUS 10 MILLILITER(S): 8.6 TABLET ORAL at 11:41

## 2018-12-25 RX ADMIN — LEVETIRACETAM 1000 MILLIGRAM(S): 250 TABLET, FILM COATED ORAL at 17:02

## 2018-12-25 RX ADMIN — Medication 500 MILLIGRAM(S): at 11:41

## 2018-12-25 RX ADMIN — Medication 20 MILLIGRAM(S): at 11:40

## 2018-12-25 RX ADMIN — Medication 20 MILLIGRAM(S): at 17:02

## 2018-12-25 RX ADMIN — MAGNESIUM OXIDE 400 MG ORAL TABLET 400 MILLIGRAM(S): 241.3 TABLET ORAL at 08:01

## 2018-12-25 RX ADMIN — SODIUM CHLORIDE 1 GRAM(S): 9 INJECTION INTRAMUSCULAR; INTRAVENOUS; SUBCUTANEOUS at 17:02

## 2018-12-25 RX ADMIN — CHLORHEXIDINE GLUCONATE 15 MILLILITER(S): 213 SOLUTION TOPICAL at 06:29

## 2018-12-25 RX ADMIN — MAGNESIUM OXIDE 400 MG ORAL TABLET 400 MILLIGRAM(S): 241.3 TABLET ORAL at 17:02

## 2018-12-25 RX ADMIN — Medication 100 MILLIGRAM(S): at 11:41

## 2018-12-25 RX ADMIN — Medication 5 MILLIGRAM(S): at 15:53

## 2018-12-25 RX ADMIN — Medication 20 MILLIGRAM(S): at 06:29

## 2018-12-25 RX ADMIN — ALBUTEROL 2.5 MILLIGRAM(S): 90 AEROSOL, METERED ORAL at 09:26

## 2018-12-25 NOTE — PROGRESS NOTE ADULT - ASSESSMENT
5 M found down after an unwitnessed traumatic event -- he was found to have severe TBI with subdural hematoma s/p surgical intervention, s/p trach and peg and course complicated by sepsis, acute anemia, hepatic encephalopathy, and hyponatremia. Family meeting held on 11/12 with SICU team over phone with Uncle - next of kin, pt was made DNR/DNI. No further escalation of care or transfer to ICU.  A Code Sepsis was called 12/6 and patient was found to have Klebsiella bacteremia; started on a course of IV abx. Currently completing a 14 day abx course. If patient falls ill again per family, the patient will be transitioned to full comfort care - no abx, no SICU admission.    -Off antibiotics.  If patient becomes febrile or tachycardic again, pt will be made CMO   -RN, Nurse's aids are aware that patient must be turned frequently to avoid skin breakdown  -RNs aware that no Code Sepsis to be called

## 2018-12-25 NOTE — PROGRESS NOTE ADULT - SUBJECTIVE AND OBJECTIVE BOX
HPI/OVERNIGHT EVENTS: Patient seen and examined at bedside this AM. No acute events overnight per nursing reports. Remains off IV antibiotics. Tolerating tube feeds. No significant neurological improvement.  Patient DNR/DNI.        Vital Signs Last 24 Hrs  T(C): 36.9 (24 Dec 2018 17:18), Max: 36.9 (24 Dec 2018 08:11)  T(F): 98.5 (24 Dec 2018 17:18), Max: 98.5 (24 Dec 2018 17:18)  HR: 80 (24 Dec 2018 21:18) (74 - 102)  BP: 115/80 (24 Dec 2018 17:18) (102/69 - 115/80)  BP(mean): --  RR: 18 (24 Dec 2018 17:18) (18 - 19)  SpO2: 98% (24 Dec 2018 21:18) (97% - 100%)    I&O's Detail    23 Dec 2018 07:01  -  24 Dec 2018 07:00  --------------------------------------------------------  IN:    Pivot: 660 mL  Total IN: 660 mL    OUT:  Total OUT: 0 mL    Total NET: 660 mL      24 Dec 2018 07:01  -  25 Dec 2018 00:19  --------------------------------------------------------  IN:    Pivot: 720 mL  Total IN: 720 mL    OUT:  Total OUT: 0 mL    Total NET: 720 mL          PE    HEENT: continues to have swelling of head, no longer concave in lateral areas of skull. Tongue protruding out but reducible with jaw thrust, no blood    Respiratory: Breath Sounds equal & CTA bilaterally, no accessory muscle use; in need of deep suctioning    Cardiovascular: RRR (80s)     Gastrointestinal: PEG present. Soft, non-tender, nondistended    Vascular: Equal and normal pulses: 2+ peripheral pulses throughout    Musculoskeletal: No joint pain, swelling or deformity; no limitation of movement    Skin: No rashes      LABS:                        10.3   4.5   )-----------( 106      ( 24 Dec 2018 09:59 )             31.3     12-24    145  |  105  |  40.0<H>  ----------------------------<  118<H>  3.7   |  29.0  |  0.45<L>    Ca    11.6<H>      24 Dec 2018 09:59  Phos  4.3     12-24  Mg     1.3     12-24            MEDICATIONS  (STANDING):  acetaminophen  IVPB .. 1000 milliGRAM(s) IV Intermittent once  ALBUTerol    0.083% 2.5 milliGRAM(s) Nebulizer every 6 hours  ascorbic acid 500 milliGRAM(s) Oral daily  chlorhexidine 0.12% Liquid 15 milliLiter(s) Swish and Spit two times a day  folic acid 1 milliGRAM(s) Oral daily  levETIRAcetam  Solution 1000 milliGRAM(s) Oral two times a day  magnesium oxide 400 milliGRAM(s) Oral three times a day with meals  phytonadione   Solution 5 milliGRAM(s) Oral daily  propranolol 20 milliGRAM(s) Oral every 6 hours  senna Syrup 10 milliLiter(s) Oral daily  sodium chloride 1 Gram(s) Oral two times a day  thiamine 100 milliGRAM(s) Oral daily    MEDICATIONS  (PRN):  acetaminophen    Suspension .. 650 milliGRAM(s) Oral every 6 hours PRN Temp greater or equal to 38.5C (101.3F)  polyethylene glycol 3350 17 Gram(s) Oral two times a day PRN if not BM in the last 24 hours

## 2018-12-26 RX ADMIN — Medication 20 MILLIGRAM(S): at 05:22

## 2018-12-26 RX ADMIN — LEVETIRACETAM 1000 MILLIGRAM(S): 250 TABLET, FILM COATED ORAL at 17:07

## 2018-12-26 RX ADMIN — ALBUTEROL 2.5 MILLIGRAM(S): 90 AEROSOL, METERED ORAL at 21:38

## 2018-12-26 RX ADMIN — Medication 20 MILLIGRAM(S): at 23:56

## 2018-12-26 RX ADMIN — MAGNESIUM OXIDE 400 MG ORAL TABLET 400 MILLIGRAM(S): 241.3 TABLET ORAL at 17:07

## 2018-12-26 RX ADMIN — Medication 500 MILLIGRAM(S): at 11:37

## 2018-12-26 RX ADMIN — Medication 5 MILLIGRAM(S): at 11:38

## 2018-12-26 RX ADMIN — Medication 1 MILLIGRAM(S): at 11:37

## 2018-12-26 RX ADMIN — ALBUTEROL 2.5 MILLIGRAM(S): 90 AEROSOL, METERED ORAL at 04:15

## 2018-12-26 RX ADMIN — ALBUTEROL 2.5 MILLIGRAM(S): 90 AEROSOL, METERED ORAL at 09:26

## 2018-12-26 RX ADMIN — Medication 20 MILLIGRAM(S): at 00:00

## 2018-12-26 RX ADMIN — MAGNESIUM OXIDE 400 MG ORAL TABLET 400 MILLIGRAM(S): 241.3 TABLET ORAL at 07:53

## 2018-12-26 RX ADMIN — Medication 100 MILLIGRAM(S): at 11:38

## 2018-12-26 RX ADMIN — LEVETIRACETAM 1000 MILLIGRAM(S): 250 TABLET, FILM COATED ORAL at 05:22

## 2018-12-26 RX ADMIN — SODIUM CHLORIDE 1 GRAM(S): 9 INJECTION INTRAMUSCULAR; INTRAVENOUS; SUBCUTANEOUS at 17:07

## 2018-12-26 RX ADMIN — Medication 20 MILLIGRAM(S): at 17:07

## 2018-12-26 RX ADMIN — MAGNESIUM OXIDE 400 MG ORAL TABLET 400 MILLIGRAM(S): 241.3 TABLET ORAL at 11:38

## 2018-12-26 RX ADMIN — CHLORHEXIDINE GLUCONATE 15 MILLILITER(S): 213 SOLUTION TOPICAL at 05:22

## 2018-12-26 RX ADMIN — Medication 20 MILLIGRAM(S): at 11:38

## 2018-12-26 RX ADMIN — ALBUTEROL 2.5 MILLIGRAM(S): 90 AEROSOL, METERED ORAL at 16:06

## 2018-12-26 RX ADMIN — CHLORHEXIDINE GLUCONATE 15 MILLILITER(S): 213 SOLUTION TOPICAL at 17:07

## 2018-12-26 RX ADMIN — SODIUM CHLORIDE 1 GRAM(S): 9 INJECTION INTRAMUSCULAR; INTRAVENOUS; SUBCUTANEOUS at 05:22

## 2018-12-26 RX ADMIN — SENNA PLUS 10 MILLILITER(S): 8.6 TABLET ORAL at 11:38

## 2018-12-26 NOTE — PROGRESS NOTE ADULT - PROBLEM SELECTOR PLAN 1
1. Off antibiotics.  If patient becomes febrile or tachycardic again, pt will be made CMO   2. RN, Nurse's aids are aware that patient must be turned frequently to avoid skin breakdown  3. RNs aware that no Code Sepsis to be called

## 2018-12-26 NOTE — PROGRESS NOTE ADULT - SUBJECTIVE AND OBJECTIVE BOX
HPI/OVERNIGHT EVENTS: Patient seen and examined at bedside this AM. No acute events overnight. Subjective information limited by patient's mental status. Tolerating TFs.     Vital Signs Last 24 Hrs  T(C): 36.2 (26 Dec 2018 00:04), Max: 36.5 (25 Dec 2018 17:01)  T(F): 97.1 (26 Dec 2018 00:04), Max: 97.7 (25 Dec 2018 17:01)  HR: 86 (26 Dec 2018 04:15) (77 - 91)  BP: 107/78 (26 Dec 2018 00:04) (104/67 - 107/78)  BP(mean): --  RR: 18 (26 Dec 2018 00:04) (17 - 18)  SpO2: 100% (26 Dec 2018 04:15) (98% - 100%)    I&O's Detail    24 Dec 2018 07:01  -  25 Dec 2018 07:00  --------------------------------------------------------  IN:    Pivot: 720 mL  Total IN: 720 mL    OUT:  Total OUT: 0 mL    Total NET: 720 mL      25 Dec 2018 07:01  -  26 Dec 2018 06:47  --------------------------------------------------------  IN:    Enteral Tube Flush: 550 mL    Pivot: 660 mL  Total IN: 1210 mL    OUT:  Total OUT: 0 mL    Total NET: 1210 mL    Constitutional: patient resting comfortably in bed, in no acute distress  HEENT: Protruding tongue with no blood present (requires frequent reduction inside mouth with jaw thrust)   Neck: No JVD  Respiratory: CTAB with respirations are unlabored, no accessory muscle use, no conversational dyspnea  Cardiovascular: regular rate & rhythm  Gastrointestinal: PEG in place; abdomen soft, non-tender, non-distended, no rebound tenderness / guarding  Psychiatric: Normal mood, normal affect  Musculoskeletal: No joint pain, swelling or deformity    LABS:                        10.3   4.5   )-----------( 106      ( 24 Dec 2018 09:59 )             31.3     12-24    145  |  105  |  40.0<H>  ----------------------------<  118<H>  3.7   |  29.0  |  0.45<L>    Ca    11.6<H>      24 Dec 2018 09:59  Phos  4.3     12-24  Mg     1.3     12-24    MEDICATIONS  (STANDING):  acetaminophen  IVPB .. 1000 milliGRAM(s) IV Intermittent once  ALBUTerol    0.083% 2.5 milliGRAM(s) Nebulizer every 6 hours  ascorbic acid 500 milliGRAM(s) Oral daily  chlorhexidine 0.12% Liquid 15 milliLiter(s) Swish and Spit two times a day  folic acid 1 milliGRAM(s) Oral daily  levETIRAcetam  Solution 1000 milliGRAM(s) Oral two times a day  magnesium oxide 400 milliGRAM(s) Oral three times a day with meals  phytonadione   Solution 5 milliGRAM(s) Oral daily  propranolol 20 milliGRAM(s) Oral every 6 hours  senna Syrup 10 milliLiter(s) Oral daily  sodium chloride 1 Gram(s) Oral two times a day  thiamine 100 milliGRAM(s) Oral daily    MEDICATIONS  (PRN):  acetaminophen    Suspension .. 650 milliGRAM(s) Oral every 6 hours PRN Temp greater or equal to 38.5C (101.3F)  polyethylene glycol 3350 17 Gram(s) Oral two times a day PRN if not BM in the last 24 hours    MICRO:     STUDIES:
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
## 2018-12-27 RX ORDER — ENOXAPARIN SODIUM 100 MG/ML
40 INJECTION SUBCUTANEOUS DAILY
Qty: 0 | Refills: 0 | Status: DISCONTINUED | OUTPATIENT
Start: 2018-12-27 | End: 2018-12-31

## 2018-12-27 RX ADMIN — SODIUM CHLORIDE 1 GRAM(S): 9 INJECTION INTRAMUSCULAR; INTRAVENOUS; SUBCUTANEOUS at 17:11

## 2018-12-27 RX ADMIN — ALBUTEROL 2.5 MILLIGRAM(S): 90 AEROSOL, METERED ORAL at 08:53

## 2018-12-27 RX ADMIN — Medication 1 MILLIGRAM(S): at 11:15

## 2018-12-27 RX ADMIN — Medication 20 MILLIGRAM(S): at 11:16

## 2018-12-27 RX ADMIN — SENNA PLUS 10 MILLILITER(S): 8.6 TABLET ORAL at 11:16

## 2018-12-27 RX ADMIN — SODIUM CHLORIDE 1 GRAM(S): 9 INJECTION INTRAMUSCULAR; INTRAVENOUS; SUBCUTANEOUS at 05:57

## 2018-12-27 RX ADMIN — LEVETIRACETAM 1000 MILLIGRAM(S): 250 TABLET, FILM COATED ORAL at 05:57

## 2018-12-27 RX ADMIN — Medication 100 MILLIGRAM(S): at 11:15

## 2018-12-27 RX ADMIN — ALBUTEROL 2.5 MILLIGRAM(S): 90 AEROSOL, METERED ORAL at 21:21

## 2018-12-27 RX ADMIN — CHLORHEXIDINE GLUCONATE 15 MILLILITER(S): 213 SOLUTION TOPICAL at 05:57

## 2018-12-27 RX ADMIN — Medication 5 MILLIGRAM(S): at 11:18

## 2018-12-27 RX ADMIN — ENOXAPARIN SODIUM 40 MILLIGRAM(S): 100 INJECTION SUBCUTANEOUS at 11:15

## 2018-12-27 RX ADMIN — LEVETIRACETAM 1000 MILLIGRAM(S): 250 TABLET, FILM COATED ORAL at 17:11

## 2018-12-27 RX ADMIN — MAGNESIUM OXIDE 400 MG ORAL TABLET 400 MILLIGRAM(S): 241.3 TABLET ORAL at 11:16

## 2018-12-27 RX ADMIN — Medication 20 MILLIGRAM(S): at 05:57

## 2018-12-27 RX ADMIN — Medication 20 MILLIGRAM(S): at 17:12

## 2018-12-27 RX ADMIN — ALBUTEROL 2.5 MILLIGRAM(S): 90 AEROSOL, METERED ORAL at 03:46

## 2018-12-27 RX ADMIN — MAGNESIUM OXIDE 400 MG ORAL TABLET 400 MILLIGRAM(S): 241.3 TABLET ORAL at 05:57

## 2018-12-27 RX ADMIN — CHLORHEXIDINE GLUCONATE 15 MILLILITER(S): 213 SOLUTION TOPICAL at 17:11

## 2018-12-27 RX ADMIN — ALBUTEROL 2.5 MILLIGRAM(S): 90 AEROSOL, METERED ORAL at 15:57

## 2018-12-27 RX ADMIN — MAGNESIUM OXIDE 400 MG ORAL TABLET 400 MILLIGRAM(S): 241.3 TABLET ORAL at 17:11

## 2018-12-27 RX ADMIN — Medication 500 MILLIGRAM(S): at 11:15

## 2018-12-27 NOTE — PROGRESS NOTE ADULT - ASSESSMENT
45M with severe TBI    -Alternate level of care  -Court hearing scheduled for January 4th  -Repositioning while in bed for DTI prevention

## 2018-12-27 NOTE — CHART NOTE - NSCHARTNOTEFT_GEN_A_CORE
patient has #8 Ulises trach, routine trach care provided, on .40 CATC, trach suctioning done, SPO2 98 % on 40% Fio2

## 2018-12-27 NOTE — PROGRESS NOTE ADULT - SUBJECTIVE AND OBJECTIVE BOX
Subjective:  No acute events overnight. Mental status prohibits patient from being able to provide subjective complaints      MEDICATIONS  (STANDING):  acetaminophen  IVPB .. 1000 milliGRAM(s) IV Intermittent once  ALBUTerol    0.083% 2.5 milliGRAM(s) Nebulizer every 6 hours  ascorbic acid 500 milliGRAM(s) Oral daily  chlorhexidine 0.12% Liquid 15 milliLiter(s) Swish and Spit two times a day  enoxaparin Injectable 40 milliGRAM(s) SubCutaneous daily  folic acid 1 milliGRAM(s) Oral daily  levETIRAcetam  Solution 1000 milliGRAM(s) Oral two times a day  magnesium oxide 400 milliGRAM(s) Oral three times a day with meals  phytonadione   Solution 5 milliGRAM(s) Oral daily  propranolol 20 milliGRAM(s) Oral every 6 hours  senna Syrup 10 milliLiter(s) Oral daily  sodium chloride 1 Gram(s) Oral two times a day  thiamine 100 milliGRAM(s) Oral daily    MEDICATIONS  (PRN):  acetaminophen    Suspension .. 650 milliGRAM(s) Oral every 6 hours PRN Temp greater or equal to 38.5C (101.3F)  polyethylene glycol 3350 17 Gram(s) Oral two times a day PRN if not BM in the last 24 hours      Vital Signs Last 24 Hrs  T(C): 37.1 (27 Dec 2018 08:06), Max: 37.1 (27 Dec 2018 08:06)  T(F): 98.8 (27 Dec 2018 08:06), Max: 98.8 (27 Dec 2018 08:06)  HR: 75 (27 Dec 2018 08:58) (75 - 85)  BP: 105/61 (27 Dec 2018 08:06) (100/65 - 105/61)  BP(mean): --  RR: 19 (27 Dec 2018 08:06) (17 - 19)  SpO2: 98% (27 Dec 2018 08:58) (98% - 100%)    Physical Exam:    Constitutional: NAD  Neck: No JVD, FROM without pain  Respiratory: Respirations non-labored, no accessory muscle use  Cardiovascular: Regular rate & rhythm, S1, S2  Gastrointestinal: Soft, non-tender, non-distended  Neurological: Eyes open spontaneously but not tracking, withdraws to stimulus

## 2018-12-28 RX ADMIN — Medication 20 MILLIGRAM(S): at 17:20

## 2018-12-28 RX ADMIN — Medication 20 MILLIGRAM(S): at 05:19

## 2018-12-28 RX ADMIN — LEVETIRACETAM 1000 MILLIGRAM(S): 250 TABLET, FILM COATED ORAL at 17:20

## 2018-12-28 RX ADMIN — SODIUM CHLORIDE 1 GRAM(S): 9 INJECTION INTRAMUSCULAR; INTRAVENOUS; SUBCUTANEOUS at 05:19

## 2018-12-28 RX ADMIN — Medication 100 MILLIGRAM(S): at 13:18

## 2018-12-28 RX ADMIN — Medication 1 MILLIGRAM(S): at 13:18

## 2018-12-28 RX ADMIN — ENOXAPARIN SODIUM 40 MILLIGRAM(S): 100 INJECTION SUBCUTANEOUS at 13:18

## 2018-12-28 RX ADMIN — LEVETIRACETAM 1000 MILLIGRAM(S): 250 TABLET, FILM COATED ORAL at 05:19

## 2018-12-28 RX ADMIN — MAGNESIUM OXIDE 400 MG ORAL TABLET 400 MILLIGRAM(S): 241.3 TABLET ORAL at 13:18

## 2018-12-28 RX ADMIN — Medication 20 MILLIGRAM(S): at 13:18

## 2018-12-28 RX ADMIN — ALBUTEROL 2.5 MILLIGRAM(S): 90 AEROSOL, METERED ORAL at 21:20

## 2018-12-28 RX ADMIN — Medication 5 MILLIGRAM(S): at 13:18

## 2018-12-28 RX ADMIN — MAGNESIUM OXIDE 400 MG ORAL TABLET 400 MILLIGRAM(S): 241.3 TABLET ORAL at 05:20

## 2018-12-28 RX ADMIN — ALBUTEROL 2.5 MILLIGRAM(S): 90 AEROSOL, METERED ORAL at 15:34

## 2018-12-28 RX ADMIN — MAGNESIUM OXIDE 400 MG ORAL TABLET 400 MILLIGRAM(S): 241.3 TABLET ORAL at 17:20

## 2018-12-28 RX ADMIN — CHLORHEXIDINE GLUCONATE 15 MILLILITER(S): 213 SOLUTION TOPICAL at 05:20

## 2018-12-28 RX ADMIN — Medication 500 MILLIGRAM(S): at 13:18

## 2018-12-28 RX ADMIN — CHLORHEXIDINE GLUCONATE 15 MILLILITER(S): 213 SOLUTION TOPICAL at 17:20

## 2018-12-28 RX ADMIN — Medication 20 MILLIGRAM(S): at 00:27

## 2018-12-28 RX ADMIN — SODIUM CHLORIDE 1 GRAM(S): 9 INJECTION INTRAMUSCULAR; INTRAVENOUS; SUBCUTANEOUS at 17:20

## 2018-12-28 RX ADMIN — ALBUTEROL 2.5 MILLIGRAM(S): 90 AEROSOL, METERED ORAL at 08:20

## 2018-12-28 RX ADMIN — ALBUTEROL 2.5 MILLIGRAM(S): 90 AEROSOL, METERED ORAL at 02:50

## 2018-12-28 RX ADMIN — SENNA PLUS 10 MILLILITER(S): 8.6 TABLET ORAL at 13:18

## 2018-12-28 NOTE — CHART NOTE - NSCHARTNOTEFT_GEN_A_CORE
Upon Nutritional Assessment by the Registered Dietitian your patient was determined to meet criteria / has evidence of the following diagnosis/diagnoses:          [X] Severe Chronic Protein Calorie Malnutrition       Findings as based on:  •  Comprehensive nutrition assessment and consultation  •  Calorie counts (nutrient intake analysis)  •  Food acceptance and intake status from observations by staff  •  Follow up  •  Patient education  •  Intervention secondary to interdisciplinary rounds  •   concerns     Pt now with severe chronic malnutrition related to increased needs for healing 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 Currently no edema noted per documentation. Pt with multiple pressure injuries/wound sites (Wound to R. Clavicle; Stage II coccyx; suspected DTI R. scapula) requiring increased protein-energy needs for healing. Unsure of accuracy, though weights continue to trend down with 4.6kg loss in ~2 weeks (7.6%), consider increasing TF's to prevent further weight loss.       Treatment:    The following diet has been recommended:  1) Consider increase TF rate to 70ml/hr (x 20 hours) to provide 1400ml/day (2100kcal, 131gm protein) to meet increased needs and prevent further weight loss   2) C/W Prostat BID  3) MVI daily   4) Consider ZnSO4 220mg x 10 days to facilitate wound healing   5) Trending daily weights      PROVIDER Section:     By signing this assessment you are acknowledging and agree with the diagnosis/diagnoses assigned by the Registered Dietitian    Comments:

## 2018-12-28 NOTE — CHART NOTE - NSCHARTNOTEFT_GEN_A_CORE
Source: Patient [ ]  Family [ ]   other [X]  EMR and staff    Current Diet:   NPO with TF: Pivot 1.5 @ goal rate 60mL/hr    Patient reports [ ] nausea  [ ] vomiting [ ] diarrhea [ ] constipation  [ ]chewing problems [ ] swallowing issues  [ ] other:     PO intake:  < 50% [ ]   50-75%  [ ]   %  [ ]  other :    Source for PO intake [ ] Patient [ ] family [ ] chart [ ] staff [ ] other    Enteral /Parenteral Nutrition: current TF provides Pivot 1.5 @ 60mL/hr (x 20 hrs) to provide 1200mL daily (1800cal, 113g protein).     Current Weight:  (12/13)    62.1kg   (12/12)    64kg  (12/11)    57kg  (11/11)    83.2kg  (11/2)      87kg   (11/1)      82 kg   (10/27)    82kg     % Weight Change: 1.9kg weight loss noted. Weights continue to trend down.     Pertinent Medications: MEDICATIONS  (STANDING):  acetaminophen  IVPB .. 1000 milliGRAM(s) IV Intermittent once  ALBUTerol    0.083% 2.5 milliGRAM(s) Nebulizer every 6 hours  ascorbic acid 500 milliGRAM(s) Oral daily  chlorhexidine 0.12% Liquid 15 milliLiter(s) Swish and Spit two times a day  enoxaparin Injectable 40 milliGRAM(s) SubCutaneous daily  folic acid 1 milliGRAM(s) Oral daily  levETIRAcetam  Solution 1000 milliGRAM(s) Oral two times a day  magnesium oxide 400 milliGRAM(s) Oral three times a day with meals  phytonadione   Solution 5 milliGRAM(s) Oral daily  propranolol 20 milliGRAM(s) Oral every 6 hours  senna Syrup 10 milliLiter(s) Oral daily  sodium chloride 1 Gram(s) Oral two times a day  thiamine 100 milliGRAM(s) Oral daily    MEDICATIONS  (PRN):  acetaminophen    Suspension .. 650 milliGRAM(s) Oral every 6 hours PRN Temp greater or equal to 38.5C (101.3F)  polyethylene glycol 3350 17 Gram(s) Oral two times a day PRN if not BM in the last 24 hours    Pertinent Labs: No recent nutrition related labs    Skin: Wound to R. Clavicle; Stage II coccyx; suspected DTI R. scapula    Nutrition focused physical exam conducted - found signs of malnutrition [ ]absent [X]present    Subcutaneous fat loss: [ ] Orbital fat pads region, [ ]Buccal fat region, [ ]Triceps region,  [ ]Ribs region    Muscle wasting: [ ]Temples region, [X]Clavicle region, [ ]Shoulder region, [ ]Scapula region, [ ]Interosseous region,  [ ]thigh region, [ ]Calf region    Estimated Needs:   [X] no change since previous assessment  [ ] recalculated:     Current Nutrition Diagnosis: Pt now with severe acute malnutrition related to increased needs for healing 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 Currently no edema noted per documentation. Pt with multiple pressure injuries/wound sites requiring increased protein-energy needs. Weights continue to trend down, consider increasing TF's to prevent further weight loss.     Recommendations:   1) When medically feasible, consider increase TF rate to 70ml/hr (x 20 hours) to provide 1400ml/day (2100kcal, 131gm protein) to meet increased needs. c/w Prostat BID  2) MVI daily  3) Trend daily weights     Monitoring and Evaluation:   [ ] PO intake [ ] Tolerance to diet prescription [X] Weights  [X] Follow up per protocol [X] Labs: Source: Patient [ ]  Family [ ]   other [X]  EMR and staff    Current Diet:   NPO with TF: Pivot 1.5 @ goal rate 60mL/hr    Enteral /Parenteral Nutrition: Current TF provides Pivot 1.5 @ 60mL/hr (x 20 hrs) to provide 1200mL daily (1800cal, 113g protein).     Current Weight:  (12/28)    59.5kg RD bedscale weight  (12/13)    62.1kg   (12/12)    64kg  (12/11)    57kg  (11/11)    83.2kg  (11/2)      87kg   (11/1)      82 kg   (10/27)    82kg     % Weight Change: 2.6kg weight loss noted x ~2 weeks. Weights continue to trend down.     Pertinent Medications: MEDICATIONS  (STANDING):  acetaminophen  IVPB .. 1000 milliGRAM(s) IV Intermittent once  ALBUTerol    0.083% 2.5 milliGRAM(s) Nebulizer every 6 hours  ascorbic acid 500 milliGRAM(s) Oral daily  chlorhexidine 0.12% Liquid 15 milliLiter(s) Swish and Spit two times a day  enoxaparin Injectable 40 milliGRAM(s) SubCutaneous daily  folic acid 1 milliGRAM(s) Oral daily  levETIRAcetam  Solution 1000 milliGRAM(s) Oral two times a day  magnesium oxide 400 milliGRAM(s) Oral three times a day with meals  phytonadione   Solution 5 milliGRAM(s) Oral daily  propranolol 20 milliGRAM(s) Oral every 6 hours  senna Syrup 10 milliLiter(s) Oral daily  sodium chloride 1 Gram(s) Oral two times a day  thiamine 100 milliGRAM(s) Oral daily    MEDICATIONS  (PRN):  acetaminophen    Suspension .. 650 milliGRAM(s) Oral every 6 hours PRN Temp greater or equal to 38.5C (101.3F)  polyethylene glycol 3350 17 Gram(s) Oral two times a day PRN if not BM in the last 24 hours    Pertinent Labs: No recent nutrition related labs    Skin: Wound to R. Clavicle; Stage II coccyx; suspected DTI R. scapula    Nutrition focused physical exam conducted - found signs of malnutrition [ ]absent [X]present    Subcutaneous fat loss: [ ] Orbital fat pads region, [ ]Buccal fat region, [ ]Triceps region,  [ ]Ribs region    Muscle wasting: [ ]Temples region, [X]Clavicle region, [ ]Shoulder region, [ ]Scapula region, [ ]Interosseous region,  [ ]thigh region, [ ]Calf region    Estimated Needs:   [X] no change since previous assessment  [ ] recalculated:     Current Nutrition Diagnosis: Pt now with severe chronic malnutrition related to increased needs for healing 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 Currently no edema noted per documentation. Pt with multiple pressure injuries/wound sites requiring increased protein-energy needs. Weights continue to trend down with 2.6kg loss in ~2 weeks, consider increasing TF's to prevent further weight loss.     Recommendations:   1) When medically feasible, consider increase TF rate to 70ml/hr (x 20 hours) to provide 1400ml/day (2100kcal, 131gm protein) to meet increased needs. c/w Prostat BID  2) MVI daily  3) Trend daily weights   4) Consider ZnSO4 220mg x 10 days to facilitate wound healing     Monitoring and Evaluation:   [ ] PO intake [X] Tolerance to diet prescription [X] Weights  [X] Follow up per protocol [X] Labs:

## 2018-12-29 RX ADMIN — LEVETIRACETAM 1000 MILLIGRAM(S): 250 TABLET, FILM COATED ORAL at 05:00

## 2018-12-29 RX ADMIN — ALBUTEROL 2.5 MILLIGRAM(S): 90 AEROSOL, METERED ORAL at 15:45

## 2018-12-29 RX ADMIN — SENNA PLUS 10 MILLILITER(S): 8.6 TABLET ORAL at 14:22

## 2018-12-29 RX ADMIN — MAGNESIUM OXIDE 400 MG ORAL TABLET 400 MILLIGRAM(S): 241.3 TABLET ORAL at 14:22

## 2018-12-29 RX ADMIN — CHLORHEXIDINE GLUCONATE 15 MILLILITER(S): 213 SOLUTION TOPICAL at 16:54

## 2018-12-29 RX ADMIN — MAGNESIUM OXIDE 400 MG ORAL TABLET 400 MILLIGRAM(S): 241.3 TABLET ORAL at 16:55

## 2018-12-29 RX ADMIN — Medication 20 MILLIGRAM(S): at 00:48

## 2018-12-29 RX ADMIN — ENOXAPARIN SODIUM 40 MILLIGRAM(S): 100 INJECTION SUBCUTANEOUS at 14:21

## 2018-12-29 RX ADMIN — ALBUTEROL 2.5 MILLIGRAM(S): 90 AEROSOL, METERED ORAL at 02:42

## 2018-12-29 RX ADMIN — Medication 20 MILLIGRAM(S): at 14:23

## 2018-12-29 RX ADMIN — CHLORHEXIDINE GLUCONATE 15 MILLILITER(S): 213 SOLUTION TOPICAL at 05:00

## 2018-12-29 RX ADMIN — ALBUTEROL 2.5 MILLIGRAM(S): 90 AEROSOL, METERED ORAL at 09:36

## 2018-12-29 RX ADMIN — Medication 5 MILLIGRAM(S): at 14:21

## 2018-12-29 RX ADMIN — SODIUM CHLORIDE 1 GRAM(S): 9 INJECTION INTRAMUSCULAR; INTRAVENOUS; SUBCUTANEOUS at 05:00

## 2018-12-29 RX ADMIN — ALBUTEROL 2.5 MILLIGRAM(S): 90 AEROSOL, METERED ORAL at 21:02

## 2018-12-29 RX ADMIN — Medication 500 MILLIGRAM(S): at 14:21

## 2018-12-29 RX ADMIN — SODIUM CHLORIDE 1 GRAM(S): 9 INJECTION INTRAMUSCULAR; INTRAVENOUS; SUBCUTANEOUS at 16:53

## 2018-12-29 RX ADMIN — Medication 1 MILLIGRAM(S): at 14:22

## 2018-12-29 RX ADMIN — Medication 20 MILLIGRAM(S): at 16:54

## 2018-12-29 RX ADMIN — Medication 100 MILLIGRAM(S): at 14:22

## 2018-12-29 RX ADMIN — Medication 20 MILLIGRAM(S): at 05:00

## 2018-12-29 RX ADMIN — LEVETIRACETAM 1000 MILLIGRAM(S): 250 TABLET, FILM COATED ORAL at 16:54

## 2018-12-30 RX ADMIN — CHLORHEXIDINE GLUCONATE 15 MILLILITER(S): 213 SOLUTION TOPICAL at 05:09

## 2018-12-30 RX ADMIN — MAGNESIUM OXIDE 400 MG ORAL TABLET 400 MILLIGRAM(S): 241.3 TABLET ORAL at 11:02

## 2018-12-30 RX ADMIN — Medication 500 MILLIGRAM(S): at 11:03

## 2018-12-30 RX ADMIN — Medication 20 MILLIGRAM(S): at 11:04

## 2018-12-30 RX ADMIN — Medication 100 MILLIGRAM(S): at 11:03

## 2018-12-30 RX ADMIN — SODIUM CHLORIDE 1 GRAM(S): 9 INJECTION INTRAMUSCULAR; INTRAVENOUS; SUBCUTANEOUS at 18:04

## 2018-12-30 RX ADMIN — Medication 1 MILLIGRAM(S): at 11:03

## 2018-12-30 RX ADMIN — ENOXAPARIN SODIUM 40 MILLIGRAM(S): 100 INJECTION SUBCUTANEOUS at 11:03

## 2018-12-30 RX ADMIN — Medication 20 MILLIGRAM(S): at 05:09

## 2018-12-30 RX ADMIN — MAGNESIUM OXIDE 400 MG ORAL TABLET 400 MILLIGRAM(S): 241.3 TABLET ORAL at 18:03

## 2018-12-30 RX ADMIN — SODIUM CHLORIDE 1 GRAM(S): 9 INJECTION INTRAMUSCULAR; INTRAVENOUS; SUBCUTANEOUS at 05:09

## 2018-12-30 RX ADMIN — Medication 20 MILLIGRAM(S): at 00:51

## 2018-12-30 RX ADMIN — ALBUTEROL 2.5 MILLIGRAM(S): 90 AEROSOL, METERED ORAL at 21:04

## 2018-12-30 RX ADMIN — SENNA PLUS 10 MILLILITER(S): 8.6 TABLET ORAL at 11:03

## 2018-12-30 RX ADMIN — CHLORHEXIDINE GLUCONATE 15 MILLILITER(S): 213 SOLUTION TOPICAL at 18:05

## 2018-12-30 RX ADMIN — ALBUTEROL 2.5 MILLIGRAM(S): 90 AEROSOL, METERED ORAL at 04:08

## 2018-12-30 RX ADMIN — Medication 20 MILLIGRAM(S): at 18:05

## 2018-12-30 RX ADMIN — LEVETIRACETAM 1000 MILLIGRAM(S): 250 TABLET, FILM COATED ORAL at 05:09

## 2018-12-30 RX ADMIN — Medication 5 MILLIGRAM(S): at 11:02

## 2018-12-30 RX ADMIN — ALBUTEROL 2.5 MILLIGRAM(S): 90 AEROSOL, METERED ORAL at 15:50

## 2018-12-30 RX ADMIN — LEVETIRACETAM 1000 MILLIGRAM(S): 250 TABLET, FILM COATED ORAL at 18:04

## 2018-12-30 RX ADMIN — ALBUTEROL 2.5 MILLIGRAM(S): 90 AEROSOL, METERED ORAL at 09:45

## 2018-12-31 DIAGNOSIS — R09.89 OTHER SPECIFIED SYMPTOMS AND SIGNS INVOLVING THE CIRCULATORY AND RESPIRATORY SYSTEMS: ICD-10-CM

## 2018-12-31 LAB
ANION GAP SERPL CALC-SCNC: 13 MMOL/L — SIGNIFICANT CHANGE UP (ref 5–17)
BUN SERPL-MCNC: 36 MG/DL — HIGH (ref 8–20)
CALCIUM SERPL-MCNC: 11.9 MG/DL — HIGH (ref 8.6–10.2)
CHLORIDE SERPL-SCNC: 103 MMOL/L — SIGNIFICANT CHANGE UP (ref 98–107)
CO2 SERPL-SCNC: 30 MMOL/L — HIGH (ref 22–29)
CREAT SERPL-MCNC: 0.6 MG/DL — SIGNIFICANT CHANGE UP (ref 0.5–1.3)
EOSINOPHIL # BLD AUTO: 0.2 K/UL — SIGNIFICANT CHANGE UP (ref 0–0.5)
EOSINOPHIL NFR BLD AUTO: 4.1 % — SIGNIFICANT CHANGE UP (ref 0–5)
GLUCOSE SERPL-MCNC: 107 MG/DL — SIGNIFICANT CHANGE UP (ref 70–115)
HCT VFR BLD CALC: 28.7 % — LOW (ref 42–52)
HGB BLD-MCNC: 9.6 G/DL — LOW (ref 14–18)
LYMPHOCYTES # BLD AUTO: 1.6 K/UL — SIGNIFICANT CHANGE UP (ref 1–4.8)
LYMPHOCYTES # BLD AUTO: 37.8 % — SIGNIFICANT CHANGE UP (ref 20–55)
MAGNESIUM SERPL-MCNC: 1.4 MG/DL — LOW (ref 1.6–2.6)
MCHC RBC-ENTMCNC: 29.4 PG — SIGNIFICANT CHANGE UP (ref 27–31)
MCHC RBC-ENTMCNC: 33.4 G/DL — SIGNIFICANT CHANGE UP (ref 32–36)
MCV RBC AUTO: 88 FL — SIGNIFICANT CHANGE UP (ref 80–94)
MONOCYTES # BLD AUTO: 0.5 K/UL — SIGNIFICANT CHANGE UP (ref 0–0.8)
MONOCYTES NFR BLD AUTO: 11.7 % — HIGH (ref 3–10)
NEUTROPHILS # BLD AUTO: 1.9 K/UL — SIGNIFICANT CHANGE UP (ref 1.8–8)
NEUTROPHILS NFR BLD AUTO: 46.2 % — SIGNIFICANT CHANGE UP (ref 37–73)
PHOSPHATE SERPL-MCNC: 4.7 MG/DL — SIGNIFICANT CHANGE UP (ref 2.4–4.7)
PLATELET # BLD AUTO: 124 K/UL — LOW (ref 150–400)
POTASSIUM SERPL-MCNC: 3.7 MMOL/L — SIGNIFICANT CHANGE UP (ref 3.5–5.3)
POTASSIUM SERPL-SCNC: 3.7 MMOL/L — SIGNIFICANT CHANGE UP (ref 3.5–5.3)
RBC # BLD: 3.26 M/UL — LOW (ref 4.6–6.2)
RBC # FLD: 15 % — SIGNIFICANT CHANGE UP (ref 11–15.6)
SODIUM SERPL-SCNC: 146 MMOL/L — HIGH (ref 135–145)
WBC # BLD: 4.2 K/UL — LOW (ref 4.8–10.8)
WBC # FLD AUTO: 4.2 K/UL — LOW (ref 4.8–10.8)

## 2018-12-31 PROCEDURE — 99232 SBSQ HOSP IP/OBS MODERATE 35: CPT

## 2018-12-31 RX ORDER — MORPHINE SULFATE 50 MG/1
1 CAPSULE, EXTENDED RELEASE ORAL
Qty: 0 | Refills: 0 | Status: DISCONTINUED | OUTPATIENT
Start: 2018-12-31 | End: 2018-12-31

## 2018-12-31 RX ORDER — MORPHINE SULFATE 50 MG/1
2 CAPSULE, EXTENDED RELEASE ORAL EVERY 4 HOURS
Qty: 0 | Refills: 0 | Status: DISCONTINUED | OUTPATIENT
Start: 2018-12-31 | End: 2019-01-04

## 2018-12-31 RX ORDER — SODIUM CHLORIDE 9 MG/ML
1000 INJECTION, SOLUTION INTRAVENOUS
Qty: 0 | Refills: 0 | Status: DISCONTINUED | OUTPATIENT
Start: 2018-12-31 | End: 2018-12-31

## 2018-12-31 RX ORDER — MAGNESIUM SULFATE 500 MG/ML
4 VIAL (ML) INJECTION ONCE
Qty: 0 | Refills: 0 | Status: COMPLETED | OUTPATIENT
Start: 2018-12-31 | End: 2018-12-31

## 2018-12-31 RX ORDER — MORPHINE SULFATE 50 MG/1
1 CAPSULE, EXTENDED RELEASE ORAL EVERY 4 HOURS
Qty: 0 | Refills: 0 | Status: DISCONTINUED | OUTPATIENT
Start: 2018-12-31 | End: 2018-12-31

## 2018-12-31 RX ORDER — ROBINUL 0.2 MG/ML
0.1 INJECTION INTRAMUSCULAR; INTRAVENOUS EVERY 4 HOURS
Qty: 0 | Refills: 0 | Status: DISCONTINUED | OUTPATIENT
Start: 2018-12-31 | End: 2019-01-08

## 2018-12-31 RX ORDER — SODIUM CHLORIDE 9 MG/ML
1 INJECTION INTRAMUSCULAR; INTRAVENOUS; SUBCUTANEOUS DAILY
Qty: 0 | Refills: 0 | Status: DISCONTINUED | OUTPATIENT
Start: 2018-12-31 | End: 2018-12-31

## 2018-12-31 RX ADMIN — CHLORHEXIDINE GLUCONATE 15 MILLILITER(S): 213 SOLUTION TOPICAL at 05:00

## 2018-12-31 RX ADMIN — LEVETIRACETAM 1000 MILLIGRAM(S): 250 TABLET, FILM COATED ORAL at 05:00

## 2018-12-31 RX ADMIN — Medication 500 MILLIGRAM(S): at 11:37

## 2018-12-31 RX ADMIN — Medication 100 GRAM(S): at 11:37

## 2018-12-31 RX ADMIN — Medication 20 MILLIGRAM(S): at 00:03

## 2018-12-31 RX ADMIN — Medication 20 MILLIGRAM(S): at 05:00

## 2018-12-31 RX ADMIN — Medication 5 MILLIGRAM(S): at 12:48

## 2018-12-31 RX ADMIN — Medication 100 MILLIGRAM(S): at 11:36

## 2018-12-31 RX ADMIN — ALBUTEROL 2.5 MILLIGRAM(S): 90 AEROSOL, METERED ORAL at 09:06

## 2018-12-31 RX ADMIN — MAGNESIUM OXIDE 400 MG ORAL TABLET 400 MILLIGRAM(S): 241.3 TABLET ORAL at 07:57

## 2018-12-31 RX ADMIN — Medication 1 MILLIGRAM(S): at 11:36

## 2018-12-31 RX ADMIN — SODIUM CHLORIDE 1 GRAM(S): 9 INJECTION INTRAMUSCULAR; INTRAVENOUS; SUBCUTANEOUS at 11:37

## 2018-12-31 RX ADMIN — SENNA PLUS 10 MILLILITER(S): 8.6 TABLET ORAL at 11:36

## 2018-12-31 RX ADMIN — ENOXAPARIN SODIUM 40 MILLIGRAM(S): 100 INJECTION SUBCUTANEOUS at 11:37

## 2018-12-31 RX ADMIN — CHLORHEXIDINE GLUCONATE 15 MILLILITER(S): 213 SOLUTION TOPICAL at 18:25

## 2018-12-31 RX ADMIN — ALBUTEROL 2.5 MILLIGRAM(S): 90 AEROSOL, METERED ORAL at 04:27

## 2018-12-31 RX ADMIN — MAGNESIUM OXIDE 400 MG ORAL TABLET 400 MILLIGRAM(S): 241.3 TABLET ORAL at 11:36

## 2018-12-31 RX ADMIN — SODIUM CHLORIDE 1 GRAM(S): 9 INJECTION INTRAMUSCULAR; INTRAVENOUS; SUBCUTANEOUS at 05:00

## 2018-12-31 RX ADMIN — Medication 20 MILLIGRAM(S): at 11:37

## 2018-12-31 RX ADMIN — LEVETIRACETAM 1000 MILLIGRAM(S): 250 TABLET, FILM COATED ORAL at 18:25
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
## 2018-12-31 NOTE — CHART NOTE - NSCHARTNOTEFT_GEN_A_CORE
Patient's uncle Mr. Hung Clemente was contacted at [phone] to inform that the patient was tachycardic and hypotensive. As a result the patient was going to be placed on comfort measures only. Mr. Hung Clemente agreed with this plan and stated that he might try to come see the patient tomorrow morning. I informed Mr. Hung Clemente that we would communicate any further updates as they occurred.

## 2018-12-31 NOTE — PROGRESS NOTE ADULT - SUBJECTIVE AND OBJECTIVE BOX
INTERVAL HPI/OVERNIGHT EVENTS:    Patient with once weekly progress notes and once weekly lab draws; No acute events over the past week. No acute events overnight. Subjective information unable to be elicited given patient's mental status. Tolerating TFs.     MEDICATIONS  (STANDING):  acetaminophen  IVPB .. 1000 milliGRAM(s) IV Intermittent once  ALBUTerol    0.083% 2.5 milliGRAM(s) Nebulizer every 6 hours  ascorbic acid 500 milliGRAM(s) Oral daily  chlorhexidine 0.12% Liquid 15 milliLiter(s) Swish and Spit two times a day  enoxaparin Injectable 40 milliGRAM(s) SubCutaneous daily  folic acid 1 milliGRAM(s) Oral daily  levETIRAcetam  Solution 1000 milliGRAM(s) Oral two times a day  magnesium oxide 400 milliGRAM(s) Oral three times a day with meals  phytonadione   Solution 5 milliGRAM(s) Oral daily  propranolol 20 milliGRAM(s) Oral every 6 hours  senna Syrup 10 milliLiter(s) Oral daily  sodium chloride 1 Gram(s) Oral two times a day  thiamine 100 milliGRAM(s) Oral daily    MEDICATIONS  (PRN):  acetaminophen    Suspension .. 650 milliGRAM(s) Oral every 6 hours PRN Temp greater or equal to 38.5C (101.3F)  polyethylene glycol 3350 17 Gram(s) Oral two times a day PRN if not BM in the last 24 hours      Vital Signs Last 24 Hrs  T(C): 37.1 (30 Dec 2018 23:39), Max: 37.1 (30 Dec 2018 23:39)  T(F): 98.7 (30 Dec 2018 23:39), Max: 98.7 (30 Dec 2018 23:39)  HR: 80 (31 Dec 2018 04:28) (78 - 88)  BP: 120/85 (30 Dec 2018 23:39) (120/85 - 122/84)  BP(mean): --  RR: 18 (30 Dec 2018 23:39) (18 - 19)  SpO2: 100% (31 Dec 2018 04:28) (95% - 100%)    PE  Constitutional: patient resting comfortably in bed, in no acute distress  HEENT: Protruding tongue with no blood present (requires frequent reduction inside mouth with jaw thrust)   Neck: No JVD  Respiratory: CTAB with respirations are unlabored, no accessory muscle use, no conversational dyspnea  Cardiovascular: regular rate & rhythm  Gastrointestinal: PEG in place; abdomen soft, non-tender, non-distended, no rebound tenderness / guarding  Psychiatric: Normal mood, normal affect  Musculoskeletal: No joint pain, swelling or deformity    I&O's Detail    30 Dec 2018 07:01  -  31 Dec 2018 07:00  --------------------------------------------------------  IN:    Pivot: 720 mL  Total IN: 720 mL    OUT:  Total OUT: 0 mL    Total NET: 720 mL

## 2019-01-01 RX ADMIN — LEVETIRACETAM 1000 MILLIGRAM(S): 250 TABLET, FILM COATED ORAL at 06:14

## 2019-01-01 RX ADMIN — LEVETIRACETAM 1000 MILLIGRAM(S): 250 TABLET, FILM COATED ORAL at 18:57

## 2019-01-01 RX ADMIN — CHLORHEXIDINE GLUCONATE 15 MILLILITER(S): 213 SOLUTION TOPICAL at 06:14

## 2019-01-01 RX ADMIN — CHLORHEXIDINE GLUCONATE 15 MILLILITER(S): 213 SOLUTION TOPICAL at 18:58

## 2019-01-01 NOTE — PROGRESS NOTE ADULT - SUBJECTIVE AND OBJECTIVE BOX
INTERVAL HPI/OVERNIGHT EVENTS:  Patient was hypotensive and tachycardic overnight. Patient was made Comfort measures only and his uncle was notified. This AM the patient was seen on  saturating at 95%, tachy at the low 100s, not in acute distress.      MEDICATIONS  (STANDING):  acetaminophen  IVPB .. 1000 milliGRAM(s) IV Intermittent once  chlorhexidine 0.12% Liquid 15 milliLiter(s) Swish and Spit two times a day  levETIRAcetam  Solution 1000 milliGRAM(s) Oral two times a day    MEDICATIONS  (PRN):  glycopyrrolate Injectable 0.1 milliGRAM(s) IV Push every 4 hours PRN oral secretions  LORazepam   Injectable 0.5 milliGRAM(s) IV Push every 4 hours PRN Agitation  morphine  - Injectable 2 milliGRAM(s) IV Push every 4 hours PRN pain or air hunger      Vital Signs Last 24 Hrs  T(C): 37.4 (01 Jan 2019 02:03), Max: 39.4 (31 Dec 2018 16:10)  T(F): 99.3 (01 Jan 2019 02:03), Max: 102.9 (31 Dec 2018 16:10)  HR: 109 (01 Jan 2019 02:03) (87 - 126)  BP: 71/53 (01 Jan 2019 02:03) (71/53 - 94/60)  BP(mean): --  RR: 18 (01 Jan 2019 02:03) (18 - 19)  SpO2: 100% (01 Jan 2019 02:03) (98% - 100%)    PE  Gen: on comfort measures only, not in acute distress  Pulm: non-labored breathing  CV: tachycadic,  Abd: Soft, non-distended  Ext: warm  Vasc: 2+ radial pulses      I&O's Detail    31 Dec 2018 07:01  -  01 Jan 2019 07:00  --------------------------------------------------------  IN:    Pivot: 600 mL    Solution: 100 mL  Total IN: 700 mL    OUT:  Total OUT: 0 mL    Total NET: 700 mL          LABS:                        9.6    4.2   )-----------( 124      ( 31 Dec 2018 09:01 )             28.7     12-31    146<H>  |  103  |  36.0<H>  ----------------------------<  107  3.7   |  30.0<H>  |  0.60    Ca    11.9<H>      31 Dec 2018 09:01  Phos  4.7     12-31  Mg     1.4     12-31            RADIOLOGY & ADDITIONAL STUDIES:

## 2019-01-01 NOTE — PROGRESS NOTE ADULT - ASSESSMENT
A/P: Patient tachycardic and hypotensive currently on comfort care only measures in agreement with family wishes and previously established goals of care.    -Continue Comfort care measures  -monitors vitals per routine   -notify surgery team of any changes in status  -will continue to follow

## 2019-01-02 PROCEDURE — 99232 SBSQ HOSP IP/OBS MODERATE 35: CPT

## 2019-01-02 RX ORDER — MORPHINE SULFATE 50 MG/1
0.5 CAPSULE, EXTENDED RELEASE ORAL
Qty: 100 | Refills: 0 | Status: DISCONTINUED | OUTPATIENT
Start: 2019-01-02 | End: 2019-01-03

## 2019-01-02 RX ADMIN — MORPHINE SULFATE 0.5 MG/HR: 50 CAPSULE, EXTENDED RELEASE ORAL at 06:33

## 2019-01-02 RX ADMIN — CHLORHEXIDINE GLUCONATE 15 MILLILITER(S): 213 SOLUTION TOPICAL at 05:52

## 2019-01-02 RX ADMIN — LEVETIRACETAM 1000 MILLIGRAM(S): 250 TABLET, FILM COATED ORAL at 05:52

## 2019-01-02 RX ADMIN — MORPHINE SULFATE 2 MILLIGRAM(S): 50 CAPSULE, EXTENDED RELEASE ORAL at 06:07

## 2019-01-02 RX ADMIN — MORPHINE SULFATE 2 MILLIGRAM(S): 50 CAPSULE, EXTENDED RELEASE ORAL at 05:52

## 2019-01-02 NOTE — PROGRESS NOTE ADULT - ASSESSMENT
45 year old male admitted on 10/24 s/p hemicraniectomy, decompression and EVD placement. S/P trach and peg placement. Course complicated by seizures, sepsis due to ventilator associated pneumonia, severe anemia s/p transfusion, ?cholecystitis s/p diagnostic paracentesis 11/5 to rule SBP. Subsequently downgraded to medical floor. Code sepsis on 12/5 for fever, tachycardia, oxygen desaturation. Many family meetings held by surgical team and palliative with Uncle Hung (next of kin) - most recent on 12/11, decision made for no further escalation of care, complete course of antibiotics for bacteremia and if pt should clinically decompensate - decision for transition to comfort measures at that time. Updated by team on 12/31, pt is hemodynamically unstable ?reinfection and per their discussion with Uncle, decision made to transition to comfort measures. I called and spoke with Uncle to follow up and provide support; again he confirmed above decision. Palliative following to provide support at EOL.

## 2019-01-02 NOTE — PROGRESS NOTE ADULT - SUBJECTIVE AND OBJECTIVE BOX
SUBJECTIVE: Patient was made Comfort measures yesterday. This AM the patient was seen this morning, resting comfortably, tachy at the low 100s, not in acute distress.          MEDICATIONS  (STANDING):  acetaminophen  IVPB .. 1000 milliGRAM(s) IV Intermittent once  chlorhexidine 0.12% Liquid 15 milliLiter(s) Swish and Spit two times a day  levETIRAcetam  Solution 1000 milliGRAM(s) Oral two times a day    MEDICATIONS  (PRN):  glycopyrrolate Injectable 0.1 milliGRAM(s) IV Push every 4 hours PRN oral secretions  LORazepam   Injectable 0.5 milliGRAM(s) IV Push every 4 hours PRN Agitation  morphine  - Injectable 2 milliGRAM(s) IV Push every 4 hours PRN pain or air hunger      Vital Signs Last 24 Hrs  T(C): 37.3 (01 Jan 2019 23:25), Max: 37.3 (01 Jan 2019 23:25)  T(F): 99.1 (01 Jan 2019 23:25), Max: 99.1 (01 Jan 2019 23:25)  HR: 105 (01 Jan 2019 23:25) (105 - 105)  BP: 102/67 (01 Jan 2019 23:25) (102/67 - 102/67)  BP(mean): --  RR: 20 (01 Jan 2019 23:25) (20 - 20)  SpO2: 100% (01 Jan 2019 23:25) (100% - 100%)    PE  Gen: on comfort measures only, not in acute distress  Pulm: non-labored breathing  CV: tachycadic,  Abd: Soft, non-distended  Ext: warm  Vasc: 2+ radial pulses    I&O's Detail    31 Dec 2018 07:01  -  01 Jan 2019 07:00  --------------------------------------------------------  IN:    Pivot: 600 mL    Solution: 100 mL  Total IN: 700 mL    OUT:  Total OUT: 0 mL    Total NET: 700 mL          LABS:                        9.6    4.2   )-----------( 124      ( 31 Dec 2018 09:01 )             28.7     12-31    146<H>  |  103  |  36.0<H>  ----------------------------<  107  3.7   |  30.0<H>  |  0.60    Ca    11.9<H>      31 Dec 2018 09:01  Phos  4.7     12-31  Mg     1.4     12-31            RADIOLOGY & ADDITIONAL STUDIES:

## 2019-01-02 NOTE — CHART NOTE - NSCHARTNOTEFT_GEN_A_CORE
Source: Patient [ ]  Family [ ]   other [X] EMR and staff    Current Diet: None    Current Weight:   (12/28)    59.5kg RD bedscale weight  (12/13)    62.1kg   (12/12)    64kg  (12/11)    57kg  (11/11)    83.2kg  (11/2)      87kg   (11/1)      82 kg   (10/27)    82kg     % Weight Change: No recent updated weight     Pertinent Medications: MEDICATIONS  (STANDING):  acetaminophen  IVPB .. 1000 milliGRAM(s) IV Intermittent once  morphine  Infusion 0.5 mG/Hr (0.5 mL/Hr) IV Continuous <Continuous>    MEDICATIONS  (PRN):  glycopyrrolate Injectable 0.1 milliGRAM(s) IV Push every 4 hours PRN oral secretions  LORazepam   Injectable 0.5 milliGRAM(s) IV Push every 4 hours PRN Agitation  morphine  - Injectable 2 milliGRAM(s) IV Push every 4 hours PRN pain or air hunger    Pertinent Labs: No recent nutrition-related labs    Skin: Wound to R. Clavicle; Stage II coccyx; suspected DTI R. scapula    Nutrition focused physical exam conducted - found signs of malnutrition [ ]absent [X]present    Subcutaneous fat loss: [ ] Orbital fat pads region, [ ]Buccal fat region, [ ]Triceps region,  [ ]Ribs region    Muscle wasting: [ ]Temples region, [X]Clavicle region, [ ]Shoulder region, [ ]Scapula region, [ ]Interosseous region,  [ ]thigh region, [ ]Calf region    Estimated Needs:   [X] no change since previous assessment  [ ] recalculated:     Current Nutrition Diagnosis: Pt now with severe chronic malnutrition related to increased needs for healing 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 Weights continue to trend down with 2.6kg loss in ~2 weeks. Aware comfort measures only- TF stopped.     Recommendations:   RD to provide Rx based on Pt/family wishes

## 2019-01-02 NOTE — PROGRESS NOTE ADULT - SUBJECTIVE AND OBJECTIVE BOX
CC: Follow up, symptom management on comfort measures      INTERVAL HPI/OVERNIGHT EVENTS:  Pt seen and examined earlier this morning with medical resident.   Pt transitioned to comfort measures on 12/31 per family wish.   He is resting comfortable and in no acute distress; nonpurposeful opening of eyes.   He was started on morphine infusion this AM, running at 1 mg/hr during my visit.     ROS limited due to underlying TBI.    MEDICATIONS  (STANDING):  acetaminophen  IVPB .. 1000 milliGRAM(s) IV Intermittent once  morphine  Infusion 0.5 mG/Hr (0.5 mL/Hr) IV Continuous <Continuous>    MEDICATIONS  (PRN):  glycopyrrolate Injectable 0.1 milliGRAM(s) IV Push every 4 hours PRN oral secretions  LORazepam   Injectable 0.5 milliGRAM(s) IV Push every 4 hours PRN Agitation  morphine  - Injectable 2 milliGRAM(s) IV Push every 4 hours PRN pain or air hunger    PHYSICAL EXAM:    Vital Signs Last 24 Hrs  T(C): 39.4 (31 Dec 2018 16:10), Max: 39.4 (31 Dec 2018 16:10)  T(F): 102.9 (31 Dec 2018 16:10), Max: 102.9 (31 Dec 2018 16:10)  HR: 126 (31 Dec 2018 16:10) (80 - 126)  BP: 94/60 (31 Dec 2018 16:10) (90/58 - 120/85)  BP(mean): --  RR: 19 (31 Dec 2018 16:10) (18 - 19)  SpO2: 98% (31 Dec 2018 16:10) (97% - 100%)    General: Resting comfortably. No acute distress.  HEENT: mucous membrane moist. concavity at surgical site. +trach  Lungs: coarse breath sounds. no rales, rhonchi, wheezing. non-labored.   CV: +s1/s2. Regular rate and rhythm.    GI: +bowel sound. abdomen soft, non-tender, non-distended,+PEG.   MSK: No cyanosis or edema.   Neuro: nonverbal, unresponsive  Skin: warm and dry.     LABS:                        9.6    4.2   )-----------( 124      ( 31 Dec 2018 09:01 )             28.7   12-31    146<H>  |  103  |  36.0<H>  ----------------------------<  107  3.7   |  30.0<H>  |  0.60    Ca    11.9<H>      31 Dec 2018 09:01  Phos  4.7     12-31  Mg     1.4     12-31      I&O's Summary    30 Dec 2018 07:01  -  31 Dec 2018 07:00  --------------------------------------------------------  IN: 720 mL / OUT: 0 mL / NET: 720 mL    RADIOLOGY & ADDITIONAL STUDIES: Reviewed, no new recent studies    ADVANCE DIRECTIVES:   DNR YES NO  Completed on:                     TAVIA  YES NO   Completed on:  Living Will  YES NO   Completed on:

## 2019-01-03 LAB
ALBUMIN SERPL ELPH-MCNC: 3.1 G/DL — LOW (ref 3.3–5.2)
ALP SERPL-CCNC: 154 U/L — HIGH (ref 40–120)
ALT FLD-CCNC: 22 U/L — SIGNIFICANT CHANGE UP
ANION GAP SERPL CALC-SCNC: 14 MMOL/L — SIGNIFICANT CHANGE UP (ref 5–17)
AST SERPL-CCNC: 36 U/L — SIGNIFICANT CHANGE UP
BASOPHILS # BLD AUTO: 0 K/UL — SIGNIFICANT CHANGE UP (ref 0–0.2)
BASOPHILS NFR BLD AUTO: 0.1 % — SIGNIFICANT CHANGE UP (ref 0–2)
BILIRUB SERPL-MCNC: 1.6 MG/DL — SIGNIFICANT CHANGE UP (ref 0.4–2)
BUN SERPL-MCNC: 52 MG/DL — HIGH (ref 8–20)
CALCIUM SERPL-MCNC: 11.6 MG/DL — HIGH (ref 8.6–10.2)
CHLORIDE SERPL-SCNC: 112 MMOL/L — HIGH (ref 98–107)
CO2 SERPL-SCNC: 25 MMOL/L — SIGNIFICANT CHANGE UP (ref 22–29)
CREAT SERPL-MCNC: 0.94 MG/DL — SIGNIFICANT CHANGE UP (ref 0.5–1.3)
EOSINOPHIL # BLD AUTO: 0 K/UL — SIGNIFICANT CHANGE UP (ref 0–0.5)
EOSINOPHIL NFR BLD AUTO: 0.3 % — SIGNIFICANT CHANGE UP (ref 0–5)
GLUCOSE SERPL-MCNC: 113 MG/DL — SIGNIFICANT CHANGE UP (ref 70–115)
HCT VFR BLD CALC: 26.5 % — LOW (ref 42–52)
HGB BLD-MCNC: 8.4 G/DL — LOW (ref 14–18)
LYMPHOCYTES # BLD AUTO: 1.6 K/UL — SIGNIFICANT CHANGE UP (ref 1–4.8)
LYMPHOCYTES # BLD AUTO: 20.2 % — SIGNIFICANT CHANGE UP (ref 20–55)
MCHC RBC-ENTMCNC: 28.9 PG — SIGNIFICANT CHANGE UP (ref 27–31)
MCHC RBC-ENTMCNC: 31.7 G/DL — LOW (ref 32–36)
MCV RBC AUTO: 91.1 FL — SIGNIFICANT CHANGE UP (ref 80–94)
MONOCYTES # BLD AUTO: 0.6 K/UL — SIGNIFICANT CHANGE UP (ref 0–0.8)
MONOCYTES NFR BLD AUTO: 8.3 % — SIGNIFICANT CHANGE UP (ref 3–10)
NEUTROPHILS # BLD AUTO: 5.6 K/UL — SIGNIFICANT CHANGE UP (ref 1.8–8)
NEUTROPHILS NFR BLD AUTO: 71 % — SIGNIFICANT CHANGE UP (ref 37–73)
PLATELET # BLD AUTO: 156 K/UL — SIGNIFICANT CHANGE UP (ref 150–400)
POTASSIUM SERPL-MCNC: 3.3 MMOL/L — LOW (ref 3.5–5.3)
POTASSIUM SERPL-SCNC: 3.3 MMOL/L — LOW (ref 3.5–5.3)
PROT SERPL-MCNC: 8.9 G/DL — HIGH (ref 6.6–8.7)
RBC # BLD: 2.91 M/UL — LOW (ref 4.6–6.2)
RBC # FLD: 15.3 % — SIGNIFICANT CHANGE UP (ref 11–15.6)
SODIUM SERPL-SCNC: 151 MMOL/L — HIGH (ref 135–145)
WBC # BLD: 7.8 K/UL — SIGNIFICANT CHANGE UP (ref 4.8–10.8)
WBC # FLD AUTO: 7.8 K/UL — SIGNIFICANT CHANGE UP (ref 4.8–10.8)

## 2019-01-03 PROCEDURE — 99232 SBSQ HOSP IP/OBS MODERATE 35: CPT

## 2019-01-03 RX ORDER — SODIUM CHLORIDE 9 MG/ML
1000 INJECTION INTRAMUSCULAR; INTRAVENOUS; SUBCUTANEOUS ONCE
Qty: 0 | Refills: 0 | Status: DISCONTINUED | OUTPATIENT
Start: 2019-01-03 | End: 2019-01-03

## 2019-01-03 RX ORDER — SODIUM CHLORIDE 9 MG/ML
2000 INJECTION INTRAMUSCULAR; INTRAVENOUS; SUBCUTANEOUS ONCE
Qty: 0 | Refills: 0 | Status: COMPLETED | OUTPATIENT
Start: 2019-01-03 | End: 2019-01-03

## 2019-01-03 RX ADMIN — SODIUM CHLORIDE 1000 MILLILITER(S): 9 INJECTION INTRAMUSCULAR; INTRAVENOUS; SUBCUTANEOUS at 11:46

## 2019-01-03 NOTE — PROGRESS NOTE ADULT - SUBJECTIVE AND OBJECTIVE BOX
SUBJECTIVE: Patient is Comfort Measures. This AM the patient was seen this morning, resting comfortably, not in acute distress.    Vital Signs Last 24 Hrs  T(C): 37 (02 Jan 2019 23:34), Max: 37 (02 Jan 2019 23:34)  T(F): 98.6 (02 Jan 2019 23:34), Max: 98.6 (02 Jan 2019 23:34)  HR: 80 (02 Jan 2019 23:34) (80 - 80)  BP: 101/65 (02 Jan 2019 23:34) (101/65 - 101/65)  BP(mean): --  RR: 19 (02 Jan 2019 23:34) (19 - 19)  SpO2: 96% (02 Jan 2019 23:34) (96% - 96%)    PE  Gen: on comfort measures only, not in acute distress  Pulm: non-labored breathing  CV: tachycadic,  Abd: Soft, non-distended  Ext: warm  Vasc: 2+ radial pulses    LABS:                MEDICATIONS  (STANDING):  acetaminophen  IVPB .. 1000 milliGRAM(s) IV Intermittent once  morphine  Infusion 0.5 mG/Hr (0.5 mL/Hr) IV Continuous <Continuous>    MEDICATIONS  (PRN):  glycopyrrolate Injectable 0.1 milliGRAM(s) IV Push every 4 hours PRN oral secretions  LORazepam   Injectable 0.5 milliGRAM(s) IV Push every 4 hours PRN Agitation  morphine  - Injectable 2 milliGRAM(s) IV Push every 4 hours PRN pain or air hunger      MICRO:   Cultures     STUDIES:   EKG, CXR, U/S, CT, MRI

## 2019-01-03 NOTE — PROGRESS NOTE ADULT - SUBJECTIVE AND OBJECTIVE BOX
CC: Follow up, symptom management on comfort measures    INTERVAL HPI/OVERNIGHT EVENTS:  No acute events overnight per RN.   Pt resting comfortable, no acute distress.     ROS limited due to underlying TBI.    MEDICATIONS  (STANDING):  acetaminophen  IVPB .. 1000 milliGRAM(s) IV Intermittent once  morphine  Infusion 0.5 mG/Hr (0.5 mL/Hr) IV Continuous <Continuous>  sodium chloride 0.9% Bolus 2000 milliLiter(s) IV Bolus once    MEDICATIONS  (PRN):  glycopyrrolate Injectable 0.1 milliGRAM(s) IV Push every 4 hours PRN oral secretions  LORazepam   Injectable 0.5 milliGRAM(s) IV Push every 4 hours PRN Agitation  morphine  - Injectable 2 milliGRAM(s) IV Push every 4 hours PRN pain or air hunger    PHYSICAL EXAM:    Vital Signs Last 24 Hrs  T(C): 37 (02 Jan 2019 23:34), Max: 37 (02 Jan 2019 23:34)  T(F): 98.6 (02 Jan 2019 23:34), Max: 98.6 (02 Jan 2019 23:34)  HR: 80 (02 Jan 2019 23:34) (80 - 80)  BP: 101/65 (02 Jan 2019 23:34) (101/65 - 101/65)  BP(mean): --  RR: 19 (02 Jan 2019 23:34) (19 - 19)  SpO2: 96% (02 Jan 2019 23:34) (96% - 96%)    General: Resting comfortably. No acute distress.  HEENT: mucous membrane moist. concavity at surgical site/head. +trach  Lungs: coarse breath sounds. no rales, rhonchi, wheezing. non-labored.   CV: +s1/s2. Regular rate and rhythm.    GI: +bowel sound. abdomen soft, non-tender, non-distended,+PEG.   MSK: No cyanosis or edema.   Neuro: nonverbal, unresponsive  Skin: warm and dry.     LABS: No recent lab studies  RADIOLOGY & ADDITIONAL STUDIES: Reviewed, no new recent studies             ADVANCE DIRECTIVES:   DNR YES NO  Completed on:                     MOLST  YES NO   Completed on:  Living Will  YES NO   Completed on:

## 2019-01-03 NOTE — PROGRESS NOTE ADULT - ATTENDING COMMENTS
Patient was seen and examined.  Not on any distress, 80/60's   has been on CMO for over 36 hrs with no progression.  He is dehydrated and at this time does not have a terminal infection as thought before, since he has not worsen off antibiotics.  Plan to hydrate him, resume TF and continue supportive care.

## 2019-01-03 NOTE — PROGRESS NOTE ADULT - ASSESSMENT
Mr. Yates is a 45 year old male admitted on 10/24/18 for TBI s/p hemicraniectomy, decompression and EVD placement. S/P trach and peg placement. Course complicated by seizures, sepsis due to ventilator associated pneumonia, severe anemia s/p transfusion, ?cholecystitis s/p diagnostic paracentesis 11/5 to rule SBP. Subsequently downgraded to medical floor. Code sepsis on 12/5 for fever, tachycardia, oxygen desaturation. Many family meetings held by surgical team and palliative with Uncle Hung (next of kin) - most recent on 12/11, decision made for no further escalation of care, complete course of antibiotics for bacteremia and if pt should clinically decompensate - decision for transition to comfort measures at that time. Updated by team on 12/31, pt is hemodynamically unstable ?reinfection and per their discussion with Uncle, decision made to transition to comfort measures. I called and spoke with Uncle to follow up and provide support; again he confirmed above decision. Palliative following to provide support at EOL.

## 2019-01-04 PROCEDURE — 99232 SBSQ HOSP IP/OBS MODERATE 35: CPT

## 2019-01-04 RX ORDER — POTASSIUM CHLORIDE 20 MEQ
40 PACKET (EA) ORAL ONCE
Qty: 0 | Refills: 0 | Status: COMPLETED | OUTPATIENT
Start: 2019-01-04 | End: 2019-01-04

## 2019-01-04 RX ORDER — SODIUM CHLORIDE 9 MG/ML
500 INJECTION INTRAMUSCULAR; INTRAVENOUS; SUBCUTANEOUS ONCE
Qty: 0 | Refills: 0 | Status: COMPLETED | OUTPATIENT
Start: 2019-01-04 | End: 2019-01-04

## 2019-01-04 RX ORDER — SODIUM CHLORIDE 9 MG/ML
1000 INJECTION, SOLUTION INTRAVENOUS
Qty: 0 | Refills: 0 | Status: DISCONTINUED | OUTPATIENT
Start: 2019-01-04 | End: 2019-01-04

## 2019-01-04 RX ORDER — POTASSIUM CHLORIDE 20 MEQ
40 PACKET (EA) ORAL ONCE
Qty: 0 | Refills: 0 | Status: DISCONTINUED | OUTPATIENT
Start: 2019-01-04 | End: 2019-01-04

## 2019-01-04 RX ORDER — ENOXAPARIN SODIUM 100 MG/ML
40 INJECTION SUBCUTANEOUS DAILY
Qty: 0 | Refills: 0 | Status: DISCONTINUED | OUTPATIENT
Start: 2019-01-04 | End: 2019-01-08

## 2019-01-04 RX ADMIN — ENOXAPARIN SODIUM 40 MILLIGRAM(S): 100 INJECTION SUBCUTANEOUS at 12:35

## 2019-01-04 RX ADMIN — SODIUM CHLORIDE 500 MILLILITER(S): 9 INJECTION INTRAMUSCULAR; INTRAVENOUS; SUBCUTANEOUS at 18:14

## 2019-01-04 RX ADMIN — Medication 40 MILLIEQUIVALENT(S): at 12:34

## 2019-01-04 NOTE — PROGRESS NOTE ADULT - NSHPATTENDINGPLANDISCUSS_GEN_ALL_CORE
ACS team, all questions answered
ACS team, all questions answered.
D/W trauma team
ICU and ACS team, all questions answered

## 2019-01-04 NOTE — PROGRESS NOTE ADULT - SUBJECTIVE AND OBJECTIVE BOX
CC: Follow up    INTERVAL HPI/OVERNIGHT EVENTS:  Pt now off comfort measures, restarted on tube feeding and hydration, VS monitoring and routine lab draws per surgery note.   No acute events overnight per RN. He is resting comfortable and obvious acute distress.     Limited ROS due to underlying TBI.    MEDICATIONS  (STANDING):  acetaminophen  IVPB .. 1000 milliGRAM(s) IV Intermittent once  enoxaparin Injectable 40 milliGRAM(s) SubCutaneous daily  potassium chloride   Solution 40 milliEquivalent(s) Oral once    MEDICATIONS  (PRN):  glycopyrrolate Injectable 0.1 milliGRAM(s) IV Push every 4 hours PRN oral secretions  LORazepam   Injectable 0.5 milliGRAM(s) IV Push every 4 hours PRN Agitation      PHYSICAL EXAM:    Vital Signs Last 24 Hrs  T(C): 37.6 (04 Jan 2019 07:43), Max: 37.6 (04 Jan 2019 07:43)  T(F): 99.7 (04 Jan 2019 07:43), Max: 99.7 (04 Jan 2019 07:43)  HR: 106 (04 Jan 2019 10:32) (76 - 106)  BP: 130/69 (04 Jan 2019 07:43) (130/69 - 130/69)  BP(mean): --  RR: 20 (04 Jan 2019 10:32) (19 - 20)  SpO2: 100% (04 Jan 2019 10:32) (98% - 100%)    General: Resting comfortably. No acute distress.  HEENT: mucous membrane moist. concavity at surgical site/head. +trach. abrasion noted on right ear - dry scab present  Lungs: coarse breath sounds. no rales, rhonchi, wheezing. non-labored.   CV: +s1/s2. Regular rate and rhythm.    GI: +bowel sound. abdomen soft, non-tender, non-distended,+PEG.   MSK: No cyanosis or edema.   Neuro: nonverbal, unresponsive  Skin: warm and dry.       LABS:                          8.4    7.8   )-----------( 156      ( 03 Jan 2019 16:12 )             26.5     01-03    151<H>  |  112<H>  |  52.0<H>  ----------------------------<  113  3.3<L>   |  25.0  |  0.94    Ca    11.6<H>      03 Jan 2019 16:12    TPro  8.9<H>  /  Alb  3.1<L>  /  TBili  1.6  /  DBili  x   /  AST  36  /  ALT  22  /  AlkPhos  154<H>  01-03        I&O's Summary    03 Jan 2019 07:01  -  04 Jan 2019 07:00  --------------------------------------------------------  IN: 2000 mL / OUT: 0 mL / NET: 2000 mL    04 Jan 2019 07:01  -  04 Jan 2019 11:06  --------------------------------------------------------  IN: 40 mL / OUT: 0 mL / NET: 40 mL    RADIOLOGY & ADDITIONAL STUDIES: Reviewed, no new recent studies    ADVANCE DIRECTIVES:   DNR YES NO  Completed on:                     MOLST  YES NO   Completed on:  Living Will  YES NO   Completed on:

## 2019-01-04 NOTE — PROGRESS NOTE ADULT - ATTENDING COMMENTS
Patient seen and examined," no change on exam, non tachypneic.  idem baseline.  TF have been resumed advance to goal.  resume dvt chemoprophylaxis  add free water to correct5 hypernatremia.  Continue supportive care  follow lykelly tomorrow

## 2019-01-04 NOTE — PROGRESS NOTE ADULT - PROBLEM SELECTOR PLAN 3
c/w O2 supplement
Established goals of care with next of kin, Uncle, on 11/12 during family meeting with SICU team - wants to continue all medical management. He was made DNR/DNI. Please refer to initial consult note.     He remains hemodynamically stable and resting comfortably. SW assisting with PRUCAL. No further recommendations at this time. Will sign off. Please reconsult if we can be of additional support.
c/w O2 supplement PRN per primary team.
Chart reviewed. Per surgery note, pt now off comfort measures - does not have terminal infection as thought before, restarted on tube feeding via PEG and hydration, routine labs and continued medical management. Meds reviewed, agree with D/C of morphine. Will D/C ativan PRN (pt has not needed it). Palliative care will sign off. Please reconsult if we can be of assistance.
Pt is now with hemodynamic instability; possible recurrent infection. Updated by surgical team, decision made by Uncle for transition to comfort measures and palliative assistance requested for symptom management.     I called and spoke with Uncle Hung with assistance of [place]  to provide support. Again uncle confirmed decision for comfort measures, acknowledging that he knows pt would not have any meaningful recovery given TBI and unlikely to recover.   does not want any further lab draws, imaging studies, no vital sign monitoring and discontinue further tube feeding; wants to focus on optimizing his symptom control for comfort and to continue only necessary medications.    Orders placed for the following:  - morphine IV 2 mg q4H PRN pain or air hunger  - lorazepam IV 0.5 mg q4H PRN agitation  - glycopyrrolate 0.1 mg q4H PRN oral secretions  - D/C tube feeding  - D/C Vital signs
Pt transitioned to comfort measures on 12/31 after discussion with Uncle by both surgical and palliative team due to increasing hemodynamic instability. Pt is already DNR/DNI from earlier in hospital course.      Plan:  1. C/W morphine infusion at 1 mg/hr started by surgery team on 1/2/19  2. C/W morphine IV 2 mg q4H PRN BTP or air hunger  3. C/W lorazepam IV 0.5 mg q4H PRN agitation  4. C/W glycopyrrolate 0.1 mg q4H PRN oral secretions
Pt transitioned to comfort measures on 12/31 after discussion with Uncle by both surgical and palliative team due to increasing hemodynamic instability. Pt is already DNR/DNI from earlier in hospital course. Pt remains comfortable.      Plan:  1. C/W morphine gtt at 1 mg/hr ordered by surgery team  2. C/W morphine IV 2 mg q4H PRN BTP or air hunger  3. C/W lorazepam IV 0.5 mg q4H PRN agitation  4. C/W glycopyrrolate 0.1 mg q4H PRN oral secretions
c/w O2 supplement PRN per primary team
c/w O2 supplement PRN. tolerating trach collar presently
Pt remains hemodynamically stable. Plan of care is to complete total of 14 days of antibiotics for bacteremia. Awaiting hearing for guardianship. Will continue to follow peripherally and be available should goc change.

## 2019-01-04 NOTE — PROGRESS NOTE ADULT - ASSESSMENT
46yo male s/p found unresponsive of unkown duration. Now currently off comfort measures.   -Vital signs q4hrs  -free water to be administered via PEG   -tube feed to goal of 60  -Strict I&O's

## 2019-01-04 NOTE — PROGRESS NOTE ADULT - PROBLEM SELECTOR PROBLEM 3
Tracheostomy dependence
Traumatic brain injury
Functional quadriplegia
Hyponatremia
Respiratory failure after trauma
Respiratory failure after trauma
Seizures, post-traumatic
Palliative care encounter
Tracheostomy dependence
Palliative care encounter
Tracheostomy dependence
Tracheostomy dependence
Palliative care encounter

## 2019-01-04 NOTE — PROGRESS NOTE ADULT - ASSESSMENT
Mr. Yates is a 45 year old male admitted on 10/24/18 for TBI s/p hemicraniectomy, decompression and EVD placement. S/P trach and peg placement. Course complicated by seizures, sepsis due to ventilator associated pneumonia, severe anemia s/p transfusion, ?cholecystitis s/p diagnostic paracentesis 11/5 to rule SBP. Subsequently downgraded to medical floor. Code sepsis on 12/5 for fever, tachycardia, oxygen desaturation. Many family meetings held by surgical team and palliative with Uncle Hung (next of kin) - most recent on 12/11, decision made for no further escalation of care, complete course of antibiotics for bacteremia and if pt should clinically decompensate - decision for transition to comfort measures at that time. Updated by team on 12/31, pt is hemodynamically unstable ?reinfection and per their discussion with Uncle, decision made to transition to comfort measures. I called and spoke with Uncle to follow up and provide support; again he confirmed above decision.

## 2019-01-04 NOTE — PROGRESS NOTE ADULT - SUBJECTIVE AND OBJECTIVE BOX
HPI/OVERNIGHT EVENTS: Patient seen and examined at bedside this AM. No acute overnight per nursing reports. Patient is off comfort measures.       Vital Signs Last 24 Hrs  T(C): 37.6 (04 Jan 2019 07:43), Max: 37.6 (04 Jan 2019 07:43)  T(F): 99.7 (04 Jan 2019 07:43), Max: 99.7 (04 Jan 2019 07:43)  HR: 106 (04 Jan 2019 10:32) (76 - 106)  BP: 130/69 (04 Jan 2019 07:43) (130/69 - 130/69)  BP(mean): --  RR: 20 (04 Jan 2019 10:32) (19 - 20)  SpO2: 100% (04 Jan 2019 10:32) (98% - 100%)    I&O's Detail    03 Jan 2019 07:01  -  04 Jan 2019 07:00  --------------------------------------------------------  IN:    Sodium Chloride 0.9% IV Bolus: 2000 mL  Total IN: 2000 mL    OUT:  Total OUT: 0 mL    Total NET: 2000 mL      04 Jan 2019 07:01  -  04 Jan 2019 11:07  --------------------------------------------------------  IN:    ns in tub fed vital15: 40 mL  Total IN: 40 mL    OUT:  Total OUT: 0 mL    Total NET: 40 mL          Gen: on comfort measures only, not in acute distress  Pulm: non-labored breathing  CV: RRR  Abd: Soft, non-distended  Ext: warm  Vasc: 2+ radial pulses    LABS:                        8.4    7.8   )-----------( 156      ( 03 Jan 2019 16:12 )             26.5     01-03    151<H>  |  112<H>  |  52.0<H>  ----------------------------<  113  3.3<L>   |  25.0  |  0.94    Ca    11.6<H>      03 Jan 2019 16:12    TPro  8.9<H>  /  Alb  3.1<L>  /  TBili  1.6  /  DBili  x   /  AST  36  /  ALT  22  /  AlkPhos  154<H>  01-03          MEDICATIONS  (STANDING):  acetaminophen  IVPB .. 1000 milliGRAM(s) IV Intermittent once  enoxaparin Injectable 40 milliGRAM(s) SubCutaneous daily  potassium chloride   Solution 40 milliEquivalent(s) Oral once    MEDICATIONS  (PRN):  glycopyrrolate Injectable 0.1 milliGRAM(s) IV Push every 4 hours PRN oral secretions  LORazepam   Injectable 0.5 milliGRAM(s) IV Push every 4 hours PRN Agitation      MICRO:   Cultures     STUDIES:   EKG, CXR, U/S, CT, MRI

## 2019-01-05 DIAGNOSIS — E87.0 HYPEROSMOLALITY AND HYPERNATREMIA: ICD-10-CM

## 2019-01-05 LAB
ALBUMIN SERPL ELPH-MCNC: 2.9 G/DL — LOW (ref 3.3–5.2)
ALP SERPL-CCNC: 170 U/L — HIGH (ref 40–120)
ALT FLD-CCNC: 19 U/L — SIGNIFICANT CHANGE UP
ANION GAP SERPL CALC-SCNC: 11 MMOL/L — SIGNIFICANT CHANGE UP (ref 5–17)
AST SERPL-CCNC: 33 U/L — SIGNIFICANT CHANGE UP
BASOPHILS # BLD AUTO: 0 K/UL — SIGNIFICANT CHANGE UP (ref 0–0.2)
BASOPHILS NFR BLD AUTO: 0.2 % — SIGNIFICANT CHANGE UP (ref 0–2)
BILIRUB DIRECT SERPL-MCNC: 0.3 MG/DL — SIGNIFICANT CHANGE UP (ref 0–0.3)
BILIRUB INDIRECT FLD-MCNC: 0.6 MG/DL — SIGNIFICANT CHANGE UP (ref 0.2–1)
BILIRUB SERPL-MCNC: 0.9 MG/DL — SIGNIFICANT CHANGE UP (ref 0.4–2)
BUN SERPL-MCNC: 33 MG/DL — HIGH (ref 8–20)
CALCIUM SERPL-MCNC: 11.2 MG/DL — HIGH (ref 8.6–10.2)
CHLORIDE SERPL-SCNC: 114 MMOL/L — HIGH (ref 98–107)
CO2 SERPL-SCNC: 27 MMOL/L — SIGNIFICANT CHANGE UP (ref 22–29)
CREAT SERPL-MCNC: 0.81 MG/DL — SIGNIFICANT CHANGE UP (ref 0.5–1.3)
EOSINOPHIL # BLD AUTO: 0.2 K/UL — SIGNIFICANT CHANGE UP (ref 0–0.5)
EOSINOPHIL NFR BLD AUTO: 4.9 % — SIGNIFICANT CHANGE UP (ref 0–5)
GLUCOSE SERPL-MCNC: 156 MG/DL — HIGH (ref 70–115)
HCT VFR BLD CALC: 25.8 % — LOW (ref 42–52)
HGB BLD-MCNC: 8.2 G/DL — LOW (ref 14–18)
LYMPHOCYTES # BLD AUTO: 1 K/UL — SIGNIFICANT CHANGE UP (ref 1–4.8)
LYMPHOCYTES # BLD AUTO: 20.4 % — SIGNIFICANT CHANGE UP (ref 20–55)
MAGNESIUM SERPL-MCNC: 1.5 MG/DL — LOW (ref 1.6–2.6)
MCHC RBC-ENTMCNC: 29.1 PG — SIGNIFICANT CHANGE UP (ref 27–31)
MCHC RBC-ENTMCNC: 31.8 G/DL — LOW (ref 32–36)
MCV RBC AUTO: 91.5 FL — SIGNIFICANT CHANGE UP (ref 80–94)
MONOCYTES # BLD AUTO: 0.3 K/UL — SIGNIFICANT CHANGE UP (ref 0–0.8)
MONOCYTES NFR BLD AUTO: 6.3 % — SIGNIFICANT CHANGE UP (ref 3–10)
NEUTROPHILS # BLD AUTO: 3.5 K/UL — SIGNIFICANT CHANGE UP (ref 1.8–8)
NEUTROPHILS NFR BLD AUTO: 68 % — SIGNIFICANT CHANGE UP (ref 37–73)
PHOSPHATE SERPL-MCNC: 2.3 MG/DL — LOW (ref 2.4–4.7)
PLATELET # BLD AUTO: 156 K/UL — SIGNIFICANT CHANGE UP (ref 150–400)
POTASSIUM SERPL-MCNC: 3.5 MMOL/L — SIGNIFICANT CHANGE UP (ref 3.5–5.3)
POTASSIUM SERPL-SCNC: 3.5 MMOL/L — SIGNIFICANT CHANGE UP (ref 3.5–5.3)
PREALB SERPL-MCNC: 8 MG/DL — LOW (ref 18–38)
PROT SERPL-MCNC: 8 G/DL — SIGNIFICANT CHANGE UP (ref 6.6–8.7)
RBC # BLD: 2.82 M/UL — LOW (ref 4.6–6.2)
RBC # FLD: 14.9 % — SIGNIFICANT CHANGE UP (ref 11–15.6)
SODIUM SERPL-SCNC: 152 MMOL/L — HIGH (ref 135–145)
WBC # BLD: 5.1 K/UL — SIGNIFICANT CHANGE UP (ref 4.8–10.8)
WBC # FLD AUTO: 5.1 K/UL — SIGNIFICANT CHANGE UP (ref 4.8–10.8)

## 2019-01-05 RX ORDER — POTASSIUM CHLORIDE 20 MEQ
40 PACKET (EA) ORAL EVERY 6 HOURS
Qty: 0 | Refills: 0 | Status: DISCONTINUED | OUTPATIENT
Start: 2019-01-05 | End: 2019-01-05

## 2019-01-05 RX ORDER — POTASSIUM CHLORIDE 20 MEQ
40 PACKET (EA) ORAL EVERY 6 HOURS
Qty: 0 | Refills: 0 | Status: COMPLETED | OUTPATIENT
Start: 2019-01-05 | End: 2019-01-05

## 2019-01-05 RX ORDER — MAGNESIUM SULFATE 500 MG/ML
2 VIAL (ML) INJECTION EVERY 4 HOURS
Qty: 0 | Refills: 0 | Status: COMPLETED | OUTPATIENT
Start: 2019-01-05 | End: 2019-01-05

## 2019-01-05 RX ADMIN — Medication 100 GRAM(S): at 14:39

## 2019-01-05 RX ADMIN — Medication 40 MILLIEQUIVALENT(S): at 18:26

## 2019-01-05 RX ADMIN — Medication 100 GRAM(S): at 18:26

## 2019-01-05 RX ADMIN — ENOXAPARIN SODIUM 40 MILLIGRAM(S): 100 INJECTION SUBCUTANEOUS at 12:11

## 2019-01-05 RX ADMIN — Medication 40 MILLIEQUIVALENT(S): at 12:25

## 2019-01-05 NOTE — PROGRESS NOTE ADULT - ATTENDING COMMENTS
Agree with above assessment.  The patient was seen and examined by me.  The patient remains of trach collar, Duoderm dressing is in place over decubitus at the trach site, chest bilateral air entry, abdomen is soft and non distended, PEG tube is intact and clean.  Awaiting SW and guardianship clarification.  Patient with hypernatremia, will get free water via PEG, monitor and follow Na level.

## 2019-01-05 NOTE — PROGRESS NOTE ADULT - ASSESSMENT
44yo male s/p found unresponsive of unknown duration with TBI, s/p craniectomy w/ evacuation B/l SDH and placement of L. frontal EVD (Removed), Trach/PEG  -Q4hr vitals  -250mL qh6r free water to be administered via PEG   -tube feed to goal of 60  -Strict I&O's  -PRN pain control

## 2019-01-05 NOTE — PROGRESS NOTE ADULT - SUBJECTIVE AND OBJECTIVE BOX
INTERVAL HPI/OVERNIGHT EVENTS: No acute events overnight    SUBJECTIVE: No acute events overnight. Patient off actively managed by surgery. Court appointed guardianship yesterday, awaiting SW.  PEG feedings increased to 50mL/hr. Calculated to have free water deficit of 1L, started 250cc q6hrs repletion. Afebrile. Minimally interactive. Condom catheter in place with adequate UOP.     MEDICATIONS  (STANDING):  acetaminophen  IVPB .. 1000 milliGRAM(s) IV Intermittent once  enoxaparin Injectable 40 milliGRAM(s) SubCutaneous daily    MEDICATIONS  (PRN):  glycopyrrolate Injectable 0.1 milliGRAM(s) IV Push every 4 hours PRN oral secretions      Vital Signs Last 24 Hrs  T(C): 37.4 (04 Jan 2019 23:25), Max: 37.7 (04 Jan 2019 10:32)  T(F): 99.3 (04 Jan 2019 23:25), Max: 99.9 (04 Jan 2019 10:32)  HR: 93 (04 Jan 2019 23:25) (90 - 106)  BP: 111/77 (04 Jan 2019 23:25) (95/69 - 135/70)  BP(mean): --  RR: 16 (04 Jan 2019 23:25) (16 - 20)  SpO2: 100% (04 Jan 2019 23:25) (99% - 100%)    PE    Gen: No acute distress. GCS4T(E4V5M2)  Pulm: On trach collar  CV: RRR  Abd: Peg tube in place with no extravastion of feeds. Soft, non-distended  Ext: warm, well perfused  Vasc: 2+ radial pulses      I&O's Detail    04 Jan 2019 07:01  -  05 Jan 2019 07:00  --------------------------------------------------------  IN:    Enteral Tube Flush: 500 mL    ns in tub fed vital15: 400 mL  Total IN: 900 mL    OUT:    Incontinent per Condom Catheter: 700 mL  Total OUT: 700 mL    Total NET: 200 mL          LABS:                        8.4    7.8   )-----------( 156      ( 03 Jan 2019 16:12 )             26.5     01-03    151<H>  |  112<H>  |  52.0<H>  ----------------------------<  113  3.3<L>   |  25.0  |  0.94    Ca    11.6<H>      03 Jan 2019 16:12    TPro  8.9<H>  /  Alb  3.1<L>  /  TBili  1.6  /  DBili  x   /  AST  36  /  ALT  22  /  AlkPhos  154<H>  01-03

## 2019-01-06 LAB
ANION GAP SERPL CALC-SCNC: 10 MMOL/L — SIGNIFICANT CHANGE UP (ref 5–17)
BUN SERPL-MCNC: 25 MG/DL — HIGH (ref 8–20)
CALCIUM SERPL-MCNC: 11.1 MG/DL — HIGH (ref 8.6–10.2)
CHLORIDE SERPL-SCNC: 113 MMOL/L — HIGH (ref 98–107)
CO2 SERPL-SCNC: 28 MMOL/L — SIGNIFICANT CHANGE UP (ref 22–29)
CREAT SERPL-MCNC: 0.55 MG/DL — SIGNIFICANT CHANGE UP (ref 0.5–1.3)
GLUCOSE SERPL-MCNC: 134 MG/DL — HIGH (ref 70–115)
MAGNESIUM SERPL-MCNC: 1.7 MG/DL — SIGNIFICANT CHANGE UP (ref 1.6–2.6)
PHOSPHATE SERPL-MCNC: 2.9 MG/DL — SIGNIFICANT CHANGE UP (ref 2.4–4.7)
POTASSIUM SERPL-MCNC: 3.7 MMOL/L — SIGNIFICANT CHANGE UP (ref 3.5–5.3)
POTASSIUM SERPL-SCNC: 3.7 MMOL/L — SIGNIFICANT CHANGE UP (ref 3.5–5.3)
SODIUM SERPL-SCNC: 151 MMOL/L — HIGH (ref 135–145)

## 2019-01-06 RX ORDER — MAGNESIUM OXIDE 400 MG ORAL TABLET 241.3 MG
400 TABLET ORAL EVERY 12 HOURS
Qty: 0 | Refills: 0 | Status: DISCONTINUED | OUTPATIENT
Start: 2019-01-06 | End: 2019-01-08

## 2019-01-06 RX ADMIN — ENOXAPARIN SODIUM 40 MILLIGRAM(S): 100 INJECTION SUBCUTANEOUS at 11:31

## 2019-01-06 RX ADMIN — MAGNESIUM OXIDE 400 MG ORAL TABLET 400 MILLIGRAM(S): 241.3 TABLET ORAL at 16:35

## 2019-01-06 NOTE — PROGRESS NOTE ADULT - PROBLEM SELECTOR PROBLEM 1
Hepatic encephalopathy
Sepsis
Traumatic brain injury, with loss of consciousness of 1 hour to 5 hours 59 minutes, initial encounter
Traumatic brain injury, with loss of consciousness of 1 hour to 5 hours 59 minutes, initial encounter
Hemodynamic instability
Sepsis
Sepsis
Subdural hematoma
Subdural hematoma
TBI (traumatic brain injury)
Traumatic brain injury, with loss of consciousness of 1 hour to 5 hours 59 minutes, initial encounter
TBI (traumatic brain injury)
Traumatic brain injury, with loss of consciousness of 1 hour to 5 hours 59 minutes, initial encounter
Bacteremia
Sepsis
Subdural hematoma

## 2019-01-06 NOTE — PROGRESS NOTE ADULT - SUBJECTIVE AND OBJECTIVE BOX
INTERVAL HPI/OVERNIGHT EVENTS: No acute events overnight    SUBJECTIVE: No acute events overnight. Tolerating PEG feedings increased at 50mL/hr. Continuing 250cc free water q6hrs repletion. Afebrile. Minimally interactive. Condom catheter in place with adequate UOP.       MEDICATIONS  (STANDING):  acetaminophen  IVPB .. 1000 milliGRAM(s) IV Intermittent once  enoxaparin Injectable 40 milliGRAM(s) SubCutaneous daily    MEDICATIONS  (PRN):  glycopyrrolate Injectable 0.1 milliGRAM(s) IV Push every 4 hours PRN oral secretions      Vital Signs Last 24 Hrs  T(C): 37 (06 Jan 2019 08:09), Max: 37 (05 Jan 2019 23:54)  T(F): 98.6 (06 Jan 2019 08:09), Max: 98.6 (05 Jan 2019 23:54)  HR: 100 (06 Jan 2019 08:09) (88 - 102)  BP: 120/76 (06 Jan 2019 08:09) (113/78 - 125/80)  BP(mean): --  RR: 18 (06 Jan 2019 08:09) (18 - 18)  SpO2: 95% (06 Jan 2019 08:09) (94% - 100%)    PE  Gen: No acute distress. GCS4T(E4V5M2)  Pulm: On trach collar  CV: RRR  Abd: Peg tube in place with no extravasation of feeds. Soft, non-distended  Ext: warm, well perfused  Vasc: 2+ radial pulses    I&O's Detail    05 Jan 2019 07:01  -  06 Jan 2019 07:00  --------------------------------------------------------  IN:    Enteral Tube Flush: 500 mL    ns in tub fed vital15: 500 mL  Total IN: 1000 mL    OUT:    Incontinent per Condom Catheter: 650 mL  Total OUT: 650 mL    Total NET: 350 mL          LABS:                        8.2    5.1   )-----------( 156      ( 05 Jan 2019 09:12 )             25.8     01-05    152<H>  |  114<H>  |  33.0<H>  ----------------------------<  156<H>  3.5   |  27.0  |  0.81    Ca    11.2<H>      05 Jan 2019 09:12  Phos  2.3     01-05  Mg     1.5     01-05    TPro  8.0  /  Alb  2.9<L>  /  TBili  0.9  /  DBili  0.3  /  AST  33  /  ALT  19  /  AlkPhos  170<H>  01-05

## 2019-01-06 NOTE — PROGRESS NOTE ADULT - ASSESSMENT
46yo male s/p found unresponsive of unknown duration with TBI, s/p craniectomy w/ evacuation B/l SDH and placement of L. frontal EVD (Removed), Trach/PEG  -Q8hr vitals  -250mL qh6r free water to be administered via PEG, may need to adjust based to AM sodium level  -tube feed to goal of 50  -Strict I&O's  -PRN pain control  -SW f/u for court appointed guardianship  Dispo: Inpatient care at this time, will likely need LTAC

## 2019-01-06 NOTE — PROGRESS NOTE ADULT - PROBLEM SELECTOR PLAN 2
remains unresponsive with trach and peg
s/p trach and peg. Nonverbal and bed-bound. c/w supportive measures.
tolerating trach collar
Free water via PEG tube  follow and monitor serum Na
remains unresponsive with trach and peg. c/w tube feeds as tolerated
s/p trach and peg. Nonverbal and bed-bound. c/w supportive measures
s/p trach and peg. Nonverbal and bed-bound. c/w supportive measures.
s/p trach and peg. Nonverbal and bed-bound. c/w supportive measures.
Free water via PEG tube  follow and monitor serum Na
pain control as needed  - maintain bite block  - continue trach collar as tolerated  -Continue DVT ppx with Lovenox; SCDs  -All abx completed   -Will follow up family's discussion of care goals this week; consider planning for longterm placement
s/p trach and peg. Nonverbal and bed-bound. c/w supportive measures.

## 2019-01-06 NOTE — PROGRESS NOTE ADULT - PROBLEM SELECTOR PROBLEM 2
Coma
Functional quadriplegia
Hepatic encephalopathy
Respiratory failure after trauma
Tracheostomy dependence
Traumatic brain injury, with loss of consciousness of 1 hour to 5 hours 59 minutes, initial encounter
Hypernatremia
Traumatic brain injury, with loss of consciousness of 1 hour to 5 hours 59 minutes, initial encounter
Hypernatremia
Tracheostomy dependence
Traumatic brain injury, with loss of consciousness of 1 hour to 5 hours 59 minutes, initial encounter
Traumatic brain injury, with loss of consciousness of 1 hour to 5 hours 59 minutes, initial encounter

## 2019-01-07 LAB
ANION GAP SERPL CALC-SCNC: 14 MMOL/L — SIGNIFICANT CHANGE UP (ref 5–17)
BASOPHILS # BLD AUTO: 0 K/UL — SIGNIFICANT CHANGE UP (ref 0–0.2)
BASOPHILS NFR BLD AUTO: 0.2 % — SIGNIFICANT CHANGE UP (ref 0–2)
BUN SERPL-MCNC: 22 MG/DL — HIGH (ref 8–20)
CALCIUM SERPL-MCNC: 11.1 MG/DL — HIGH (ref 8.6–10.2)
CHLORIDE SERPL-SCNC: 110 MMOL/L — HIGH (ref 98–107)
CO2 SERPL-SCNC: 26 MMOL/L — SIGNIFICANT CHANGE UP (ref 22–29)
CREAT SERPL-MCNC: 0.54 MG/DL — SIGNIFICANT CHANGE UP (ref 0.5–1.3)
EOSINOPHIL # BLD AUTO: 0.2 K/UL — SIGNIFICANT CHANGE UP (ref 0–0.5)
EOSINOPHIL NFR BLD AUTO: 4.5 % — SIGNIFICANT CHANGE UP (ref 0–5)
GLUCOSE SERPL-MCNC: 102 MG/DL — SIGNIFICANT CHANGE UP (ref 70–115)
HCT VFR BLD CALC: 27.9 % — LOW (ref 42–52)
HGB BLD-MCNC: 8.9 G/DL — LOW (ref 14–18)
LYMPHOCYTES # BLD AUTO: 1.2 K/UL — SIGNIFICANT CHANGE UP (ref 1–4.8)
LYMPHOCYTES # BLD AUTO: 22.1 % — SIGNIFICANT CHANGE UP (ref 20–55)
MAGNESIUM SERPL-MCNC: 1.6 MG/DL — SIGNIFICANT CHANGE UP (ref 1.6–2.6)
MCHC RBC-ENTMCNC: 28.9 PG — SIGNIFICANT CHANGE UP (ref 27–31)
MCHC RBC-ENTMCNC: 31.9 G/DL — LOW (ref 32–36)
MCV RBC AUTO: 90.6 FL — SIGNIFICANT CHANGE UP (ref 80–94)
MONOCYTES # BLD AUTO: 0.4 K/UL — SIGNIFICANT CHANGE UP (ref 0–0.8)
MONOCYTES NFR BLD AUTO: 6.7 % — SIGNIFICANT CHANGE UP (ref 3–10)
NEUTROPHILS # BLD AUTO: 3.6 K/UL — SIGNIFICANT CHANGE UP (ref 1.8–8)
NEUTROPHILS NFR BLD AUTO: 66.3 % — SIGNIFICANT CHANGE UP (ref 37–73)
PHOSPHATE SERPL-MCNC: 4.5 MG/DL — SIGNIFICANT CHANGE UP (ref 2.4–4.7)
PLATELET # BLD AUTO: 177 K/UL — SIGNIFICANT CHANGE UP (ref 150–400)
POTASSIUM SERPL-MCNC: 4 MMOL/L — SIGNIFICANT CHANGE UP (ref 3.5–5.3)
POTASSIUM SERPL-SCNC: 4 MMOL/L — SIGNIFICANT CHANGE UP (ref 3.5–5.3)
RBC # BLD: 3.08 M/UL — LOW (ref 4.6–6.2)
RBC # FLD: 14.8 % — SIGNIFICANT CHANGE UP (ref 11–15.6)
SODIUM SERPL-SCNC: 150 MMOL/L — HIGH (ref 135–145)
WBC # BLD: 5.4 K/UL — SIGNIFICANT CHANGE UP (ref 4.8–10.8)
WBC # FLD AUTO: 5.4 K/UL — SIGNIFICANT CHANGE UP (ref 4.8–10.8)

## 2019-01-07 PROCEDURE — 99231 SBSQ HOSP IP/OBS SF/LOW 25: CPT

## 2019-01-07 RX ORDER — MAGNESIUM SULFATE 500 MG/ML
2 VIAL (ML) INJECTION ONCE
Qty: 0 | Refills: 0 | Status: COMPLETED | OUTPATIENT
Start: 2019-01-07 | End: 2019-01-07

## 2019-01-07 RX ORDER — SODIUM CHLORIDE 9 MG/ML
1000 INJECTION, SOLUTION INTRAVENOUS ONCE
Qty: 0 | Refills: 0 | Status: COMPLETED | OUTPATIENT
Start: 2019-01-07 | End: 2019-01-07

## 2019-01-07 RX ADMIN — Medication 50 GRAM(S): at 08:20

## 2019-01-07 RX ADMIN — MORPHINE SULFATE 0.5 MG/HR: 50 CAPSULE, EXTENDED RELEASE ORAL at 07:18

## 2019-01-07 RX ADMIN — Medication 400 MILLIGRAM(S): at 11:14

## 2019-01-07 RX ADMIN — ENOXAPARIN SODIUM 40 MILLIGRAM(S): 100 INJECTION SUBCUTANEOUS at 12:26

## 2019-01-07 RX ADMIN — Medication 1000 MILLIGRAM(S): at 14:24

## 2019-01-07 RX ADMIN — SODIUM CHLORIDE 500 MILLILITER(S): 9 INJECTION, SOLUTION INTRAVENOUS at 15:54

## 2019-01-07 RX ADMIN — MAGNESIUM OXIDE 400 MG ORAL TABLET 400 MILLIGRAM(S): 241.3 TABLET ORAL at 05:01

## 2019-01-07 RX ADMIN — MAGNESIUM OXIDE 400 MG ORAL TABLET 400 MILLIGRAM(S): 241.3 TABLET ORAL at 17:16

## 2019-01-07 NOTE — PROGRESS NOTE ADULT - ASSESSMENT
44yo male s/p found unresponsive of unknown duration with TBI, s/p craniectomy w/ evacuation B/l SDH and placement of L. frontal EVD (Removed), Trach/PEG  -Q8hr vitals  -250mL qh6r free water to be administered via PEG, may need to adjust based to AM sodium level  -tube feed to goal of 50

## 2019-01-07 NOTE — PROGRESS NOTE ADULT - I WAS PHYSICALLY PRESENT FOR THE KEY PORTIONS OF THE EVALUATION AND MANAGEMENT (E/M) SERVICE PROVIDED.  I AGREE WITH THE ABOVE HISTORY, PHYSICAL, AND PLAN WHICH I HAVE REVIEWED AND EDITED WHERE APPROPRIATE

## 2019-01-07 NOTE — PROGRESS NOTE ADULT - SUBJECTIVE AND OBJECTIVE BOX
HPI/OVERNIGHT EVENTS: Patient seen and examined at bedside this AM. No acute events overnight. Tolerating PEG feedings increased at 50mL/hr. Continuing 250cc free water q6hrs repletion. Afebrile. Minimally interactive. Condom catheter in place with adequate UOP.     Vital Signs Last 24 Hrs  T(C): 36.8 (07 Jan 2019 08:49), Max: 37.1 (07 Jan 2019 00:00)  T(F): 98.2 (07 Jan 2019 08:49), Max: 98.8 (07 Jan 2019 00:00)  HR: 98 (07 Jan 2019 08:49) (88 - 120)  BP: 121/70 (07 Jan 2019 08:49) (113/75 - 138/98)  BP(mean): --  RR: 18 (07 Jan 2019 08:49) (18 - 18)  SpO2: 98% (07 Jan 2019 08:49) (98% - 100%)    I&O's Detail    06 Jan 2019 07:01  -  07 Jan 2019 07:00  --------------------------------------------------------  IN:    Enteral Tube Flush: 500 mL    ns in tub fed vital15: 600 mL  Total IN: 1100 mL    OUT:    Incontinent per Condom Catheter: 800 mL  Total OUT: 800 mL    Total NET: 300 mL          PE  Gen: No acute distress. GCS4T(E4V5M2)  Pulm: On trach collar  CV: RRR  Abd: Peg tube in place with no extravasation of feeds. Soft, non-distended  Ext: warm, well perfused  Vasc: 2+ radial pulses    LABS:                        8.9    5.4   )-----------( 177      ( 07 Jan 2019 07:15 )             27.9     01-07    150<H>  |  110<H>  |  22.0<H>  ----------------------------<  102  4.0   |  26.0  |  0.54    Ca    11.1<H>      07 Jan 2019 07:15  Phos  4.5     01-07  Mg     1.6     01-07            MEDICATIONS  (STANDING):  acetaminophen  IVPB .. 1000 milliGRAM(s) IV Intermittent once  enoxaparin Injectable 40 milliGRAM(s) SubCutaneous daily  magnesium oxide 400 milliGRAM(s) Oral every 12 hours    MEDICATIONS  (PRN):  glycopyrrolate Injectable 0.1 milliGRAM(s) IV Push every 4 hours PRN oral secretions      MICRO:   Cultures     STUDIES:   EKG, CXR, U/S, CT, MRI

## 2019-01-08 VITALS
RESPIRATION RATE: 20 BRPM | SYSTOLIC BLOOD PRESSURE: 120 MMHG | TEMPERATURE: 98 F | HEART RATE: 98 BPM | DIASTOLIC BLOOD PRESSURE: 89 MMHG | OXYGEN SATURATION: 100 %

## 2019-01-08 LAB
ANION GAP SERPL CALC-SCNC: 11 MMOL/L — SIGNIFICANT CHANGE UP (ref 5–17)
BUN SERPL-MCNC: 17 MG/DL — SIGNIFICANT CHANGE UP (ref 8–20)
CALCIUM SERPL-MCNC: 10.4 MG/DL — HIGH (ref 8.6–10.2)
CHLORIDE SERPL-SCNC: 106 MMOL/L — SIGNIFICANT CHANGE UP (ref 98–107)
CO2 SERPL-SCNC: 28 MMOL/L — SIGNIFICANT CHANGE UP (ref 22–29)
CREAT SERPL-MCNC: 0.43 MG/DL — LOW (ref 0.5–1.3)
GLUCOSE SERPL-MCNC: 119 MG/DL — HIGH (ref 70–115)
MAGNESIUM SERPL-MCNC: 1.7 MG/DL — SIGNIFICANT CHANGE UP (ref 1.6–2.6)
PHOSPHATE SERPL-MCNC: 4.2 MG/DL — SIGNIFICANT CHANGE UP (ref 2.4–4.7)
POTASSIUM SERPL-MCNC: 3.9 MMOL/L — SIGNIFICANT CHANGE UP (ref 3.5–5.3)
POTASSIUM SERPL-SCNC: 3.9 MMOL/L — SIGNIFICANT CHANGE UP (ref 3.5–5.3)
SODIUM SERPL-SCNC: 145 MMOL/L — SIGNIFICANT CHANGE UP (ref 135–145)

## 2019-01-08 PROCEDURE — 94640 AIRWAY INHALATION TREATMENT: CPT

## 2019-01-08 PROCEDURE — 82962 GLUCOSE BLOOD TEST: CPT

## 2019-01-08 PROCEDURE — 86901 BLOOD TYPING SEROLOGIC RH(D): CPT

## 2019-01-08 PROCEDURE — 82746 ASSAY OF FOLIC ACID SERUM: CPT

## 2019-01-08 PROCEDURE — 94799 UNLISTED PULMONARY SVC/PX: CPT

## 2019-01-08 PROCEDURE — 82947 ASSAY GLUCOSE BLOOD QUANT: CPT

## 2019-01-08 PROCEDURE — C1763: CPT

## 2019-01-08 PROCEDURE — 87086 URINE CULTURE/COLONY COUNT: CPT

## 2019-01-08 PROCEDURE — 82140 ASSAY OF AMMONIA: CPT

## 2019-01-08 PROCEDURE — 84100 ASSAY OF PHOSPHORUS: CPT

## 2019-01-08 PROCEDURE — P9059: CPT

## 2019-01-08 PROCEDURE — 92507 TX SP LANG VOICE COMM INDIV: CPT

## 2019-01-08 PROCEDURE — 89051 BODY FLUID CELL COUNT: CPT

## 2019-01-08 PROCEDURE — 80202 ASSAY OF VANCOMYCIN: CPT

## 2019-01-08 PROCEDURE — 85027 COMPLETE CBC AUTOMATED: CPT

## 2019-01-08 PROCEDURE — C1729: CPT

## 2019-01-08 PROCEDURE — 96375 TX/PRO/DX INJ NEW DRUG ADDON: CPT

## 2019-01-08 PROCEDURE — 85610 PROTHROMBIN TIME: CPT

## 2019-01-08 PROCEDURE — 86850 RBC ANTIBODY SCREEN: CPT

## 2019-01-08 PROCEDURE — 71045 X-RAY EXAM CHEST 1 VIEW: CPT

## 2019-01-08 PROCEDURE — T1013: CPT

## 2019-01-08 PROCEDURE — 84295 ASSAY OF SERUM SODIUM: CPT

## 2019-01-08 PROCEDURE — 99285 EMERGENCY DEPT VISIT HI MDM: CPT | Mod: 25

## 2019-01-08 PROCEDURE — 87205 SMEAR GRAM STAIN: CPT

## 2019-01-08 PROCEDURE — 80307 DRUG TEST PRSMV CHEM ANLYZR: CPT

## 2019-01-08 PROCEDURE — 86923 COMPATIBILITY TEST ELECTRIC: CPT

## 2019-01-08 PROCEDURE — 82803 BLOOD GASES ANY COMBINATION: CPT

## 2019-01-08 PROCEDURE — 80053 COMPREHEN METABOLIC PANEL: CPT

## 2019-01-08 PROCEDURE — 97110 THERAPEUTIC EXERCISES: CPT

## 2019-01-08 PROCEDURE — 80185 ASSAY OF PHENYTOIN TOTAL: CPT

## 2019-01-08 PROCEDURE — 81001 URINALYSIS AUTO W/SCOPE: CPT

## 2019-01-08 PROCEDURE — 82436 ASSAY OF URINE CHLORIDE: CPT

## 2019-01-08 PROCEDURE — 80076 HEPATIC FUNCTION PANEL: CPT

## 2019-01-08 PROCEDURE — 94770: CPT

## 2019-01-08 PROCEDURE — 84133 ASSAY OF URINE POTASSIUM: CPT

## 2019-01-08 PROCEDURE — 80061 LIPID PANEL: CPT

## 2019-01-08 PROCEDURE — 97163 PT EVAL HIGH COMPLEX 45 MIN: CPT

## 2019-01-08 PROCEDURE — 76705 ECHO EXAM OF ABDOMEN: CPT

## 2019-01-08 PROCEDURE — 70496 CT ANGIOGRAPHY HEAD: CPT

## 2019-01-08 PROCEDURE — 82570 ASSAY OF URINE CREATININE: CPT

## 2019-01-08 PROCEDURE — 36415 COLL VENOUS BLD VENIPUNCTURE: CPT

## 2019-01-08 PROCEDURE — C1889: CPT

## 2019-01-08 PROCEDURE — 31720 CLEARANCE OF AIRWAYS: CPT

## 2019-01-08 PROCEDURE — P9047: CPT

## 2019-01-08 PROCEDURE — 92523 SPEECH SOUND LANG COMPREHEN: CPT

## 2019-01-08 PROCEDURE — P9016: CPT

## 2019-01-08 PROCEDURE — 82330 ASSAY OF CALCIUM: CPT

## 2019-01-08 PROCEDURE — 87040 BLOOD CULTURE FOR BACTERIA: CPT

## 2019-01-08 PROCEDURE — 95819 EEG AWAKE AND ASLEEP: CPT

## 2019-01-08 PROCEDURE — 83690 ASSAY OF LIPASE: CPT

## 2019-01-08 PROCEDURE — 97535 SELF CARE MNGMENT TRAINING: CPT

## 2019-01-08 PROCEDURE — 96374 THER/PROPH/DIAG INJ IV PUSH: CPT | Mod: XU

## 2019-01-08 PROCEDURE — 84134 ASSAY OF PREALBUMIN: CPT

## 2019-01-08 PROCEDURE — 82533 TOTAL CORTISOL: CPT

## 2019-01-08 PROCEDURE — 85014 HEMATOCRIT: CPT

## 2019-01-08 PROCEDURE — 83935 ASSAY OF URINE OSMOLALITY: CPT

## 2019-01-08 PROCEDURE — 97167 OT EVAL HIGH COMPLEX 60 MIN: CPT

## 2019-01-08 PROCEDURE — 87186 SC STD MICRODIL/AGAR DIL: CPT

## 2019-01-08 PROCEDURE — 85730 THROMBOPLASTIN TIME PARTIAL: CPT

## 2019-01-08 PROCEDURE — 83735 ASSAY OF MAGNESIUM: CPT

## 2019-01-08 PROCEDURE — 70498 CT ANGIOGRAPHY NECK: CPT

## 2019-01-08 PROCEDURE — 82977 ASSAY OF GGT: CPT

## 2019-01-08 PROCEDURE — 87075 CULTR BACTERIA EXCEPT BLOOD: CPT

## 2019-01-08 PROCEDURE — 82435 ASSAY OF BLOOD CHLORIDE: CPT

## 2019-01-08 PROCEDURE — 87150 DNA/RNA AMPLIFIED PROBE: CPT

## 2019-01-08 PROCEDURE — 36600 WITHDRAWAL OF ARTERIAL BLOOD: CPT

## 2019-01-08 PROCEDURE — 86900 BLOOD TYPING SEROLOGIC ABO: CPT

## 2019-01-08 PROCEDURE — 84132 ASSAY OF SERUM POTASSIUM: CPT

## 2019-01-08 PROCEDURE — P9037: CPT

## 2019-01-08 PROCEDURE — 74176 CT ABD & PELVIS W/O CONTRAST: CPT

## 2019-01-08 PROCEDURE — 94003 VENT MGMT INPAT SUBQ DAY: CPT

## 2019-01-08 PROCEDURE — 80048 BASIC METABOLIC PNL TOTAL CA: CPT

## 2019-01-08 PROCEDURE — 83930 ASSAY OF BLOOD OSMOLALITY: CPT

## 2019-01-08 PROCEDURE — 97112 NEUROMUSCULAR REEDUCATION: CPT

## 2019-01-08 PROCEDURE — 36430 TRANSFUSION BLD/BLD COMPNT: CPT

## 2019-01-08 PROCEDURE — 70450 CT HEAD/BRAIN W/O DYE: CPT

## 2019-01-08 PROCEDURE — 83605 ASSAY OF LACTIC ACID: CPT

## 2019-01-08 PROCEDURE — G0515: CPT

## 2019-01-08 PROCEDURE — 94760 N-INVAS EAR/PLS OXIMETRY 1: CPT

## 2019-01-08 PROCEDURE — 84443 ASSAY THYROID STIM HORMONE: CPT

## 2019-01-08 PROCEDURE — 80186 ASSAY OF PHENYTOIN FREE: CPT

## 2019-01-08 PROCEDURE — 94002 VENT MGMT INPAT INIT DAY: CPT

## 2019-01-08 PROCEDURE — 82550 ASSAY OF CK (CPK): CPT

## 2019-01-08 PROCEDURE — 84300 ASSAY OF URINE SODIUM: CPT

## 2019-01-08 PROCEDURE — 93005 ELECTROCARDIOGRAM TRACING: CPT

## 2019-01-08 PROCEDURE — 72125 CT NECK SPINE W/O DYE: CPT

## 2019-01-08 PROCEDURE — 82607 VITAMIN B-12: CPT

## 2019-01-08 PROCEDURE — 93970 EXTREMITY STUDY: CPT

## 2019-01-08 PROCEDURE — 87070 CULTURE OTHR SPECIMN AEROBIC: CPT

## 2019-01-08 RX ORDER — ENOXAPARIN SODIUM 100 MG/ML
40 INJECTION SUBCUTANEOUS
Qty: 0 | Refills: 0 | COMMUNITY
Start: 2019-01-08

## 2019-01-08 RX ORDER — SODIUM CHLORIDE 9 MG/ML
1000 INJECTION, SOLUTION INTRAVENOUS ONCE
Qty: 0 | Refills: 0 | Status: COMPLETED | OUTPATIENT
Start: 2019-01-08 | End: 2019-01-08

## 2019-01-08 RX ORDER — MAGNESIUM OXIDE 400 MG ORAL TABLET 241.3 MG
1 TABLET ORAL
Qty: 0 | Refills: 0 | COMMUNITY
Start: 2019-01-08

## 2019-01-08 RX ADMIN — MAGNESIUM OXIDE 400 MG ORAL TABLET 400 MILLIGRAM(S): 241.3 TABLET ORAL at 06:37

## 2019-01-08 RX ADMIN — MAGNESIUM OXIDE 400 MG ORAL TABLET 400 MILLIGRAM(S): 241.3 TABLET ORAL at 17:18

## 2019-01-08 RX ADMIN — SODIUM CHLORIDE 1000 MILLILITER(S): 9 INJECTION, SOLUTION INTRAVENOUS at 16:49

## 2019-01-08 RX ADMIN — SODIUM CHLORIDE 1000 MILLILITER(S): 9 INJECTION, SOLUTION INTRAVENOUS at 08:44

## 2019-01-08 RX ADMIN — ENOXAPARIN SODIUM 40 MILLIGRAM(S): 100 INJECTION SUBCUTANEOUS at 11:53

## 2019-01-08 NOTE — PROGRESS NOTE ADULT - ASSESSMENT
46yo M s/p found unresponsive of unknown duration with TBI, s/p craniectomy w/ evacuation b/l SDH and placement of L frontal EVD (removed), trach/PEG.  -q8hrs vitals  -250mL q6hr free water to be administered via PEG, adjust bases on sodium levels.

## 2019-01-08 NOTE — DISCHARGE NOTE ADULT - INSTRUCTIONS
NPO with Tube Feeds via gastrostomy tube - Vital 1.5 bushra @ 50cc/hr over 24 hours. Recommend FREE WATER 250cc every 6 hours for hypernatremia.

## 2019-01-08 NOTE — DISCHARGE NOTE ADULT - MEDICATION SUMMARY - MEDICATIONS TO TAKE
I will START or STAY ON the medications listed below when I get home from the hospital:    enoxaparin  -- 40 milligram(s) subcutaneous once a day  -- Indication: For DVT prophylaxis    magnesium oxide 400 mg (241.3 mg elemental magnesium) oral tablet  -- 1 tab(s) by mouth every 12 hours  -- Indication: For Low magnesium

## 2019-01-08 NOTE — DISCHARGE NOTE ADULT - CARE PROVIDER_API CALL
Thomas Fraser), Neurosurgery  [facility]  [address]  [place] [address]  Phone: [phone]  Fax: [phone]    Ana Sumner (), Brain Injury Medicine; PhysicalRehab Medicine  [address] [place] [address]  Phone: [phone]  Fax: [phone]

## 2019-01-08 NOTE — DISCHARGE NOTE ADULT - SECONDARY DIAGNOSIS.
Subdural hematoma Seizures, post-traumatic Functional quadriplegia Bacteremia Tracheostomy dependence

## 2019-01-08 NOTE — PROGRESS NOTE ADULT - PROVIDER SPECIALTY LIST ADULT
Anesthesia
Critical Care
Critical Care
Neurology
Neurosurgery
Palliative Care
Rehab Medicine
SICU
Surgery
Trauma Surgery
Surgery
Trauma Surgery
Rehab Medicine
SICU
Surgery
Surgery
Trauma Surgery
Trauma Surgery
SICU
SICU
Trauma Surgery
Palliative Care

## 2019-01-08 NOTE — DISCHARGE NOTE ADULT - CARE PROVIDERS DIRECT ADDRESSES
[email or web address]

## 2019-01-08 NOTE — DISCHARGE NOTE ADULT - PRINCIPAL DIAGNOSIS
Traumatic brain injury, with loss of consciousness of 1 hour to 5 hours 59 minutes, initial encounter

## 2019-01-08 NOTE — DISCHARGE NOTE ADULT - HOSPITAL COURSE
33 y/o M [facility] and with c-collar in place. Trauma A activation, patient was brought for further evaluation by trauma and neurosurgery team after being found unconscious and unresponsive on the sidewalk. Initial CT showed bilateral subdural hemorrhage and SAH. [place] ct's, no other injury reported. Transport vital signs were /88, , Sat 100%, GCS 3T. Patient presents intubated due to reported posturing and agonal respirations, EMS reports seizure activity enroute, sedated w 2 mg ativan and propofol for transport.     CT head on arrival to [facility] showed bilateral holohemispheric subdural hemorrhages measuring 1.1 cm on the right and 1.4 cm on the left; increased hyperdense blood products within these hemorrhages compared to the earlier examination; left convexity subdural hemorrhage has increased in size since the earlier examination and there is new 9 mm midline shift to the right; aiffuse subarachnoid hemorrhage again noted; poor visualization of the basal cisterns; possible loss of gray-white differentiation along bilateral occipital lobes and the right frontal lobe; nondisplaced fracture along the left mastoid tip medial to the mastoid air cells; left posterior scalp soft tissue swelling. No c-spine fx. CT angio of head & neck negative. Pt was admitted to SICU & neurosurgery consulted. Taken to OR with Dr. Fraser on 10/24 for craniectomy & evacuation of b/l SDHs and placement of left frontal EVD.      10/24: post op noted to have persistent tongue fasciculation and rhythmic movement of his toes/feet, neurology called for possible seizure activity, still posturing.  ON  1L Bolus given for hjgh SVV.  Now off pressors.  Prop able to be increased.  No change in neuro exam .  ICP 23, Neursx flushed drains with improvement  10/25: Given 1U PRBC for anemia this AM. Placed back on levophed early. DPL performed and sent for analysis. 3% Saline 150cc bolus and drip at 30cc/hr started. PEEP increased to 10  10/26: No neurological change, eeg read as negative for seizure activity.  Bedside trach and PEG completed.  Started on tube feeds.  Repeat Head CT completed and fugly.   Osmol 297.  Given additional bolus of 3% NaCl.  ICP remain below 20.  ON: Given 1L bolus for SVV and softer pressures, responded well.  Hyper thermic given 1g ofimev x 2, cooling blanket and ice packs.  Repeat osm 300, increased 3% rate to 40, repeat osm in AM  10/27:  Osmol now 314.  3% DC’d and currently at goal tube feeds.  No change in exam.  Placed on PSV and tolerating.  SD drains DC’d.  EVD planned to be removed on Monday  10/28: Pt continues with fever.  Blood cultures sent.  Central line DC’d and peripheral placed.  [place] DC for condom cath and voiding.  Chest XR with LLL infiltrate.  Required increased PEEP to maintain O2 sat .  No BM since admission, miralax added ON: Bronched, gram + cocci, gram neg rods gram positive rods, started abx, gave 2L Bolus.  10/29: Started on abx for ? PNA. No change in mental status. O/N: Cousin and uncle came in. Long discussion had between author and family via . Explained in details injuries and hospital course, prognosis. Answered all questions. Vtrough 7.6, vanco given. Left on CPAP all night.  10/30 diamox given for chlor unresponsive met alkalosis, Dilantin DCed, oral exam revealed no abscess, weaned to trach colar .  ON  Improvement of pH on ABG.  Na low.  FENA pre renal ? given 1L IVF  10/31 Worsening hyponatremia believed to be cerebral salt wasting. Given 1L bolus and started on NS @ 100. EEG negative for seizure. Vanco DC’d in favor of single agent therapy with Zosyn. Unable to attempt TC as he is tachypneic on PS. Tube feedings increased to 1.4x his basic metabolic need 2/2 head injury  11/1 – Sputum resulted MSSA final and transitioned to Ancef.  Urine/Serum labs and IVC us c/w SIADH.  IVF dced, Pivot decreased. 1U pRBCs given. Propranolol increased. ON: Cont of prop, tachypnea improved 20-30’s.    11/2- awaiting creatinine clearance.  INR/LFTs elevated.  1U FFP given.  CK normal. GGT elevated.  RUQ US with 9.5mm GBW no stones, moderate ascites.  Discussed with IR, no role for perc mega tube at this time.  Monitor labs and clinical status. ON: no events overnight.  Continues on PSV, tolerating.  11/3 – Tolerating PSV and calm.  Biteblock removed.  Working to wean propofol.  11/4  TF held, repeat US tonight. ON: repeat US done, GB wall appears improved.  11/5 – started lactulose, rifaximin, Albumin. Paracentesis done sent for gram stain and cultures. ON: NO issues  11/6: Started on Cefepime for SBP, added vitamin k daily, spironolactone.  Tried on trach collar today, tolerated well.  Given albumin 25% q6hrs x 24hrs.    11/7 – bromocriptine increased. Family responded to bedside, DNR implemented after discussion with SW and .  Will return early next week to discuss withdrawal of care.  No acute events overnight.  11/8: Tolerated TC most of day.  Hgb 7.  Could not contact family for consent.  Albumin given.  ON:  Hgb 6.8 this AM.  Will consent and transfuse  11/9: Family would like to come in to talk prior to transfusion.  Mild met alkalosis.  U-Cl = 108.  Diamox given.  Lactulose increased.  ON:  Consented for transfusion and received 1 unit PRBC and 1 unit FFP for hgb 6.8 and INR: 1.56 with a Lasix chaser.  Alkalosis improved this AM.  Hgb 7.5.  Ammonia level improved.  Tolerated trach collar all night.    11/10 Sodium and Chloride are normalizing. Ammonia down to 87. Abdominal fluid culture is negative. ON:  Remains on trach collar > 48 hrs.  Hyponatremia resolved.    11/11 – tolerating trach collar.  Rifaximin/Cefepime completed.  Lactulose dced. Arterial line removed. Albuterol q6 started. Less than q2 hr suctioning. Possible 6T transfer tomorrow  Transferred down to surgical floors. Throughout remainder of hospital stay patient with little improvement in mental / functional stauts. While on the floors, hospital course complicated by bacteremia. Pt was treated with IV antibiotics and repeat cultures after treatment showed no growth. Course was also complicated by both hypo and hypernatremia, treated with salt tabs / fluid restriction, and free water, respectively. Patient received guardianship after course case on 1/4/19. Accepted to a skilled nursing facility for long term care. Patient's next of kin Hung (uncle) was main contact during patient's hospital stay and updated throughout admission of changes in clinical status / plan for discharge to SNF - in agreement with plan.

## 2019-01-08 NOTE — DISCHARGE NOTE ADULT - PLAN OF CARE
To be pain free, improve functional status Follow up: Contact information for neurosurgeon, Dr. Fraser, and brain injury physician, Dr. Sumner has been provided below. Call to make follow-up appointments, as needed.    Diet: NPO with Tube Feeds via gastrostomy tube - Vital 1.5 bushra @ 50cc/hr over 24 hours. Recommend FREE WATER 250cc every 6 hours for hypernatremia.    Medications: As listed above.    Wound Care: Please keep wound sites (including trach & gastrostomy sites) clean and dry. Showers encouraged daily.     Patient is advised to RETURN TO THE EMERGENCY DEPARTMENT for any of the following - worsening pain, fever/chills, nausea/vomiting, altered mental status, chest pain, shortness of breath, or any other new / worsening symptom. Resolved s/p treatment with IV antibiotics. Follow up: Contact information for neurosurgeon, Dr. Fraser, and brain injury physician, Dr. Sumner, has been provided below. Call to make follow-up appointments, as needed.    Diet: NPO with Tube Feeds via gastrostomy tube - Vital 1.5 bushra @ 50cc/hr over 24 hours. Recommend FREE WATER 250cc every 6 hours for hypernatremia.    Wound Care: Please keep wound sites (including trach & gastrostomy sites) clean and dry. Showers encouraged daily.

## 2019-01-08 NOTE — DISCHARGE NOTE ADULT - CARE PLAN
Principal Discharge DX:	Traumatic brain injury, with loss of consciousness of 1 hour to 5 hours 59 minutes, initial encounter  Goal:	To be pain free, improve functional status  Assessment and plan of treatment:	Follow up: Contact information for neurosurgeon, Dr. Fraser, and brain injury physician, Dr. Sumner has been provided below. Call to make follow-up appointments, as needed.    Diet: NPO with Tube Feeds via gastrostomy tube - Vital 1.5 bushra @ 50cc/hr over 24 hours. Recommend FREE WATER 250cc every 6 hours for hypernatremia.    Medications: As listed above.    Wound Care: Please keep wound sites (including trach & gastrostomy sites) clean and dry. Showers encouraged daily.     Patient is advised to RETURN TO THE EMERGENCY DEPARTMENT for any of the following - worsening pain, fever/chills, nausea/vomiting, altered mental status, chest pain, shortness of breath, or any other new / worsening symptom.  Secondary Diagnosis:	Subdural hematoma  Secondary Diagnosis:	Seizures, post-traumatic  Secondary Diagnosis:	Functional quadriplegia  Secondary Diagnosis:	Bacteremia  Assessment and plan of treatment:	Resolved s/p treatment with IV antibiotics.  Secondary Diagnosis:	Tracheostomy dependence Principal Discharge DX:	Traumatic brain injury, with loss of consciousness of 1 hour to 5 hours 59 minutes, initial encounter  Goal:	To be pain free, improve functional status  Assessment and plan of treatment:	Follow up: Contact information for neurosurgeon, Dr. Fraser, and brain injury physician, Dr. Sumner, has been provided below. Call to make follow-up appointments, as needed.    Diet: NPO with Tube Feeds via gastrostomy tube - Vital 1.5 bushra @ 50cc/hr over 24 hours. Recommend FREE WATER 250cc every 6 hours for hypernatremia.    Wound Care: Please keep wound sites (including trach & gastrostomy sites) clean and dry. Showers encouraged daily.  Secondary Diagnosis:	Subdural hematoma  Secondary Diagnosis:	Seizures, post-traumatic  Secondary Diagnosis:	Functional quadriplegia  Secondary Diagnosis:	Bacteremia  Assessment and plan of treatment:	Resolved s/p treatment with IV antibiotics.  Secondary Diagnosis:	Tracheostomy dependence

## 2019-01-08 NOTE — DISCHARGE NOTE ADULT - PATIENT PORTAL LINK FT
You can access the [facility] Patient Portal, offered by [facility], by registering with the following website: [email or web address]

## 2019-01-08 NOTE — PROGRESS NOTE ADULT - SUBJECTIVE AND OBJECTIVE BOX
HPI/OVERNIGHT EVENTS: 46yo M examined at bedside and found in no distress. No acute events overnight per nurse. Patient is on PEG feeds at 50ml/hr. Continuing 250cc of free water q6hrs repletions. Afebrile. No interaction. Condom catheter in place with adequate urination.     Vital Signs Last 24 Hrs  T(C): 36.9 (07 Jan 2019 23:48), Max: 36.9 (07 Jan 2019 23:48)  T(F): 98.5 (07 Jan 2019 23:48), Max: 98.5 (07 Jan 2019 23:48)  HR: 89 (08 Jan 2019 03:21) (88 - 98)  BP: 105/80 (07 Jan 2019 23:48) (105/80 - 121/70)  BP(mean): --  RR: 18 (07 Jan 2019 23:48) (18 - 18)  SpO2: 100% (08 Jan 2019 03:21) (98% - 100%)    I&O's Detail    06 Jan 2019 07:01  -  07 Jan 2019 07:00  --------------------------------------------------------  IN:    Enteral Tube Flush: 500 mL    ns in tub fed vital15: 600 mL  Total IN: 1100 mL    OUT:    Incontinent per Condom Catheter: 800 mL  Total OUT: 800 mL    Total NET: 300 mL      07 Jan 2019 07:01  -  08 Jan 2019 06:17  --------------------------------------------------------  IN:    Enteral Tube Flush: 250 mL    ns in tub fed vital15: 450 mL  Total IN: 700 mL    OUT:  Total OUT: 0 mL    Total NET: 700 mL          Constitutional: Not in distress. GCS4T(E4V5M2)  Respiratory: On trach collar  Cardiovascular: regular rate & rhythm   Gastrointestinal: Peg tube in place with no extravasation of feeds. Soft, non distended.  Vasc: 2+ pulses in all 4 extremities.   Musculoskeletal: Extremities warm and well perfused.    LABS:                        8.9    5.4   )-----------( 177      ( 07 Jan 2019 07:15 )             27.9     01-07    150<H>  |  110<H>  |  22.0<H>  ----------------------------<  102  4.0   |  26.0  |  0.54    Ca    11.1<H>      07 Jan 2019 07:15  Phos  4.5     01-07  Mg     1.6     01-07            MEDICATIONS  (STANDING):  enoxaparin Injectable 40 milliGRAM(s) SubCutaneous daily  magnesium oxide 400 milliGRAM(s) Oral every 12 hours    MEDICATIONS  (PRN):  glycopyrrolate Injectable 0.1 milliGRAM(s) IV Push every 4 hours PRN oral secretions      MICRO:   Cultures     STUDIES:   EKG, CXR, U/S, CT, MRI

## 2019-03-25 NOTE — DISCHARGE NOTE ADULT - PROCEDURE 2
Could refer back to GI.   Could try topical numbing med to see if mild relief, otc lidocaine every 2-4 hrs. Could rx a higher dose to see if more effective.    PEG (percutaneous endoscopic gastrostomy)

## 2019-04-11 NOTE — PROGRESS NOTE ADULT - SUBJECTIVE AND OBJECTIVE BOX
Needs to get from her spine specialist.     Zach Rosario PA-C  [facility]      CC: rhonda tasha    BRIEF HOSPITAL COURSE:  This is a 45 year old male admitted on 10/24, found unresponsive and with severe severe traumatic brain injury, CT showing bilateral subdural hematoma with compression s/p emergent bilateral hemicraniectomy, decompression and EVD placement on 10/24. S/P trach and peg placement. Course complicated by seizures, possible hepatic encephalopathy, sepsis due to ventilator associated pneumonia, severe anemia s/p transfusion, ?cholecystitis s/p diagnostic paracentesis  to rule SBP. Subsequently downgraded to medical floor.     INTERVAL HPI/OVERNIGHT EVENTS:  Code sepsis at 4 AM today for fever, hypotension, tachycardia and desaturation.   Ongoing sepsis workup, unclear etiology.  Ongoing family meeting by surgery team with family (uncle/next of kin and aunt in law) on my arrival with assistance of .     Limited ROS due to unresponsiveness/coma       MEDICATIONS  (STANDING):  ALBUTerol    0.083% 2.5 milliGRAM(s) Nebulizer every 6 hours  ascorbic acid 500 milliGRAM(s) Oral daily  chlorhexidine 0.12% Liquid 15 milliLiter(s) Swish and Spit two times a day  enoxaparin Injectable 40 milliGRAM(s) SubCutaneous daily  FLUoxetine Solution 20 milliGRAM(s) Oral daily  folic acid 1 milliGRAM(s) Oral daily  lactated ringers. 1000 milliLiter(s) (150 mL/Hr) IV Continuous <Continuous>  levETIRAcetam  Solution 1000 milliGRAM(s) Oral two times a day  magnesium sulfate  IVPB 1 Gram(s) IV Intermittent daily  melatonin 5 milliGRAM(s) Oral at bedtime  meropenem  IVPB      meropenem  IVPB 1000 milliGRAM(s) IV Intermittent every 8 hours  modafinil 100 milliGRAM(s) Oral <User Schedule>  phytonadione   Solution 5 milliGRAM(s) Oral daily  propranolol 10 milliGRAM(s) Oral every 12 hours  senna Syrup 10 milliLiter(s) Oral daily  spironolactone 25 milliGRAM(s) Oral daily  thiamine 100 milliGRAM(s) Oral daily  vancomycin  IVPB 1000 milliGRAM(s) IV Intermittent every 12 hours  vancomycin  IVPB        MEDICATIONS  (PRN):  acetaminophen    Suspension .. 650 milliGRAM(s) Oral every 6 hours PRN Temp greater or equal to 38.5C (101.3F)  polyethylene glycol 3350 17 Gram(s) Oral two times a day PRN if not BM in the last 24 hours      PHYSICAL EXAM:    Vital Signs Last 24 Hrs  T(C): 39.1 (05 Dec 2018 08:00), Max: 39.8 (05 Dec 2018 04:15)  T(F): 102.4 (05 Dec 2018 08:00), Max: 103.6 (05 Dec 2018 04:15)  HR: 120 (05 Dec 2018 15:13) (111 - 140)  BP: 136/78 (05 Dec 2018 08:00) (92/61 - 144/86)  BP(mean): --  RR: 24 (05 Dec 2018 08:00) (18 - 36)  SpO2: 99% (05 Dec 2018 15:13) (86% - 100%)    General: nonverbal and unresponsive. Resting comfortably. No acute distress.   HEENT: mucous membrane moist. enlarged and protruding tongue edema  Lungs: coarse breath sounds. +trach +tachypneic  CV: +s1/s2. tachycardic  GI: +bowel sound. abdomen soft, non-tender, obese. +PEG.  MSK: No cyanosis. +edema.  Neuro: unresponsive and nonverbal.   Skin: warm and dry.     LABS:                          8.0    4.6   )-----------( 78       ( 05 Dec 2018 05:02 )             24.2     12-05    130<L>  |  96<L>  |  41.0<H>  ----------------------------<  134<H>  3.8   |  21.0<L>  |  0.71    Ca    9.8      05 Dec 2018 05:04  Phos  3.7     12-05  Mg     1.4     12-05    TPro  8.6  /  Alb  2.8<L>  /  TBili  1.6  /  DBili  x   /  AST  349<H>  /  ALT  204<H>  /  AlkPhos  266<H>  12-05    PT/INR - ( 05 Dec 2018 05:03 )   PT: 19.6 sec;   INR: 1.68 ratio         PTT - ( 05 Dec 2018 05:03 )  PTT:32.8 sec  Urinalysis Basic - ( 04 Dec 2018 16:48 )    Color: Yellow / Appearance: Slightly Turbid / S.015 / pH: x  Gluc: x / Ketone: Negative  / Bili: Negative / Urobili: 8 mg/dL   Blood: x / Protein: 100 mg/dL / Nitrite: Negative   Leuk Esterase: Trace / RBC: 25-50 /HPF / WBC 3-5   Sq Epi: x / Non Sq Epi: Few / Bacteria: Few      I&O's Summary    04 Dec 2018 07:01  -  05 Dec 2018 07:00  --------------------------------------------------------  IN: 5010 mL / OUT: 0 mL / NET: 5010 mL        RADIOLOGY & ADDITIONAL STUDIES:      EXAM:  US DPLX LWR EXT VEINS COMPL BI                          PROCEDURE DATE:  2018          INTERPRETATION:  Clinical indication:  Respiratory distress.    Technique:  Grayscale, color Doppler and spectral Doppler ultrasound was   utilized to evaluate bilateral lower extremity deep venous system.      Comparison: None.    Findings: There is no thrombosis in bilateral common femoral veins,   femoral veins or popliteal veins. Visualized calf veins are patent.    Impression:     No evidence of deep vein thrombosis in either lower extremity.         EXAM:  XR CHEST PORTABLE URGENT 1V                          PROCEDURE DATE:  2018          INTERPRETATION:  TECHNIQUE: Single portable view of the chest.    COMPARISON: 2018    CLINICAL HISTORY: Fever, trauma.     FINDINGS:    Single frontal view of the chest demonstrates right basilar atelectasis,   improved. Left lower lobe consolidation, unchanged. The cardiomediastinal   silhouette is normal. No acute osseous abnormalities. Tracheostomy tube.   Low lung volumes.    IMPRESSION: Improved right lower lobe basilar atelectasis. Left lower   lobe consolidation, unchanged.      ADVANCE DIRECTIVES:   DNR YES NO  Completed on:                     MOLST  YES NO   Completed on:  Living Will  YES NO   Completed on:

## 2019-04-15 PROBLEM — Z00.00 ENCOUNTER FOR PREVENTIVE HEALTH EXAMINATION: Status: ACTIVE | Noted: 2019-04-15

## 2020-08-05 NOTE — PROGRESS NOTE ADULT - ASSESSMENT
COUNSELING: 32 y Male who is followed by neurology because of TBI/possible seizure    TBI  Now status post decompressive bilateral hemicraniectomy and extraventricular drain.  EVD removed due to controlled/low ICP  Significant amount of intracranial hemorrhage, improving post decompression.  Multiple areas of parenchymal CVA/ischemia seen on previous CTs  No significant improvement in neuro exam- remains comatose    Possible seizure  EEG: no seizure, breech rhythm over craniectomies, slowing suggesting diffuse cerebral dysfunction.  Rhythmic tongue movements and random foot movement has stopped.  Continue Keppra.    Neurologic status remains unchanged.    Poor prognosis for meaningful recovery. Now DNR    will follow with you    Martell Mendoza MD PhD   [id number]

## 2022-09-27 NOTE — PROGRESS NOTE ADULT - ATTENDING COMMENTS
NSGY Attg:    see above    patient seen and examined    Exam:  GCS 4T  E1 V1T M2  right pupil 5 mm NR  left pupil 3 mm NR  flaps full, soft bilaterally (softer on right than left)    no ICP sustained greater than 20    agree with plan as above; likely poor prognosis for meaningful functional recovery given patient's presenting and persistent post-op GCS despite bilateral decompression and ICPs within normal limits Isotretinoin Pregnancy And Lactation Text: This medication is Pregnancy Category X and is considered extremely dangerous during pregnancy. It is unknown if it is excreted in breast milk.

## 2022-11-22 NOTE — PATIENT PROFILE ADULT - INFORMATION COULD NOT BE OBTAINED DETAILS
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 No  Secondary Emergency Contact: CATHERINE ALVARADO  Mobile Phone: [phone]  Relation: Brother  Preferred language: English   needed? No    Discharge Plan A: Rehab  Discharge Plan B: Home Health      [facility] [place] - [address]  [address]  [place] [address]  Phone: [phone] Fax: [phone]    [id number] - [place] - [address]  [address]  [place] [address]  Phone: [phone] Fax: [phone]      Transferred from:  Home    Past Medical History:   Diagnosis Date    COPD (chronic obstructive pulmonary disease)     Diabetes mellitus, type 2     Hypertension          CM met with patient and Mariann Chou (mother) [phone] in room for Discharge Planning Assessment.  Patient is able to answer some questions.  Per mother, the patient lives with his 13 year old son in a single story house with 3 step(s) to enter and B/L rails.   Per mother, the patient was independent with ADLS and used no dme for ambulation.  Patient will have assistance from his mother and brother upon discharge.   Discharge Planning Booklet given to patient/family and discussed.  All questions addressed.  CM will follow for needs.      Initial Assessment (most recent)       Adult Discharge Assessment - 11/22/22 6717           Discharge Assessment    Assessment Type Discharge Planning Assessment     Confirmed/corrected address, phone number and insurance Yes     Confirmed Demographics Correct on Facesheet     Source of Information patient;family     Communicated ZACARIAS with patient/caregiver Date not available/Unable to determine     Reason For Admission Left-sided nontraumatic intracerebral hemorrhage of brainstem     Lives With child(annamarie), dependent     [place] [facility]     Do you expect to return to your current living situation? Yes     Do you have help at home or someone to help you manage your care at home? Yes     Who are your caregiver(s) and their phone number(s)? Mariann Chou (mother) [phone]     Prior to hospitilization cognitive status: Alert/Oriented     Current cognitive status: Unable to Assess     Walking or Climbing Stairs Difficulty none     Dressing/Bathing Difficulty none     Home Accessibility stairs to enter home     Number of Stairs, Main Entrance three     Stair Railings, Main Entrance railings on both sides of stairs     Home Layout Able to live on 1st floor     Equipment Currently Used at Home none     Readmission within 30 days? No     Patient currently being followed by outpatient case management? No     Do you currently have service(s) that help you manage your care at home? No     Do you take prescription medications? Yes     Do you have prescription coverage? No     Do you have any problems affording any of your prescribed medications? TBD     Is the patient taking medications as prescribed? --   jessa    Who is going to help you get home at discharge? Mariann Chou (mother) [phone]     How do you get to doctors appointments? family or friend will provide;car, drives self     Are you on dialysis? No     Do you take coumadin? No     Discharge Plan A Rehab     Discharge Plan B Home Health     DME Needed Upon Discharge  other (see comments)   tbd    Discharge Plan discussed with:  Patient;Parent(s)     Name(s) and Number(s) Mariann Chou (mother) [phone] (at bedside)     Discharge Barriers Identified None        Relationship/Environment    Name(s) of Who Lives With Patient 13 year old son                     Anithaponce Prince, RN, CCRN-K, [facility]  Neuro-Critical Care   X [phone]                pt is unresponsive no family

## 2022-12-09 NOTE — PROGRESS NOTE ADULT - ASSESSMENT
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

## 2023-02-24 NOTE — PROGRESS NOTE ADULT - ATTENDING COMMENTS
Order has been updated.    Agree with Dr. Vera.   Patient received pain medications a half hour prior to my visit. Exam is unchanged.   Continue Bromocriptine 10mg.   Will follow up and consider adding Provigil for ongoing stimulation in an attempt to improve arousal.

## 2023-07-20 NOTE — PROGRESS NOTE ADULT - ATTENDING COMMENTS
Agree with Dr. Vera.  No significant change.   Extensor posturing with poor maintenance of eye opening.  Will DC Bromo and trial Amantadine.  Continue bedside therapy as well as OOB throughout the day with mobilization by staff to maintain cardiopulmonary function and prevention of secondary complications related to debility. no

## 2024-04-09 NOTE — PROGRESS NOTE ADULT - ASSESSMENT
33 yo Male Subdural hematoma s/p bilateral crani with EVD    Neuro: Continue with tiered therapy. EVD discontinued.  C Collar removed. Continue serial neurologic assessments.      CV: Maintain MAP> 65.     Pulm:  Awaiting speciation of cultures.  Continue vent management.  Pulmonary toilet    GI: Tolerating goal enteral feeds    : Strict I&Os. avoid nephrotoxic agents    ID:  Remains on broad spectrum Abx: Zosyn/Vanco.  Awaiting speciation of BAL.  Tmax 103.1.  Tylenol/cooling methods to maintain euthermia.  Continue monitoring fever curve, leukocytosis    Heme; Serial H/H stable.  SCD    Endo: Target     Lines:  Central line DC'd with rising temp.      Skin: Intact    Dispo; Continue SICU. Critically ill. Live on [place] following. Vaccine status unknown